# Patient Record
Sex: FEMALE | Race: WHITE | NOT HISPANIC OR LATINO | Employment: OTHER | URBAN - METROPOLITAN AREA
[De-identification: names, ages, dates, MRNs, and addresses within clinical notes are randomized per-mention and may not be internally consistent; named-entity substitution may affect disease eponyms.]

---

## 2019-08-30 ENCOUNTER — HOSPITAL ENCOUNTER (OUTPATIENT)
Dept: PREADMISSION TESTING | Facility: HOSPITAL | Age: 79
Discharge: HOME | End: 2019-08-30
Attending: INTERNAL MEDICINE
Payer: MEDICARE

## 2019-08-30 VITALS — WEIGHT: 170 LBS | HEIGHT: 57 IN | BODY MASS INDEX: 36.68 KG/M2

## 2019-08-30 DIAGNOSIS — Z01.812 PRE-PROCEDURE LAB EXAM: Primary | ICD-10-CM

## 2019-08-30 LAB
ALBUMIN SERPL BCP-MCNC: 3.9 G/DL (ref 3.5–5.2)
ALP SERPL-CCNC: 87 U/L (ref 55–135)
ALT SERPL W/O P-5'-P-CCNC: 16 U/L (ref 10–44)
ANION GAP SERPL CALC-SCNC: 13 MMOL/L (ref 8–16)
APTT PPP: 27.5 SEC (ref 26.2–34.7)
AST SERPL-CCNC: 19 U/L (ref 10–40)
BILIRUB SERPL-MCNC: 0.9 MG/DL (ref 0.1–1)
BUN SERPL-MCNC: 20 MG/DL (ref 8–23)
CALCIUM SERPL-MCNC: 9.5 MG/DL (ref 8.7–10.5)
CHLORIDE SERPL-SCNC: 107 MMOL/L (ref 95–110)
CO2 SERPL-SCNC: 24 MMOL/L (ref 23–29)
CREAT SERPL-MCNC: 1 MG/DL (ref 0.5–1.4)
ERYTHROCYTE [DISTWIDTH] IN BLOOD BY AUTOMATED COUNT: 12.8 % (ref 11.5–14.5)
EST. GFR  (AFRICAN AMERICAN): >60 ML/MIN/1.73 M^2
EST. GFR  (NON AFRICAN AMERICAN): 53.7 ML/MIN/1.73 M^2
GLUCOSE SERPL-MCNC: 107 MG/DL (ref 70–110)
HCT VFR BLD AUTO: 42.2 % (ref 37–48.5)
HGB BLD-MCNC: 13.7 G/DL (ref 12–16)
INR PPP: 1
MAGNESIUM SERPL-MCNC: 1.9 MG/DL (ref 1.6–2.6)
MCH RBC QN AUTO: 29.5 PG (ref 27–31)
MCHC RBC AUTO-ENTMCNC: 32.5 G/DL (ref 32–36)
MCV RBC AUTO: 91 FL (ref 82–98)
PLATELET # BLD AUTO: 200 K/UL (ref 150–350)
PMV BLD AUTO: 9.5 FL (ref 9.2–12.9)
POTASSIUM SERPL-SCNC: 4.8 MMOL/L (ref 3.5–5.1)
PROT SERPL-MCNC: 7.1 G/DL (ref 6–8.4)
PROTHROMBIN TIME: 12.8 SEC (ref 11.7–14)
RBC # BLD AUTO: 4.64 M/UL (ref 4–5.4)
SODIUM SERPL-SCNC: 144 MMOL/L (ref 136–145)
WBC # BLD AUTO: 7.7 K/UL (ref 3.9–12.7)

## 2019-08-30 PROCEDURE — 85730 THROMBOPLASTIN TIME PARTIAL: CPT

## 2019-08-30 PROCEDURE — 83735 ASSAY OF MAGNESIUM: CPT

## 2019-08-30 PROCEDURE — 36415 COLL VENOUS BLD VENIPUNCTURE: CPT

## 2019-08-30 PROCEDURE — 80053 COMPREHEN METABOLIC PANEL: CPT

## 2019-08-30 PROCEDURE — 93005 ELECTROCARDIOGRAM TRACING: CPT

## 2019-08-30 PROCEDURE — 85027 COMPLETE CBC AUTOMATED: CPT

## 2019-08-30 PROCEDURE — 85610 PROTHROMBIN TIME: CPT

## 2019-08-30 RX ORDER — METFORMIN HYDROCHLORIDE 500 MG/1
500 TABLET ORAL 2 TIMES DAILY WITH MEALS
Status: ON HOLD | COMMUNITY
End: 2020-02-13 | Stop reason: HOSPADM

## 2019-08-30 RX ORDER — LISINOPRIL 20 MG/1
20 TABLET ORAL DAILY
Status: ON HOLD | COMMUNITY
End: 2020-02-13 | Stop reason: HOSPADM

## 2019-08-30 RX ORDER — NAPROXEN SODIUM 220 MG/1
81 TABLET, FILM COATED ORAL DAILY
Status: ON HOLD | COMMUNITY
End: 2020-02-11

## 2019-09-04 ENCOUNTER — HOSPITAL ENCOUNTER (OUTPATIENT)
Facility: HOSPITAL | Age: 79
Discharge: HOME | End: 2019-09-04
Attending: INTERNAL MEDICINE | Admitting: INTERNAL MEDICINE
Payer: MEDICARE

## 2019-09-04 ENCOUNTER — CLINICAL SUPPORT (OUTPATIENT)
Dept: CARDIOLOGY | Facility: HOSPITAL | Age: 79
End: 2019-09-04
Attending: INTERNAL MEDICINE
Payer: MEDICARE

## 2019-09-04 ENCOUNTER — ANESTHESIA EVENT (OUTPATIENT)
Dept: CARDIOLOGY | Facility: HOSPITAL | Age: 79
End: 2019-09-04
Payer: MEDICARE

## 2019-09-04 ENCOUNTER — ANESTHESIA (OUTPATIENT)
Dept: CARDIOLOGY | Facility: HOSPITAL | Age: 79
End: 2019-09-04
Payer: MEDICARE

## 2019-09-04 VITALS
OXYGEN SATURATION: 98 % | TEMPERATURE: 98 F | SYSTOLIC BLOOD PRESSURE: 125 MMHG | DIASTOLIC BLOOD PRESSURE: 59 MMHG | HEART RATE: 82 BPM | RESPIRATION RATE: 17 BRPM

## 2019-09-04 DIAGNOSIS — I05.0 RHEUMATIC MITRAL STENOSIS: ICD-10-CM

## 2019-09-04 DIAGNOSIS — I35.0 NONRHEUMATIC AORTIC VALVE STENOSIS: ICD-10-CM

## 2019-09-04 DIAGNOSIS — R06.09 DYSPNEA ON EFFORT: ICD-10-CM

## 2019-09-04 LAB — GLUCOSE SERPL-MCNC: 116 MG/DL (ref 70–110)

## 2019-09-04 PROCEDURE — C1894 INTRO/SHEATH, NON-LASER: HCPCS | Performed by: INTERNAL MEDICINE

## 2019-09-04 PROCEDURE — 99153 MOD SED SAME PHYS/QHP EA: CPT | Performed by: INTERNAL MEDICINE

## 2019-09-04 PROCEDURE — 25000003 PHARM REV CODE 250: Performed by: INTERNAL MEDICINE

## 2019-09-04 PROCEDURE — 25000003 PHARM REV CODE 250: Performed by: ANESTHESIOLOGY

## 2019-09-04 PROCEDURE — 99152 MOD SED SAME PHYS/QHP 5/>YRS: CPT | Performed by: INTERNAL MEDICINE

## 2019-09-04 PROCEDURE — C1887 CATHETER, GUIDING: HCPCS | Performed by: INTERNAL MEDICINE

## 2019-09-04 PROCEDURE — 93312 ECHO TRANSESOPHAGEAL: CPT

## 2019-09-04 PROCEDURE — 63600175 PHARM REV CODE 636 W HCPCS: Mod: GZ | Performed by: INTERNAL MEDICINE

## 2019-09-04 PROCEDURE — 63600175 PHARM REV CODE 636 W HCPCS: Performed by: ANESTHESIOLOGY

## 2019-09-04 PROCEDURE — 63600175 PHARM REV CODE 636 W HCPCS: Performed by: INTERNAL MEDICINE

## 2019-09-04 PROCEDURE — 27800903 OPTIME MED/SURG SUP & DEVICES OTHER IMPLANTS: Performed by: INTERNAL MEDICINE

## 2019-09-04 PROCEDURE — C1769 GUIDE WIRE: HCPCS | Performed by: INTERNAL MEDICINE

## 2019-09-04 PROCEDURE — 93571 IV DOP VEL&/PRESS C FLO 1ST: CPT | Performed by: INTERNAL MEDICINE

## 2019-09-04 PROCEDURE — 93454 CORONARY ARTERY ANGIO S&I: CPT | Performed by: INTERNAL MEDICINE

## 2019-09-04 RX ORDER — MIDAZOLAM HYDROCHLORIDE 1 MG/ML
INJECTION INTRAMUSCULAR; INTRAVENOUS
Status: DISCONTINUED | OUTPATIENT
Start: 2019-09-04 | End: 2019-09-04 | Stop reason: HOSPADM

## 2019-09-04 RX ORDER — DIPHENHYDRAMINE HCL 25 MG
25 CAPSULE ORAL ONCE
Status: ACTIVE | OUTPATIENT
Start: 2019-09-04

## 2019-09-04 RX ORDER — NITROGLYCERIN 0.4 MG/1
0.4 TABLET SUBLINGUAL EVERY 5 MIN PRN
Status: DISCONTINUED | OUTPATIENT
Start: 2019-09-04 | End: 2019-09-04 | Stop reason: HOSPADM

## 2019-09-04 RX ORDER — LIDOCAINE HYDROCHLORIDE 20 MG/ML
INJECTION, SOLUTION EPIDURAL; INFILTRATION; INTRACAUDAL; PERINEURAL
Status: DISCONTINUED | OUTPATIENT
Start: 2019-09-04 | End: 2019-09-04

## 2019-09-04 RX ORDER — SODIUM CHLORIDE 450 MG/100ML
INJECTION, SOLUTION INTRAVENOUS CONTINUOUS
Status: DISCONTINUED | OUTPATIENT
Start: 2019-09-04 | End: 2019-09-04 | Stop reason: HOSPADM

## 2019-09-04 RX ORDER — ONDANSETRON 2 MG/ML
4 INJECTION INTRAMUSCULAR; INTRAVENOUS EVERY 12 HOURS PRN
Status: DISCONTINUED | OUTPATIENT
Start: 2019-09-04 | End: 2019-09-04 | Stop reason: HOSPADM

## 2019-09-04 RX ORDER — PROPOFOL 10 MG/ML
VIAL (ML) INTRAVENOUS
Status: DISCONTINUED | OUTPATIENT
Start: 2019-09-04 | End: 2019-09-04

## 2019-09-04 RX ORDER — VERAPAMIL HYDROCHLORIDE 2.5 MG/ML
INJECTION, SOLUTION INTRAVENOUS
Status: DISCONTINUED | OUTPATIENT
Start: 2019-09-04 | End: 2019-09-04 | Stop reason: HOSPADM

## 2019-09-04 RX ORDER — DIAZEPAM 5 MG/1
5 TABLET ORAL
Status: ACTIVE | OUTPATIENT
Start: 2019-09-04

## 2019-09-04 RX ORDER — LIDOCAINE HYDROCHLORIDE 10 MG/ML
INJECTION, SOLUTION EPIDURAL; INFILTRATION; INTRACAUDAL; PERINEURAL
Status: DISCONTINUED | OUTPATIENT
Start: 2019-09-04 | End: 2019-09-04 | Stop reason: HOSPADM

## 2019-09-04 RX ORDER — LABETALOL HYDROCHLORIDE 5 MG/ML
INJECTION, SOLUTION INTRAVENOUS
Status: DISCONTINUED | OUTPATIENT
Start: 2019-09-04 | End: 2019-09-04

## 2019-09-04 RX ORDER — ASPIRIN 325 MG
325 TABLET, DELAYED RELEASE (ENTERIC COATED) ORAL ONCE
Status: ACTIVE | OUTPATIENT
Start: 2019-09-04

## 2019-09-04 RX ORDER — NITROGLYCERIN 5 MG/ML
INJECTION, SOLUTION INTRAVENOUS
Status: DISCONTINUED | OUTPATIENT
Start: 2019-09-04 | End: 2019-09-04 | Stop reason: HOSPADM

## 2019-09-04 RX ORDER — HEPARIN SODIUM 1000 [USP'U]/ML
INJECTION, SOLUTION INTRAVENOUS; SUBCUTANEOUS
Status: DISCONTINUED | OUTPATIENT
Start: 2019-09-04 | End: 2019-09-04 | Stop reason: HOSPADM

## 2019-09-04 RX ORDER — ACETAMINOPHEN 325 MG/1
650 TABLET ORAL EVERY 4 HOURS PRN
Status: DISCONTINUED | OUTPATIENT
Start: 2019-09-04 | End: 2019-09-04 | Stop reason: HOSPADM

## 2019-09-04 RX ORDER — SODIUM CHLORIDE 9 MG/ML
INJECTION, SOLUTION INTRAVENOUS CONTINUOUS
Status: ACTIVE | OUTPATIENT
Start: 2019-09-04

## 2019-09-04 RX ORDER — MORPHINE SULFATE 2 MG/ML
2 INJECTION, SOLUTION INTRAMUSCULAR; INTRAVENOUS
Status: DISCONTINUED | OUTPATIENT
Start: 2019-09-04 | End: 2019-09-04 | Stop reason: HOSPADM

## 2019-09-04 RX ORDER — HYDROCODONE BITARTRATE AND ACETAMINOPHEN 5; 325 MG/1; MG/1
1 TABLET ORAL EVERY 4 HOURS PRN
Status: DISCONTINUED | OUTPATIENT
Start: 2019-09-04 | End: 2019-09-04 | Stop reason: HOSPADM

## 2019-09-04 RX ORDER — FENTANYL CITRATE 50 UG/ML
INJECTION, SOLUTION INTRAMUSCULAR; INTRAVENOUS
Status: DISCONTINUED | OUTPATIENT
Start: 2019-09-04 | End: 2019-09-04 | Stop reason: HOSPADM

## 2019-09-04 RX ORDER — DIPHENHYDRAMINE HYDROCHLORIDE 50 MG/ML
INJECTION INTRAMUSCULAR; INTRAVENOUS
Status: DISCONTINUED | OUTPATIENT
Start: 2019-09-04 | End: 2019-09-04 | Stop reason: HOSPADM

## 2019-09-04 RX ADMIN — SODIUM CHLORIDE: 0.9 INJECTION, SOLUTION INTRAVENOUS at 08:09

## 2019-09-04 RX ADMIN — PROPOFOL 20 MG: 10 INJECTION, EMULSION INTRAVENOUS at 08:09

## 2019-09-04 RX ADMIN — LIDOCAINE HYDROCHLORIDE 40 MG: 20 INJECTION, SOLUTION EPIDURAL; INFILTRATION; INTRACAUDAL; PERINEURAL at 08:09

## 2019-09-04 RX ADMIN — LABETALOL HYDROCHLORIDE 5 MG: 5 INJECTION INTRAVENOUS at 08:09

## 2019-09-04 RX ADMIN — LIDOCAINE HYDROCHLORIDE 60 MG: 20 INJECTION, SOLUTION EPIDURAL; INFILTRATION; INTRACAUDAL; PERINEURAL at 08:09

## 2019-09-04 RX ADMIN — SODIUM CHLORIDE: 0.45 INJECTION, SOLUTION INTRAVENOUS at 11:09

## 2019-09-04 RX ADMIN — SODIUM CHLORIDE: 0.9 INJECTION, SOLUTION INTRAVENOUS at 06:09

## 2019-09-04 RX ADMIN — PROPOFOL 30 MG: 10 INJECTION, EMULSION INTRAVENOUS at 08:09

## 2019-09-04 NOTE — ANESTHESIA POSTPROCEDURE EVALUATION
Anesthesia Post Evaluation    Patient: Roxanne Hernandez    Procedure(s) Performed: Procedure(s) (LRB):  CATHETERIZATION, HEART, LEFT (Left)    Final Anesthesia Type: MAC  Patient location during evaluation: PACU  Patient participation: Yes- Able to Participate  Level of consciousness: awake and alert and oriented  Post-procedure vital signs: reviewed and stable  Pain management: adequate  Airway patency: patent  PONV status at discharge: No PONV  Anesthetic complications: no      Cardiovascular status: blood pressure returned to baseline and hemodynamically stable  Respiratory status: unassisted, spontaneous ventilation and nasal cannula  Hydration status: euvolemic  Follow-up not needed.          Vitals Value Taken Time   /60 9/4/2019  8:38 AM   Temp 36.4 °C (97.5 °F) 9/4/2019  8:38 AM   Pulse 84 9/4/2019  8:38 AM   Resp 20 9/4/2019  8:38 AM   SpO2 98 % 9/4/2019  8:38 AM         No case tracking events are documented in the log.      Pain/Paula Score: Paula Score: 10 (9/4/2019  7:59 AM)

## 2019-09-04 NOTE — PROCEDURES
Indication:   Mitral stenosis, aortic stenosis and regurgitation    Counseling:   Patient as well as her family member were informed of the risks, benefits as well as alternatives to the procedure and informed consent was obtained.     Procedure:  After obtaining informed consent and sedated with Anesthesia help a VICKY probe was advanced without difficulty and images were obtained. Patient tolerated the procedure well and no immediate complications were noted.     Complications:   None immediate    Findings:  1. Aortic valve: Heavily calcified with restricted leaflet motion. Moderate aortic regurgitation.   2. Mitral valve: Thick and calcified with restricted leaflet motion. Mild mitral stenosis and regurgitation.   3. Tricuspid valve: Is structurally normal with good leaflet excursion. Mild regurgitation.  4. Pulmonary valve is structurally normal with good leaflet excursion. No significant regurgitation.  5. Overall LVEF appears normal.   6. No obvious shunt across the interatrial septum by color flow Doppler.     Conclusions:  1. Mild mitral stenosis.   2. Moderate aortic regurgitation.   3. Known severe aortic stenosis on previous TTE.

## 2019-09-04 NOTE — PLAN OF CARE
Verbalizes understanding of discharge instructions, angio site care, and follow up care. Belongings gathered and dressed in personal clothing. Left via wheelchair to private auto accompanied family.

## 2019-09-04 NOTE — Clinical Note
Catheter is inserted into the ostial  left coronary artery. Angiography performed of the left coronary arteries in multiple views. Angiography performed via power injection with 8 mL contrast at 4 mL/s.

## 2019-09-04 NOTE — ANESTHESIA PREPROCEDURE EVALUATION
09/04/2019  Roxanne Hernandez is a 79 y.o., female.    Anesthesia Evaluation    I have reviewed the Patient Summary Reports.    I have reviewed the Nursing Notes.   I have reviewed the Medications.     Review of Systems  Anesthesia Hx:  No problems with previous Anesthesia    Social:  Former Smoker, No Alcohol Use    Hematology/Oncology:  Hematology Normal       -- Cancer in past history (cervical s/p dung):    EENT/Dental:EENT/Dental Normal   Pulmonary:   Shortness of breath (related to Valve Dz)    Renal/:  Renal/ Normal     Hepatic/GI:   GERD, well controlled    Musculoskeletal:  Musculoskeletal Normal    Neurological:  Neurology Normal    Endocrine:   Diabetes (Blood Glucose this am 116)    Dermatological:  Skin Normal    Psych:  Psychiatric Normal           Physical Exam  General:  Well nourished, Obesity    Airway/Jaw/Neck:  Airway Findings: Mouth Opening: Normal Tongue: Normal  General Airway Assessment: Adult  Mallampati: III  Improves to II with phonation.  TM Distance: Normal, at least 6 cm  Jaw/Neck Findings:     Neck ROM: Normal ROM      Dental:  Dental Findings: Edentulous   Chest/Lungs:  Chest/Lungs Findings: Clear to auscultation, Normal Respiratory Rate     Heart/Vascular:  Heart Findings: Rate: Normal  Rhythm: Regular Rhythm  Sounds: Normal  Heart Murmur  Systolic, Diastolic        Mental Status:  Mental Status Findings:  Cooperative, Alert and Oriented         Anesthesia Plan  Type of Anesthesia, risks & benefits discussed:  Anesthesia Type:  MAC  Patient's Preference:   Intra-op Monitoring Plan: standard ASA monitors  Intra-op Monitoring Plan Comments:   Post Op Pain Control Plan: per primary service following discharge from PACU  Post Op Pain Control Plan Comments:   Induction:   IV  Beta Blocker:  Patient is not currently on a Beta-Blocker (No further documentation required).       Informed  Consent: Patient understands risks and agrees with Anesthesia plan.  Questions answered. Anesthesia consent signed with patient.  ASA Score: 4     Day of Surgery Review of History & Physical:            Ready For Surgery From Anesthesia Perspective.

## 2019-09-04 NOTE — Clinical Note
70 ml injected throughout the case. 80 mL total wasted during the case. 150 mL total used in the case.

## 2019-09-04 NOTE — TRANSFER OF CARE
Anesthesia Transfer of Care Note    Patient: Roxanne Hernandez    Procedure(s) Performed: Procedure(s) (LRB):  CATHETERIZATION, HEART, LEFT (Left)    Patient location: Other: heart center    Anesthesia Type: MAC    Transport from OR: Transported from OR on 2-3 L/min O2 by NC with adequate spontaneous ventilation    Post pain: adequate analgesia    Post assessment: no apparent anesthetic complications    Post vital signs: stable    Level of consciousness: awake, alert and oriented    Complications: none    Transfer of care protocol was followed      Last vitals:   Visit Vitals  /60 (BP Location: Left arm, Patient Position: Lying)   Pulse 84   Temp 36.4 °C (97.5 °F) (Oral)   Resp 20   SpO2 98%   Breastfeeding? No

## 2019-09-04 NOTE — Clinical Note
Catheter is repositioned to the ostium   right coronary artery. Angiography performed of the right coronary arteries in multiple views. Angiography performed via power injection with 6 mL contrast at 3 mL/s.

## 2019-09-04 NOTE — INTERVAL H&P NOTE
The patient has been examined and the H&P has been reviewed:    I concur with the findings and no changes have occurred since H&P was written.    Anesthesia/Surgery risks, benefits and alternative options discussed and understood by patient/family.          Active Hospital Problems    Diagnosis  POA    Nonrheumatic aortic valve stenosis [I35.0]  Yes     Priority: High      Resolved Hospital Problems   No resolved problems to display.

## 2019-09-04 NOTE — Clinical Note
Prepped: right groin and right radial. Prepped with: chlorhexidine. The site was clipped. The patient was draped.

## 2019-09-19 DIAGNOSIS — I35.0 NONRHEUMATIC AORTIC VALVE STENOSIS: Primary | ICD-10-CM

## 2019-10-31 LAB
E WAVE DECELERATION TIME: 269.29 MSEC
PISA TR MAX VEL: 3.25 M/S
TR MAX PG: 42 MMHG

## 2019-11-11 ENCOUNTER — TELEPHONE (OUTPATIENT)
Dept: GASTROENTEROLOGY | Facility: CLINIC | Age: 79
End: 2019-11-11

## 2019-11-11 ENCOUNTER — HOSPITAL ENCOUNTER (OUTPATIENT)
Dept: CARDIOLOGY | Facility: CLINIC | Age: 79
Discharge: HOME | End: 2019-11-11
Attending: INTERNAL MEDICINE
Payer: MEDICARE

## 2019-11-11 ENCOUNTER — HOSPITAL ENCOUNTER (OUTPATIENT)
Dept: PULMONOLOGY | Facility: CLINIC | Age: 79
Discharge: HOME | End: 2019-11-11
Payer: MEDICARE

## 2019-11-11 ENCOUNTER — OFFICE VISIT (OUTPATIENT)
Dept: CARDIOLOGY | Facility: CLINIC | Age: 79
End: 2019-11-11
Payer: MEDICARE

## 2019-11-11 ENCOUNTER — TELEPHONE (OUTPATIENT)
Dept: UROLOGY | Facility: CLINIC | Age: 79
End: 2019-11-11

## 2019-11-11 ENCOUNTER — HOSPITAL ENCOUNTER (OUTPATIENT)
Dept: CARDIOLOGY | Facility: CLINIC | Age: 79
Discharge: HOME | End: 2019-11-11
Payer: MEDICARE

## 2019-11-11 ENCOUNTER — HOSPITAL ENCOUNTER (OUTPATIENT)
Dept: RADIOLOGY | Facility: HOSPITAL | Age: 79
Discharge: HOME | End: 2019-11-11
Attending: INTERNAL MEDICINE
Payer: MEDICARE

## 2019-11-11 VITALS
WEIGHT: 177.5 LBS | BODY MASS INDEX: 37.26 KG/M2 | HEIGHT: 58 IN | OXYGEN SATURATION: 98 % | SYSTOLIC BLOOD PRESSURE: 156 MMHG | DIASTOLIC BLOOD PRESSURE: 86 MMHG | HEART RATE: 115 BPM

## 2019-11-11 VITALS — HEIGHT: 57 IN | WEIGHT: 176 LBS | BODY MASS INDEX: 37.97 KG/M2

## 2019-11-11 VITALS
DIASTOLIC BLOOD PRESSURE: 72 MMHG | HEART RATE: 98 BPM | SYSTOLIC BLOOD PRESSURE: 148 MMHG | WEIGHT: 176 LBS | HEIGHT: 57 IN | BODY MASS INDEX: 37.97 KG/M2

## 2019-11-11 DIAGNOSIS — I34.2 NONRHEUMATIC MITRAL VALVE STENOSIS: ICD-10-CM

## 2019-11-11 DIAGNOSIS — I35.0 NONRHEUMATIC AORTIC VALVE STENOSIS: ICD-10-CM

## 2019-11-11 DIAGNOSIS — N20.1 LEFT URETERAL STONE: Primary | ICD-10-CM

## 2019-11-11 DIAGNOSIS — E11.9 DIABETES MELLITUS WITHOUT COMPLICATION: ICD-10-CM

## 2019-11-11 DIAGNOSIS — I35.1 NONRHEUMATIC AORTIC VALVE INSUFFICIENCY: ICD-10-CM

## 2019-11-11 DIAGNOSIS — N13.30 HYDRONEPHROSIS OF LEFT KIDNEY: ICD-10-CM

## 2019-11-11 DIAGNOSIS — I10 ESSENTIAL HYPERTENSION: ICD-10-CM

## 2019-11-11 DIAGNOSIS — K83.8 PNEUMOBILIA: ICD-10-CM

## 2019-11-11 DIAGNOSIS — N13.2 HYDRONEPHROSIS WITH URINARY OBSTRUCTION DUE TO URETERAL CALCULUS: ICD-10-CM

## 2019-11-11 DIAGNOSIS — I35.0 NONRHEUMATIC AORTIC VALVE STENOSIS: Primary | ICD-10-CM

## 2019-11-11 LAB
ASCENDING AORTA: 2.83 CM
AV INDEX (PROSTH): 0.29
AV MEAN GRADIENT: 53 MMHG
AV PEAK GRADIENT: 85 MMHG
AV REGURGITATION PRESSURE HALF TIME: 300 MS
AV VALVE AREA: 0.82 CM2
AV VELOCITY RATIO: 0.34
BSA FOR ECHO PROCEDURE: 1.79 M2
CV ECHO LV RWT: 0.87 CM
DLCO ADJ PRE: 13.18 ML/(MIN*MMHG) (ref 9.94–21.41)
DLCO SINGLE BREATH LLN: 9.94
DLCO SINGLE BREATH PRE REF: 84.1 %
DLCO SINGLE BREATH REF: 15.68
DLCOC SBVA LLN: 2.28
DLCOC SBVA PRE REF: 113 %
DLCOC SBVA REF: 4.15
DLCOC SINGLE BREATH LLN: 9.94
DLCOC SINGLE BREATH PRE REF: 84.1 %
DLCOC SINGLE BREATH REF: 15.68
DLCOCSBVAULN: 6.01
DLCOCSINGLEBREATHULN: 21.41
DLCOSINGLEBREATHULN: 21.41
DLCOVA LLN: 2.28
DLCOVA PRE REF: 113 %
DLCOVA PRE: 4.69 ML/(MIN*MMHG*L) (ref 2.28–6.01)
DLCOVA REF: 4.15
DLCOVAULN: 6.01
DLVAADJ PRE: 4.69 ML/(MIN*MMHG*L) (ref 2.28–6.01)
DOP CALC AO PEAK VEL: 4.6 M/S
DOP CALC AO VTI: 103.17 CM
DOP CALC LVOT AREA: 2.8 CM2
DOP CALC LVOT DIAMETER: 1.9 CM
DOP CALC LVOT PEAK VEL: 1.55 M/S
DOP CALC LVOT STROKE VOLUME: 85.02 CM3
DOP CALCLVOT PEAK VEL VTI: 30 CM
E WAVE DECELERATION TIME: 316.28 MSEC
E/A RATIO: 0.72
E/E' RATIO: 38.5 M/S
ECHO LV POSTERIOR WALL: 1.35 CM (ref 0.6–1.1)
FEF 25 75 LLN: 0.56
FEF 25 75 PRE REF: 68.2 %
FEF 25 75 REF: 1.34
FEV1 FVC LLN: 63
FEV1 FVC PRE REF: 95 %
FEV1 FVC REF: 78
FEV1 LLN: 1.07
FEV1 PRE REF: 76.3 %
FEV1 REF: 1.53
FRACTIONAL SHORTENING: 24 % (ref 28–44)
FVC LLN: 1.39
FVC PRE REF: 79.7 %
FVC REF: 1.98
HR TR ECHO: 92 BPM
INTERVENTRICULAR SEPTUM: 1.25 CM (ref 0.6–1.1)
IVC PRE: 1.62 L (ref 1.39–2.56)
IVC SINGLE BREATH LLN: 1.39
IVC SINGLE BREATH PRE REF: 82.1 %
IVC SINGLE BREATH REF: 1.98
IVCSINGLEBREATHULN: 2.56
LA MAJOR: 5.42 CM
LA MINOR: 5.36 CM
LA WIDTH: 4.39 CM
LEFT ATRIUM SIZE: 4.55 CM
LEFT ATRIUM VOLUME INDEX: 53.7 ML/M2
LEFT ATRIUM VOLUME: 91.51 CM3
LEFT INTERNAL DIMENSION IN SYSTOLE: 2.37 CM (ref 2.1–4)
LEFT VENTRICLE DIASTOLIC VOLUME INDEX: 22.51 ML/M2
LEFT VENTRICLE DIASTOLIC VOLUME: 38.34 ML
LEFT VENTRICLE MASS INDEX: 77 G/M2
LEFT VENTRICLE SYSTOLIC VOLUME INDEX: 11.4 ML/M2
LEFT VENTRICLE SYSTOLIC VOLUME: 19.44 ML
LEFT VENTRICULAR INTERNAL DIMENSION IN DIASTOLE: 3.11 CM (ref 3.5–6)
LEFT VENTRICULAR MASS: 130.47 G
LV LATERAL E/E' RATIO: 38.5 M/S
LV SEPTAL E/E' RATIO: 38.5 M/S
MV MEAN GRADIENT: 9 MMHG
MV PEAK A VEL: 2.14 M/S
MV PEAK E VEL: 1.54 M/S
MV PEAK GRADIENT: 16 MMHG
MVV LLN: 39
MVV PRE REF: 56.4 %
MVV REF: 46
PEF LLN: 2.52
PEF PRE REF: 65.5 %
PEF REF: 3.88
PISA TR MAX VEL: 2.99 M/S
PRE DLCO: 13.18 ML/(MIN*MMHG) (ref 9.94–21.41)
PRE FEF 25 75: 0.91 L/S (ref 0.56–2.11)
PRE FET 100: 7.18 SEC
PRE FEV1 FVC: 74.08 % (ref 63.27–92.76)
PRE FEV1: 1.17 L (ref 1.07–1.98)
PRE FEV5: 0.9 L
PRE FVC: 1.57 L (ref 1.39–2.56)
PRE MVV: 26 L/MIN (ref 39.19–53.02)
PRE PEF: 2.54 L/S (ref 2.52–5.24)
PULM VEIN S/D RATIO: 2.24
PV PEAK D VEL: 0.33 M/S
PV PEAK S VEL: 0.74 M/S
RA MAJOR: 4.77 CM
RA PRESSURE: 3 MMHG
RA WIDTH: 2.83 CM
RIGHT VENTRICULAR END-DIASTOLIC DIMENSION: 2.72 CM
RV TISSUE DOPPLER FREE WALL SYSTOLIC VELOCITY 1 (APICAL 4 CHAMBER VIEW): 9.62 CM/S
SINUS: 2.55 CM
STJ: 2.18 CM
TDI LATERAL: 0.04 M/S
TDI SEPTAL: 0.04 M/S
TDI: 0.04 M/S
TR MAX PG: 36 MMHG
TRICUSPID ANNULAR PLANE SYSTOLIC EXCURSION: 2.04 CM
TV REST PULMONARY ARTERY PRESSURE: 39 MMHG
VA PRE: 2.81 L (ref 3.63–3.63)
VA SINGLE BREATH LLN: 3.63
VA SINGLE BREATH PRE REF: 77.5 %
VA SINGLE BREATH REF: 3.63
VASINGLEBREATHULN: 3.63

## 2019-11-11 PROCEDURE — 99204 PR OFFICE/OUTPT VISIT, NEW, LEVL IV, 45-59 MIN: ICD-10-PCS | Mod: S$PBB,,, | Performed by: INTERNAL MEDICINE

## 2019-11-11 PROCEDURE — 25500020 PHARM REV CODE 255: Performed by: INTERNAL MEDICINE

## 2019-11-11 PROCEDURE — 94618 PULMONARY STRESS TESTING: ICD-10-PCS | Mod: 26,S$PBB,, | Performed by: INTERNAL MEDICINE

## 2019-11-11 PROCEDURE — 99213 OFFICE O/P EST LOW 20 MIN: CPT | Mod: PBBFAC,25

## 2019-11-11 PROCEDURE — 94010 BREATHING CAPACITY TEST: CPT | Mod: PBBFAC | Performed by: INTERNAL MEDICINE

## 2019-11-11 PROCEDURE — 94618 PULMONARY STRESS TESTING: CPT | Mod: 26,S$PBB,, | Performed by: INTERNAL MEDICINE

## 2019-11-11 PROCEDURE — 93010 EKG 12-LEAD: ICD-10-PCS | Mod: S$PBB,,, | Performed by: INTERNAL MEDICINE

## 2019-11-11 PROCEDURE — 94618 PULMONARY STRESS TESTING: CPT | Mod: PBBFAC | Performed by: INTERNAL MEDICINE

## 2019-11-11 PROCEDURE — 94729 DIFFUSING CAPACITY: CPT | Mod: 26,S$PBB,, | Performed by: INTERNAL MEDICINE

## 2019-11-11 PROCEDURE — 94729 DIFFUSING CAPACITY: CPT | Mod: PBBFAC | Performed by: INTERNAL MEDICINE

## 2019-11-11 PROCEDURE — 99999 PR PBB SHADOW E&M-EST. PATIENT-LVL III: ICD-10-PCS | Mod: PBBFAC,,,

## 2019-11-11 PROCEDURE — 74174 CTA CARDIAC TAVR_PARTNERS (XPD): ICD-10-PCS | Mod: 26,,, | Performed by: RADIOLOGY

## 2019-11-11 PROCEDURE — 94010 BREATHING CAPACITY TEST: ICD-10-PCS | Mod: 26,S$PBB,, | Performed by: INTERNAL MEDICINE

## 2019-11-11 PROCEDURE — 93010 ELECTROCARDIOGRAM REPORT: CPT | Mod: S$PBB,,, | Performed by: INTERNAL MEDICINE

## 2019-11-11 PROCEDURE — 71275 CTA CARDIAC TAVR_PARTNERS (XPD): ICD-10-PCS | Mod: 26,,, | Performed by: RADIOLOGY

## 2019-11-11 PROCEDURE — 74174 CTA ABD&PLVS W/CONTRAST: CPT | Mod: 26,,, | Performed by: RADIOLOGY

## 2019-11-11 PROCEDURE — 94010 BREATHING CAPACITY TEST: CPT | Mod: 26,S$PBB,, | Performed by: INTERNAL MEDICINE

## 2019-11-11 PROCEDURE — 71275 CT ANGIOGRAPHY CHEST: CPT | Mod: TC

## 2019-11-11 PROCEDURE — 93306 TTE W/DOPPLER COMPLETE: CPT | Mod: PBBFAC | Performed by: INTERNAL MEDICINE

## 2019-11-11 PROCEDURE — 94729 PR C02/MEMBANE DIFFUSE CAPACITY: ICD-10-PCS | Mod: 26,S$PBB,, | Performed by: INTERNAL MEDICINE

## 2019-11-11 PROCEDURE — 71275 CT ANGIOGRAPHY CHEST: CPT | Mod: 26,,, | Performed by: RADIOLOGY

## 2019-11-11 PROCEDURE — 93005 ELECTROCARDIOGRAM TRACING: CPT | Mod: PBBFAC | Performed by: INTERNAL MEDICINE

## 2019-11-11 PROCEDURE — 93306 ECHO (CUPID ONLY): ICD-10-PCS | Mod: 26,S$PBB,, | Performed by: INTERNAL MEDICINE

## 2019-11-11 PROCEDURE — 99999 PR PBB SHADOW E&M-EST. PATIENT-LVL III: CPT | Mod: PBBFAC,,,

## 2019-11-11 PROCEDURE — 99204 OFFICE O/P NEW MOD 45 MIN: CPT | Mod: S$PBB,,, | Performed by: INTERNAL MEDICINE

## 2019-11-11 RX ADMIN — IOHEXOL 100 ML: 350 INJECTION, SOLUTION INTRAVENOUS at 01:11

## 2019-11-11 NOTE — TELEPHONE ENCOUNTER
CT  11/11/19  Kidneys are normal in size and location.  Multiple nonobstructing right renal stones identified with the largest measuring at 1.2 cm.  There is severe left-sided hydronephrosis with severe dilatation of the left ureter throughout its course with a large obstructing stone at the left UVJ measuring 2.6 cm (series 601, image 85).  There is mild perinephric stranding surrounding the left kidney.     Right ureter is normal in course to the urinary bladder. There is apparent dilatation of the distal right ureter into the UVJ without apparent obstructing process.  Prior hysterectomy calcifications in the right adnexa and surgical clips.    Left ureteral stone  -     Urine culture; Future; Expected date: 11/11/2019  -     X-Ray Abdomen AP 1 View; Future; Expected date: 11/11/2019    Hydronephrosis of left kidney  -     Urine culture; Future; Expected date: 11/11/2019  -     X-Ray Abdomen AP 1 View; Future; Expected date: 11/11/2019    please schedule her for cysto bilateral RPGs, left ureteral stent placement this Weds.  Thank you.

## 2019-11-11 NOTE — TELEPHONE ENCOUNTER
Left ureteral stone  -     Case Request Operating Room: CYSTOSCOPY, WITH RETROGRADE PYELOGRAM, CYSTOSCOPY, WITH URETERAL STENT INSERTION    Hydronephrosis of left kidney  -     Case Request Operating Room: CYSTOSCOPY, WITH RETROGRADE PYELOGRAM, CYSTOSCOPY, WITH URETERAL STENT INSERTION

## 2019-11-11 NOTE — PROGRESS NOTES
Cardiology Clinic Note  Reason for Visit: Severe AS  Referring MD: Jett    HPI:     Roxanne Hernandez is a 79 y.o. female referred by Dr Frausto for evaluation of severe AS (NYHA Class III sx). Pt noticed worsening RENDON for last 6 months. Now progressed to the point she gets SOB with mopping floor. No CP, syncope, orthopnea, or admits for CHF. Doesn't restrict salt and not on lasix. +LE edema. PCP ordered echo and then referred to cardiologist. EF normal. Noted to have mod AI and mod MS along with severe AS. Had LHC by Dr. Frausto 9/2019 with nonobstructive CAD    The patient has undergone the following TAVR work-up:   ECHO (Date 11/11/19): NICOLE 0.82 cm2; peak velocity is 4.6 m/s; mean gradient is 53 mmHg EF= 70%. Pt also has mod AI and Mod MS from [facility]  LH (Date 9/4/19): Nonobstructive CAD doen by DR. Frausto. Disc in folder  STS: 3.1%   Frailty: 1/4(Hand )  Iliacs are >5.9 on R and > 7.08 on L   LVOT: (20.7 mm X 17.2 mm) and Avg Diameter is 18.7   Incidental findings on CT: severe left-sided hydronephrosis with severe dilatation of the left ureter throughout its course with a large obstructing stone at the left UVJ measuring 2.6 cm. Significant pneumobilia in the left hepatic lobe with air within the common bile duct.  CT Surgery risk assessment: Pending  Rhythm issues: NSR  PFTs: FEV1 76% predicted, DLCO 84% predicted.  Comorbidities: HTN, DM      Roxanne Hernandez is a 23-26mm Evolute Valve given LVOT Ca+ via L TF  access.       ROS:    Constitution: Negative for fever or chills. Negative for weight loss or gain.   HENT: Negative for sore throat or headaches. Negative for rhinorrhea.  Eyes: Negative for blurred or double vision.   Cardiovascular: See above  Pulmonary: +SOB. Negative for cough.   Gastrointestinal: Negative for abdominal pain. Negative for nausea/ vomiting. Negative for diarrhea.   : Negative for dysuria.   Neurological: Negative for focal weakness or sensory changes.  PMH:     Past  Medical History:   Diagnosis Date    Cervical cancer     Diabetes mellitus     Hypertension      Past Surgical History:   Procedure Laterality Date    CARDIAC CATHETERIZATION      CHOLECYSTECTOMY      CORONARY ANGIOGRAPHY Left 9/4/2019    Procedure: ANGIOGRAM, CORONARY ARTERY;  Surgeon: Carmen Yepez MD;  Location: [facility] CATH/EP LAB;  Service: Cardiology;  Laterality: Left;    HYSTERECTOMY       Allergies:     Review of patient's allergies indicates:   Allergen Reactions    Azithromycin Swelling     All mycins    Statins-hmg-coa reductase inhibitors Other (See Comments)     Liver function     Medications:     Current Outpatient Medications on File Prior to Visit   Medication Sig Dispense Refill    aspirin 81 MG Chew Take 81 mg by mouth once daily.      Lactobac no.41/Bifidobact no.7 (PROBIOTIC-10 ORAL) Take by mouth once daily.      lisinopril (PRINIVIL,ZESTRIL) 20 MG tablet Take 20 mg by mouth once daily.      metFORMIN (GLUCOPHAGE) 500 MG tablet Take 500 mg by mouth 2 (two) times daily with meals.       Current Facility-Administered Medications on File Prior to Visit   Medication Dose Route Frequency Provider Last Rate Last Dose    0.9%  NaCl infusion   Intravenous Continuous Carmen Yepez MD   Stopped at 09/04/19 1141    aspirin EC tablet 325 mg  325 mg Oral Once Carmen Yepez MD        diazePAM tablet 5 mg  5 mg Oral On Call Procedure Carmen Yeepz MD        diphenhydrAMINE capsule 25 mg  25 mg Oral Once Carmen Yepez MD        [COMPLETED] iohexol (OMNIPAQUE 350) injection 100 mL  100 mL Intravenous ONCE PRN Herbert Ashley MD   100 mL at 11/11/19 1343     Social History:     Social History     Tobacco Use    Smoking status: Former Smoker    Smokeless tobacco: Never Used   Substance Use Topics    Alcohol use: Not Currently     Family History:     Family History   Problem Relation Age of Onset    Hypertension Father     Cancer Maternal  "Grandfather     Cancer Paternal Grandfather      Physical Exam:   BP (!) 156/86 (BP Location: Left arm, Patient Position: Sitting, BP Method: Large (Manual))   Pulse (!) 115   Ht 4' 9.87" (1.47 m)   Wt 80.5 kg (177 lb 7.5 oz)   SpO2 98%   BMI 37.25 kg/m²      Constitutional: No acute distress, conversant  HEENT: Sclera anicteric, Pupils equal, round and reactive to light, extraocular motions intact, Oropharynx clear  Neck: No JVD, no carotid bruits  Cardiovascular: regular rate and rhythm, 3/6 late peaking systolic murmur at RUSB  Pulmonary: Clear to auscultation bilaterally  Abdominal: Abdomen soft, nontender, nondistended, positive bowel sounds  Extremities: No lower extremity edema,   Pulses: 2+ BL Radial, 2+ BL carotid, 1+ BL DP, 1+ BL PT  Skin: No ecchymosis, erythema, or ulcers  Psych: Alert and oriented x 3, appropriate affect  Neuro: CNII-XII intact, no focal deficits    Labs:     Lab Results   Component Value Date     11/11/2019    K 4.2 11/11/2019     11/11/2019    CO2 25 11/11/2019    BUN 15 11/11/2019    CREATININE 0.9 11/11/2019    ANIONGAP 6 (L) 11/11/2019     Lab Results   Component Value Date    AST 19 08/30/2019    ALT 16 08/30/2019    ALKPHOS 87 08/30/2019    BILITOT 0.9 08/30/2019    ALBUMIN 3.6 11/11/2019     Lab Results   Component Value Date    CALCIUM 9.5 11/11/2019    MG 1.9 08/30/2019     No results found for: BNP, BNPTRIAGEBLO Lab Results   Component Value Date    WBC 7.90 11/11/2019    HGB 14.5 11/11/2019    HCT 46.0 11/11/2019     11/11/2019     Lab Results   Component Value Date    INR 1.0 08/30/2019     No results found for: CHOL, HDL, LDLCALC, TRIG  No results found for: HGBA1C  No results found for: TSH, U3FIYYJ       Imaging:        No results found for: EF    EKG: NSR  Assessment:     1. Nonrheumatic aortic valve stenosis   The patient has undergone the following TAVR work-up:   ECHO (Date 11/11/19): NICOLE 0.82 cm2; peak velocity is 4.6 m/s; mean gradient is " 53 mmHg EF= 70%. Pt also has mod AI and Mod MS [facility] (Date 9/4/19): Nonobstructive CAD doen by DR. Frausto. Disc in folder  STS: 3.1%   Frailty: 1/4(Hand )  Iliacs are >5.9 on R and > 7.08 on L   LVOT: (20.7 mm X 17.2 mm) and Avg Diameter is 18.7   Incidental findings on CT: severe left-sided hydronephrosis with severe dilatation of the left ureter throughout its course with a large obstructing stone at the left UVJ measuring 2.6 cm. Significant pneumobilia in the left hepatic lobe with air within the common bile duct.  CT Surgery risk assessment: Pending  Rhythm issues: NSR  PFTs: FEV1 76% predicted, DLCO 84% predicted.  Comorbidities: HTN, DM, morbid obesity with BMI 37.      Roxanne David is a 23-26mm Evolute Valve given LVOT Ca+ via L TF  access.    2. Essential hypertension    3. Diabetes mellitus without complication    4. Hydronephrosis with urinary obstruction due to ureteral calculus    5. Pneumobilia    6. Nonrheumatic mitral valve stenosis    7. Nonrheumatic aortic valve insufficiency        Plan:      - Discussed incidental findings of pneumobilia and severe L sided hydronephrosis from obstructive renal stones. Spoke with urology clinic and they are going to schedule pt for ureteral stent placement. She is Asx and urinating well with normal renal function  - In regards to her pneumobilia she has not had any recent abdominal procedures. She denies Fever or chills or abd pain. Will set her up to see GI  - After these above two issues have been resolved she will call us back and we will set her up for TAVR. She has completed the workup except for CTS eval.  - Will use ASA/Plavix post TAVR    Signed:  Abimael Walker MD  Cardiology Fellow  [phone]  11/11/2019 4:08 PM    Staff:  I have personally taken the history and examined this patient and agree with the fellow's note as stated above and amended it accordingly :-) Dr. Henrique Guzman will see her in liver clinic for hemobilia and Dr. Dunham to arrange  L ureteral stent for ureteral obstruction/renal stone.  After that, will as CTS to see her and schedule TAVR.

## 2019-11-12 ENCOUNTER — TELEPHONE (OUTPATIENT)
Dept: UROLOGY | Facility: CLINIC | Age: 79
End: 2019-11-12

## 2019-11-12 NOTE — PROCEDURES
Roxanne Hernandez is a 79 y.o.  female patient, who presents for a 6 minute walk test ordered by MD Gabi.  The diagnosis is Aortic Valve Disorder.  The patient's BMI is 38.1 kg/m2.  Predicted distance (lower limit of normal) is 179.69 meters.      Test Results:    The test was completed without stopping.  The total time walked was 360 seconds.  During walking, the patient reported:  Dyspnea. The patient used no assistive devices  during testing.     11/11/2019---------Distance: 304.8 meters (1000 feet)     O2 Sat % Supplemental Oxygen Heart Rate Blood Pressure Peg Scale   Pre-exercise  (Resting) 98 % Room Air 92 bpm 155/72 mmHg 4   During Exercise 96 % Room Air 126 bpm 178/76 mmHg 7-8   Post-exercise  (Recovery) 98 % Room Air  102 bpm   mmHg       Recovery Time: 160 seconds    Performing nurse/tech: Ene ESTRADA      PREVIOUS STUDY:   The patient has not had a previous study.      CLINICAL INTERPRETATION:  Six minute walk distance is 304.8 meters (1000 feet) with very heavy dyspnea.  During exercise, there was no significant desaturation while breathing room air.  Blood pressure remained stable and Heart rate increased significantly with walking.  Hypertension and Tachycardia was present prior to exercise.  This may represent a tachycardic response to exercise.  The patient did not report non-pulmonary symptoms during exercise.  No previous study performed.  Based upon age and body mass index, exercise capacity is normal.

## 2019-11-13 ENCOUNTER — ANESTHESIA (OUTPATIENT)
Dept: SURGERY | Facility: HOSPITAL | Age: 79
End: 2019-11-13
Payer: MEDICARE

## 2019-11-13 ENCOUNTER — HOSPITAL ENCOUNTER (OUTPATIENT)
Facility: HOSPITAL | Age: 79
Discharge: HOME | End: 2019-11-13
Attending: UROLOGY | Admitting: UROLOGY
Payer: MEDICARE

## 2019-11-13 ENCOUNTER — ANESTHESIA EVENT (OUTPATIENT)
Dept: SURGERY | Facility: HOSPITAL | Age: 79
End: 2019-11-13
Payer: MEDICARE

## 2019-11-13 DIAGNOSIS — N20.1 LEFT URETERAL STONE: Primary | ICD-10-CM

## 2019-11-13 DIAGNOSIS — N20.1 URETERAL STONE: ICD-10-CM

## 2019-11-13 LAB
POCT GLUCOSE: 108 MG/DL (ref 70–110)
POCT GLUCOSE: 99 MG/DL (ref 70–110)

## 2019-11-13 PROCEDURE — D9220A PRA ANESTHESIA: ICD-10-PCS | Mod: CRNA,,, | Performed by: NURSE ANESTHETIST, CERTIFIED REGISTERED

## 2019-11-13 PROCEDURE — 63600175 PHARM REV CODE 636 W HCPCS: Performed by: STUDENT IN AN ORGANIZED HEALTH CARE EDUCATION/TRAINING PROGRAM

## 2019-11-13 PROCEDURE — 82365 CALCULUS SPECTROSCOPY: CPT

## 2019-11-13 PROCEDURE — D9220A PRA ANESTHESIA: Mod: ANES,,, | Performed by: ANESTHESIOLOGY

## 2019-11-13 PROCEDURE — D9220A PRA ANESTHESIA: ICD-10-PCS | Mod: ANES,,, | Performed by: ANESTHESIOLOGY

## 2019-11-13 PROCEDURE — D9220A PRA ANESTHESIA: Mod: CRNA,,, | Performed by: NURSE ANESTHETIST, CERTIFIED REGISTERED

## 2019-11-13 PROCEDURE — 27201423 OPTIME MED/SURG SUP & DEVICES STERILE SUPPLY: Performed by: UROLOGY

## 2019-11-13 PROCEDURE — 63600175 PHARM REV CODE 636 W HCPCS: Performed by: NURSE ANESTHETIST, CERTIFIED REGISTERED

## 2019-11-13 PROCEDURE — 36000706: Performed by: UROLOGY

## 2019-11-13 PROCEDURE — 25500020 PHARM REV CODE 255: Performed by: UROLOGY

## 2019-11-13 PROCEDURE — C1758 CATHETER, URETERAL: HCPCS | Performed by: UROLOGY

## 2019-11-13 PROCEDURE — C1769 GUIDE WIRE: HCPCS | Performed by: UROLOGY

## 2019-11-13 PROCEDURE — 71000015 HC POSTOP RECOV 1ST HR: Performed by: UROLOGY

## 2019-11-13 PROCEDURE — 37000008 HC ANESTHESIA 1ST 15 MINUTES: Performed by: UROLOGY

## 2019-11-13 PROCEDURE — 36000707: Performed by: UROLOGY

## 2019-11-13 PROCEDURE — 25000003 PHARM REV CODE 250: Performed by: NURSE ANESTHETIST, CERTIFIED REGISTERED

## 2019-11-13 PROCEDURE — 25000003 PHARM REV CODE 250: Performed by: UROLOGY

## 2019-11-13 PROCEDURE — 52356 CYSTO/URETERO W/LITHOTRIPSY: CPT | Mod: LT,,, | Performed by: UROLOGY

## 2019-11-13 PROCEDURE — C2617 STENT, NON-COR, TEM W/O DEL: HCPCS | Performed by: UROLOGY

## 2019-11-13 PROCEDURE — 71000044 HC DOSC ROUTINE RECOVERY FIRST HOUR: Performed by: UROLOGY

## 2019-11-13 PROCEDURE — 25000003 PHARM REV CODE 250: Performed by: STUDENT IN AN ORGANIZED HEALTH CARE EDUCATION/TRAINING PROGRAM

## 2019-11-13 PROCEDURE — 52356 PR CYSTO/URETERO W/LITHOTRIPSY: ICD-10-PCS | Mod: LT,,, | Performed by: UROLOGY

## 2019-11-13 PROCEDURE — 82962 GLUCOSE BLOOD TEST: CPT | Performed by: UROLOGY

## 2019-11-13 PROCEDURE — 37000009 HC ANESTHESIA EA ADD 15 MINS: Performed by: UROLOGY

## 2019-11-13 PROCEDURE — 63600175 PHARM REV CODE 636 W HCPCS: Performed by: UROLOGY

## 2019-11-13 DEVICE — STENT URETERAL UNIV 6FR 24CM
Type: IMPLANTABLE DEVICE | Site: URETER | Status: NON-FUNCTIONAL
Removed: 2019-12-12

## 2019-11-13 RX ORDER — HYDROCODONE BITARTRATE AND ACETAMINOPHEN 5; 325 MG/1; MG/1
1 TABLET ORAL EVERY 6 HOURS PRN
Qty: 11 TABLET | Refills: 0 | Status: SHIPPED | OUTPATIENT
Start: 2019-11-13 | End: 2019-11-23

## 2019-11-13 RX ORDER — TAMSULOSIN HYDROCHLORIDE 0.4 MG/1
0.4 CAPSULE ORAL DAILY
Qty: 30 CAPSULE | Refills: 0 | Status: SHIPPED | OUTPATIENT
Start: 2019-11-13 | End: 2019-12-12

## 2019-11-13 RX ORDER — EPHEDRINE SULFATE 50 MG/ML
INJECTION, SOLUTION INTRAVENOUS
Status: DISCONTINUED | OUTPATIENT
Start: 2019-11-13 | End: 2019-11-13

## 2019-11-13 RX ORDER — PROPOFOL 10 MG/ML
VIAL (ML) INTRAVENOUS
Status: DISCONTINUED | OUTPATIENT
Start: 2019-11-13 | End: 2019-11-13

## 2019-11-13 RX ORDER — SULFAMETHOXAZOLE AND TRIMETHOPRIM 800; 160 MG/1; MG/1
1 TABLET ORAL 2 TIMES DAILY
Qty: 6 TABLET | Refills: 0 | Status: SHIPPED | OUTPATIENT
Start: 2019-11-13 | End: 2019-11-16

## 2019-11-13 RX ORDER — SODIUM CHLORIDE 9 MG/ML
INJECTION, SOLUTION INTRAVENOUS CONTINUOUS PRN
Status: DISCONTINUED | OUTPATIENT
Start: 2019-11-13 | End: 2019-11-13

## 2019-11-13 RX ORDER — LIDOCAINE HCL/PF 100 MG/5ML
SYRINGE (ML) INTRAVENOUS
Status: DISCONTINUED | OUTPATIENT
Start: 2019-11-13 | End: 2019-11-13

## 2019-11-13 RX ORDER — CEFAZOLIN SODIUM 1 G/3ML
2 INJECTION, POWDER, FOR SOLUTION INTRAMUSCULAR; INTRAVENOUS
Status: COMPLETED | OUTPATIENT
Start: 2019-11-13 | End: 2019-11-13

## 2019-11-13 RX ORDER — FENTANYL CITRATE 50 UG/ML
INJECTION, SOLUTION INTRAMUSCULAR; INTRAVENOUS
Status: DISCONTINUED | OUTPATIENT
Start: 2019-11-13 | End: 2019-11-13

## 2019-11-13 RX ORDER — VASOPRESSIN 20 [USP'U]/ML
INJECTION, SOLUTION INTRAMUSCULAR; SUBCUTANEOUS
Status: DISCONTINUED | OUTPATIENT
Start: 2019-11-13 | End: 2019-11-13

## 2019-11-13 RX ORDER — SODIUM CHLORIDE 9 MG/ML
INJECTION, SOLUTION INTRAVENOUS CONTINUOUS
Status: DISCONTINUED | OUTPATIENT
Start: 2019-11-13 | End: 2019-11-13 | Stop reason: HOSPADM

## 2019-11-13 RX ORDER — LIDOCAINE HYDROCHLORIDE 10 MG/ML
1 INJECTION INFILTRATION; PERINEURAL ONCE
Status: COMPLETED | OUTPATIENT
Start: 2019-11-13 | End: 2019-11-13

## 2019-11-13 RX ORDER — ONDANSETRON 8 MG/1
8 TABLET, ORALLY DISINTEGRATING ORAL EVERY 8 HOURS PRN
Status: DISCONTINUED | OUTPATIENT
Start: 2019-11-13 | End: 2019-11-13 | Stop reason: HOSPADM

## 2019-11-13 RX ORDER — HYDROCODONE BITARTRATE AND ACETAMINOPHEN 5; 325 MG/1; MG/1
1 TABLET ORAL EVERY 4 HOURS PRN
Status: DISCONTINUED | OUTPATIENT
Start: 2019-11-13 | End: 2019-11-13 | Stop reason: HOSPADM

## 2019-11-13 RX ORDER — MIDAZOLAM HYDROCHLORIDE 1 MG/ML
INJECTION, SOLUTION INTRAMUSCULAR; INTRAVENOUS
Status: DISCONTINUED | OUTPATIENT
Start: 2019-11-13 | End: 2019-11-13

## 2019-11-13 RX ORDER — PHENYLEPHRINE HYDROCHLORIDE 10 MG/ML
INJECTION INTRAVENOUS
Status: DISCONTINUED | OUTPATIENT
Start: 2019-11-13 | End: 2019-11-13

## 2019-11-13 RX ADMIN — FENTANYL CITRATE 25 MCG: 50 INJECTION, SOLUTION INTRAMUSCULAR; INTRAVENOUS at 12:11

## 2019-11-13 RX ADMIN — LIDOCAINE HYDROCHLORIDE 20 MG: 20 INJECTION, SOLUTION INTRAVENOUS at 11:11

## 2019-11-13 RX ADMIN — EPHEDRINE SULFATE 10 MG: 50 INJECTION, SOLUTION INTRAMUSCULAR; INTRAVENOUS; SUBCUTANEOUS at 12:11

## 2019-11-13 RX ADMIN — PHENYLEPHRINE HYDROCHLORIDE 100 MCG: 10 INJECTION INTRAVENOUS at 12:11

## 2019-11-13 RX ADMIN — SODIUM CHLORIDE: 0.9 INJECTION, SOLUTION INTRAVENOUS at 01:11

## 2019-11-13 RX ADMIN — CEFAZOLIN 2 G: 330 INJECTION, POWDER, FOR SOLUTION INTRAMUSCULAR; INTRAVENOUS at 12:11

## 2019-11-13 RX ADMIN — PROPOFOL 100 MG: 10 INJECTION, EMULSION INTRAVENOUS at 11:11

## 2019-11-13 RX ADMIN — VASOPRESSIN 3 UNITS: 20 INJECTION, SOLUTION INTRAMUSCULAR; SUBCUTANEOUS at 12:11

## 2019-11-13 RX ADMIN — SODIUM CHLORIDE: 0.9 INJECTION, SOLUTION INTRAVENOUS at 11:11

## 2019-11-13 RX ADMIN — MIDAZOLAM HYDROCHLORIDE 0.5 MG: 1 INJECTION, SOLUTION INTRAMUSCULAR; INTRAVENOUS at 11:11

## 2019-11-13 RX ADMIN — LIDOCAINE HYDROCHLORIDE 1 ML: 10 INJECTION, SOLUTION EPIDURAL; INFILTRATION; INTRACAUDAL; PERINEURAL at 10:11

## 2019-11-13 RX ADMIN — PHENYLEPHRINE HYDROCHLORIDE 100 MCG: 10 INJECTION INTRAVENOUS at 11:11

## 2019-11-13 RX ADMIN — ONDANSETRON 8 MG: 8 TABLET, ORALLY DISINTEGRATING ORAL at 02:11

## 2019-11-13 RX ADMIN — FENTANYL CITRATE 50 MCG: 50 INJECTION, SOLUTION INTRAMUSCULAR; INTRAVENOUS at 01:11

## 2019-11-13 RX ADMIN — SODIUM CHLORIDE: 0.9 INJECTION, SOLUTION INTRAVENOUS at 10:11

## 2019-11-13 RX ADMIN — EPHEDRINE SULFATE 15 MG: 50 INJECTION, SOLUTION INTRAMUSCULAR; INTRAVENOUS; SUBCUTANEOUS at 01:11

## 2019-11-13 RX ADMIN — VASOPRESSIN 2 UNITS: 20 INJECTION, SOLUTION INTRAMUSCULAR; SUBCUTANEOUS at 12:11

## 2019-11-13 RX ADMIN — VASOPRESSIN 4 UNITS: 20 INJECTION, SOLUTION INTRAMUSCULAR; SUBCUTANEOUS at 12:11

## 2019-11-13 NOTE — H&P (VIEW-ONLY)
Urology ([facility]) H&P  Staff: Paddy Dunham MD    Is this patient in a research study?  No    CC: Left ureteral stone, left hydronephrosis    HPI:  Roxanne Hernandez is a 79 y.o. female with a history of severe aortic stenosis with the incidental finding of a large left UVJ stone and severe hydro. She presents today for bilateral retrogrades and left ureteral stent placement. She is supposed to undergo TAVR once her hydronephrosis is addressed.    ROS:  Neg except per HPI    Past Medical History:   Diagnosis Date    Cervical cancer     Diabetes mellitus     Hypertension        Past Surgical History:   Procedure Laterality Date    CARDIAC CATHETERIZATION      CHOLECYSTECTOMY      CORONARY ANGIOGRAPHY Left 9/4/2019    Procedure: ANGIOGRAM, CORONARY ARTERY;  Surgeon: Carmen Yepez MD;  Location: [facility] CATH/EP LAB;  Service: Cardiology;  Laterality: Left;    HYSTERECTOMY         Social History     Socioeconomic History    Marital status:      Spouse name: Not on file    Number of children: Not on file    Years of education: Not on file    Highest education level: Not on file   Occupational History    Not on file   Social Needs    Financial resource strain: Not on file    Food insecurity:     Worry: Not on file     Inability: Not on file    Transportation needs:     Medical: Not on file     Non-medical: Not on file   Tobacco Use    Smoking status: Former Smoker    Smokeless tobacco: Never Used   Substance and Sexual Activity    Alcohol use: Not Currently    Drug use: Never    Sexual activity: Not on file   Lifestyle    Physical activity:     Days per week: Not on file     Minutes per session: Not on file    Stress: Not on file   Relationships    Social connections:     Talks on phone: Not on file     Gets together: Not on file     Attends Jewish service: Not on file     Active member of club or organization: Not on file     Attends meetings of clubs or organizations: Not on file      Relationship status: Not on file   Other Topics Concern    Not on file   Social History Narrative    Not on file       Family History   Problem Relation Age of Onset    Hypertension Father     Cancer Maternal Grandfather     Cancer Paternal Grandfather        Review of patient's allergies indicates:   Allergen Reactions    Azithromycin Swelling     All mycins    Statins-hmg-coa reductase inhibitors Other (See Comments)     Liver function       Current Facility-Administered Medications on File Prior to Encounter   Medication Dose Route Frequency Provider Last Rate Last Dose    0.9%  NaCl infusion   Intravenous Continuous Carmen Yepez MD   Stopped at 09/04/19 1141    aspirin EC tablet 325 mg  325 mg Oral Once Carmen Yepez MD        diazePAM tablet 5 mg  5 mg Oral On Call Procedure Carmen Yepez MD        diphenhydrAMINE capsule 25 mg  25 mg Oral Once Carmen Yepez MD         Current Outpatient Medications on File Prior to Encounter   Medication Sig Dispense Refill    aspirin 81 MG Chew Take 81 mg by mouth once daily.      Lactobac no.41/Bifidobact no.7 (PROBIOTIC-10 ORAL) Take by mouth once daily.      lisinopril (PRINIVIL,ZESTRIL) 20 MG tablet Take 20 mg by mouth once daily.      metFORMIN (GLUCOPHAGE) 500 MG tablet Take 500 mg by mouth 2 (two) times daily with meals.       Physical Exam:  General: No acute distress, well developed. AAOx3  Head: Normocephalic, Atraumatic  Eyes: Extra-occular movements intact, No discharge  Neck: supple, symmetrical, trachea midline  Lungs: normal respiratory effort, no respiratory distress, no wheezes  CV: regular rate, 2+ pulses  Abdomen: soft, non-tender, non-distended, no organomegaly  MSK: no edema, no deformities, normal ROM  Skin: skin color, texture, turgor normal.  Neurologic: no focal deficits, sensation intact    Labs:    Urine dipstick today - Negative for all tested components.    Lab Results   Component Value Date     WBC 7.90 11/11/2019    HGB 14.5 11/11/2019    HCT 46.0 11/11/2019    MCV 92 11/11/2019     11/11/2019           BMP  Lab Results   Component Value Date     11/11/2019    K 4.2 11/11/2019     11/11/2019    CO2 25 11/11/2019    BUN 15 11/11/2019    CREATININE 0.9 11/11/2019    CALCIUM 9.5 11/11/2019    ANIONGAP 6 (L) 11/11/2019    ESTGFRAFRICA >60.0 11/11/2019    EGFRNONAA >60.0 11/11/2019         Imaging: CT  11/11/19  Kidneys are normal in size and location.  Multiple nonobstructing right renal stones identified with the largest measuring at 1.2 cm.  There is severe left-sided hydronephrosis with severe dilatation of the left ureter throughout its course with a large obstructing stone at the left UVJ measuring 2.6 cm (series 601, image 85).  There is mild perinephric stranding surrounding the left kidney.     Right ureter is normal in course to the urinary bladder. There is apparent dilatation of the distal right ureter into the UVJ without apparent obstructing process.  Prior hysterectomy calcifications in the right adnexa and surgical clips.    Assessment: Roxanne Hernandez is a 79 y.o. female with a history of severe aortic stenosis with the incidental finding of a large left UVJ stone and severe hydro.    Plan:     1. To OR today for bilateral RPG and left ureteral stent placement. Possible left ureteroscopy.  2. Consents signed   3. I have explained the risk, benefits, and alternatives of the procedure in detail. The patient voices understanding and all questions have been answered. The patient agrees to proceed as planned.     Sergio Degroot MD

## 2019-11-13 NOTE — ANESTHESIA RELEASE NOTE
Anesthesia Release from PACU Note    Patient: Roxanne Hernandez    Procedure(s) Performed: Procedure(s) (LRB):  STENT, URETERAL (Left)  URETEROSCOPY (Left)  LITHOTRIPSY, USING LASER (Left)  REMOVAL, CALCULUS, URETER, URETEROSCOPIC (Left)  CYSTOSCOPY (N/A)    Anesthesia type: general    Post pain: Adequate analgesia    Post assessment: no apparent anesthetic complications and tolerated procedure well    Last Vitals:   Vitals:    11/13/19 1432   BP: (!) 150/64   Pulse: 103   Resp: 20   Temp:          Post vital signs: stable    Level of consciousness: awake and alert     Nausea/Vomiting: no nausea/no vomiting    Complications: none    Airway Patency: patent    Respiratory: unassisted    Cardiovascular: stable and blood pressure at baseline    Hydration: euvolemic

## 2019-11-13 NOTE — PROGRESS NOTES
Instructed pt and daughter on how to empty prado catheter, and how to remove prado cath tomorrow morning.  Both verbalized understanding

## 2019-11-13 NOTE — ANESTHESIA POSTPROCEDURE EVALUATION
Anesthesia Post Evaluation    Patient: Roxanne Hernandez    Procedure(s) Performed: Procedure(s) (LRB):  STENT, URETERAL (Left)  URETEROSCOPY (Left)  LITHOTRIPSY, USING LASER (Left)  REMOVAL, CALCULUS, URETER, URETEROSCOPIC (Left)  CYSTOSCOPY (N/A)    Final Anesthesia Type: general  Patient location during evaluation: PACU  Patient participation: Yes- Able to Participate  Level of consciousness: awake and alert  Post-procedure vital signs: reviewed and stable  Pain management: adequate  Airway patency: patent  PONV status at discharge: No PONV  Anesthetic complications: no      Cardiovascular status: blood pressure returned to baseline  Respiratory status: unassisted, spontaneous ventilation and room air  Hydration status: euvolemic  Follow-up not needed.          Vitals Value Taken Time   /64 11/13/2019  2:32 PM   Temp 36.6 °C (97.9 °F) 11/13/2019  2:02 PM   Pulse 100 11/13/2019  2:38 PM   Resp 28 11/13/2019  2:38 PM   SpO2 95 % 11/13/2019  2:38 PM   Vitals shown include unvalidated device data.      No case tracking events are documented in the log.      Pain/Paula Score: Paula Score: 10 (11/13/2019  2:33 PM)

## 2019-11-13 NOTE — PROGRESS NOTES
Dr Degroot arrived with pt to  bed 19.  Informed nurse that pt will be going home with prado cath in place and that pt is to remove prado cath tomorrow morning and he left 10cc syring for pt to take home.

## 2019-11-13 NOTE — DISCHARGE INSTRUCTIONS
Ok to remove prado catheter tomorrow morning         Cystoscopy    Cystoscopy is a procedure that lets your doctor look directly inside your urethra and bladder. It can be used to:  · Help diagnose a problem with your urethra, bladder, or kidneys.  · Take a sample (biopsy) of bladder or urethral tissue.  · Treat certain problems (such as removing kidney stones).  · Place a stent to bypass an obstruction.  · Take special X-rays of the kidneys.  Based on the findings, your doctor may recommend other tests or treatments.  What is a cystoscope?  A cystoscope is a telescope-like instrument that contains lenses and fiberoptics (small glass wires that make bright light). The cystoscope may be straight and rigid, or flexible to bend around curves in the urethra. The doctor may look directly into the cystoscope, or project the image onto a monitor.  Getting ready  · Ask your doctor if you should stop taking any medicines before the procedure.  · Ask whether you should avoid eating or drinking anything after midnight before the procedure.  · Follow any other instructions your doctor gives you.  Tell your doctor before the exam if you:  · Take any medicines, such as aspirin or blood thinners  · Have allergies to any medicines  · Are pregnant   The procedure  Cystoscopy is done in the doctors office, surgery center, or hospital. The doctor and a nurse are present during the procedure. It takes only a few minutes, longer if a biopsy, X-ray, or treatment needs to be done.  During the procedure:  · You lie on an exam table on your back, knees bent and legs apart. You are covered with a drape.  · Your urethra and the area around it are washed. Anesthetic jelly may be applied to numb the urethra. Other pain medicine is usually not needed. In some cases, you may be offered a mild sedative to help you relax. If a more extensive procedure is to be done, such as a biopsy or kidney stone removal, general anesthesia may be needed.  · The  cystoscope is inserted. A sterile fluid is put into the bladder to expand it. You may feel pressure from this fluid.  · When the procedure is done, the cystoscope is removed.  After the procedure  If you had a sedative, general anesthesia, or spinal anesthesia, you must have someone drive you home. Once youre home:  · Drink plenty of fluids.  · You may have burning or light bleeding when you urinate--this is normal.  · Medicines may be prescribed to ease any discomfort or prevent infection. Take these as directed.  · Call your doctor if you have heavy bleeding or blood clots, burning that lasts more than a day, a fever over 100°F  (38° C), or trouble urinating.  Date Last Reviewed: 1/1/2017 © 2000-2017 [employer]. [address] [place] [address], [place] [address]. All rights reserved. This information is not intended as a substitute for professional medical care. Always follow your healthcare professional's instructions.        Fowler Catheter Care    A Fowler catheter is a rubber tube that is placed through the urethra (opening where urine comes out) and into the bladder. This helps drain urine from the bladder. There is a small balloon on the end of the tube that is inflated after insertion. This keeps the catheter from sliding out of the bladder.  A Fowler catheter is used to treat urinary retention (unable to pass urine). It is also used when there is incontinence (loss of bladder control).  Home care  · Finish taking any prescribed antibiotic even if you are feeling better before then.  · It is important to keep bacteria from getting into the collection bag. Do not disconnect the catheter from the collection bag.  · Use a leg band to secure the drainage tube, so it does not pull on the catheter. Drain the collection bag when it becomes full using the drain spout at the bottom of the bag.  · Do not try to pull or remove your catheter. This will injure your urethra. It must be removed by your healthcare  provider or nurse.  Follow-up care  Follow up with your healthcare provider as advised for repeat urine testing and catheter removal or replacement.  When to seek medical advice  Call your healthcare provider right away if any of these occur:  · Fever of 100.4ºF (38ºC) or higher, or as directed by your healthcare provider  · Bladder pain or fullness  · Abdominal swelling, nausea or vomiting, or back pain  · Blood or urine leakage around the catheter  · Bloody urine coming from the catheter (if a new symptom)  · Catheter falls out  · Catheter stops draining for 6 hours  · Weakness, dizziness, or fainting  Date Last Reviewed: 10/1/2016  © 3072-8755 [employer]. [address] [place] [address], [place] [address]. All rights reserved. This information is not intended as a substitute for professional medical care. Always follow your healthcare professional's instructions.      Anesthesia: General Anesthesia     You are watched continuously during your procedure by your anesthesia provider.     Youre due to have surgery. During surgery, youll be given medicine called anesthesia or anesthetic. This will keep you comfortable and pain-free. Your anesthesia provider will use general anesthesia.  What is general anesthesia?  General anesthesia puts you into a state like deep sleep. It goes into the bloodstream (IV anesthetics), into the lungs (gas anesthetics), or both. You feel nothing during the procedure. You will not remember it. During the procedure, the anesthesia provider monitors you continuously. He or she checks your heart rate and rhythm, blood pressure, breathing, and blood oxygen.  · IV anesthetics. IV anesthetics are given through an IV line in your arm. Theyre often given first. This is so you are asleep before a gas anesthetic is started. Some kinds of IV anesthetics relieve pain. Others relax you. Your doctor will decide which kind is best in your case.  · Gas anesthetics. Gas anesthetics are breathed  into the lungs. They are often used to keep you asleep. They can be given through a facemask or a tube placed in your larynx or trachea (breathing tube).  ¨ If you have a facemask, your anesthesia provider will most likely place it over your nose and mouth while youre still awake. Youll breathe oxygen through the mask as your IV anesthetic is started. Gas anesthetic may be added through the mask.  ¨ If you have a tube in the larynx or trachea, it will be inserted into your throat after youre asleep.  Anesthesia tools and medicines  You will likely have:  · IV anesthetics. These are put into an IV line into your bloodstream.  · Gas anesthetics. You breathe these anesthetics into your lungs, where they pass into your bloodstream.  · Pulse oximeter. This is a small clip that is attached to the end of your finger. This measures your blood oxygen level.  · Electrocardiography leads (electrodes). These are small sticky pads that are placed on your chest. They record your heart rate and rhythm.  · Blood pressure cuff. This reads your blood pressure.  Risks and possible complications  General anesthesia has some risks. These include:  · Breathing problems  · Nausea and vomiting  · Sore throat or hoarseness (usually temporary)  · Allergic reaction to the anesthetic  · Irregular heartbeat (rare)  · Cardiac arrest (rare)   Anesthesia safety  · Follow all instructions you are given for how long not to eat or drink before your procedure.  · Be sure your doctor knows what medicines and drugs you take. This includes over-the-counter medicines, herbs, supplements, alcohol or other drugs. You will be asked when those were last taken.  · Have an adult family member or friend drive you home after the procedure.  · For the first 24 hours after your surgery:  ¨ Do not drive or use heavy equipment.  ¨ Do not make important decisions or sign legal documents. If important decisions or signing legal documents is necessary during the  first 24 hours after surgery, have a trusted family member or spouse act on your behalf.  ¨ Avoid alcohol.  ¨ Have a responsible adult stay with you. He or she can watch for problems and help keep you safe.  Date Last Reviewed: 12/1/2016  © 9784-5490 [employer]. [address] [place] [address], [place] [address]. All rights reserved. This information is not intended as a substitute for professional medical care. Always follow your healthcare professional's instructions.

## 2019-11-13 NOTE — TRANSFER OF CARE
"Anesthesia Transfer of Care Note    Patient: Roxanne Hernandez    Procedure(s) Performed: Procedure(s) (LRB):  STENT, URETERAL (Left)  URETEROSCOPY (Left)  LITHOTRIPSY, USING LASER (Left)  REMOVAL, CALCULUS, URETER, URETEROSCOPIC (Left)  CYSTOSCOPY (N/A)    Patient location: PACU    Anesthesia Type: general    Transport from OR: Transported from OR on 2-3 L/min O2 by NC with adequate spontaneous ventilation    Post pain: adequate analgesia    Post assessment: no apparent anesthetic complications and tolerated procedure well    Post vital signs: stable    Level of consciousness: awake, alert and oriented    Nausea/Vomiting: no nausea/vomiting    Complications: none    Transfer of care protocol was followed      Last vitals:   Visit Vitals  BP (!) 154/68 (BP Location: Right arm, Patient Position: Lying)   Pulse (!) 112   Temp 36.7 °C (98 °F) (Oral)   Resp 18   Ht 4' 10" (1.473 m)   Wt 80.3 kg (177 lb)   SpO2 95%   Breastfeeding? No   BMI 36.99 kg/m²     "

## 2019-11-13 NOTE — DISCHARGE SUMMARY
[facility]  Discharge Note  Short Stay    Admit Date: 11/13/2019    Discharge Date and Time: 11/13/2019 1:53 PM      Attending Physician: Paddy Dunham MD     Discharge Provider: Alis Solorio    Diagnoses:  Active Hospital Problems    Diagnosis  POA    *Left ureteral stone [N20.1]  Yes    Essential hypertension [I10]  Yes    Diabetes mellitus without complication [E11.9]  Yes    Pneumobilia [K83.8]  Yes    Nonrheumatic mitral valve stenosis [I34.2]  Yes    Nonrheumatic aortic valve insufficiency [I35.1]  Yes    Nonrheumatic aortic valve stenosis [I35.0]  Yes      Resolved Hospital Problems   No resolved problems to display.       Discharged Condition: good    Hospital Course: Patient was admitted for left ureteroscopy and tolerated the procedure well with no complications. The patient was discharged home in good condition on the same day.       Final Diagnoses: Same as principal problem.    Disposition: Home or Self Care    Follow up/Patient Instructions:    Medications:  Reconciled Home Medications:   Current Discharge Medication List      START taking these medications    Details   HYDROcodone-acetaminophen (NORCO) 5-325 mg per tablet Take 1 tablet by mouth every 6 (six) hours as needed for Pain.  Qty: 11 tablet, Refills: 0      sulfamethoxazole-trimethoprim 800-160mg (BACTRIM DS) 800-160 mg Tab Take 1 tablet by mouth 2 (two) times daily. for 3 days  Qty: 6 tablet, Refills: 0      tamsulosin (FLOMAX) 0.4 mg Cap Take 1 capsule (0.4 mg total) by mouth once daily.  Qty: 30 capsule, Refills: 0         CONTINUE these medications which have NOT CHANGED    Details   aspirin 81 MG Chew Take 81 mg by mouth once daily.      Lactobac no.41/Bifidobact no.7 (PROBIOTIC-10 ORAL) Take by mouth once daily.      lisinopril (PRINIVIL,ZESTRIL) 20 MG tablet Take 20 mg by mouth once daily.      metFORMIN (GLUCOPHAGE) 500 MG tablet Take 500 mg by mouth 2 (two) times daily with meals.           Discharge Procedure  Orders   Diet general     Call MD for:  temperature >100.4     Call MD for:  persistent nausea and vomiting     Call MD for:  severe uncontrolled pain     No dressing needed     CYSTOSCOPY W/ STENT REMOVAL   Standing Status: Future Standing Exp. Date: 05/13/20     Activity as tolerated     Follow-up Information     Paddy Dunham MD In 4 weeks.    Specialty:  Urology  Why:  stent removal  Contact information:  [address] [place] [id number]  [phone]                   Alis Solorio MD  Urology, PGY- 5  Pager# [phone]

## 2019-11-13 NOTE — ANESTHESIA PREPROCEDURE EVALUATION
11/13/2019  Roxanne Hernandez is a 79 y.o., female.    Anesthesia Evaluation    I have reviewed the Patient Summary Reports.    I have reviewed the Nursing Notes.   I have reviewed the Medications.     Review of Systems  Anesthesia Hx:  No problems with previous Anesthesia  History of prior surgery of interest to airway management or planning: Denies Family Hx of Anesthesia complications.   Denies Personal Hx of Anesthesia complications.   Social:  Non-Smoker    Hematology/Oncology:  Hematology Normal   Oncology Normal     EENT/Dental:EENT/Dental Normal   Cardiovascular:   Hypertension    Pulmonary:  Pulmonary Normal    Renal/:   Chronic Renal Disease    Hepatic/GI:  Hepatic/GI Normal    Musculoskeletal:  Musculoskeletal Normal    Neurological:  Neurology Normal    Endocrine:   Diabetes    Psych:  Psychiatric Normal           Physical Exam  General:  Well nourished    Airway/Jaw/Neck:  Airway Findings: Mouth Opening: Normal Tongue: Normal  General Airway Assessment: Adult  Mallampati: II  TM Distance: Normal, at least 6 cm  Jaw/Neck Findings:  Neck ROM: Normal ROM      Dental:  Dental Findings: In tact   Chest/Lungs:  Chest/Lungs Findings: Clear to auscultation, Normal Respiratory Rate     Heart/Vascular:  Heart Findings: Rate: Normal  Rhythm: Regular Rhythm  Sounds: Normal        Mental Status:  Mental Status Findings:  Cooperative, Alert and Oriented         Anesthesia Plan  Type of Anesthesia, risks & benefits discussed:  Anesthesia Type:  general  Patient's Preference:   Intra-op Monitoring Plan: standard ASA monitors  Intra-op Monitoring Plan Comments:   Post Op Pain Control Plan: multimodal analgesia  Post Op Pain Control Plan Comments:   Induction:   IV  Beta Blocker:  Patient is not currently on a Beta-Blocker (No further documentation required).       Informed Consent: Patient understands risks and  agrees with Anesthesia plan.  Questions answered. Anesthesia consent signed with patient.  ASA Score: 2     Day of Surgery Review of History & Physical:    H&P update referred to the surgeon.         Ready For Surgery From Anesthesia Perspective.

## 2019-11-13 NOTE — H&P
Urology ([facility]) H&P  Staff: Paddy Dunham MD    Is this patient in a research study?  No    CC: Left ureteral stone, left hydronephrosis    HPI:  Roxanne Hernandez is a 79 y.o. female with a history of severe aortic stenosis with the incidental finding of a large left UVJ stone and severe hydro. She presents today for bilateral retrogrades and left ureteral stent placement. She is supposed to undergo TAVR once her hydronephrosis is addressed.    ROS:  Neg except per HPI    Past Medical History:   Diagnosis Date    Cervical cancer     Diabetes mellitus     Hypertension        Past Surgical History:   Procedure Laterality Date    CARDIAC CATHETERIZATION      CHOLECYSTECTOMY      CORONARY ANGIOGRAPHY Left 9/4/2019    Procedure: ANGIOGRAM, CORONARY ARTERY;  Surgeon: Carmen Ypeez MD;  [facility] CATH[facility]EP LAB;  Service: Cardiology;  Laterality: Left;    HYSTERECTOMY         Social History     Socioeconomic History    Marital status:      Spouse name: Not on file    Number of children: Not on file    Years of education: Not on file    Highest education level: Not on file   Occupational History    Not on file   Social Needs    Financial resource strain: Not on file    Food insecurity:     Worry: Not on file     Inability: Not on file    Transportation needs:     Medical: Not on file     Non-medical: Not on file   Tobacco Use    Smoking status: Former Smoker    Smokeless tobacco: Never Used   Substance and Sexual Activity    Alcohol use: Not Currently    Drug use: Never    Sexual activity: Not on file   Lifestyle    Physical activity:     Days per week: Not on file     Minutes per session: Not on file    Stress: Not on file   Relationships    Social connections:     Talks on phone: Not on file     Gets together: Not on file     Attends Zoroastrianism service: Not on file     Active member of club or organization: Not on file     Attends meetings of clubs or organizations: Not on file      Relationship status: Not on file   Other Topics Concern    Not on file   Social History Narrative    Not on file       Family History   Problem Relation Age of Onset    Hypertension Father     Cancer Maternal Grandfather     Cancer Paternal Grandfather        Review of patient's allergies indicates:   Allergen Reactions    Azithromycin Swelling     All mycins    Statins-hmg-coa reductase inhibitors Other (See Comments)     Liver function       Current Facility-Administered Medications on File Prior to Encounter   Medication Dose Route Frequency Provider Last Rate Last Dose    0.9%  NaCl infusion   Intravenous Continuous Carmen Yepez MD   Stopped at 09/04/19 1141    aspirin EC tablet 325 mg  325 mg Oral Once Carmen Yepez MD        diazePAM tablet 5 mg  5 mg Oral On Call Procedure Carmen Yepez MD        diphenhydrAMINE capsule 25 mg  25 mg Oral Once Carmen Yepez MD         Current Outpatient Medications on File Prior to Encounter   Medication Sig Dispense Refill    aspirin 81 MG Chew Take 81 mg by mouth once daily.      Lactobac no.41/Bifidobact no.7 (PROBIOTIC-10 ORAL) Take by mouth once daily.      lisinopril (PRINIVIL,ZESTRIL) 20 MG tablet Take 20 mg by mouth once daily.      metFORMIN (GLUCOPHAGE) 500 MG tablet Take 500 mg by mouth 2 (two) times daily with meals.       Physical Exam:  General: No acute distress, well developed. AAOx3  Head: Normocephalic, Atraumatic  Eyes: Extra-occular movements intact, No discharge  Neck: supple, symmetrical, trachea midline  Lungs: normal respiratory effort, no respiratory distress, no wheezes  CV: regular rate, 2+ pulses  Abdomen: soft, non-tender, non-distended, no organomegaly  MSK: no edema, no deformities, normal ROM  Skin: skin color, texture, turgor normal.  Neurologic: no focal deficits, sensation intact    Labs:    Urine dipstick today - Negative for all tested components.    Lab Results   Component Value Date     WBC 7.90 11/11/2019    HGB 14.5 11/11/2019    HCT 46.0 11/11/2019    MCV 92 11/11/2019     11/11/2019           BMP  Lab Results   Component Value Date     11/11/2019    K 4.2 11/11/2019     11/11/2019    CO2 25 11/11/2019    BUN 15 11/11/2019    CREATININE 0.9 11/11/2019    CALCIUM 9.5 11/11/2019    ANIONGAP 6 (L) 11/11/2019    ESTGFRAFRICA >60.0 11/11/2019    EGFRNONAA >60.0 11/11/2019         Imaging: CT  11/11/19  Kidneys are normal in size and location.  Multiple nonobstructing right renal stones identified with the largest measuring at 1.2 cm.  There is severe left-sided hydronephrosis with severe dilatation of the left ureter throughout its course with a large obstructing stone at the left UVJ measuring 2.6 cm (series 601, image 85).  There is mild perinephric stranding surrounding the left kidney.     Right ureter is normal in course to the urinary bladder. There is apparent dilatation of the distal right ureter into the UVJ without apparent obstructing process.  Prior hysterectomy calcifications in the right adnexa and surgical clips.    Assessment: Roxanne Hernandez is a 79 y.o. female with a history of severe aortic stenosis with the incidental finding of a large left UVJ stone and severe hydro.    Plan:     1. To OR today for bilateral RPG and left ureteral stent placement. Possible left ureteroscopy.  2. Consents signed   3. I have explained the risk, benefits, and alternatives of the procedure in detail. The patient voices understanding and all questions have been answered. The patient agrees to proceed as planned.     Sergio Degroot MD

## 2019-11-13 NOTE — OP NOTE
[facility]  Operative Note    Date: 11/13/2019    Pre-Op Diagnosis:   1. 2.5 cm left distal ureteral stone  2. Severe left hydronephrosis    Patient Active Problem List   Diagnosis    Nonrheumatic aortic valve stenosis    Essential hypertension    Diabetes mellitus without complication    Hydronephrosis with urinary obstruction due to ureteral calculus    Pneumobilia    Nonrheumatic mitral valve stenosis    Nonrheumatic aortic valve insufficiency    Left ureteral stone       Post-Op Diagnosis: same    Procedure(s) Performed:   1.  Left ureteroscopy  2.  Cystoscopy  3.  Stone basket extraction  4.  Laser lithotripsy  5.  Left ureteral stent placement  6.  Fluoro < 1 h    Specimen(s): sone for analysis    Staff Surgeon: Paddy Dunham MD    Assistant Surgeon: Alis Solorio MD; Lonnie Degroot MD    Anesthesia: General endotracheal anesthesia    Indications: Roxanne Hernandez is a 79 y.o. female with a left 2.5 cm UVJ stone, presenting for definitive stone management. This was found incidentally on CT she underwent for aortic stenosis. There is severe cortical thinning of the left kidney.     Findings:   - stones radiopaque  - no bladder abnormalities  - unable to pass wire up into left kidney  - severely impacted left UVJ stone requiring ureteral meatotomy to gain access into ureter and fragment stone      Estimated Blood Loss: min    Drains: 6 Fr x 24 cm JJ ureteral stent without strings    Procedure in detail:  After informed consent was obtained, the patient was brought the the cystoscopy suite and placed in the supine position.  SCDs were applied and working.  Anesthesia was administered.  The patient was then placed in the dorsal lithotomy position and prepped and draped in the usual sterile fashion.      A rigid cystoscope in a 22 Fr sheath was introduced into the patient's urethra.  This passed easily.  The entire urethra was visualized which showed no strictures or masses.  Formal  cystoscopy was performed which revealed no masses or lesions suspicious for malignancy, no bladder stones, no bladder diverticuli, no trabeculations.  The ureteral orifices were visualized in the normal anatomic position bilaterally. There was severe distortion of the left UO.      We attempted to pass an angled motion wire up the left ureter however were unable to gain access past the stone. We then elected to perform an incision of the left ureteral orifice. This was done using the laser in a lateral to medial fashion. The stone was then clearly visualized.     An 8 Fr rigid ureteroscope was passed into the patient's bladder. A 365 micron laser fiber was passed through the ureteroscope.  The stone was fragmented using the laser. There was severe impaction of the stone into the ureteral wall. The laser fiber was removed and a Nitinol tipless basket was introduced through the ureteroscope.  Stone fragments were removed and placed in the bladder.  Once access to the proximal ureter was obtained, the scope was advanced into the proximal ureter ensuring there were no further fragments. A motion wire was passed through the scope into the kidney. The ureteroscope was removed keeping the motion wire in place.      A cystoscope was reinserted and the bladder was irrigated to remove the stone fragments.  The bladder was drained the cystoscope removed keeping the wire in place.      A 6 Fr x 24 cm JJ ureteral stent without strings was passed over the wire and up into the renal pelvis using fluoro.  When the coil appeared to be in good position in the kidney the wire was removed under continuous fluoro.  Good coils were seen in the kidney and the bladder using fluoro.      A 16 fr prado was placed for maximal drainage.     The patient tolerated the procedure well and was transferred to the recovery room in stable condition.      Disposition:  The patient will follow up with Dr. Dunham in 4 weeks for stent removal. She will  remove her prado tomorrow.      Alis Solorio MD

## 2019-11-14 ENCOUNTER — TELEPHONE (OUTPATIENT)
Dept: UROLOGY | Facility: CLINIC | Age: 79
End: 2019-11-14

## 2019-11-14 NOTE — TELEPHONE ENCOUNTER
----- Message from Alis Solorio MD sent at 11/13/2019  2:13 PM CST -----      ----- Message -----  From: Alis Solorio MD  Sent: 11/13/2019   2:11 PM CST  To: Tamar Meyers LPN, Paddy Dunham MD    Can we please schedule this patient with Dr. Dunham in 4 weeks for cysto/stent removal?    Thanks,  Alis

## 2019-11-15 VITALS
WEIGHT: 177 LBS | OXYGEN SATURATION: 96 % | RESPIRATION RATE: 20 BRPM | HEIGHT: 58 IN | DIASTOLIC BLOOD PRESSURE: 64 MMHG | SYSTOLIC BLOOD PRESSURE: 140 MMHG | TEMPERATURE: 98 F | HEART RATE: 96 BPM | BODY MASS INDEX: 37.16 KG/M2

## 2019-11-15 LAB
COMPN STONE: NORMAL
SPECIMEN SOURCE: NORMAL
STONE ANALYSIS IR-IMP: NORMAL

## 2019-11-18 ENCOUNTER — TELEPHONE (OUTPATIENT)
Dept: UROLOGY | Facility: CLINIC | Age: 79
End: 2019-11-18

## 2019-11-18 DIAGNOSIS — Z96.0 URETERAL STENT RETAINED: Primary | ICD-10-CM

## 2019-11-18 NOTE — TELEPHONE ENCOUNTER
----- Message from Alis Solorio MD sent at 11/13/2019  2:11 PM CST -----  Can we please schedule this patient with Dr. Dunham in 4 weeks for cysto/stent removal?    Thanks,  Alis    Ureteral stent retained  -     X-Ray Abdomen AP 1 View; Future; Expected date: 11/18/2019  -     Cystoscopy with Stent Removal; Future; Expected date: 12/18/2019    please have her do a KUB and schedule her for cysto and ureteral stent removal in 4 wks.

## 2019-11-18 NOTE — PATIENT INSTRUCTIONS
Cystoscopy    Cystoscopy is a procedure that lets your doctor look directly inside your urethra and bladder. It can be used to:  · Help diagnose a problem with your urethra, bladder, or kidneys.  · Take a sample (biopsy) of bladder or urethral tissue.  · Treat certain problems (such as removing kidney stones).  · Place a stent to bypass an obstruction.  · Take special X-rays of the kidneys.  Based on the findings, your doctor may recommend other tests or treatments.  What is a cystoscope?  A cystoscope is a telescope-like instrument that contains lenses and fiberoptics (small glass wires that make bright light). The cystoscope may be straight and rigid, or flexible to bend around curves in the urethra. The doctor may look directly into the cystoscope, or project the image onto a monitor.  Getting ready  · Ask your doctor if you should stop taking any medicines before the procedure.  · Ask whether you should avoid eating or drinking anything after midnight before the procedure.  · Follow any other instructions your doctor gives you.  Tell your doctor before the exam if you:  · Take any medicines, such as aspirin or blood thinners  · Have allergies to any medicines  · Are pregnant   The procedure  Cystoscopy is done in the doctors office, surgery center, or hospital. The doctor and a nurse are present during the procedure. It takes only a few minutes, longer if a biopsy, X-ray, or treatment needs to be done.  During the procedure:  · You lie on an exam table on your back, knees bent and legs apart. You are covered with a drape.  · Your urethra and the area around it are washed. Anesthetic jelly may be applied to numb the urethra. Other pain medicine is usually not needed. In some cases, you may be offered a mild sedative to help you relax. If a more extensive procedure is to be done, such as a biopsy or kidney stone removal, general anesthesia may be needed.  · The cystoscope is inserted. A sterile fluid is put  into the bladder to expand it. You may feel pressure from this fluid.  · When the procedure is done, the cystoscope is removed.  After the procedure  If you had a sedative, general anesthesia, or spinal anesthesia, you must have someone drive you home. Once youre home:  · Drink plenty of fluids.  · You may have burning or light bleeding when you urinate--this is normal.  · Medicines may be prescribed to ease any discomfort or prevent infection. Take these as directed.  · Call your doctor if you have heavy bleeding or blood clots, burning that lasts more than a day, a fever over 100°F  (38° C), or trouble urinating.  Date Last Reviewed: 1/1/2017  © 7747-0322 [employer]. [address] [place] [address], [place] [address]. All rights reserved. This information is not intended as a substitute for professional medical care. Always follow your healthcare professional's instructions.

## 2019-11-19 ENCOUNTER — TELEPHONE (OUTPATIENT)
Dept: ENDOSCOPY | Facility: HOSPITAL | Age: 79
End: 2019-11-19

## 2019-11-21 DIAGNOSIS — I35.0 NODULAR CALCIFIC AORTIC VALVE STENOSIS: Primary | ICD-10-CM

## 2019-11-21 DIAGNOSIS — N18.9 CHRONIC KIDNEY DISEASE, UNSPECIFIED CKD STAGE: ICD-10-CM

## 2019-11-21 RX ORDER — DEXTROSE MONOHYDRATE AND SODIUM CHLORIDE 5; .45 G/100ML; G/100ML
INJECTION, SOLUTION INTRAVENOUS CONTINUOUS
Status: CANCELLED | OUTPATIENT
Start: 2019-11-21

## 2019-11-21 RX ORDER — DIPHENHYDRAMINE HCL 25 MG
50 CAPSULE ORAL ONCE
Status: CANCELLED | OUTPATIENT
Start: 2019-11-21 | End: 2019-11-21

## 2019-12-12 ENCOUNTER — OFFICE VISIT (OUTPATIENT)
Dept: CARDIOLOGY | Facility: CLINIC | Age: 79
End: 2019-12-12
Payer: MEDICARE

## 2019-12-12 ENCOUNTER — HOSPITAL ENCOUNTER (OUTPATIENT)
Dept: RADIOLOGY | Facility: HOSPITAL | Age: 79
Discharge: HOME | End: 2019-12-12
Attending: UROLOGY
Payer: MEDICARE

## 2019-12-12 ENCOUNTER — HOSPITAL ENCOUNTER (OUTPATIENT)
Dept: UROLOGY | Facility: HOSPITAL | Age: 79
Discharge: HOME | End: 2019-12-12
Attending: UROLOGY
Payer: MEDICARE

## 2019-12-12 ENCOUNTER — OFFICE VISIT (OUTPATIENT)
Dept: CARDIOTHORACIC SURGERY | Facility: CLINIC | Age: 79
End: 2019-12-12
Payer: MEDICARE

## 2019-12-12 VITALS
DIASTOLIC BLOOD PRESSURE: 59 MMHG | HEIGHT: 58 IN | BODY MASS INDEX: 36.79 KG/M2 | SYSTOLIC BLOOD PRESSURE: 118 MMHG | WEIGHT: 175.25 LBS | TEMPERATURE: 98 F | OXYGEN SATURATION: 97 % | HEART RATE: 88 BPM

## 2019-12-12 VITALS
HEIGHT: 58 IN | SYSTOLIC BLOOD PRESSURE: 115 MMHG | DIASTOLIC BLOOD PRESSURE: 60 MMHG | WEIGHT: 175.5 LBS | OXYGEN SATURATION: 100 % | BODY MASS INDEX: 36.84 KG/M2 | HEART RATE: 87 BPM

## 2019-12-12 VITALS
RESPIRATION RATE: 18 BRPM | BODY MASS INDEX: 35.45 KG/M2 | HEIGHT: 58 IN | WEIGHT: 168.88 LBS | SYSTOLIC BLOOD PRESSURE: 137 MMHG | DIASTOLIC BLOOD PRESSURE: 59 MMHG | HEART RATE: 82 BPM | TEMPERATURE: 99 F

## 2019-12-12 DIAGNOSIS — I35.1 NONRHEUMATIC AORTIC VALVE INSUFFICIENCY: ICD-10-CM

## 2019-12-12 DIAGNOSIS — K83.8 PNEUMOBILIA: ICD-10-CM

## 2019-12-12 DIAGNOSIS — N20.1 LEFT URETERAL STONE: ICD-10-CM

## 2019-12-12 DIAGNOSIS — I10 ESSENTIAL HYPERTENSION: ICD-10-CM

## 2019-12-12 DIAGNOSIS — I35.0 NONRHEUMATIC AORTIC VALVE STENOSIS: Primary | ICD-10-CM

## 2019-12-12 DIAGNOSIS — N13.30 HYDRONEPHROSIS OF LEFT KIDNEY: ICD-10-CM

## 2019-12-12 DIAGNOSIS — N13.2 HYDRONEPHROSIS WITH URINARY OBSTRUCTION DUE TO URETERAL CALCULUS: ICD-10-CM

## 2019-12-12 DIAGNOSIS — E11.9 DIABETES MELLITUS WITHOUT COMPLICATION: ICD-10-CM

## 2019-12-12 DIAGNOSIS — N20.1 LEFT URETERAL STONE: Primary | ICD-10-CM

## 2019-12-12 PROCEDURE — 99205 OFFICE O/P NEW HI 60 MIN: CPT | Mod: S$PBB,,, | Performed by: THORACIC SURGERY (CARDIOTHORACIC VASCULAR SURGERY)

## 2019-12-12 PROCEDURE — 1159F MED LIST DOCD IN RCRD: CPT | Mod: ,,, | Performed by: INTERNAL MEDICINE

## 2019-12-12 PROCEDURE — 99999 PR PBB SHADOW E&M-EST. PATIENT-LVL III: ICD-10-PCS | Mod: PBBFAC,,, | Performed by: THORACIC SURGERY (CARDIOTHORACIC VASCULAR SURGERY)

## 2019-12-12 PROCEDURE — 52310 PR CYSTOSCOPY,REMV CALCULUS,SIMPLE: ICD-10-PCS | Mod: ,,, | Performed by: UROLOGY

## 2019-12-12 PROCEDURE — 1159F PR MEDICATION LIST DOCUMENTED IN MEDICAL RECORD: ICD-10-PCS | Mod: ,,, | Performed by: THORACIC SURGERY (CARDIOTHORACIC VASCULAR SURGERY)

## 2019-12-12 PROCEDURE — 52310 CYSTOSCOPY AND TREATMENT: CPT | Mod: ,,, | Performed by: UROLOGY

## 2019-12-12 PROCEDURE — 99205 PR OFFICE/OUTPT VISIT, NEW, LEVL V, 60-74 MIN: ICD-10-PCS | Mod: S$PBB,,, | Performed by: THORACIC SURGERY (CARDIOTHORACIC VASCULAR SURGERY)

## 2019-12-12 PROCEDURE — 1126F PR PAIN SEVERITY QUANTIFIED, NO PAIN PRESENT: ICD-10-PCS | Mod: ,,, | Performed by: INTERNAL MEDICINE

## 2019-12-12 PROCEDURE — 99214 OFFICE O/P EST MOD 30 MIN: CPT | Mod: S$PBB,,, | Performed by: INTERNAL MEDICINE

## 2019-12-12 PROCEDURE — 99213 OFFICE O/P EST LOW 20 MIN: CPT | Mod: PBBFAC,25 | Performed by: THORACIC SURGERY (CARDIOTHORACIC VASCULAR SURGERY)

## 2019-12-12 PROCEDURE — 99999 PR PBB SHADOW E&M-EST. PATIENT-LVL III: CPT | Mod: PBBFAC,,, | Performed by: THORACIC SURGERY (CARDIOTHORACIC VASCULAR SURGERY)

## 2019-12-12 PROCEDURE — 1126F AMNT PAIN NOTED NONE PRSNT: CPT | Mod: ,,, | Performed by: INTERNAL MEDICINE

## 2019-12-12 PROCEDURE — 74018 XR ABDOMEN AP 1 VIEW: ICD-10-PCS | Mod: 26,,, | Performed by: RADIOLOGY

## 2019-12-12 PROCEDURE — 87086 URINE CULTURE/COLONY COUNT: CPT

## 2019-12-12 PROCEDURE — 1159F MED LIST DOCD IN RCRD: CPT | Mod: ,,, | Performed by: THORACIC SURGERY (CARDIOTHORACIC VASCULAR SURGERY)

## 2019-12-12 PROCEDURE — 99213 OFFICE O/P EST LOW 20 MIN: CPT | Mod: PBBFAC,25,27

## 2019-12-12 PROCEDURE — 74018 RADEX ABDOMEN 1 VIEW: CPT | Mod: TC

## 2019-12-12 PROCEDURE — 1126F AMNT PAIN NOTED NONE PRSNT: CPT | Mod: ,,, | Performed by: THORACIC SURGERY (CARDIOTHORACIC VASCULAR SURGERY)

## 2019-12-12 PROCEDURE — 1159F PR MEDICATION LIST DOCUMENTED IN MEDICAL RECORD: ICD-10-PCS | Mod: ,,, | Performed by: INTERNAL MEDICINE

## 2019-12-12 PROCEDURE — 52310 CYSTOSCOPY AND TREATMENT: CPT

## 2019-12-12 PROCEDURE — 99999 PR PBB SHADOW E&M-EST. PATIENT-LVL III: ICD-10-PCS | Mod: PBBFAC,,,

## 2019-12-12 PROCEDURE — 99999 PR PBB SHADOW E&M-EST. PATIENT-LVL III: CPT | Mod: PBBFAC,,,

## 2019-12-12 PROCEDURE — 99214 PR OFFICE/OUTPT VISIT, EST, LEVL IV, 30-39 MIN: ICD-10-PCS | Mod: S$PBB,,, | Performed by: INTERNAL MEDICINE

## 2019-12-12 PROCEDURE — 74018 RADEX ABDOMEN 1 VIEW: CPT | Mod: 26,,, | Performed by: RADIOLOGY

## 2019-12-12 PROCEDURE — 1126F PR PAIN SEVERITY QUANTIFIED, NO PAIN PRESENT: ICD-10-PCS | Mod: ,,, | Performed by: THORACIC SURGERY (CARDIOTHORACIC VASCULAR SURGERY)

## 2019-12-12 RX ORDER — LIDOCAINE HYDROCHLORIDE 20 MG/ML
JELLY TOPICAL
Status: COMPLETED | OUTPATIENT
Start: 2019-12-12 | End: 2019-12-12

## 2019-12-12 RX ORDER — CLOPIDOGREL BISULFATE 75 MG/1
75 TABLET ORAL DAILY
Qty: 30 TABLET | Refills: 6 | Status: CANCELLED | OUTPATIENT
Start: 2019-12-12 | End: 2020-07-09

## 2019-12-12 RX ADMIN — LIDOCAINE HYDROCHLORIDE: 20 JELLY TOPICAL at 10:12

## 2019-12-12 NOTE — PROCEDURES
Date of Procedure: 12/12/2019    Procedure:                                                                        Cystourethroscopy with Successful Removal of left Ureteral Stent(s)                                                                                    Pre-OP Diagnosis:                                                                Left ureteral stent                                 Post-OP Diagnosis:                                                                same                              Anesthesia:                                                                       Anesthesia Administered:                                                     Intraurethral instillation of 10 mL 2% lidocaine (Xylocaine) jelly.     Findings:                                                                         Double J stent in the ureteral orifice.                                - The stent is not encrusted.                                           Description of Procedure:                                                         Informed Consent:                                                            - Risks, benefits and alternatives of procedure discussed with               patient and informed consent obtained.                                       Patient Position:                                                            - Supine. Careful padding and pressure point care performed.                 Prep and Drape:                                                              - Patient prepped and draped in usual sterile fashion using povidone         iodine (Betadine).                                                           Instruments:                                                                 - Alligator forceps.                                                         - Flexible Cystoscope: With 0 degree lens 16 Fr.                             Procedure Details:                                                            - Cystoscope passed under vision into bladder.                               - Bladder and urethra examined in their entirety with findings as            above.                                                                       - Ureteral stent visualized in bladder, grasped and removed.            Complications:                                                                    No immediate complications.                                             Post-OP Plan:                                                                    3 months with renal US and BMP    Left ureteral stone  -     CYSTOSCOPY W/ STENT REMOVAL; Standing  -     CYSTOSCOPY W/ STENT REMOVAL  -     lidocaine HCl 2% urojet  -     Urine culture    Hydronephrosis with urinary obstruction due to ureteral calculus  -     Basic metabolic panel; Future; Expected date: 12/12/2019  -     US Kidney; Future; Expected date: 12/12/2019

## 2019-12-12 NOTE — PATIENT INSTRUCTIONS

## 2019-12-12 NOTE — PROGRESS NOTES
Subjective:    Patient ID:  Roxanne Hernandez is a 79 y.o. female who presents for follow-up of Aortic Stenosis.    HPI  Roxanne Hernandez is a 79 y.o. female referred by Dr Frausto for evaluation of severe AS (NYHA Class III sx). She is here for a pre-op TAVR evaluation scheduled on 12/17/19. Patient reports worsening SOB for the past 6 months. She now experiences SOB with ADLs. She can no longer mop her kitchen without stopping to take breaks. She recently underwent a successful left ureteroscopy for a large left UVJ stone and severe hydronephrosis seen on TAVR CTA. Stents were removed today and she was cleared for TAVR. CTA also revealed significant pneumobilia in the left hepatic lobe with air in the common bile duct. Patient reports this was addressed with GI who cleared her for the procedure and will schedule endoscopy following TAVR.       The patient has undergone the following TAVR work-up:   ECHO (Date 11/11/19): NICOLE 0.82 cm2; peak velocity is 4.6 m/s; mean gradient is 53 mmHg EF= 70%. Pt also has mod AI and Mod MS from [facility] (Date 9/4/19): Nonobstructive CAD doen by DR. Frausto. Disc in folder  STS: 3.1%   Frailty: 1/4(Hand )  Iliacs are >5.9 on R and > 7.08 on L ** needs to be re-measured per Dr. Ashley  LVOT: (20.7 mm X 17.2 mm) and Avg Diameter is 18.7 ** needs to be re-measured per Dr. Ashley  Incidental findings on CT: severe left-sided hydronephrosis with severe dilatation of the left ureter throughout its course with a large obstructing stone at the left UVJ measuring 2.6 cm. Significant pneumobilia in the left hepatic lobe with air within the common bile duct.  CT Surgery risk assessment: moderate risk due to comorbid conditions and debility, Per Dr. Munson.   Rhythm issues: NSR  PFTs: FEV1 76% predicted, DLCO 84% predicted.  Comorbidities: HTN, DM      Roxanne Hernandez is a 23-26mm Evolute Valve given LVOT Ca+ via L TF  access.     Review of Systems   Constitution: Negative for chills and fever.    Eyes: Negative for blurred vision.   Cardiovascular: Positive for dyspnea on exertion. Negative for chest pain, claudication, irregular heartbeat, leg swelling, orthopnea, palpitations and syncope.   Respiratory: Negative for cough.    Skin: Negative for itching, rash and suspicious lesions.   Musculoskeletal: Negative for falls.   Gastrointestinal: Negative for abdominal pain.   Neurological: Negative for disturbances in coordination, dizziness and loss of balance.   Psychiatric/Behavioral: Negative for altered mental status.        Past Medical History:   Diagnosis Date    Cervical cancer     Diabetes mellitus     Hypertension      Current Outpatient Medications on File Prior to Visit   Medication Sig Dispense Refill    aspirin 81 MG Chew Take 81 mg by mouth once daily.      Lactobac no.41/Bifidobact no.7 (PROBIOTIC-10 ORAL) Take by mouth once daily.      lisinopril (PRINIVIL,ZESTRIL) 20 MG tablet Take 20 mg by mouth once daily.      metFORMIN (GLUCOPHAGE) 500 MG tablet Take 500 mg by mouth 2 (two) times daily with meals.      [DISCONTINUED] tamsulosin (FLOMAX) 0.4 mg Cap Take 1 capsule (0.4 mg total) by mouth once daily. 30 capsule 0     Current Facility-Administered Medications on File Prior to Visit   Medication Dose Route Frequency Provider Last Rate Last Dose    0.9%  NaCl infusion   Intravenous Continuous Carmen Yepez MD   Stopped at 09/04/19 1141    aspirin EC tablet 325 mg  325 mg Oral Once Carmen Yepez MD        diazePAM tablet 5 mg  5 mg Oral On Call Procedure Carmen Yepez MD        diphenhydrAMINE capsule 25 mg  25 mg Oral Once Carmen Yepez MD        [COMPLETED] lidocaine HCl 2% urojet   Mucous Membrane 1 time in Clinic/HOD Paddy Dunham MD         Vitals:    12/12/19 1434 12/12/19 1437   BP: 120/61 115/60   BP Location: Right arm Left arm   Patient Position: Sitting Sitting   BP Method: Large (Automatic) Large (Automatic)   Pulse: 87 87  "  SpO2: 100%    Weight: 79.6 kg (175 lb 7.8 oz)    Height: 4' 10" (1.473 m)      Body mass index is 36.68 kg/m².  Objective:    Physical Exam   Constitutional: She is oriented to person, place, and time. She appears well-developed and well-nourished. No distress.   HENT:   Head: Normocephalic and atraumatic.   Eyes: EOM are normal.   Neck: Normal range of motion.   Cardiovascular:   Murmur heard.   Harsh midsystolic murmur is present with a grade of 2/6 at the upper right sternal border radiating to the neck.  Pulmonary/Chest: Effort normal and breath sounds normal. No respiratory distress.   Neurological: She is alert and oriented to person, place, and time.   Skin: She is not diaphoretic.   Nursing note and vitals reviewed.        Assessment:       Nonrheumatic aortic valve stenosis  The patient has undergone the following TAVR work-up:   ECHO (Date 11/11/19): NICOLE 0.82 cm2; peak velocity is 4.6 m/s; mean gradient is 53 mmHg EF= 70%. Pt also has mod AI and Mod MS from [facility] (Date 9/4/19): Nonobstructive CAD doen by DR. Frausto. Disc in folder  STS: 3.1%   Frailty: 1/4(Hand )  Iliacs are >5.9 on R and > 7.08 on L ** needs to be re-measured per Dr. Ashley  LVOT: (20.7 mm X 17.2 mm) and Avg Diameter is 18.7 ** needs to be re-measured per Dr. Ashley  Incidental findings on CT: severe left-sided hydronephrosis with severe dilatation of the left ureter throughout its course with a large obstructing stone at the left UVJ measuring 2.6 cm. Significant pneumobilia in the left hepatic lobe with air within the common bile duct.  CT Surgery risk assessment: moderate risk due to comorbid conditions and debility, Per Dr. Munson.   Rhythm issues: NSR  PFTs: FEV1 76% predicted, DLCO 84% predicted.  Comorbidities: DM, age      Roxanne Hernandez is a 23-26mm Evolute Valve given LVOT Ca+ via L TF  access.     - The patient understands the risks and benefits and wishes to go ahead with the procedure.  - All patient's questions were " answered.  -The risks, benefits & alternatives of the procedure were explained to the patient  -Discussed in detailed the risk of bleeding, infection, heart rhythm abnormalities, allergic reactions, kidney injury, stroke and death.    -The risks of moderate sedation include hypotension, respiratory depression, arrhythmias, bronchospasm, & death.    -Informed consent was obtained & the patient is agreeable to proceed with the procedure.  -This patient was discussed with the attending cardiologist who agrees with the above assessment & plan.    Hydronephrosis with urinary obstruction due to ureteral calculus  Ureter stent removed today. Cleared for TAVR by nephrology.     Pneumobilia  Cleared for TAVR by GI. Will scheduled endoscopy 6 months following TAVR.       Plan:       1. TAVR scheduled for 12/17/19   2. ASA and Plavix start 12/17/19  3. Stop metformin day before procedure.   4. Educated to not get endoscopy for 6 months following procedure.

## 2019-12-12 NOTE — LETTER
December 12, 2019      Herbert Ashley MD  [address]  [facility] [address]           [facility] - Cardiovascular Surg  [address]  [facility] [address]  Phone: [phone]          Patient: Roxanne Hernandez   MR Number: [id number]   YOB: 1940   Date of Visit: 12/12/2019       Dear Dr. Herbert Ashley:    Thank you for referring Roxanne Hernandez to me for evaluation. Attached you will find relevant portions of my assessment and plan of care.    If you have questions, please do not hesitate to call me. I look forward to following Roxanne Hernandez along with you.    Sincerely,    Stoney Munson MD    Enclosure  CC:  No Recipients    If you would like to receive this communication electronically, please contact [email or web address] or [phone] to request more information on [employer] access.    For providers and/or their staff who would like to refer a patient to [facility], please contact us through our one-stop-shop provider referral line, [facility] , at [phone].    If you feel you have received this communication in error or would no longer like to receive these types of communications, please e-mail [email or web address]

## 2019-12-12 NOTE — ASSESSMENT & PLAN NOTE
The patient has undergone the following TAVR work-up:   ECHO (Date 11/11/19): NICOLE 0.82 cm2; peak velocity is 4.6 m/s; mean gradient is 53 mmHg EF= 70%. Pt also has mod AI and Mod MS from [facility] (Date 9/4/19): Nonobstructive CAD doen by DR. Frausto. Disc in folder  STS: 3.1%   Frailty: 1/4(Hand )  Iliacs are >5.9 on R and > 7.08 on L   LVOT: (20.7 mm X 17.2 mm) and Avg Diameter is 18.7   Incidental findings on CT: severe left-sided hydronephrosis with severe dilatation of the left ureter throughout its course with a large obstructing stone at the left UVJ measuring 2.6 cm. Significant pneumobilia in the left hepatic lobe with air within the common bile duct.  CT Surgery risk assessment: Pending  Rhythm issues: NSR  PFTs: FEV1 76% predicted, DLCO 84% predicted.  Comorbidities: HTN, DM      Roxanne Hernandez is a 23-26mm Evolute Valve given LVOT Ca+ via L TF  access.     - The patient understands the risks and benefits and wishes to go ahead with the procedure.  - All patient's questions were answered.  -The risks, benefits & alternatives of the procedure were explained to the patient  -Discussed in detailed the risk of bleeding, infection, heart rhythm abnormalities, allergic reactions, kidney injury, stroke and death.    -The risks of moderate sedation include hypotension, respiratory depression, arrhythmias, bronchospasm, & death.    -Informed consent was obtained & the patient is agreeable to proceed with the procedure.  -This patient was discussed with the attending cardiologist who agrees with the above assessment & plan.    1. TAVR scheduled for 12/17/19   2. Start ASA/Plavix  3. Stop metformin day of TAVR  4. Educated to not get endoscopy for 6 months following procedure.

## 2019-12-12 NOTE — PROGRESS NOTES
Subjective:      Patient ID: Roxanne Hernandez is a 79 y.o. female.    Chief Complaint: No chief complaint on file.      HPI:  Roxanne Hernandez is a 79 y.o. female who presents for evaluation of severe AS (NYHA Class III sx). Pt noticed worsening RENDON for last 6 months. Now progressed to the point she gets SOB with mopping floor. No CP, syncope, orthopnea, or admits for CHF. Doesn't restrict salt and not on lasix. +LE edema. PCP ordered echo and then referred to cardiologist. EF normal. Noted to have mod AI and mod MS along with severe AS. Had LHC by Dr. Frausto 9/2019 with nonobstructive CAD. Had cystoscopy with ureteral stent removal today for kidney stones noted on CT scan. Reports she spoke with GI doctor on phone regarding pneumobilia and was cleared for surgery. Reports doctor thinks her gallbladder surgery ~25 years ago may be the cause. Has endoscopy scheduled for after TAVR.      The patient has undergone the following TAVR work-up:   · ECHO (Date 11/11/19): NICOLE 0.82 cm2; peak velocity is 4.6 m/s; mean gradient is 53 mmHg EF= 70%. Pt also has mod AI and Mod MS from MAC  · LHC (Date 9/4/19): Nonobstructive CAD doen by DR. Frausto. Disc in folder  · STS: 3.1%   · Frailty: 1/4(Hand )  · Iliacs are >5.9 on R and > 7.08 on L   · LVOT: (20.7 mm X 17.2 mm) and Avg Diameter is 18.7   · Incidental findings on CT: severe left-sided hydronephrosis with severe dilatation of the left ureter throughout its course with a large obstructing stone at the left UVJ measuring 2.6 cm. Significant pneumobilia in the left hepatic lobe with air within the common bile duct.  · CT Surgery risk assessment: Pending  · Rhythm issues: NSR  · PFTs: FEV1 76% predicted, DLCO 84% predicted.  · Comorbidities: HTN, DM        Roxanne Hernandez is a 23-26mm Evolute Valve given LVOT Ca+ via L TF  access.          Family and social history reviewed    Review of patient's allergies indicates:   Allergen Reactions    Azithromycin Swelling     All mycins     Statins-hmg-coa reductase inhibitors Other (See Comments)     Liver function     Past Medical History:   Diagnosis Date    Cervical cancer     Diabetes mellitus     Hypertension      Past Surgical History:   Procedure Laterality Date    CARDIAC CATHETERIZATION      CHOLECYSTECTOMY      CORONARY ANGIOGRAPHY Left 9/4/2019    Procedure: ANGIOGRAM, CORONARY ARTERY;  Surgeon: Carmen Yepez MD;  Location: [facility] CATH/EP LAB;  Service: Cardiology;  Laterality: Left;    CYSTOSCOPY N/A 11/13/2019    Procedure: CYSTOSCOPY;  Surgeon: Paddy Dunham MD;  Location: [facility] OR [address] [place] [address];  Service: Urology;  Laterality: N/A;    HYSTERECTOMY      LASER LITHOTRIPSY Left 11/13/2019    Procedure: LITHOTRIPSY, USING LASER;  Surgeon: Paddy Dunham MD;  Location: [facility] OR [address] [place] [address];  Service: Urology;  Laterality: Left;    URETEROSCOPIC REMOVAL OF URETERIC CALCULUS Left 11/13/2019    Procedure: REMOVAL, CALCULUS, URETER, URETEROSCOPIC;  Surgeon: Paddy Dunham MD;  Location: [facility] OR [address] [place] [address];  Service: Urology;  Laterality: Left;    URETEROSCOPY Left 11/13/2019    Procedure: URETEROSCOPY;  Surgeon: Paddy Dunham MD;  Location: [address];  Service: Urology;  Laterality: Left;     Family History     Problem Relation (Age of Onset)    Cancer Maternal Grandfather, Paternal Grandfather    Hypertension Father        Social History     Socioeconomic History    Marital status:      Spouse name: Not on file    Number of children: Not on file    Years of education: Not on file    Highest education level: Not on file   Occupational History    Not on file   Social Needs    Financial resource strain: Not on file    Food insecurity:     Worry: Not on file     Inability: Not on file    Transportation needs:     Medical: Not on file     Non-medical: Not on file   Tobacco Use    Smoking status: Former Smoker    Smokeless tobacco: Never Used   Substance and Sexual Activity    Alcohol use: Not Currently    Drug use:  Never    Sexual activity: Not on file   Lifestyle    Physical activity:     Days per week: Not on file     Minutes per session: Not on file    Stress: Not on file   Relationships    Social connections:     Talks on phone: Not on file     Gets together: Not on file     Attends Muslim service: Not on file     Active member of club or organization: Not on file     Attends meetings of clubs or organizations: Not on file     Relationship status: Not on file   Other Topics Concern    Not on file   Social History Narrative    Not on file       Current medications Reviewed    Review of Systems   Constitutional: Positive for activity change and fatigue.   HENT: Negative for nosebleeds.    Eyes: Negative for visual disturbance.   Respiratory: Positive for shortness of breath.    Cardiovascular: Positive for palpitations and leg swelling. Negative for chest pain.   Gastrointestinal: Negative for nausea.   Musculoskeletal: Negative for gait problem.   Skin: Negative for color change.   Neurological: Negative for dizziness, seizures and syncope.   Hematological: Does not bruise/bleed easily.   Psychiatric/Behavioral: Negative for sleep disturbance.     Objective:   Physical Exam   Constitutional: She is oriented to person, place, and time. She appears well-developed. No distress.   obese   HENT:   Head: Normocephalic and atraumatic.   Eyes: Pupils are equal, round, and reactive to light. EOM are normal.   Neck: Normal range of motion.   Cardiovascular: Normal rate, regular rhythm and normal pulses.   Murmur heard.  Pulmonary/Chest: Effort normal. No respiratory distress.   Abdominal: Soft.   Musculoskeletal: Normal range of motion. She exhibits edema.   Neurological: She is alert and oriented to person, place, and time.   Skin: Skin is warm, dry and intact. Capillary refill takes less than 2 seconds. She is not diaphoretic.   Psychiatric: She has a normal mood and affect. Her speech is normal and behavior is normal.  Judgment and thought content normal.   Vitals reviewed.      Diagnostic Results: reviewed   TTE 11/11/19  · Concentric left ventricular remodeling.  · Hyperdynamic left ventricular systolic function. The estimated ejection fraction is 73%  · Normal right ventricular systolic function.  · Severe left atrial enlargement.  · Moderate mitral stenosis from annular calcification. The mean diastolic gradient across the mitral valve is 9 mmHg at a heart rate of 92 bpm.  · Severe aortic valve stenosis. Aortic valve area is 0.82 cm2; peak velocity is 4.6 m/s; mean gradient is 53 mmHg.  · Moderate aortic regurgitation.  · The estimated PA systolic pressure is 39 mm Hg  · Normal central venous pressure (3 mm Hg).    LHC 9/4/19  · Mid RCA lesion , 50% stenosed. Nonsignificant by iFR assessment.  · Known severe aortic stenosis from echo  I certify that I was present for catheter insertion, catheter manipulation, angiography, and angiographic interpretation of this patient.    VICKY 9/4/19   1. Aortic valve: Heavily calcified with restricted leaflet motion. Moderate aortic regurgitation.   2. Mitral valve: Thick and calcified with restricted leaflet motion. Mild mitral stenosis and regurgitation.   3. Tricuspid valve: Is structurally normal with good leaflet excursion. Mild regurgitation.  4. Pulmonary valve is structurally normal with good leaflet excursion. No significant regurgitation.  5. Overall LVEF appears normal.   6. No obvious shunt across the interatrial septum by color flow Doppler.   Conclusions:  1. Mild mitral stenosis.   2. Moderate aortic regurgitation.   3. Known severe aortic stenosis on previous TTE.     CTA TAVR 9/2019  Large left UVJ stone measuring at 2.6 cm with severe left-sided hydroureteronephrosis.    Significant pneumobilia in the left hepatic lobe with air within the common bile duct.  Unclear if this is due to biliary enteric fistula, infectious process such as cholangitis, or incompetent sphincter of  Oddi.  Unfortunately this is not well evaluated on this dedicated TAVR protocol examination.  Clinical correlation is advised.  Further evaluation could be performed with ERCP and dedicated abdominal CT with intravenous contrast.    TAVR measurements, as above.    Significant aortic valve and abdominal aorta atherosclerosis.    Nonspecific mesenteric haziness, possibly mesenteric panniculitis or adenitis.    Heterogeneity of the pulmonary parenchyma possibly related to elevated pulmonary vascular resistance, small airways disease, or both.    Small bilateral pleural effusions.  Assessment:   1. Aortic Stenosis   Plan:   TAVR 12/17/19    CTS Attending Note:    I have personally taken the history and examined this patient and agree with the THONG's note as stated above. Pleasant 79-year-old female with severe aortic stenosis.  She is symptomatic for this.  We discussed surgical aortic valve replacement versus transcatheter aortic valve insertion. We discussed the advantages and disadvantages of both.  She desires transcatheter aortic valve.  She is moderate risk for surgery due to comorbid conditions and debility.

## 2019-12-12 NOTE — INTERVAL H&P NOTE
The patient has been examined and the H&P has been reviewed:    I concur with the findings and no changes have occurred since H&P was written.     Procedure(s) Performed: 11/13/19  1.  Left ureteroscopy  2.  Cystoscopy  3.  Stone basket extraction  4.  Laser lithotripsy  5.  Left ureteral stent placement  6.  Fluoro < 1 h     Findings:   - stones radiopaque  - no bladder abnormalities  - unable to pass wire up into left kidney  - severely impacted left UVJ stone requiring ureteral meatotomy to gain access into ureter and fragment stone       Estimated Blood Loss: min     Drains: 6 Fr x 24 cm JJ ureteral stent without strings        There are no hospital problems to display for this patient.

## 2019-12-13 LAB — BACTERIA UR CULT: NO GROWTH

## 2019-12-16 ENCOUNTER — TELEPHONE (OUTPATIENT)
Dept: UROLOGY | Facility: CLINIC | Age: 79
End: 2019-12-16

## 2019-12-16 NOTE — TELEPHONE ENCOUNTER
Pt had stent removed last week , she was given one dose of antibiotic after. Her urine culture was negative. Patient notified

## 2019-12-17 ENCOUNTER — HOSPITAL ENCOUNTER (INPATIENT)
Facility: HOSPITAL | Age: 79
LOS: 2 days | Discharge: HOME | DRG: 266 | End: 2019-12-19
Attending: INTERNAL MEDICINE | Admitting: INTERNAL MEDICINE
Payer: MEDICARE

## 2019-12-17 ENCOUNTER — ANESTHESIA EVENT (OUTPATIENT)
Dept: MEDSURG UNIT | Facility: HOSPITAL | Age: 79
DRG: 266 | End: 2019-12-17
Payer: MEDICARE

## 2019-12-17 ENCOUNTER — ANESTHESIA (OUTPATIENT)
Dept: MEDSURG UNIT | Facility: HOSPITAL | Age: 79
DRG: 266 | End: 2019-12-17
Payer: MEDICARE

## 2019-12-17 DIAGNOSIS — N18.9 CHRONIC KIDNEY DISEASE, UNSPECIFIED CKD STAGE: ICD-10-CM

## 2019-12-17 DIAGNOSIS — I44.7 LEFT BUNDLE BRANCH BLOCK: ICD-10-CM

## 2019-12-17 DIAGNOSIS — I35.0 NODULAR CALCIFIC AORTIC VALVE STENOSIS: ICD-10-CM

## 2019-12-17 DIAGNOSIS — I49.9 ARRHYTHMIA: ICD-10-CM

## 2019-12-17 DIAGNOSIS — Z95.2 S/P TAVR (TRANSCATHETER AORTIC VALVE REPLACEMENT): ICD-10-CM

## 2019-12-17 LAB
ABO + RH BLD: NORMAL
ANION GAP SERPL CALC-SCNC: 6 MMOL/L (ref 8–16)
ANION GAP SERPL CALC-SCNC: 6 MMOL/L (ref 8–16)
APTT BLDCRRT: 22.7 SEC (ref 21–32)
BASOPHILS # BLD AUTO: 0.02 K/UL (ref 0–0.2)
BASOPHILS NFR BLD: 0.3 % (ref 0–1.9)
BLD GP AB SCN CELLS X3 SERPL QL: NORMAL
BUN SERPL-MCNC: 16 MG/DL (ref 8–23)
BUN SERPL-MCNC: 17 MG/DL (ref 8–23)
CALCIUM SERPL-MCNC: 7.8 MG/DL (ref 8.7–10.5)
CALCIUM SERPL-MCNC: 9.4 MG/DL (ref 8.7–10.5)
CHLORIDE SERPL-SCNC: 109 MMOL/L (ref 95–110)
CHLORIDE SERPL-SCNC: 112 MMOL/L (ref 95–110)
CO2 SERPL-SCNC: 20 MMOL/L (ref 23–29)
CO2 SERPL-SCNC: 24 MMOL/L (ref 23–29)
CREAT SERPL-MCNC: 0.9 MG/DL (ref 0.5–1.4)
CREAT SERPL-MCNC: 1.1 MG/DL (ref 0.5–1.4)
DIFFERENTIAL METHOD: ABNORMAL
EOSINOPHIL # BLD AUTO: 0.2 K/UL (ref 0–0.5)
EOSINOPHIL NFR BLD: 2.8 % (ref 0–8)
ERYTHROCYTE [DISTWIDTH] IN BLOOD BY AUTOMATED COUNT: 12.9 % (ref 11.5–14.5)
ERYTHROCYTE [DISTWIDTH] IN BLOOD BY AUTOMATED COUNT: 12.9 % (ref 11.5–14.5)
EST. GFR  (AFRICAN AMERICAN): 55.2 ML/MIN/1.73 M^2
EST. GFR  (AFRICAN AMERICAN): >60 ML/MIN/1.73 M^2
EST. GFR  (NON AFRICAN AMERICAN): 47.9 ML/MIN/1.73 M^2
EST. GFR  (NON AFRICAN AMERICAN): >60 ML/MIN/1.73 M^2
ESTIMATED AVG GLUCOSE: 111 MG/DL (ref 68–131)
GLUCOSE SERPL-MCNC: 108 MG/DL (ref 70–110)
GLUCOSE SERPL-MCNC: 139 MG/DL (ref 70–110)
HBA1C MFR BLD HPLC: 5.5 % (ref 4–5.6)
HCT VFR BLD AUTO: 36.5 % (ref 37–48.5)
HCT VFR BLD AUTO: 43.5 % (ref 37–48.5)
HGB BLD-MCNC: 11.6 G/DL (ref 12–16)
HGB BLD-MCNC: 14.2 G/DL (ref 12–16)
IMM GRANULOCYTES # BLD AUTO: 0.04 K/UL (ref 0–0.04)
IMM GRANULOCYTES NFR BLD AUTO: 0.6 % (ref 0–0.5)
INR PPP: 0.9 (ref 0.8–1.2)
LYMPHOCYTES # BLD AUTO: 1.2 K/UL (ref 1–4.8)
LYMPHOCYTES NFR BLD: 18.2 % (ref 18–48)
MCH RBC QN AUTO: 29 PG (ref 27–31)
MCH RBC QN AUTO: 29.5 PG (ref 27–31)
MCHC RBC AUTO-ENTMCNC: 31.8 G/DL (ref 32–36)
MCHC RBC AUTO-ENTMCNC: 32.6 G/DL (ref 32–36)
MCV RBC AUTO: 90 FL (ref 82–98)
MCV RBC AUTO: 91 FL (ref 82–98)
MONOCYTES # BLD AUTO: 0.6 K/UL (ref 0.3–1)
MONOCYTES NFR BLD: 8.2 % (ref 4–15)
NEUTROPHILS # BLD AUTO: 4.7 K/UL (ref 1.8–7.7)
NEUTROPHILS NFR BLD: 69.9 % (ref 38–73)
NRBC BLD-RTO: 0 /100 WBC
PLATELET # BLD AUTO: 126 K/UL (ref 150–350)
PLATELET # BLD AUTO: 178 K/UL (ref 150–350)
PMV BLD AUTO: 10 FL (ref 9.2–12.9)
PMV BLD AUTO: 10.1 FL (ref 9.2–12.9)
POCT GLUCOSE: 112 MG/DL (ref 70–110)
POCT GLUCOSE: 130 MG/DL (ref 70–110)
POCT GLUCOSE: 181 MG/DL (ref 70–110)
POTASSIUM SERPL-SCNC: 4.3 MMOL/L (ref 3.5–5.1)
POTASSIUM SERPL-SCNC: 4.4 MMOL/L (ref 3.5–5.1)
PROTHROMBIN TIME: 9.9 SEC (ref 9–12.5)
RBC # BLD AUTO: 4 M/UL (ref 4–5.4)
RBC # BLD AUTO: 4.81 M/UL (ref 4–5.4)
SODIUM SERPL-SCNC: 138 MMOL/L (ref 136–145)
SODIUM SERPL-SCNC: 139 MMOL/L (ref 136–145)
WBC # BLD AUTO: 6.74 K/UL (ref 3.9–12.7)
WBC # BLD AUTO: 7.16 K/UL (ref 3.9–12.7)

## 2019-12-17 PROCEDURE — 93010 ELECTROCARDIOGRAM REPORT: CPT | Mod: 76,,, | Performed by: INTERNAL MEDICINE

## 2019-12-17 PROCEDURE — 36620 INSERTION CATHETER ARTERY: CPT | Mod: 59,Q0,, | Performed by: ANESTHESIOLOGY

## 2019-12-17 PROCEDURE — 25000003 PHARM REV CODE 250: Performed by: INTERNAL MEDICINE

## 2019-12-17 PROCEDURE — 33361 PR TAVR, PERCUTANEOUS FEMORAL: ICD-10-PCS | Mod: Q0,62,, | Performed by: THORACIC SURGERY (CARDIOTHORACIC VASCULAR SURGERY)

## 2019-12-17 PROCEDURE — 33361 PR TAVR, PERCUTANEOUS FEMORAL: ICD-10-PCS | Mod: Q0,62,, | Performed by: INTERNAL MEDICINE

## 2019-12-17 PROCEDURE — 33361 REPLACE AORTIC VALVE PERQ: CPT | Mod: Q0,62 | Performed by: INTERNAL MEDICINE

## 2019-12-17 PROCEDURE — 25500020 PHARM REV CODE 255: Performed by: INTERNAL MEDICINE

## 2019-12-17 PROCEDURE — 80048 BASIC METABOLIC PNL TOTAL CA: CPT | Mod: 91

## 2019-12-17 PROCEDURE — 37000008 HC ANESTHESIA 1ST 15 MINUTES: Performed by: INTERNAL MEDICINE

## 2019-12-17 PROCEDURE — 63600175 PHARM REV CODE 636 W HCPCS: Performed by: STUDENT IN AN ORGANIZED HEALTH CARE EDUCATION/TRAINING PROGRAM

## 2019-12-17 PROCEDURE — 83036 HEMOGLOBIN GLYCOSYLATED A1C: CPT

## 2019-12-17 PROCEDURE — 86901 BLOOD TYPING SEROLOGIC RH(D): CPT

## 2019-12-17 PROCEDURE — 27201423 OPTIME MED/SURG SUP & DEVICES STERILE SUPPLY: Performed by: INTERNAL MEDICINE

## 2019-12-17 PROCEDURE — C1769 GUIDE WIRE: HCPCS | Performed by: INTERNAL MEDICINE

## 2019-12-17 PROCEDURE — 25000003 PHARM REV CODE 250: Performed by: STUDENT IN AN ORGANIZED HEALTH CARE EDUCATION/TRAINING PROGRAM

## 2019-12-17 PROCEDURE — 93005 ELECTROCARDIOGRAM TRACING: CPT

## 2019-12-17 PROCEDURE — S5010 5% DEXTROSE AND 0.45% SALINE: HCPCS | Performed by: INTERNAL MEDICINE

## 2019-12-17 PROCEDURE — 82962 GLUCOSE BLOOD TEST: CPT

## 2019-12-17 PROCEDURE — 36556 INSERT NON-TUNNEL CV CATH: CPT | Mod: 59,Q0,, | Performed by: ANESTHESIOLOGY

## 2019-12-17 PROCEDURE — 37000009 HC ANESTHESIA EA ADD 15 MINS: Performed by: INTERNAL MEDICINE

## 2019-12-17 PROCEDURE — C1760 CLOSURE DEV, VASC: HCPCS | Performed by: INTERNAL MEDICINE

## 2019-12-17 PROCEDURE — A4216 STERILE WATER/SALINE, 10 ML: HCPCS | Performed by: STUDENT IN AN ORGANIZED HEALTH CARE EDUCATION/TRAINING PROGRAM

## 2019-12-17 PROCEDURE — 27800903 OPTIME MED/SURG SUP & DEVICES OTHER IMPLANTS: Performed by: INTERNAL MEDICINE

## 2019-12-17 PROCEDURE — D9220A PRA ANESTHESIA: ICD-10-PCS | Mod: Q0,,, | Performed by: ANESTHESIOLOGY

## 2019-12-17 PROCEDURE — 99222 PR INITIAL HOSPITAL CARE,LEVL II: ICD-10-PCS | Mod: GC,,, | Performed by: INTERNAL MEDICINE

## 2019-12-17 PROCEDURE — 33361 REPLACE AORTIC VALVE PERQ: CPT | Mod: Q0,62,, | Performed by: THORACIC SURGERY (CARDIOTHORACIC VASCULAR SURGERY)

## 2019-12-17 PROCEDURE — 63600175 PHARM REV CODE 636 W HCPCS: Performed by: INTERNAL MEDICINE

## 2019-12-17 PROCEDURE — 20000000 HC ICU ROOM

## 2019-12-17 PROCEDURE — 99222 1ST HOSP IP/OBS MODERATE 55: CPT | Mod: GC,,, | Performed by: INTERNAL MEDICINE

## 2019-12-17 PROCEDURE — 36556 CENTRAL LINE: ICD-10-PCS | Mod: 59,Q0,, | Performed by: ANESTHESIOLOGY

## 2019-12-17 PROCEDURE — C1725 CATH, TRANSLUMIN NON-LASER: HCPCS | Performed by: INTERNAL MEDICINE

## 2019-12-17 PROCEDURE — 85027 COMPLETE CBC AUTOMATED: CPT

## 2019-12-17 PROCEDURE — 36620 ARTERIAL: ICD-10-PCS | Mod: 59,Q0,, | Performed by: ANESTHESIOLOGY

## 2019-12-17 PROCEDURE — 27000191 HC C-V MONITORING

## 2019-12-17 PROCEDURE — C1894 INTRO/SHEATH, NON-LASER: HCPCS | Performed by: INTERNAL MEDICINE

## 2019-12-17 PROCEDURE — 85730 THROMBOPLASTIN TIME PARTIAL: CPT

## 2019-12-17 PROCEDURE — D9220A PRA ANESTHESIA: Mod: Q0,,, | Performed by: ANESTHESIOLOGY

## 2019-12-17 PROCEDURE — 93010 ELECTROCARDIOGRAM REPORT: CPT | Mod: ,,, | Performed by: INTERNAL MEDICINE

## 2019-12-17 PROCEDURE — 85025 COMPLETE CBC W/AUTO DIFF WBC: CPT

## 2019-12-17 PROCEDURE — C1726 CATH, BAL DIL, NON-VASCULAR: HCPCS | Performed by: INTERNAL MEDICINE

## 2019-12-17 PROCEDURE — 33361 REPLACE AORTIC VALVE PERQ: CPT | Mod: Q0,62,, | Performed by: INTERNAL MEDICINE

## 2019-12-17 PROCEDURE — 93010 EKG 12-LEAD: ICD-10-PCS | Mod: ,,, | Performed by: INTERNAL MEDICINE

## 2019-12-17 PROCEDURE — 94761 N-INVAS EAR/PLS OXIMETRY MLT: CPT

## 2019-12-17 PROCEDURE — 27000239 HC STAND-BY BYPASS PUMP

## 2019-12-17 PROCEDURE — 85610 PROTHROMBIN TIME: CPT

## 2019-12-17 DEVICE — IMPLANTABLE DEVICE: Type: IMPLANTABLE DEVICE | Site: HEART | Status: FUNCTIONAL

## 2019-12-17 RX ORDER — SODIUM,POTASSIUM PHOSPHATES 280-250MG
2 POWDER IN PACKET (EA) ORAL
Status: DISCONTINUED | OUTPATIENT
Start: 2019-12-17 | End: 2019-12-19 | Stop reason: HOSPADM

## 2019-12-17 RX ORDER — ONDANSETRON 2 MG/ML
4 INJECTION INTRAMUSCULAR; INTRAVENOUS EVERY 12 HOURS PRN
Status: DISCONTINUED | OUTPATIENT
Start: 2019-12-17 | End: 2019-12-19 | Stop reason: HOSPADM

## 2019-12-17 RX ORDER — IBUPROFEN 200 MG
24 TABLET ORAL
Status: DISCONTINUED | OUTPATIENT
Start: 2019-12-17 | End: 2019-12-19 | Stop reason: HOSPADM

## 2019-12-17 RX ORDER — CLOPIDOGREL BISULFATE 75 MG/1
75 TABLET ORAL DAILY
Status: DISCONTINUED | OUTPATIENT
Start: 2019-12-18 | End: 2019-12-19 | Stop reason: HOSPADM

## 2019-12-17 RX ORDER — LANOLIN ALCOHOL/MO/W.PET/CERES
800 CREAM (GRAM) TOPICAL
Status: DISCONTINUED | OUTPATIENT
Start: 2019-12-17 | End: 2019-12-19 | Stop reason: HOSPADM

## 2019-12-17 RX ORDER — SODIUM CHLORIDE 9 MG/ML
INJECTION, SOLUTION INTRAVENOUS CONTINUOUS
Status: ACTIVE | OUTPATIENT
Start: 2019-12-17 | End: 2019-12-17

## 2019-12-17 RX ORDER — CLOPIDOGREL 300 MG/1
300 TABLET, FILM COATED ORAL ONCE
Status: COMPLETED | OUTPATIENT
Start: 2019-12-17 | End: 2019-12-17

## 2019-12-17 RX ORDER — POTASSIUM CHLORIDE 20 MEQ/15ML
40 SOLUTION ORAL
Status: DISCONTINUED | OUTPATIENT
Start: 2019-12-17 | End: 2019-12-19 | Stop reason: HOSPADM

## 2019-12-17 RX ORDER — PROTAMINE SULFATE 10 MG/ML
INJECTION, SOLUTION INTRAVENOUS
Status: DISCONTINUED | OUTPATIENT
Start: 2019-12-17 | End: 2019-12-17

## 2019-12-17 RX ORDER — CEFAZOLIN SODIUM 1 G/3ML
INJECTION, POWDER, FOR SOLUTION INTRAMUSCULAR; INTRAVENOUS
Status: DISCONTINUED | OUTPATIENT
Start: 2019-12-17 | End: 2019-12-17

## 2019-12-17 RX ORDER — IBUPROFEN 200 MG
16 TABLET ORAL
Status: DISCONTINUED | OUTPATIENT
Start: 2019-12-17 | End: 2019-12-19 | Stop reason: HOSPADM

## 2019-12-17 RX ORDER — FENTANYL CITRATE 50 UG/ML
INJECTION, SOLUTION INTRAMUSCULAR; INTRAVENOUS
Status: DISCONTINUED | OUTPATIENT
Start: 2019-12-17 | End: 2019-12-17

## 2019-12-17 RX ORDER — HEPARIN SODIUM 200 [USP'U]/100ML
INJECTION, SOLUTION INTRAVENOUS
Status: DISCONTINUED | OUTPATIENT
Start: 2019-12-17 | End: 2019-12-17

## 2019-12-17 RX ORDER — PHENYLEPHRINE HCL IN 0.9% NACL 1 MG/10 ML
SYRINGE (ML) INTRAVENOUS
Status: DISPENSED
Start: 2019-12-17 | End: 2019-12-18

## 2019-12-17 RX ORDER — DEXMEDETOMIDINE HYDROCHLORIDE 100 UG/ML
INJECTION, SOLUTION INTRAVENOUS
Status: DISCONTINUED | OUTPATIENT
Start: 2019-12-17 | End: 2019-12-17

## 2019-12-17 RX ORDER — DEXTROSE MONOHYDRATE AND SODIUM CHLORIDE 5; .45 G/100ML; G/100ML
INJECTION, SOLUTION INTRAVENOUS CONTINUOUS
Status: DISCONTINUED | OUTPATIENT
Start: 2019-12-17 | End: 2019-12-19 | Stop reason: HOSPADM

## 2019-12-17 RX ORDER — NOREPINEPHRINE BITARTRATE/D5W 4MG/250ML
0.02 PLASTIC BAG, INJECTION (ML) INTRAVENOUS CONTINUOUS PRN
Status: DISCONTINUED | OUTPATIENT
Start: 2019-12-17 | End: 2019-12-19 | Stop reason: HOSPADM

## 2019-12-17 RX ORDER — ASPIRIN 81 MG/1
81 TABLET ORAL DAILY
Status: DISCONTINUED | OUTPATIENT
Start: 2019-12-18 | End: 2019-12-19 | Stop reason: HOSPADM

## 2019-12-17 RX ORDER — HEPARIN SODIUM 1000 [USP'U]/ML
INJECTION, SOLUTION INTRAVENOUS; SUBCUTANEOUS
Status: DISCONTINUED | OUTPATIENT
Start: 2019-12-17 | End: 2019-12-17

## 2019-12-17 RX ORDER — ACETAMINOPHEN 325 MG/1
650 TABLET ORAL EVERY 4 HOURS PRN
Status: DISCONTINUED | OUTPATIENT
Start: 2019-12-17 | End: 2019-12-19 | Stop reason: HOSPADM

## 2019-12-17 RX ORDER — GLUCAGON 1 MG
1 KIT INJECTION
Status: DISCONTINUED | OUTPATIENT
Start: 2019-12-17 | End: 2019-12-19 | Stop reason: HOSPADM

## 2019-12-17 RX ORDER — DIPHENHYDRAMINE HCL 50 MG
50 CAPSULE ORAL ONCE
Status: COMPLETED | OUTPATIENT
Start: 2019-12-17 | End: 2019-12-17

## 2019-12-17 RX ORDER — LABETALOL HCL 20 MG/4 ML
10 SYRINGE (ML) INTRAVENOUS EVERY 4 HOURS PRN
Status: DISCONTINUED | OUTPATIENT
Start: 2019-12-17 | End: 2019-12-19 | Stop reason: HOSPADM

## 2019-12-17 RX ORDER — LIDOCAINE HYDROCHLORIDE 20 MG/ML
INJECTION, SOLUTION INFILTRATION; PERINEURAL
Status: DISCONTINUED | OUTPATIENT
Start: 2019-12-17 | End: 2019-12-17 | Stop reason: HOSPADM

## 2019-12-17 RX ORDER — INSULIN ASPART 100 [IU]/ML
1-10 INJECTION, SOLUTION INTRAVENOUS; SUBCUTANEOUS
Status: DISCONTINUED | OUTPATIENT
Start: 2019-12-17 | End: 2019-12-19 | Stop reason: HOSPADM

## 2019-12-17 RX ADMIN — SODIUM CHLORIDE: 0.9 INJECTION, SOLUTION INTRAVENOUS at 01:12

## 2019-12-17 RX ADMIN — DEXMEDETOMIDINE HYDROCHLORIDE 70 MCG: 100 INJECTION, SOLUTION, CONCENTRATE INTRAVENOUS at 10:12

## 2019-12-17 RX ADMIN — SODIUM CHLORIDE, SODIUM GLUCONATE, SODIUM ACETATE, POTASSIUM CHLORIDE, MAGNESIUM CHLORIDE, SODIUM PHOSPHATE, DIBASIC, AND POTASSIUM PHOSPHATE: .53; .5; .37; .037; .03; .012; .00082 INJECTION, SOLUTION INTRAVENOUS at 10:12

## 2019-12-17 RX ADMIN — DIPHENHYDRAMINE HYDROCHLORIDE 50 MG: 50 CAPSULE ORAL at 08:12

## 2019-12-17 RX ADMIN — PROTAMINE SULFATE 90 MG: 10 INJECTION, SOLUTION INTRAVENOUS at 11:12

## 2019-12-17 RX ADMIN — CEFAZOLIN 2 G: 330 INJECTION, POWDER, FOR SOLUTION INTRAMUSCULAR; INTRAVENOUS at 11:12

## 2019-12-17 RX ADMIN — Medication 0.04 MCG/KG/MIN: at 01:12

## 2019-12-17 RX ADMIN — FENTANYL CITRATE 50 MCG: 50 INJECTION, SOLUTION INTRAMUSCULAR; INTRAVENOUS at 11:12

## 2019-12-17 RX ADMIN — DEXMEDETOMIDINE HYDROCHLORIDE 1 MCG/KG/HR: 100 INJECTION, SOLUTION, CONCENTRATE INTRAVENOUS at 10:12

## 2019-12-17 RX ADMIN — HEPARIN SODIUM 9000 UNITS: 1000 INJECTION INTRAVENOUS; SUBCUTANEOUS at 11:12

## 2019-12-17 RX ADMIN — DEXTROSE AND SODIUM CHLORIDE: 5; .45 INJECTION, SOLUTION INTRAVENOUS at 08:12

## 2019-12-17 RX ADMIN — CLOPIDOGREL BISULFATE 300 MG: 300 TABLET, FILM COATED ORAL at 09:12

## 2019-12-17 NOTE — H&P
Subjective:      JENN Hernandez is a 79 y.o. female referred by Dr Frausto for evaluation of severe AS (NYHA Class III sx). She is here for a pre-op TAVR evaluation scheduled on 12/17/19. Patient reports worsening SOB for the past 6 months. She now experiences SOB with ADLs. She can no longer mop her kitchen without stopping to take breaks. She recently underwent a successful left ureteroscopy for a large left UVJ stone and severe hydronephrosis seen on TAVR CTA. Stents were removed today and she was cleared for TAVR. CTA also revealed significant pneumobilia in the left hepatic lobe with air in the common bile duct. Patient reports this was addressed with GI who cleared her for the procedure and will schedule endoscopy following TAVR.   The patient has undergone the following TAVR work-up:   ECHO (Date 11/11/19): NICOLE 0.82 cm2; peak velocity is 4.6 m/s; mean gradient is 53 mmHg EF= 70%. Pt also has mod AI and Mod MS from [facility] (Date 9/4/19): Nonobstructive CAD doen by DR. Frausto. Disc in folder   STS: 3.1%   Frailty: 1/4(Hand )   Iliacs are >5.9 on R and > 7.08 on L ** needs to be re-measured per Dr. Ashley   LVOT: (20.7 mm X 17.2 mm) and Avg Diameter is 18.7 ** needs to be re-measured per Dr. Ashley   Incidental findings on CT: severe left-sided hydronephrosis with severe dilatation of the left ureter throughout its course with a large obstructing stone at the left UVJ measuring 2.6 cm. Significant pneumobilia in the left hepatic lobe with air within the common bile duct.   CT Surgery risk assessment: moderate risk due to comorbid conditions and debility, Per Dr. Munson.   Rhythm issues: NSR   PFTs: FEV1 76% predicted, DLCO 84% predicted.   Comorbidities: HTN, DM  Roxanne Hernandez is a 23 mm Evolut Valve given LVOT Ca+ via L TF access.   Review of Systems   Constitution: Negative for chills and fever.   Eyes: Negative for blurred vision.   Cardiovascular: Positive for dyspnea on exertion. Negative for  chest pain, claudication, irregular heartbeat, leg swelling, orthopnea, palpitations and syncope.   Respiratory: Negative for cough.   Skin: Negative for itching, rash and suspicious lesions.   Musculoskeletal: Negative for falls.   Gastrointestinal: Negative for abdominal pain.   Neurological: Negative for disturbances in coordination, dizziness and loss of balance.   Psychiatric/Behavioral: Negative for altered mental status.            Past Medical History:   Diagnosis Date    Cervical cancer     Diabetes mellitus     Hypertension             Current Outpatient Medications on File Prior to Visit   Medication Sig Dispense Refill    aspirin 81 MG Chew Take 81 mg by mouth once daily.      Lactobac no.41/Bifidobact no.7 (PROBIOTIC-10 ORAL) Take by mouth once daily.      lisinopril (PRINIVIL,ZESTRIL) 20 MG tablet Take 20 mg by mouth once daily.      metFORMIN (GLUCOPHAGE) 500 MG tablet Take 500 mg by mouth 2 (two) times daily with meals.      [DISCONTINUED] tamsulosin (FLOMAX) 0.4 mg Cap Take 1 capsule (0.4 mg total) by mouth once daily. 30 capsule 0               Current Facility-Administered Medications on File Prior to Visit   Medication Dose Route Frequency Provider Last Rate Last Dose    0.9% NaCl infusion  Intravenous Continuous Carmen Yepez MD  Stopped at 09/04/19 1141    aspirin EC tablet 325 mg 325 mg Oral Once Carmen Yepez MD      diazePAM tablet 5 mg 5 mg Oral On Call Procedure Carmen Yepez MD      diphenhydrAMINE capsule 25 mg 25 mg Oral Once Carmen Yepez MD      [COMPLETED] lidocaine HCl 2% urojet  Mucous Membrane 1 time in Clinic/HOD Paddy Dunham MD       Vitals                                                                     Body mass index is 36.68 kg/m².   Objective:   Physical Exam   Constitutional: She is oriented to person, place, and time. She appears well-developed and well-nourished. No distress.   HENT:   Head: Normocephalic and  atraumatic.   Eyes: EOM are normal.   Neck: Normal range of motion.   Cardiovascular:   Murmur heard.   Harsh midsystolic murmur is present with a grade of 2/6 at the upper right sternal border radiating to the neck.   Pulmonary/Chest: Effort normal and breath sounds normal. No respiratory distress.   Neurological: She is alert and oriented to person, place, and time.   Skin: She is not diaphoretic.   Nursing note and vitals reviewed.       Assessment:     Nonrheumatic aortic valve stenosis   The patient has undergone the following TAVR work-up:   ECHO (Date 11/11/19): NICOLE 0.82 cm2; peak velocity is 4.6 m/s; mean gradient is 53 mmHg EF= 70%. Pt also has mod AI and Mod MS from [facility] (Date 9/4/19): Nonobstructive CAD doen by DR. Frausto. Disc in folder   STS: 3.1%   Frailty: 1/4(Hand )   Iliacs are >5.9 on R and > 7.08 on L ** needs to be re-measured per Dr. Ashley   LVOT: (20.7 mm X 17.2 mm) and Avg Diameter is 18.7 ** needs to be re-measured per Dr. Ashley   Incidental findings on CT: severe left-sided hydronephrosis with severe dilatation of the left ureter throughout its course with a large obstructing stone at the left UVJ measuring 2.6 cm. Significant pneumobilia in the left hepatic lobe with air within the common bile duct.   CT Surgery risk assessment: moderate risk due to comorbid conditions and debility, Per Dr. Munson.   Rhythm issues: NSR   PFTs: FEV1 76% predicted, DLCO 84% predicted.   Comorbidities: DM, age  Roxanne Hernandez is a 23 mm Evolut Valve given LVOT Ca+ via L TF access.   - The patient understands the risks and benefits and wishes to go ahead with the procedure.   - All patient's questions were answered.   -The risks, benefits & alternatives of the procedure were explained to the patient   -Discussed in detailed the risk of bleeding, infection, heart rhythm abnormalities, allergic reactions, kidney injury, stroke and death.   -The risks of moderate sedation include hypotension,  respiratory depression, arrhythmias, bronchospasm, & death.   -Informed consent was obtained & the patient is agreeable to proceed with the procedure.   -This patient was discussed with the attending cardiologist who agrees with the above assessment & plan.   Hydronephrosis with urinary obstruction due to ureteral calculus   Ureter stent removed today. Cleared for TAVR by nephrology.   Pneumobilia   Cleared for TAVR by GI. Will scheduled endoscopy 6 months following TAVR.     Po Hu MD  Structural/Valve Interventional Fellow  12/17/2019 8:20 AM

## 2019-12-17 NOTE — ANESTHESIA PROCEDURE NOTES
Arterial    Diagnosis: TAVR     Patient location during procedure: done in OR  Procedure start time: 12/17/2019 10:32 AM  Timeout: 12/17/2019 10:30 AM  Procedure end time: 12/17/2019 10:33 AM    Staffing  Authorizing Provider: Tamra Galo MD  Performing Provider: John Amezquita MD    Anesthesiologist was present at the time of the procedure.    Preanesthetic Checklist  Completed: patient identified, site marked, surgical consent, pre-op evaluation, timeout performed, IV checked, risks and benefits discussed, monitors and equipment checked and anesthesia consent givenArterial  Skin Prep: alcohol swabs  Local Infiltration: lidocaine  Orientation: right  Location: radialInsertion Attempts: 1  Assessment  Dressing: secured with tape and tegaderm  Patient: Tolerated well

## 2019-12-17 NOTE — TRANSFER OF CARE
"Anesthesia Transfer of Care Note    Patient: Roxanne Hernandez    Procedure(s) Performed: Procedure(s) (LRB):  REPLACEMENT, AORTIC VALVE, TRANSCATHETER (TAVR) (N/A)  PTA, Femoral Artery    Patient location: ICU    Anesthesia Type: MAC    Transport from OR: Transported from OR on 2-3 L/min O2 by NC with adequate spontaneous ventilation. Continuous ECG monitoring in transport. Continuous SpO2 monitoring in transport. Continuos invasive BP monitoring in transport    Post pain: adequate analgesia    Post assessment: no apparent anesthetic complications    Post vital signs: stable    Level of consciousness: awake    Nausea/Vomiting: no nausea/vomiting    Complications: none          Last vitals:   Visit Vitals  BP (!) 111/54 (BP Location: Left arm, Patient Position: Lying)   Pulse 84   Temp 36.4 °C (97.5 °F) (Oral)   Resp 18   Ht 4' 10" (1.473 m)   Wt 75.8 kg (167 lb)   SpO2 97%   Breastfeeding? No   BMI 34.90 kg/m²     "

## 2019-12-17 NOTE — ASSESSMENT & PLAN NOTE
In summary, patient with severe AS s/p TF TAVR. EP consulted for further management of TVP and if PPM is indicated. Underwent successful left transfemoral 23 mm evolute TAVR.  No paravalvular leak, mean gradient 3, peak velocity 1.5 post TAVR.    TTE: EF 70%  Pre-TAVR EKG: NSR with LVH and repolarization changes, QRS 86  Post-TAVR EKG:   Post-day EKG:       Plan:   - EP team will continue to follow, please call for any questions or concerns   - Any changes to rhythm or acute events on telemetry please obtain EKG    - NPO at MN, for possible PPM placement in AM  - EKG in AM   - Continue TVP overnight  - Routine Post op management per structural team

## 2019-12-17 NOTE — ANESTHESIA PREPROCEDURE EVALUATION
12/17/2019  Roxanne Hernandez is a 79 y.o., female.    Anesthesia Evaluation    I have reviewed the Patient Summary Reports.    I have reviewed the Nursing Notes.   I have reviewed the Medications.     Review of Systems  Anesthesia Hx:  No problems with previous Anesthesia  Denies Family Hx of Anesthesia complications.   Denies Personal Hx of Anesthesia complications.   Cardiovascular:   Hypertension   TTE 11/11/19:  · Concentric left ventricular remodeling.  · Hyperdynamic left ventricular systolic function. The estimated ejection fraction is 73%  · Normal right ventricular systolic function.  · Severe left atrial enlargement.  · Moderate mitral stenosis from annular calcification. The mean diastolic gradient across the mitral valve is 9 mmHg at a heart rate of 92 bpm.  · Severe aortic valve stenosis. Aortic valve area is 0.82 cm2; peak velocity is 4.6 m/s; mean gradient is 53 mmHg.  · Moderate aortic regurgitation.  · The estimated PA systolic pressure is 39 mm Hg  · Normal central venous pressure (3 mm Hg).   Endocrine:   Diabetes        Physical Exam  General:  Well nourished    Airway/Jaw/Neck:  Airway Findings: Mouth Opening: Normal Tongue: Normal  General Airway Assessment: Adult, Good  Mallampati: II  TM Distance: Normal, at least 6 cm  Jaw/Neck Findings:  Neck ROM: Normal ROM       Chest/Lungs:  Chest/Lungs Findings: Clear to auscultation     Heart/Vascular:  Heart Findings: Rate: Normal  Rhythm: Regular Rhythm        Mental Status:  Mental Status Findings:  Alert and Oriented, Cooperative         Anesthesia Plan  Type of Anesthesia, risks & benefits discussed:  Anesthesia Type:  MAC, general  Patient's Preference:   Intra-op Monitoring Plan: standard ASA monitors  Intra-op Monitoring Plan Comments:   Post Op Pain Control Plan: per primary service following discharge from PACU  Post Op Pain Control Plan  Comments:   Induction:   IV  Beta Blocker:         Informed Consent: Patient understands risks and agrees with Anesthesia plan.  Questions answered. Anesthesia consent signed with patient.  ASA Score: 4     Day of Surgery Review of History & Physical:    H&P update referred to the surgeon.         Ready For Surgery From Anesthesia Perspective.

## 2019-12-17 NOTE — NURSING
Paged Dr. Ashley's team concerning patients drop in BP on artline to 70s/30s MAP low 51. Levo restarted. Team at bedside. Instructed to administer 1L NS. WCTM.

## 2019-12-17 NOTE — CONSULTS
[facility]  Cardiac Electrophysiology  Consult Note    Admission Date: 12/17/2019  Code Status: No Order   Attending Provider: Kaz Pizano MD  Consulting Provider: Gricelda Alvarado MD  Principal Problem:<principal problem not specified>    Inpatient consult to Electrophysiology  Consult performed by: Gricelda Lake MD  Consult ordered by: Po Hu MD        Subjective:     Chief Complaint:  TAVR     HPI:   Ms. Roxanne Hernandez is a 79 y.o. Female, EP consulted for TAVR for PPM evaluation and TVP management. Patient with history of HTN, DM, and severe symptomatic AS underwent TAVR with structural cardiology, implanted 23mm Evolut Valve at [facility] on 12/17/19     The patient has undergone the following TAVR work-up:   · ECHO (Date 11/11/19): NICOLE 0.82 cm2; peak velocity is 4.6 m/s; mean gradient is 53 mmHg EF= 70%. Pt also has mod AI and Mod MS from [facility]   · LHC (Date 9/4/19): Nonobstructive CAD doen by DR. Frausto. Disc in folder   · STS: 3.1%   · Frailty: 1/4(Hand )   · Iliacs are >5.9 on R and > 7.08   · LVOT: (20.7 mm X 17.2 mm) and Avg Diameter is 18.7 ** needs to be re-measured per Dr. Ashley   · Incidental findings on CT: severe left-sided hydronephrosis with severe dilatation of the left ureter throughout its course with a large obstructing stone at the left UVJ measuring 2.6 cm. Significant pneumobilia in the left hepatic lobe with air within the common bile duct.   · CT Surgery risk assessment: moderate risk due to comorbid conditions and debility, Per Dr. Munson.   · Rhythm issues: NSR   · PFTs: FEV1 76% predicted, DLCO 84% predicted.   · Comorbidities: HTN, DM    Roxanne Hernandez is a 23 mm Evolut Valve given LVOT Ca+ via L TF access.     Past Medical History:   Diagnosis Date    Cervical cancer     Diabetes mellitus     Hypertension        Past Surgical History:   Procedure Laterality Date    CARDIAC CATHETERIZATION      CHOLECYSTECTOMY      CORONARY  ANGIOGRAPHY Left 9/4/2019    Procedure: ANGIOGRAM, CORONARY ARTERY;  Surgeon: Carmen Yepez MD;  Location: [facility] CATH/EP LAB;  Service: Cardiology;  Laterality: Left;    CYSTOSCOPY N/A 11/13/2019    Procedure: CYSTOSCOPY;  Surgeon: Paddy Dunham MD;  Location: [facility] OR [address] [place] [address];  Service: Urology;  Laterality: N/A;    HYSTERECTOMY      JOINT REPLACEMENT      KNEE ARTHROPLASTY Right     LASER LITHOTRIPSY Left 11/13/2019    Procedure: LITHOTRIPSY, USING LASER;  Surgeon: Paddy Dunham MD;  Location: [facility] OR [address] [place] [address];  Service: Urology;  Laterality: Left;    URETEROSCOPIC REMOVAL OF URETERIC CALCULUS Left 11/13/2019    Procedure: REMOVAL, CALCULUS, URETER, URETEROSCOPIC;  Surgeon: Paddy Dunham MD;  Location: [facility] OR [address] [place] [address];  Service: Urology;  Laterality: Left;    URETEROSCOPY Left 11/13/2019    Procedure: URETEROSCOPY;  Surgeon: Paddy Dunham MD;  Location: [facility] OR [address] [place] [address];  Service: Urology;  Laterality: Left;       Review of patient's allergies indicates:   Allergen Reactions    Azithromycin Swelling     All mycins    Statins-hmg-coa reductase inhibitors Other (See Comments)     Liver function       Current Facility-Administered Medications on File Prior to Encounter   Medication    0.9%  NaCl infusion    aspirin EC tablet 325 mg    diazePAM tablet 5 mg    diphenhydrAMINE capsule 25 mg     Current Outpatient Medications on File Prior to Encounter   Medication Sig    aspirin 81 MG Chew Take 81 mg by mouth once daily.    Lactobac no.41/Bifidobact no.7 (PROBIOTIC-10 ORAL) Take by mouth once daily.    lisinopril (PRINIVIL,ZESTRIL) 20 MG tablet Take 20 mg by mouth once daily.    metFORMIN (GLUCOPHAGE) 500 MG tablet Take 500 mg by mouth 2 (two) times daily with meals.     Family History     Problem Relation (Age of Onset)    Cancer Maternal Grandfather, Paternal Grandfather    Hypertension Father        Tobacco Use    Smoking status: Former Smoker     Packs/day: 1.00     Years: 25.00     Pack  years: 25.00     Types: Cigarettes     Last attempt to quit: 1980     Years since quittin.9    Smokeless tobacco: Never Used   Substance and Sexual Activity    Alcohol use: Not Currently    Drug use: Never    Sexual activity: Not on file     Review of Systems   Constitution: Negative for chills, decreased appetite, diaphoresis, fever, weight gain and weight loss.   Eyes: Negative for blurred vision.   Cardiovascular: Negative for chest pain, dyspnea on exertion, irregular heartbeat, leg swelling, near-syncope, orthopnea and palpitations.   Respiratory: Negative for cough, shortness of breath, snoring and wheezing.    Gastrointestinal: Negative for abdominal pain, nausea and vomiting.   Genitourinary: Negative for bladder incontinence and urgency.     Objective:     Vital Signs (Most Recent):  Temp: 97.5 °F (36.4 °C) (19 08)  Pulse: 84 (19 08)  Resp: 18 (19)  BP: (!) 111/54 (19 08)  SpO2: 97 % (19) Vital Signs (24h Range):  Temp:  [97.5 °F (36.4 °C)] 97.5 °F (36.4 °C)  Pulse:  [84] 84  Resp:  [18] 18  SpO2:  [97 %] 97 %  BP: (111-113)/(53-54) 111/54       Weight: 75.8 kg (167 lb)  Body mass index is 34.9 kg/m².    SpO2: 97 %  O2 Device (Oxygen Therapy): room air    Physical Exam   Constitutional: She is oriented to person, place, and time. She appears well-developed and well-nourished. No distress.   HENT:   Head: Normocephalic and atraumatic.   Eyes: Pupils are equal, round, and reactive to light. Conjunctivae are normal.   Neck: Normal range of motion. Neck supple. No thyromegaly present.   Cardiovascular: Normal rate, regular rhythm, normal heart sounds and intact distal pulses. Exam reveals no gallop and no friction rub.   No murmur heard.  Pulses:       Carotid pulses are 2+ on the right side, and 2+ on the left side.       Radial pulses are 2+ on the right side, and 2+ on the left side.   Pulmonary/Chest: Effort normal and breath sounds normal. No  respiratory distress. She has no wheezes.   Abdominal: Soft. Bowel sounds are normal. She exhibits no distension. There is no tenderness.   Neurological: She is alert and oriented to person, place, and time.   Skin: She is not diaphoretic.       Significant Labs:   CMP:   Recent Labs   Lab 12/17/19  0810      K 4.3      CO2 24      BUN 17   CREATININE 1.1   CALCIUM 9.4   ANIONGAP 6*   ESTGFRAFRICA 55.2*   EGFRNONAA 47.9*   , CBC:   Recent Labs   Lab 12/17/19  0810   WBC 7.16   HGB 14.2   HCT 43.5      , INR:   Recent Labs   Lab 12/17/19  0810   INR 0.9    and Lipid Panel No results for input(s): CHOL, HDL, LDLCALC, TRIG, CHOLHDL in the last 48 hours.    Significant Imaging: Ejection fraction: No results for input(s): EF in the last 168 hours.              Assessment and Plan:     Nodular calcific aortic valve stenosis  In summary, patient with severe AS s/p TF TAVR. EP consulted for further management of TVP and if PPM is indicated. Underwent successful left transfemoral 23 mm evolute TAVR.  No paravalvular leak, mean gradient 3, peak velocity 1.5 post TAVR.    TTE: EF 70%  Pre-TAVR EKG: NSR with LVH and repolarization changes, QRS 86, CINDY 136  Post-TAVR EKG: NSR with complete LBBB (), 1st AVB (CINDY 222)  Post-day EKG:       Plan:   - EP team will continue to follow, please call for any questions or concerns   - Any changes to rhythm or acute events on telemetry please obtain EKG    - NPO at MN, likely  EPS +/- Right sided PPM placement in AM   - EKG in AM   - Continue TVP overnight  - Routine Post op management per structural team      Thank you for your consult. I will follow-up with patient. Please contact us if you have any additional questions.    Gricelda Alvarado MD  Cardiac Electrophysiology  [facility]

## 2019-12-17 NOTE — ANESTHESIA PROCEDURE NOTES
Central Line    Diagnosis: TAVR  Patient location during procedure: done in OR  Procedure start time: 12/17/2019 10:42 AM  Timeout: 12/17/2019 10:41 AM  Procedure end time: 12/17/2019 10:45 AM    Staffing  Authorizing Provider: Tamra Galo MD  Performing Provider: John Amezquita MD    Staffing  Other anesthesia staff: John Amezquita MD  Performed: other anesthesia staff   Anesthesiologist was present at the time of the procedure.  Preanesthetic Checklist  Completed: patient identified, site marked, surgical consent, pre-op evaluation, timeout performed, IV checked, risks and benefits discussed, monitors and equipment checked and anesthesia consent given  Indication   Indication: hemodynamic monitoring (PACING)     Anesthesia   local infiltration    Central Line   Skin Prep: skin prepped with ChloraPrep, skin prep agent completely dried prior to procedure  maximum sterile barriers used during central venous catheter insertion  hand hygiene performed prior to central venous catheter insertion  Location: right, internal jugular.   Catheter type: double lumen  Catheter Size: 7.5 Fr  Ultrasound: vascular probe with ultrasound  Vessel Caliber: patent  Needle advanced into vessel with real time Ultrasound guidance.   Manometry: Venous cannualation confirmed by visual estimation of blood vessel pressure using manometry.  Insertion Attempts: 1   Securement:line sutured, chlorhexidine patch, sterile dressing applied and blood return through all ports    Post-Procedure   Adverse Events:none    Guidewire Guidewire removed intact.

## 2019-12-17 NOTE — HPI
Ms. Roxanne Hernandez is a 79 y.o. Female, EP consulted for TAVR for PPM evaluation and TVP management. Patient with history of HTN, DM, and severe symptomatic AS underwent TAVR with structural cardiology, implanted 23mm Evolut Valve at [facility] on 12/17/19     The patient has undergone the following TAVR work-up:   · ECHO (Date 11/11/19): NICOLE 0.82 cm2; peak velocity is 4.6 m/s; mean gradient is 53 mmHg EF= 70%. Pt also has mod AI and Mod MS from [facility]   · [facility] (Date 9/4/19): Nonobstructive CAD doen by DR. Frausto. Disc in folder   · STS: 3.1%   · Frailty: 1/4(Hand )   · Iliacs are >5.9 on R and > 7.08   · LVOT: (20.7 mm X 17.2 mm) and Avg Diameter is 18.7 ** needs to be re-measured per Dr. Ashley   · Incidental findings on CT: severe left-sided hydronephrosis with severe dilatation of the left ureter throughout its course with a large obstructing stone at the left UVJ measuring 2.6 cm. Significant pneumobilia in the left hepatic lobe with air within the common bile duct.   · CT Surgery risk assessment: moderate risk due to comorbid conditions and debility, Per Dr. Munson.   · Rhythm issues: NSR   · PFTs: FEV1 76% predicted, DLCO 84% predicted.   · Comorbidities: HTN, DM    Roxanne Hernandez is a 23 mm Evolut Valve given LVOT Ca+ via L TF access.

## 2019-12-17 NOTE — ANESTHESIA POSTPROCEDURE EVALUATION
Anesthesia Post Evaluation    Patient: Roxanne Hernandez    Procedure(s) Performed: Procedure(s) (LRB):  REPLACEMENT, AORTIC VALVE, TRANSCATHETER (TAVR) (N/A)  PTA, Femoral Artery    Final Anesthesia Type: general    Patient location during evaluation: ICU  Patient participation: Yes- Able to Participate  Level of consciousness: awake and alert  Post-procedure vital signs: reviewed and stable  Pain management: adequate  Airway patency: patent    PONV status at discharge: No PONV  Anesthetic complications: no      Cardiovascular status: hemodynamically stable  Respiratory status: spontaneous ventilation  Follow-up not needed.          Vitals Value Taken Time   /63 12/17/2019 12:48 PM   Temp 36.4 °C (97.5 °F) 12/17/2019  8:33 AM   Pulse 65 12/17/2019 12:51 PM   Resp 17 12/17/2019 12:51 PM   SpO2 100 % 12/17/2019 12:51 PM   Vitals shown include unvalidated device data.      No case tracking events are documented in the log.      Pain/Paula Score: No data recorded

## 2019-12-17 NOTE — ANESTHESIA PREPROCEDURE EVALUATION
[facility]  Anesthesia Pre-Operative Evaluation         Patient Name: Roxanne Hernandez  YOB: 1940  MRN: [id number]    SUBJECTIVE:     Pre-operative evaluation for Procedure(s) (LRB):  CARDIAC ELECTROPHYSIOLOGY STUDY, DIAGNOSTIC (N/A)     12/17/2019    Roxanne Hernandez is a 79 y.o. female w/ a significant PMHx of HTN, DM2, and severe symptomatic AS s/p TAVR with structural cardiology on 12/17. LBBB. Patient now presents for the above procedure(s). Additionally, reports of significant pneumobilia in the left hepatic lobe with air in the common bile duct. LHC (Date 9/4/19) showed nonobstructive CAD.     LDA:       Percutaneous Central Line Insertion/Assessment - double lumen  12/17/19 1042 (Active)   Number of days: 0            Peripheral IV - Single Lumen 12/17/19 0853 18 G Left Forearm (Active)   Site Assessment Clean;Dry;Intact;No redness;No swelling 12/17/2019  8:33 AM   Line Status Saline locked 12/17/2019  8:33 AM   Dressing Status Clean;Dry;Intact 12/17/2019  8:33 AM   Number of days: 0            Arterial Line 12/17/19 1032 Right Radial (Active)   Number of days: 0            Pacer Wires 12/17/19 1053 (Active)   Number of days: 0       Prev airway: None documented.    Drips:    sodium chloride 0.9% 200 mL/hr at 12/17/19 1320    dextrose 5 % and 0.45 % NaCl Stopped (12/17/19 1038)    norepinephrine bitartrate-D5W Stopped (12/17/19 1355)       Patient Active Problem List   Diagnosis    Nonrheumatic aortic valve stenosis    Essential hypertension    Diabetes mellitus without complication    Hydronephrosis with urinary obstruction due to ureteral calculus    Pneumobilia    Nonrheumatic mitral valve stenosis    Nonrheumatic aortic valve insufficiency    Left ureteral stone    Nodular calcific aortic valve stenosis       Review of patient's allergies indicates:   Allergen Reactions    Azithromycin Swelling     All mycins    Statins-hmg-coa reductase inhibitors Other (See  Comments)     Liver function       Current Inpatient Medications:   [START ON 12/18/2019] aspirin  81 mg Oral Daily    [START ON 12/18/2019] clopidogrel  75 mg Oral Daily    phenylephrine HCl in 0.9% NaCl           Current Facility-Administered Medications on File Prior to Encounter   Medication Dose Route Frequency Provider Last Rate Last Dose    0.9%  NaCl infusion   Intravenous Continuous Carmen Yepez MD   Stopped at 09/04/19 1141    aspirin EC tablet 325 mg  325 mg Oral Once Carmen Yepez MD        diazePAM tablet 5 mg  5 mg Oral On Call Procedure Carmen Yepez MD        diphenhydrAMINE capsule 25 mg  25 mg Oral Once Carmen Yepez MD         Current Outpatient Medications on File Prior to Encounter   Medication Sig Dispense Refill    aspirin 81 MG Chew Take 81 mg by mouth once daily.      Lactobac no.41/Bifidobact no.7 (PROBIOTIC-10 ORAL) Take by mouth once daily.      lisinopril (PRINIVIL,ZESTRIL) 20 MG tablet Take 20 mg by mouth once daily.      metFORMIN (GLUCOPHAGE) 500 MG tablet Take 500 mg by mouth 2 (two) times daily with meals.         Past Surgical History:   Procedure Laterality Date    CARDIAC CATHETERIZATION      CHOLECYSTECTOMY      CORONARY ANGIOGRAPHY Left 9/4/2019    Procedure: ANGIOGRAM, CORONARY ARTERY;  Surgeon: Carmen Yepez MD;  Location: [facility] CATH/EP LAB;  Service: Cardiology;  Laterality: Left;    CYSTOSCOPY N/A 11/13/2019    Procedure: CYSTOSCOPY;  Surgeon: Paddy Dunham MD;  Location: [address];  Service: Urology;  Laterality: N/A;    HYSTERECTOMY      JOINT REPLACEMENT      KNEE ARTHROPLASTY Right     LASER LITHOTRIPSY Left 11/13/2019    Procedure: LITHOTRIPSY, USING LASER;  Surgeon: Paddy Dunham MD;  Location: [facility] OR [address] [place] [address];  Service: Urology;  Laterality: Left;    URETEROSCOPIC REMOVAL OF URETERIC CALCULUS Left 11/13/2019    Procedure: REMOVAL, CALCULUS, URETER, URETEROSCOPIC;  Surgeon: Paddy Dunham  MD;  Location: [address];  Service: Urology;  Laterality: Left;    URETEROSCOPY Left 2019    Procedure: URETEROSCOPY;  Surgeon: Paddy Dunham MD;  Location: [address];  Service: Urology;  Laterality: Left;       Social History     Socioeconomic History    Marital status:      Spouse name: Not on file    Number of children: Not on file    Years of education: Not on file    Highest education level: Not on file   Occupational History    Not on file   Social Needs    Financial resource strain: Not on file    Food insecurity:     Worry: Not on file     Inability: Not on file    Transportation needs:     Medical: Not on file     Non-medical: Not on file   Tobacco Use    Smoking status: Former Smoker     Packs/day: 1.00     Years: 25.00     Pack years: 25.00     Types: Cigarettes     Last attempt to quit: 1980     Years since quittin.9    Smokeless tobacco: Never Used   Substance and Sexual Activity    Alcohol use: Not Currently    Drug use: Never    Sexual activity: Not on file   Lifestyle    Physical activity:     Days per week: Not on file     Minutes per session: Not on file    Stress: Not on file   Relationships    Social connections:     Talks on phone: Not on file     Gets together: Not on file     Attends Anabaptism service: Not on file     Active member of club or organization: Not on file     Attends meetings of clubs or organizations: Not on file     Relationship status: Not on file   Other Topics Concern    Not on file   Social History Narrative    Not on file       OBJECTIVE:     Vital Signs Range (Last 24H):  Temp:  [36.4 °C (97.5 °F)-36.4 °C (97.6 °F)]   Pulse:  [62-84]   Resp:  [16-18]   BP: (111-135)/(53-59)   SpO2:  [97 %-100 %]   Arterial Line BP: ()/(10-72)       Significant Labs:  Lab Results   Component Value Date    WBC 6.74 2019    HGB 11.6 (L) 2019    HCT 36.5 (L) 2019     (L) 2019    ALT 16 2019    AST  19 08/30/2019     12/17/2019    K 4.4 12/17/2019     (H) 12/17/2019    CREATININE 0.9 12/17/2019    BUN 16 12/17/2019    CO2 20 (L) 12/17/2019    INR 0.9 12/17/2019    HGBA1C 5.5 12/17/2019       Diagnostic Studies: No relevant studies.    EKG:   Results for orders placed or performed during the hospital encounter of 12/17/19   EKG 12-LEAD starting tomorrow    Collection Time: 12/17/19  1:24 PM    Narrative    Test Reason : Z95.2,    Vent. Rate : 062 BPM     Atrial Rate : 062 BPM     P-R Int : 222 ms          QRS Dur : 146 ms      QT Int : 538 ms       P-R-T Axes : 036 -13 053 degrees     QTc Int : 546 ms    Sinus rhythm with 1st degree A-V block  Left bundle branch block  Abnormal ECG  When compared with ECG of 17-DEC-2019 08:26,  TN interval has increased  Left bundle branch block is now Present    Referred By: JANINE HAAS           Confirmed By:        ECHOCARDIOGRAM:  TTE:  Results for orders placed or performed during the hospital encounter of 11/11/19   Echo   Result Value Ref Range    Ascending aorta 2.83 cm    STJ 2.18 cm    AV mean gradient 53 mmHg    Ao peak tho 4.6 m/s    Ao .17 cm    IVS 1.25 (A) 0.6 - 1.1 cm    LA size 4.55 cm    Left Atrium Major Axis 5.42 cm    Left Atrium Minor Axis 5.36 cm    LVIDD 3.11 (A) 3.5 - 6.0 cm    LVIDS 2.37 2.1 - 4.0 cm    LVOT diameter 1.90 cm    LVOT peak VTI 30.00 cm    PW 1.35 (A) 0.6 - 1.1 cm    MV Peak A Tho 2.14 m/s    E wave decelartion time 316.28 msec    MV Peak E Tho 1.54 m/s    PV Peak D Tho 0.33 m/s    PV Peak S Tho 0.74 m/s    RA Major Axis 4.77 cm    RA Width 2.83 cm    RVDD 2.72 cm    Sinus 2.55 cm    TAPSE 2.04 cm    TR Max Tho 2.99 m/s    TDI LATERAL 0.04 m/s    TDI SEPTAL 0.04 m/s    LA WIDTH 4.39 cm    LV Diastolic Volume 38.34 mL    LV Systolic Volume 19.44 mL    RV S' 9.62 cm/s    LVOT peak tho 1.55 m/s    LV LATERAL E/E' RATIO 38.50 m/s    LV SEPTAL E/E' RATIO 38.50 m/s    FS 24 %    LA volume 91.51 cm3    LV mass 130.47 g    Left  Ventricle Relative Wall Thickness 0.87 cm    AV valve area 0.82 cm2    AV Velocity Ratio 0.34     AV index (prosthetic) 0.29     E/A ratio 0.72     Mean e' 0.04 m/s    Pulm vein S/D ratio 2.24     LVOT area 2.8 cm2    LVOT stroke volume 85.02 cm3    AV peak gradient 85 mmHg    E/E' ratio 38.50 m/s    LV Systolic Volume Index 11.4 mL/m2    LV Diastolic Volume Index 22.51 mL/m2    LA Volume Index 53.7 mL/m2    LV Mass Index 77 g/m2    Triscuspid Valve Regurgitation Peak Gradient 36 mmHg    BSA 1.79 m2    Right Atrial Pressure (from IVC) 3 mmHg    AV regurgitation pressure 1/2 time 300 ms    MV mean gradient 9 mmHg    MV peak gradient 16 mmHg    TV rest pulmonary artery pressure 39 mmHg    HR echo 92 bpm    Narrative    · Concentric left ventricular remodeling.  · Hyperdynamic left ventricular systolic function. The estimated ejection   fraction is 73%  · Normal right ventricular systolic function.  · Severe left atrial enlargement.  · Moderate mitral stenosis from annular calcification. The mean diastolic   gradient across the mitral valve is 9 mmHg at a heart rate of 92 bpm.  · Severe aortic valve stenosis. Aortic valve area is 0.82 cm2; peak   velocity is 4.6 m/s; mean gradient is 53 mmHg.  · Moderate aortic regurgitation.  · The estimated PA systolic pressure is 39 mm Hg  · Normal central venous pressure (3 mm Hg).        VICKY:  Results for orders placed or performed during the hospital encounter of 09/04/19   Transesophageal echo (VICKY)   Result Value Ref Range    E wave decelartion time 269.29 msec    TR Max Tho 3.25 m/s    Triscuspid Valve Regurgitation Peak Gradient 42 mmHg    Narrative    Indication:   Mitral stenosis, aortic stenosis and regurgitation    Counseling:   Patient as well as her family member were informed of the risks, benefits   as well as alternatives to the procedure and informed consent was   obtained.     Procedure:  After obtaining informed consent and sedated with Anesthesia help a VICKY    probe was advanced without difficulty and images were obtained. Patient   tolerated the procedure well and no immediate complications were noted.     Complications:   None immediate    Findings:  1. Aortic valve: Heavily calcified with restricted leaflet motion.   Moderate aortic regurgitation.   2. Mitral valve: Thick and calcified with restricted leaflet motion. Mild   mitral stenosis and regurgitation.   3. Tricuspid valve: Is structurally normal with good leaflet excursion.   Mild regurgitation.  4. Pulmonary valve is structurally normal with good leaflet excursion. No   significant regurgitation.  5. Overall LVEF appears normal.   6. No obvious shunt across the interatrial septum by color flow Doppler.     Conclusions:  1. Mild mitral stenosis.   2. Moderate aortic regurgitation.   3. Known severe aortic stenosis on previous TTE.            ASSESSMENT/PLAN:         Anesthesia Evaluation    I have reviewed the Patient Summary Reports.     I have reviewed the Medications.     Review of Systems  Anesthesia Hx:  No problems with previous Anesthesia  Denies Family Hx of Anesthesia complications.   Denies Personal Hx of Anesthesia complications.   Hematology/Oncology:  Hematology Normal   Oncology Normal     Cardiovascular:   Hypertension Dysrhythmias   TTE 11/11/19:  · Concentric left ventricular remodeling.  · Hyperdynamic left ventricular systolic function. The estimated ejection fraction is 73%  · Normal right ventricular systolic function.  · Severe left atrial enlargement.  · Moderate mitral stenosis from annular calcification. The mean diastolic gradient across the mitral valve is 9 mmHg at a heart rate of 92 bpm.  · Severe aortic valve stenosis. Aortic valve area is 0.82 cm2; peak velocity is 4.6 m/s; mean gradient is 53 mmHg.  · Moderate aortic regurgitation.  · The estimated PA systolic pressure is 39 mm Hg  · Normal central venous pressure (3 mm Hg).   Renal/:   Chronic Renal Disease     Musculoskeletal:  Musculoskeletal Normal    Neurological:  Neurology Normal    Endocrine:   Diabetes, type 2    Psych:  Psychiatric Normal           Physical Exam  General:  Well nourished    Airway/Jaw/Neck:  Airway Findings: Mouth Opening: Normal Tongue: Normal  General Airway Assessment: Adult  Mallampati: IV  TM Distance: Normal, at least 6 cm  Jaw/Neck Findings:  Neck ROM: Extension Decreased, Mild, Extension Painful      Dental:  Dental Findings: Edentulous   Chest/Lungs:  Chest/Lungs Findings: Clear to auscultation     Heart/Vascular:  Heart Findings: Rate: Normal  Rhythm: Regular Rhythm        Mental Status:  Mental Status Findings:  Alert and Oriented, Cooperative         Anesthesia Plan  Type of Anesthesia, risks & benefits discussed:  Anesthesia Type:  general, MAC  Patient's Preference:   Intra-op Monitoring Plan: standard ASA monitors and arterial line  Intra-op Monitoring Plan Comments:   Post Op Pain Control Plan: multimodal analgesia and IV/PO Opioids PRN  Post Op Pain Control Plan Comments:   Induction:   IV  Beta Blocker:  Patient is not currently on a Beta-Blocker (No further documentation required).       Informed Consent: Patient representative understands risks and agrees with Anesthesia plan.  Questions answered. Anesthesia consent signed with patient representative.  ASA Score: 4     Day of Surgery Review of History & Physical:    H&P update referred to the surgeon.         Ready For Surgery From Anesthesia Perspective.

## 2019-12-17 NOTE — SUBJECTIVE & OBJECTIVE
Past Medical History:   Diagnosis Date    Cervical cancer     Diabetes mellitus     Hypertension        Past Surgical History:   Procedure Laterality Date    CARDIAC CATHETERIZATION      CHOLECYSTECTOMY      CORONARY ANGIOGRAPHY Left 9/4/2019    Procedure: ANGIOGRAM, CORONARY ARTERY;  Surgeon: Carmen Yepez MD;  Location: [facility] CATH/EP LAB;  Service: Cardiology;  Laterality: Left;    CYSTOSCOPY N/A 11/13/2019    Procedure: CYSTOSCOPY;  Surgeon: Paddy Dunham MD;  Location: [address];  Service: Urology;  Laterality: N/A;    HYSTERECTOMY      JOINT REPLACEMENT      KNEE ARTHROPLASTY Right     LASER LITHOTRIPSY Left 11/13/2019    Procedure: LITHOTRIPSY, USING LASER;  Surgeon: Paddy Dunham MD;  Location: [facility] OR [address] [place] [address];  Service: Urology;  Laterality: Left;    URETEROSCOPIC REMOVAL OF URETERIC CALCULUS Left 11/13/2019    Procedure: REMOVAL, CALCULUS, URETER, URETEROSCOPIC;  Surgeon: Paddy Dunham MD;  Location: [facility] OR [address] [place] [address];  Service: Urology;  Laterality: Left;    URETEROSCOPY Left 11/13/2019    Procedure: URETEROSCOPY;  Surgeon: Paddy Dunham MD;  Location: [address];  Service: Urology;  Laterality: Left;       Review of patient's allergies indicates:   Allergen Reactions    Azithromycin Swelling     All mycins    Statins-hmg-coa reductase inhibitors Other (See Comments)     Liver function       Current Facility-Administered Medications on File Prior to Encounter   Medication    0.9%  NaCl infusion    aspirin EC tablet 325 mg    diazePAM tablet 5 mg    diphenhydrAMINE capsule 25 mg     Current Outpatient Medications on File Prior to Encounter   Medication Sig    aspirin 81 MG Chew Take 81 mg by mouth once daily.    Lactobac no.41/Bifidobact no.7 (PROBIOTIC-10 ORAL) Take by mouth once daily.    lisinopril (PRINIVIL,ZESTRIL) 20 MG tablet Take 20 mg by mouth once daily.    metFORMIN (GLUCOPHAGE) 500 MG tablet Take 500 mg by mouth 2 (two) times daily with meals.      Family History     Problem Relation (Age of Onset)    Cancer Maternal Grandfather, Paternal Grandfather    Hypertension Father        Tobacco Use    Smoking status: Former Smoker     Packs/day: 1.00     Years: 25.00     Pack years: 25.00     Types: Cigarettes     Last attempt to quit: 1980     Years since quittin.9    Smokeless tobacco: Never Used   Substance and Sexual Activity    Alcohol use: Not Currently    Drug use: Never    Sexual activity: Not on file     Review of Systems   Constitution: Negative for chills, decreased appetite, diaphoresis, fever, weight gain and weight loss.   Eyes: Negative for blurred vision.   Cardiovascular: Negative for chest pain, dyspnea on exertion, irregular heartbeat, leg swelling, near-syncope, orthopnea and palpitations.   Respiratory: Negative for cough, shortness of breath, snoring and wheezing.    Gastrointestinal: Negative for abdominal pain, nausea and vomiting.   Genitourinary: Negative for bladder incontinence and urgency.     Objective:     Vital Signs (Most Recent):  Temp: 97.5 °F (36.4 °C) (19 0833)  Pulse: 84 (19 0833)  Resp: 18 (19 0833)  BP: (!) 111/54 (19 0834)  SpO2: 97 % (19 08) Vital Signs (24h Range):  Temp:  [97.5 °F (36.4 °C)] 97.5 °F (36.4 °C)  Pulse:  [84] 84  Resp:  [18] 18  SpO2:  [97 %] 97 %  BP: (111-113)/(53-54) 111/54       Weight: 75.8 kg (167 lb)  Body mass index is 34.9 kg/m².    SpO2: 97 %  O2 Device (Oxygen Therapy): room air    Physical Exam   Constitutional: She is oriented to person, place, and time. She appears well-developed and well-nourished. No distress.   HENT:   Head: Normocephalic and atraumatic.   Eyes: Pupils are equal, round, and reactive to light. Conjunctivae are normal.   Neck: Normal range of motion. Neck supple. No thyromegaly present.   Cardiovascular: Normal rate, regular rhythm, normal heart sounds and intact distal pulses. Exam reveals no gallop and no friction rub.   No  murmur heard.  Pulses:       Carotid pulses are 2+ on the right side, and 2+ on the left side.       Radial pulses are 2+ on the right side, and 2+ on the left side.   Pulmonary/Chest: Effort normal and breath sounds normal. No respiratory distress. She has no wheezes.   Abdominal: Soft. Bowel sounds are normal. She exhibits no distension. There is no tenderness.   Neurological: She is alert and oriented to person, place, and time.   Skin: She is not diaphoretic.       Significant Labs:   CMP:   Recent Labs   Lab 12/17/19  0810      K 4.3      CO2 24      BUN 17   CREATININE 1.1   CALCIUM 9.4   ANIONGAP 6*   ESTGFRAFRICA 55.2*   EGFRNONAA 47.9*   , CBC:   Recent Labs   Lab 12/17/19  0810   WBC 7.16   HGB 14.2   HCT 43.5      , INR:   Recent Labs   Lab 12/17/19  0810   INR 0.9    and Lipid Panel No results for input(s): CHOL, HDL, LDLCALC, TRIG, CHOLHDL in the last 48 hours.    Significant Imaging: Ejection fraction: No results for input(s): EF in the last 168 hours.

## 2019-12-17 NOTE — NURSING
Attempted to contact Dr. Hu regarding patient's H&H. Spoke with the NP. H&H dropped from 14.2 & 43.5 to 11.6 & 36.5. NP stated will relay to Dr. Hu. [facility].

## 2019-12-18 ENCOUNTER — ANESTHESIA (OUTPATIENT)
Dept: MEDSURG UNIT | Facility: HOSPITAL | Age: 79
DRG: 266 | End: 2019-12-18
Payer: MEDICARE

## 2019-12-18 DIAGNOSIS — Z95.2 S/P TAVR (TRANSCATHETER AORTIC VALVE REPLACEMENT): Primary | ICD-10-CM

## 2019-12-18 PROBLEM — Z95.3 S/P TAVR (TRANSCATHETER AORTIC VALVE REPLACEMENT): Status: ACTIVE | Noted: 2019-12-18

## 2019-12-18 PROBLEM — I50.33 ACUTE ON CHRONIC DIASTOLIC HEART FAILURE: Status: ACTIVE | Noted: 2019-12-18

## 2019-12-18 PROBLEM — I44.7 LEFT BUNDLE BRANCH BLOCK: Status: ACTIVE | Noted: 2019-12-18

## 2019-12-18 LAB
ANION GAP SERPL CALC-SCNC: 8 MMOL/L (ref 8–16)
BASOPHILS # BLD AUTO: 0.02 K/UL (ref 0–0.2)
BASOPHILS NFR BLD: 0.2 % (ref 0–1.9)
BUN SERPL-MCNC: 19 MG/DL (ref 8–23)
CALCIUM SERPL-MCNC: 8.4 MG/DL (ref 8.7–10.5)
CHLORIDE SERPL-SCNC: 110 MMOL/L (ref 95–110)
CO2 SERPL-SCNC: 18 MMOL/L (ref 23–29)
CREAT SERPL-MCNC: 1 MG/DL (ref 0.5–1.4)
DIFFERENTIAL METHOD: ABNORMAL
EOSINOPHIL # BLD AUTO: 0.1 K/UL (ref 0–0.5)
EOSINOPHIL NFR BLD: 1.1 % (ref 0–8)
ERYTHROCYTE [DISTWIDTH] IN BLOOD BY AUTOMATED COUNT: 12.7 % (ref 11.5–14.5)
EST. GFR  (AFRICAN AMERICAN): >60 ML/MIN/1.73 M^2
EST. GFR  (NON AFRICAN AMERICAN): 53.7 ML/MIN/1.73 M^2
GLUCOSE SERPL-MCNC: 179 MG/DL (ref 70–110)
HCT VFR BLD AUTO: 37.4 % (ref 37–48.5)
HGB BLD-MCNC: 12.6 G/DL (ref 12–16)
IMM GRANULOCYTES # BLD AUTO: 0.03 K/UL (ref 0–0.04)
IMM GRANULOCYTES NFR BLD AUTO: 0.3 % (ref 0–0.5)
LYMPHOCYTES # BLD AUTO: 1 K/UL (ref 1–4.8)
LYMPHOCYTES NFR BLD: 9.6 % (ref 18–48)
MCH RBC QN AUTO: 30 PG (ref 27–31)
MCHC RBC AUTO-ENTMCNC: 33.7 G/DL (ref 32–36)
MCV RBC AUTO: 89 FL (ref 82–98)
MONOCYTES # BLD AUTO: 0.9 K/UL (ref 0.3–1)
MONOCYTES NFR BLD: 8.7 % (ref 4–15)
NEUTROPHILS # BLD AUTO: 8 K/UL (ref 1.8–7.7)
NEUTROPHILS NFR BLD: 80.1 % (ref 38–73)
NRBC BLD-RTO: 0 /100 WBC
PLATELET # BLD AUTO: 153 K/UL (ref 150–350)
PMV BLD AUTO: 9.8 FL (ref 9.2–12.9)
POC ACTIVATED CLOTTING TIME K: 109 SEC (ref 74–137)
POC ACTIVATED CLOTTING TIME K: 313 SEC (ref 74–137)
POCT GLUCOSE: 148 MG/DL (ref 70–110)
POCT GLUCOSE: 149 MG/DL (ref 70–110)
POCT GLUCOSE: 153 MG/DL (ref 70–110)
POCT GLUCOSE: 163 MG/DL (ref 70–110)
POCT GLUCOSE: 168 MG/DL (ref 70–110)
POTASSIUM SERPL-SCNC: 4.1 MMOL/L (ref 3.5–5.1)
RBC # BLD AUTO: 4.2 M/UL (ref 4–5.4)
SAMPLE: ABNORMAL
SAMPLE: NORMAL
SODIUM SERPL-SCNC: 136 MMOL/L (ref 136–145)
WBC # BLD AUTO: 9.94 K/UL (ref 3.9–12.7)

## 2019-12-18 PROCEDURE — 25000003 PHARM REV CODE 250: Performed by: STUDENT IN AN ORGANIZED HEALTH CARE EDUCATION/TRAINING PROGRAM

## 2019-12-18 PROCEDURE — 25000003 PHARM REV CODE 250: Performed by: NURSE ANESTHETIST, CERTIFIED REGISTERED

## 2019-12-18 PROCEDURE — D9220A PRA ANESTHESIA: ICD-10-PCS | Mod: ANES,,, | Performed by: ANESTHESIOLOGY

## 2019-12-18 PROCEDURE — 94761 N-INVAS EAR/PLS OXIMETRY MLT: CPT

## 2019-12-18 PROCEDURE — C1894 INTRO/SHEATH, NON-LASER: HCPCS | Performed by: INTERNAL MEDICINE

## 2019-12-18 PROCEDURE — C1894 INTRO/SHEATH, NON-LASER: HCPCS

## 2019-12-18 PROCEDURE — C1751 CATH, INF, PER/CENT/MIDLINE: HCPCS

## 2019-12-18 PROCEDURE — C1898 LEAD, PMKR, OTHER THAN TRANS: HCPCS | Performed by: INTERNAL MEDICINE

## 2019-12-18 PROCEDURE — 85025 COMPLETE CBC W/AUTO DIFF WBC: CPT

## 2019-12-18 PROCEDURE — 20000000 HC ICU ROOM

## 2019-12-18 PROCEDURE — 63600175 PHARM REV CODE 636 W HCPCS: Performed by: NURSE PRACTITIONER

## 2019-12-18 PROCEDURE — 25000003 PHARM REV CODE 250: Performed by: INTERNAL MEDICINE

## 2019-12-18 PROCEDURE — 33208 INSRT HEART PM ATRIAL & VENT: CPT | Mod: KX,,, | Performed by: INTERNAL MEDICINE

## 2019-12-18 PROCEDURE — D9220A PRA ANESTHESIA: Mod: CRNA,,, | Performed by: NURSE ANESTHETIST, CERTIFIED REGISTERED

## 2019-12-18 PROCEDURE — 93005 ELECTROCARDIOGRAM TRACING: CPT

## 2019-12-18 PROCEDURE — 33210 INSERT ELECTRD/PM CATH SNGL: CPT

## 2019-12-18 PROCEDURE — 36620 INSERTION CATHETER ARTERY: CPT

## 2019-12-18 PROCEDURE — 63600175 PHARM REV CODE 636 W HCPCS: Performed by: STUDENT IN AN ORGANIZED HEALTH CARE EDUCATION/TRAINING PROGRAM

## 2019-12-18 PROCEDURE — 93010 ELECTROCARDIOGRAM REPORT: CPT | Mod: ,,, | Performed by: INTERNAL MEDICINE

## 2019-12-18 PROCEDURE — D9220A PRA ANESTHESIA: ICD-10-PCS | Mod: CRNA,,, | Performed by: NURSE ANESTHETIST, CERTIFIED REGISTERED

## 2019-12-18 PROCEDURE — 33208 PR INSER HART PACER XVENOUS ATR/VENTR: ICD-10-PCS | Mod: KX,,, | Performed by: INTERNAL MEDICINE

## 2019-12-18 PROCEDURE — D9220A PRA ANESTHESIA: Mod: ANES,,, | Performed by: ANESTHESIOLOGY

## 2019-12-18 PROCEDURE — A4216 STERILE WATER/SALINE, 10 ML: HCPCS | Performed by: NURSE ANESTHETIST, CERTIFIED REGISTERED

## 2019-12-18 PROCEDURE — 63600175 PHARM REV CODE 636 W HCPCS: Performed by: NURSE ANESTHETIST, CERTIFIED REGISTERED

## 2019-12-18 PROCEDURE — C1785 PMKR, DUAL, RATE-RESP: HCPCS | Performed by: INTERNAL MEDICINE

## 2019-12-18 PROCEDURE — 93010 EKG 12-LEAD: ICD-10-PCS | Mod: ,,, | Performed by: INTERNAL MEDICINE

## 2019-12-18 PROCEDURE — 63600175 PHARM REV CODE 636 W HCPCS: Performed by: INTERNAL MEDICINE

## 2019-12-18 PROCEDURE — 37000008 HC ANESTHESIA 1ST 15 MINUTES: Performed by: INTERNAL MEDICINE

## 2019-12-18 PROCEDURE — 80048 BASIC METABOLIC PNL TOTAL CA: CPT

## 2019-12-18 PROCEDURE — 37000009 HC ANESTHESIA EA ADD 15 MINS: Performed by: INTERNAL MEDICINE

## 2019-12-18 PROCEDURE — 27201423 OPTIME MED/SURG SUP & DEVICES STERILE SUPPLY: Performed by: INTERNAL MEDICINE

## 2019-12-18 PROCEDURE — 33208 INSRT HEART PM ATRIAL & VENT: CPT | Performed by: INTERNAL MEDICINE

## 2019-12-18 PROCEDURE — 36556 INSERT NON-TUNNEL CV CATH: CPT

## 2019-12-18 DEVICE — PACING LEAD
Type: IMPLANTABLE DEVICE | Site: HEART | Status: FUNCTIONAL
Brand: TENDRIL™

## 2019-12-18 DEVICE — PULSE GENERATOR
Type: IMPLANTABLE DEVICE | Site: CHEST | Status: FUNCTIONAL
Brand: ASSURITY MRI™

## 2019-12-18 RX ORDER — CEFAZOLIN SODIUM 1 G/3ML
2 INJECTION, POWDER, FOR SOLUTION INTRAMUSCULAR; INTRAVENOUS
Status: DISCONTINUED | OUTPATIENT
Start: 2019-12-18 | End: 2019-12-18

## 2019-12-18 RX ORDER — CEFAZOLIN SODIUM 1 G/3ML
1 INJECTION, POWDER, FOR SOLUTION INTRAMUSCULAR; INTRAVENOUS
Status: COMPLETED | OUTPATIENT
Start: 2019-12-18 | End: 2019-12-19

## 2019-12-18 RX ORDER — SODIUM CHLORIDE 9 MG/ML
INJECTION, SOLUTION INTRAVENOUS CONTINUOUS
Status: ACTIVE | OUTPATIENT
Start: 2019-12-18 | End: 2019-12-18

## 2019-12-18 RX ORDER — SODIUM CHLORIDE 0.9 % (FLUSH) 0.9 %
10 SYRINGE (ML) INJECTION
Status: CANCELLED | OUTPATIENT
Start: 2019-12-18

## 2019-12-18 RX ORDER — FUROSEMIDE 10 MG/ML
5 INJECTION INTRAMUSCULAR; INTRAVENOUS ONCE
Status: COMPLETED | OUTPATIENT
Start: 2019-12-18 | End: 2019-12-18

## 2019-12-18 RX ORDER — VANCOMYCIN HYDROCHLORIDE 1 G/20ML
INJECTION, POWDER, LYOPHILIZED, FOR SOLUTION INTRAVENOUS
Status: DISCONTINUED | OUTPATIENT
Start: 2019-12-18 | End: 2019-12-19 | Stop reason: HOSPADM

## 2019-12-18 RX ORDER — BUPIVACAINE HYDROCHLORIDE 2.5 MG/ML
INJECTION, SOLUTION EPIDURAL; INFILTRATION; INTRACAUDAL
Status: DISCONTINUED | OUTPATIENT
Start: 2019-12-18 | End: 2019-12-19 | Stop reason: HOSPADM

## 2019-12-18 RX ORDER — FENTANYL CITRATE 50 UG/ML
INJECTION, SOLUTION INTRAMUSCULAR; INTRAVENOUS
Status: DISCONTINUED | OUTPATIENT
Start: 2019-12-18 | End: 2019-12-18

## 2019-12-18 RX ORDER — LIDOCAINE HYDROCHLORIDE 20 MG/ML
INJECTION, SOLUTION EPIDURAL; INFILTRATION; INTRACAUDAL; PERINEURAL
Status: DISCONTINUED | OUTPATIENT
Start: 2019-12-18 | End: 2019-12-19 | Stop reason: HOSPADM

## 2019-12-18 RX ORDER — DEXMEDETOMIDINE HYDROCHLORIDE 100 UG/ML
INJECTION, SOLUTION INTRAVENOUS
Status: DISCONTINUED | OUTPATIENT
Start: 2019-12-18 | End: 2019-12-18

## 2019-12-18 RX ORDER — CLOPIDOGREL BISULFATE 75 MG/1
75 TABLET ORAL DAILY
Qty: 30 TABLET | Refills: 11 | Status: CANCELLED | OUTPATIENT
Start: 2019-12-18 | End: 2020-12-17

## 2019-12-18 RX ORDER — CEFAZOLIN SODIUM 1 G/3ML
INJECTION, POWDER, FOR SOLUTION INTRAMUSCULAR; INTRAVENOUS
Status: DISCONTINUED | OUTPATIENT
Start: 2019-12-18 | End: 2019-12-18

## 2019-12-18 RX ORDER — SODIUM CHLORIDE 0.9 G/100ML
IRRIGANT IRRIGATION
Status: DISCONTINUED | OUTPATIENT
Start: 2019-12-18 | End: 2019-12-19 | Stop reason: HOSPADM

## 2019-12-18 RX ORDER — FUROSEMIDE 20 MG/1
20 TABLET ORAL DAILY PRN
Qty: 30 TABLET | Refills: 11 | Status: CANCELLED | OUTPATIENT
Start: 2019-12-18 | End: 2020-12-17

## 2019-12-18 RX ORDER — SODIUM CHLORIDE 9 MG/ML
INJECTION, SOLUTION INTRAVENOUS CONTINUOUS PRN
Status: DISCONTINUED | OUTPATIENT
Start: 2019-12-18 | End: 2019-12-18

## 2019-12-18 RX ORDER — CEPHALEXIN 500 MG/1
500 CAPSULE ORAL EVERY 8 HOURS
Status: DISCONTINUED | OUTPATIENT
Start: 2019-12-19 | End: 2019-12-19 | Stop reason: HOSPADM

## 2019-12-18 RX ADMIN — FENTANYL CITRATE 50 MCG: 50 INJECTION, SOLUTION INTRAMUSCULAR; INTRAVENOUS at 01:12

## 2019-12-18 RX ADMIN — Medication 0.04 MCG/KG/MIN: at 05:12

## 2019-12-18 RX ADMIN — FENTANYL CITRATE 50 MCG: 50 INJECTION, SOLUTION INTRAMUSCULAR; INTRAVENOUS at 12:12

## 2019-12-18 RX ADMIN — DEXMEDETOMIDINE HYDROCHLORIDE 35 MCG: 100 INJECTION, SOLUTION, CONCENTRATE INTRAVENOUS at 12:12

## 2019-12-18 RX ADMIN — CEFAZOLIN 1 G: 1 INJECTION, POWDER, FOR SOLUTION INTRAMUSCULAR; INTRAVENOUS at 09:12

## 2019-12-18 RX ADMIN — INSULIN ASPART 2 UNITS: 100 INJECTION, SOLUTION INTRAVENOUS; SUBCUTANEOUS at 06:12

## 2019-12-18 RX ADMIN — INSULIN ASPART 2 UNITS: 100 INJECTION, SOLUTION INTRAVENOUS; SUBCUTANEOUS at 07:12

## 2019-12-18 RX ADMIN — DEXMEDETOMIDINE HYDROCHLORIDE 1 MCG/KG/HR: 100 INJECTION, SOLUTION, CONCENTRATE INTRAVENOUS at 01:12

## 2019-12-18 RX ADMIN — SODIUM CHLORIDE 150 ML/HR: 0.9 INJECTION, SOLUTION INTRAVENOUS at 04:12

## 2019-12-18 RX ADMIN — CLOPIDOGREL BISULFATE 75 MG: 75 TABLET ORAL at 08:12

## 2019-12-18 RX ADMIN — SODIUM CHLORIDE: 0.9 INJECTION, SOLUTION INTRAVENOUS at 12:12

## 2019-12-18 RX ADMIN — FUROSEMIDE 5 MG: 10 INJECTION, SOLUTION INTRAMUSCULAR; INTRAVENOUS at 08:12

## 2019-12-18 RX ADMIN — ACETAMINOPHEN 650 MG: 325 TABLET ORAL at 09:12

## 2019-12-18 RX ADMIN — CEFAZOLIN 2 G: 330 INJECTION, POWDER, FOR SOLUTION INTRAMUSCULAR; INTRAVENOUS at 12:12

## 2019-12-18 RX ADMIN — ASPIRIN 81 MG: 81 TABLET, COATED ORAL at 08:12

## 2019-12-18 NOTE — NURSING
Transferred pt on telemetry down to EP for pacemaker placement with transport tech. No acute events during transport.

## 2019-12-18 NOTE — ANESTHESIA POSTPROCEDURE EVALUATION
Anesthesia Post Evaluation    Patient: Roxanne Hernandez    Procedure(s) Performed: Procedure(s) (LRB):  INSERTION, CARDIAC PACEMAKER, DUAL CHAMBER (N/A)    Final Anesthesia Type: MAC    Patient location during evaluation: ICU  Patient participation: Yes- Able to Participate  Level of consciousness: awake  Post-procedure vital signs: reviewed and stable  Pain management: adequate  Airway patency: patent    PONV status at discharge: No PONV                Vitals Value Taken Time   /50 12/18/2019  4:47 PM   Temp 36 °C (96.8 °F) 12/18/2019  3:10 PM   Pulse 68 12/18/2019  4:55 PM   Resp 13 12/18/2019  4:55 PM   SpO2 97 % 12/18/2019  4:55 PM   Vitals shown include unvalidated device data.      No case tracking events are documented in the log.      Pain/Paula Score: No data recorded

## 2019-12-18 NOTE — SUBJECTIVE & OBJECTIVE
Interval History: Patient remained stable overnight. H/H increased to 12.6/37.4. She currently has no complaints and is waiting for PPM placement by EP. She has been NPO since midnight.     Objective:     Vital Signs (Most Recent):  Temp: 98.5 °F (36.9 °C) (12/18/19 0700)  Pulse: 89 (12/18/19 0800)  Resp: (!) 30 (12/18/19 0800)  BP: 129/71 (12/18/19 0800)  SpO2: 96 % (12/18/19 0800) Vital Signs (24h Range):  Temp:  [97.5 °F (36.4 °C)-98.5 °F (36.9 °C)] 98.5 °F (36.9 °C)  Pulse:  [] 89  Resp:  [10-30] 30  SpO2:  [96 %-100 %] 96 %  BP: (111-145)/(56-87) 129/71  Arterial Line BP: ()/(10-72) 144/50     Weight: 75.7 kg (166 lb 14.2 oz)  Body mass index is 34.88 kg/m².    SpO2: 96 %  O2 Device (Oxygen Therapy): room air      Intake/Output Summary (Last 24 hours) at 12/18/2019 1052  Last data filed at 12/18/2019 1000  Gross per 24 hour   Intake 3458.04 ml   Output 1100 ml   Net 2358.04 ml       Lines/Drains/Airways     Central Venous Catheter Line                 Percutaneous Central Line Insertion/Assessment - double lumen  12/17/19 1042 1 day         Introducer 12/17/19 1200 right internal jugular less than 1 day          Drain            Female External Urinary Catheter 12/17/19 1350 less than 1 day          Arterial Line                 Arterial Line 12/17/19 1032 Right Radial 1 day          Line                 Pacer Wires 12/17/19 1053 less than 1 day          Peripheral Intravenous Line                 Peripheral IV - Single Lumen 12/17/19 0853 18 G Left Forearm 1 day                Physical Exam   Constitutional: She is oriented to person, place, and time. She appears well-developed and well-nourished. No distress.   HENT:   Head: Normocephalic.   Neck: Normal range of motion.   Cardiovascular: Normal rate, regular rhythm and intact distal pulses.   No murmur heard.  Pulmonary/Chest: Effort normal.   Neurological: She is alert and oriented to person, place, and time.   Skin: She is not diaphoretic.   No  noted hematomas   Nursing note and vitals reviewed.      Significant Labs:   BMP:   Recent Labs   Lab 12/17/19  0810 12/17/19  1326 12/18/19  0505    139* 179*    138 136   K 4.3 4.4 4.1    112* 110   CO2 24 20* 18*   BUN 17 16 19   CREATININE 1.1 0.9 1.0   CALCIUM 9.4 7.8* 8.4*   , CBC   Recent Labs   Lab 12/17/19  0810 12/17/19  1326 12/18/19  0505   WBC 7.16 6.74 9.94   HGB 14.2 11.6* 12.6   HCT 43.5 36.5* 37.4    126* 153    and All pertinent lab results from the last 24 hours have been reviewed.

## 2019-12-18 NOTE — ANESTHESIA POSTPROCEDURE EVALUATION
Anesthesia Post Evaluation    Patient: Roxanne Hernandez    Procedure(s) Performed: Procedure(s) (LRB):  INSERTION, CARDIAC PACEMAKER, DUAL CHAMBER (N/A)    Final Anesthesia Type: MAC    Patient location during evaluation: ICU  Patient participation: Yes- Able to Participate  Level of consciousness: awake and alert  Post-procedure vital signs: reviewed and stable  Pain management: adequate  Airway patency: patent    PONV status at discharge: No PONV  Anesthetic complications: no      Cardiovascular status: hemodynamically stable  Respiratory status: unassisted and spontaneous ventilation  Hydration status: euvolemic  Follow-up not needed.          Vitals Value Taken Time   /50 12/18/2019  4:47 PM   Temp 36 °C (96.8 °F) 12/18/2019  3:10 PM   Pulse 68 12/18/2019  4:55 PM   Resp 13 12/18/2019  4:55 PM   SpO2 97 % 12/18/2019  4:55 PM   Vitals shown include unvalidated device data.      No case tracking events are documented in the log.      Pain/Paula Score: No data recorded

## 2019-12-18 NOTE — ASSESSMENT & PLAN NOTE
- Volume overload post procedure requiring IV diuretics, now improved  - PRN Lasix PO will be given at discharge

## 2019-12-18 NOTE — PLAN OF CARE
No acute events throughout day. See vital signs and assessments in flowsheets. See below for updates on today's progress.     Pulmonary: On RA    Cardiovascular: BP stabilized. 1L NS given and levo temporarily started on admit for hypotension. Right radial a line correlates with cuff. HR 50s-70s.     Neurological: AAOx4. Up in chair.     Gastrointestinal: No Bm. Good appetite. Order for NPO @ midnight.     Genitourinary: Purewick intact and patent. See flowsheet.    Endocrine: Accu-chek per order.    Integumentary/Other: Bilateral groin sites. CDI. No hematoma.    Infusions: KVO    Patient progressing towards goals as tolerated, plan of care for possible Pacemaker tomorrow communicated and reviewed with Roxanne Hernandez and family. All concerns addressed. Will continue to monitor.              CMICU DAILY GOALS       A: Awake    RASS: Goal -  0  Actual -  0   Restraint necessity:  no  B: Breath   SBT: Not intubated   C: Coordinate A & B, analgesics/sedatives   Pain: managed    SAT: Not intubated  D: Delirium   CAM-ICU: Overall CAM-ICU: Negative  E: Early Mobility   MOVE Screen: Pass pt up in chair   Activity: Activity Management: up in chair  FAS: Feeding/Nutrition   Diet order: Diet/Nutrition Received: low saturated fat/low cholesterol, 2 gram sodium,   Fluid restriction:    T: Thrombus   DVT prophylaxis: VTE Required Core Measure: Pharmacological prophylaxis initiated/maintained  H: HOB Elevation   Head of Bed (HOB): HOB at 20-30 degrees  U: Ulcer Prophylaxis   GI: yes  G: Glucose control   managed    S: Skin   Bundle compliance: yes   Bathing/Skin Care: back care, bath, chlorhexidine, bath, complete, linen changed, dressed/undressed Date: 12/17/2019 day shift  B: Bowel Function   constipation   I: Indwelling Catheters   Fowler necessity:  no   CVC necessity: yes   IPAD offered: Yes  D: De-escalation Antibx    Plan for the day   Possible pacemaker  Family/Goals of care/Code Status   Code Status: No Order     No acute  events throughout day, VS and assessment per flow sheet, patient progressing towards goals as tolerated, plan of care reviewed with Roxanne Hernandez and family, all concerns addressed, will continue to monitor.

## 2019-12-18 NOTE — ASSESSMENT & PLAN NOTE
- Successful left transfemoral 23 mm evolute TAVR.   - No paravalvular leak, mean gradient 3, peak velocity 1.5 post TAVR.   - Follow up Structural clinic in 1 month and 1 year with labs and Echo   - ASA / Plavix for 6 months   - No dirty procedures which could cause endocarditis for 6 months including dental work, endoscopy, colonoscopy, and  procedures.   - SBE prophylaxis for life

## 2019-12-18 NOTE — TRANSFER OF CARE
"Anesthesia Transfer of Care Note    Patient: Roxanne Hernandez    Procedure(s) Performed: Procedure(s) (LRB):  INSERTION, CARDIAC PACEMAKER, DUAL CHAMBER (N/A)    Patient location: ICU    Anesthesia Type: MAC    Transport from OR: Transported from OR on 6-10 L/min O2 by face mask with adequate spontaneous ventilation    Post pain: adequate analgesia    Post assessment: no apparent anesthetic complications and tolerated procedure well    Post vital signs: stable    Level of consciousness: awake    Nausea/Vomiting: no nausea/vomiting    Complications: none    Transfer of care protocol was followed      Last vitals:   Visit Vitals  BP (!) 135/99 (BP Location: Left arm, Patient Position: Sitting)   Pulse 84   Temp 36.6 °C (97.9 °F) (Oral)   Resp (!) 26   Ht 4' 10" (1.473 m)   Wt 75.7 kg (166 lb 14.2 oz)   SpO2 99%   Breastfeeding? No   BMI 34.88 kg/m²     "

## 2019-12-18 NOTE — PROGRESS NOTES
[facility]  Interventional Cardiology  Progress Note    Patient Name: Roxanne Hernandez  MRN: [id number]  Admission Date: 12/17/2019  Hospital Length of Stay: 1 days  Code Status: No Order   Attending Physician: Kaz Pizano MD   Primary Care Physician: Yoni Hernandez MD  Principal Problem:S/P TAVR (transcatheter aortic valve replacement)    Subjective:     Interval History: Patient remained stable overnight. H/H increased to 12.6/37.4. She currently has no complaints and is waiting for PPM placement by EP. She has been NPO since midnight.     Objective:     Vital Signs (Most Recent):  Temp: 98.5 °F (36.9 °C) (12/18/19 0700)  Pulse: 89 (12/18/19 0800)  Resp: (!) 30 (12/18/19 0800)  BP: 129/71 (12/18/19 0800)  SpO2: 96 % (12/18/19 0800) Vital Signs (24h Range):  Temp:  [97.5 °F (36.4 °C)-98.5 °F (36.9 °C)] 98.5 °F (36.9 °C)  Pulse:  [] 89  Resp:  [10-30] 30  SpO2:  [96 %-100 %] 96 %  BP: (111-145)/(56-87) 129/71  Arterial Line BP: ()/(10-72) 144/50     Weight: 75.7 kg (166 lb 14.2 oz)  Body mass index is 34.88 kg/m².    SpO2: 96 %  O2 Device (Oxygen Therapy): room air      Intake/Output Summary (Last 24 hours) at 12/18/2019 1052  Last data filed at 12/18/2019 1000  Gross per 24 hour   Intake 3458.04 ml   Output 1100 ml   Net 2358.04 ml       Lines/Drains/Airways     Central Venous Catheter Line                 Percutaneous Central Line Insertion/Assessment - double lumen  12/17/19 1042 1 day         Introducer 12/17/19 1200 right internal jugular less than 1 day          Drain            Female External Urinary Catheter 12/17/19 1350 less than 1 day          Arterial Line                 Arterial Line 12/17/19 1032 Right Radial 1 day          Line                 Pacer Wires 12/17/19 1053 less than 1 day          Peripheral Intravenous Line                 Peripheral IV - Single Lumen 12/17/19 0853 18 G Left Forearm 1 day                Physical Exam   Constitutional: She is oriented to  person, place, and time. She appears well-developed and well-nourished. No distress.   HENT:   Head: Normocephalic.   Neck: Normal range of motion.   Cardiovascular: Normal rate, regular rhythm and intact distal pulses.   No murmur heard.  Pulmonary/Chest: Effort normal.   Neurological: She is alert and oriented to person, place, and time.   Skin: She is not diaphoretic.   No noted hematomas   Nursing note and vitals reviewed.      Significant Labs:   BMP:   Recent Labs   Lab 12/17/19  0810 12/17/19  1326 12/18/19  0505    139* 179*    138 136   K 4.3 4.4 4.1    112* 110   CO2 24 20* 18*   BUN 17 16 19   CREATININE 1.1 0.9 1.0   CALCIUM 9.4 7.8* 8.4*   , CBC   Recent Labs   Lab 12/17/19  0810 12/17/19  1326 12/18/19  0505   WBC 7.16 6.74 9.94   HGB 14.2 11.6* 12.6   HCT 43.5 36.5* 37.4    126* 153    and All pertinent lab results from the last 24 hours have been reviewed.      Assessment and Plan:     Patient is a 79 y.o. female presenting with:    * S/P TAVR (transcatheter aortic valve replacement)  - Successful left transfemoral 23 mm evolute TAVR.   - No paravalvular leak, mean gradient 3, peak velocity 1.5 post TAVR.   - Follow up Structural clinic in 1 month and 1 year with labs and Echo   - ASA / Plavix for 6 months   - No dirty procedures which could cause endocarditis for 6 months including dental work, endoscopy, colonoscopy, and  procedures.   - SBE prophylaxis for life     Acute on chronic diastolic heart failure  - Volume overload post procedure requiring IV diuretics, now improved  - PRN Lasix PO will be given at discharge    Left bundle branch block  - New LBBB developed post procedure  - EP planning for PPM implant today, NPO since midnight      VTE Risk Mitigation (From admission, onward)    None          Arline Mcintyre PA-C  Interventional Cardiology  [facility]

## 2019-12-18 NOTE — HOSPITAL COURSE
Ms. Hernandez was admitted and underwent successful placement of a 23 mm EvolutR TAVR via LTF access under MAC sedation. She developed a LBBB post-procedure. TVP was left in place and EP was consulted. She was taken to the CCU in stable condition. That afternoon she became hypotensive with SBP in the 70's. Levophed and IV fluids were administered and pressure stabilized. Echo done at bedside showed no pericardial effusion. H/H dropped from 14.2/43.5 to 11.6/36.5 now increased to 12.6/37.4. She required diuresis of acute on chronic diastolic heart failure with IV Lasix. She diuresed well. Due to persistent LBBB she required PPM implant by EP. She ambulated around the unit without difficulty. She was eager to go home. EP and Structural Heart Team felt that she was medically stable for discharge.

## 2019-12-19 VITALS
HEART RATE: 91 BPM | RESPIRATION RATE: 25 BRPM | DIASTOLIC BLOOD PRESSURE: 60 MMHG | WEIGHT: 166.88 LBS | TEMPERATURE: 98 F | HEIGHT: 58 IN | SYSTOLIC BLOOD PRESSURE: 141 MMHG | OXYGEN SATURATION: 96 % | BODY MASS INDEX: 35.03 KG/M2

## 2019-12-19 PROBLEM — Z95.0 PRESENCE OF PERMANENT CARDIAC PACEMAKER: Status: ACTIVE | Noted: 2019-12-19

## 2019-12-19 LAB — POCT GLUCOSE: 109 MG/DL (ref 70–110)

## 2019-12-19 PROCEDURE — 94761 N-INVAS EAR/PLS OXIMETRY MLT: CPT

## 2019-12-19 PROCEDURE — 63600175 PHARM REV CODE 636 W HCPCS: Performed by: NURSE PRACTITIONER

## 2019-12-19 PROCEDURE — 25000003 PHARM REV CODE 250: Performed by: STUDENT IN AN ORGANIZED HEALTH CARE EDUCATION/TRAINING PROGRAM

## 2019-12-19 RX ORDER — CLOPIDOGREL BISULFATE 75 MG/1
75 TABLET ORAL DAILY
Qty: 30 TABLET | Refills: 6 | Status: ON HOLD | OUTPATIENT
Start: 2019-12-19 | End: 2020-02-13 | Stop reason: HOSPADM

## 2019-12-19 RX ORDER — ASPIRIN 81 MG/1
81 TABLET ORAL DAILY
Qty: 30 TABLET | Refills: 11 | Status: SHIPPED | OUTPATIENT
Start: 2019-12-19 | End: 2019-12-19 | Stop reason: HOSPADM

## 2019-12-19 RX ORDER — FUROSEMIDE 20 MG/1
TABLET ORAL
Qty: 30 TABLET | Refills: 0 | Status: ON HOLD | OUTPATIENT
Start: 2019-12-19 | End: 2020-02-13 | Stop reason: HOSPADM

## 2019-12-19 RX ORDER — AMOXICILLIN 250 MG
1 CAPSULE ORAL DAILY
Qty: 30 TABLET | Refills: 10 | Status: ON HOLD | OUTPATIENT
Start: 2019-12-19 | End: 2020-02-13 | Stop reason: HOSPADM

## 2019-12-19 RX ADMIN — ACETAMINOPHEN 650 MG: 325 TABLET ORAL at 05:12

## 2019-12-19 RX ADMIN — ASPIRIN 81 MG: 81 TABLET, COATED ORAL at 08:12

## 2019-12-19 RX ADMIN — CLOPIDOGREL BISULFATE 75 MG: 75 TABLET ORAL at 08:12

## 2019-12-19 RX ADMIN — CEFAZOLIN 1 G: 1 INJECTION, POWDER, FOR SOLUTION INTRAMUSCULAR; INTRAVENOUS at 05:12

## 2019-12-19 NOTE — ASSESSMENT & PLAN NOTE
- Successful left transfemoral 23 mm evolute TAVR.   - No paravalvular leak, mean gradient 3, peak velocity 1.5 post TAVR.   - Follow up Structural clinic in 1 month and 1 year with labs and Echo   - ASA / Plavix for 6 months   - No non sterile procedures which could cause endocarditis for 6 months including dental work, endoscopy, colonoscopy, and  procedures.   - SBE prophylaxis for life. Wallet card given.

## 2019-12-19 NOTE — ASSESSMENT & PLAN NOTE
- Stable  - Metformin was held the day before, day of, and 2 days following TAVR. Patient can restart tomorrow.

## 2019-12-19 NOTE — ASSESSMENT & PLAN NOTE
- Found on CTA during TAVR workup.  - Plans to undergo endoscopy with GI.   - Advised to wait 6 months post TAVR for endoscopy unless medically necessary.   - SBE prophylaxis will be needed for endoscopy.

## 2019-12-19 NOTE — HPI
Roxanne Hernandez is a 79 y.o. female referred by Dr Frausto for evaluation of severe AS (NYHA Class III sx). She is here for a pre-op TAVR evaluation scheduled on 12/17/19. Patient reports worsening SOB for the past 6 months. She now experiences SOB with ADLs. She can no longer mop her kitchen without stopping to take breaks. She recently underwent a successful left ureteroscopy for a large left UVJ stone and severe hydronephrosis seen on TAVR CTA. Stents were removed today and she was cleared for TAVR. CTA also revealed significant pneumobilia in the left hepatic lobe with air in the common bile duct. Patient reports this was addressed with GI who cleared her for the procedure and will schedule endoscopy following TAVR.     The patient has undergone the following TAVR work-up:   · ECHO (Date 11/11/19): NICOLE 0.82 cm2; peak velocity is 4.6 m/s; mean gradient is 53 mmHg EF= 70%. Pt also has mod AI and Mod MS from MAC   · [facility] (Date 9/4/19): Nonobstructive CAD doen by DR. Frausto. Disc in folder   · STS: 3.1%   · Frailty: 1/4(Hand )   · Iliacs are >5.9 on R and > 7.08 on L ** needs to be re-measured per Dr. Ashley   · LVOT: (20.7 mm X 17.2 mm) and Avg Diameter is 18.7 ** needs to be re-measured per Dr. Ashley   · Incidental findings on CT: severe left-sided hydronephrosis with severe dilatation of the left ureter throughout its course with a large obstructing stone at the left UVJ measuring 2.6 cm. Significant pneumobilia in the left hepatic lobe with air within the common bile duct.   · CT Surgery risk assessment: moderate risk due to comorbid conditions and debility, Per Dr. Munson.   · Rhythm issues: NSR   · PFTs: FEV1 76% predicted, DLCO 84% predicted.   · Comorbidities: HTN, DM    Roxanne Hernandez is a 23 mm Evolut Valve given LVOT Ca+ via L TF access.

## 2019-12-19 NOTE — PROGRESS NOTES
Electrophysiology Progress Note    Subjective:   Underwent right sided dual chamber ppm (left hand dominant) on 12/18/19 with Dr Macdonald. Tolerated well. Minimal pain.     Current Facility-Administered Medications   Medication Dose Route Frequency Provider Last Rate Last Dose    acetaminophen tablet 650 mg  650 mg Oral Q4H PRN Po Hu MD   650 mg at 12/19/19 0533    aspirin EC tablet 81 mg  81 mg Oral Daily Po Hu MD   81 mg at 12/18/19 0840    bupivacaine (PF) 0.25% (2.5 mg/ml) injection    PRN Nnamdi Macdonald MD   10 mL at 12/18/19 1338    cephALEXin capsule 500 mg  500 mg Oral Q8H Yobani Saavedra NP        clopidogrel tablet 75 mg  75 mg Oral Daily Po Hu MD   75 mg at 12/18/19 0840    dextrose 10% (D10W) Bolus  12.5 g Intravenous PRN Po Hu MD        dextrose 10% (D10W) Bolus  25 g Intravenous PRN Po Hu MD        dextrose 5 % and 0.45 % NaCl infusion   Intravenous Continuous Kaz Pizano MD   Stopped at 12/17/19 1038    glucagon (human recombinant) injection 1 mg  1 mg Intramuscular PRN Po Hu MD        glucose chewable tablet 16 g  16 g Oral PRN Po Hu MD        glucose chewable tablet 24 g  24 g Oral PRN Po Hu MD        insulin aspart U-100 pen 1-10 Units  1-10 Units Subcutaneous QID (AC + HS) PRN Po Hu MD   2 Units at 12/18/19 1806    labetalol 20 mg/4 mL (5 mg/mL) IV syring  10 mg Intravenous Q4H PRN Po Hu MD        lidocaine (PF) 20 mg/ml (2%) injection    PRN Nnamdi Macdonald MD   10 mL at 12/18/19 1338    magnesium oxide tablet 800 mg  800 mg Oral PRN Po Hu MD        magnesium oxide tablet 800 mg  800 mg Oral PRN Po Hu MD        norepinephrine 4 mg in dextrose 5% 250 mL infusion (premix) (titrating)  0.02 mcg/kg/min Intravenous Continuous PRN Po Hu MD   Stopped at 12/18/19 0850    ondansetron injection 4 mg  4 mg Intravenous Q12H PRN Po  SAMANTHA Hu MD        potassium chloride 10% oral solution 40 mEq  40 mEq Oral PRN Po Hu MD        potassium chloride 10% oral solution 40 mEq  40 mEq Oral PRN Po Hu MD        potassium chloride 10% oral solution 40 mEq  40 mEq Oral PRN Po Hu MD        potassium, sodium phosphates 280-160-250 mg packet 2 packet  2 packet Oral PRN Po Hu MD        potassium, sodium phosphates 280-160-250 mg packet 2 packet  2 packet Oral PRN Po Hu MD        potassium, sodium phosphates 280-160-250 mg packet 2 packet  2 packet Oral PRN Po Hu MD        sodium chloride 0.9% irrigation    PRN Nnamdi Macdonald MD   500 mL at 12/18/19 1319    vancomycin injection    PRN Nnamdi Macdonald MD   1,000 mg at 12/18/19 1319     Facility-Administered Medications Ordered in Other Encounters   Medication Dose Route Frequency Provider Last Rate Last Dose    0.9%  NaCl infusion   Intravenous Continuous Camren Yepez MD 0 mL/hr at 09/04/19 1141      aspirin EC tablet 325 mg  325 mg Oral Once Carmen Yepez MD        diazePAM tablet 5 mg  5 mg Oral On Call Procedure Carmen Yepez MD        diphenhydrAMINE capsule 25 mg  25 mg Oral Once Carmen Yepez MD            Constitutional: (-)fevers,   HEENT: (-) lightheadedness  Cardiovascular: (-)chest pain (-)paroxysmal nocturnal dyspnea (-)orthopnea  Respiratory: (-)shortness of breath, (-)dyspnea on exertion (-)hemoptysis  Gastrointestinal: (-)abdominal pain (-)nausea (-)vomiting (-)tarry stools  Musculoskeletal: (-)arthralgias  Neurologic: (-)parasthesias       Vitals:    12/19/19 0400 12/19/19 0500 12/19/19 0530 12/19/19 0700   BP: 133/63 (!) 150/110 (!) 140/66 129/60   BP Location: Left arm Left arm  Left arm   Patient Position: Lying Lying  Sitting   Pulse: 73 81 82 81   Resp: 18 18 12 11   Temp:    98.1 °F (36.7 °C)   TempSrc:    Oral   SpO2: 97% 97% 96% 96%   Weight:       Height:          Physical Exam:   Gen: no acute distress, pleasant patient answering questions appropriately  HEENT: extra-ocular muscles intact, normocephalic-atraumatic  CVS: regular rate and rhythm  CHEST: right upper chest ppm site without hematoma. aquacel in place  ABD: Soft  EXT: No Edema  NEURO: awake, alert, and oriented     Review of Labs:   Lab Results   Component Value Date    INR 0.9 12/17/2019    INR 0.9 12/12/2019    INR 1.0 08/30/2019     Lab Results   Component Value Date    WBC 9.94 12/18/2019    HGB 12.6 12/18/2019    HCT 37.4 12/18/2019    MCV 89 12/18/2019     12/18/2019     CMP  Sodium   Date Value Ref Range Status   12/18/2019 136 136 - 145 mmol/L Final     Potassium   Date Value Ref Range Status   12/18/2019 4.1 3.5 - 5.1 mmol/L Final     Chloride   Date Value Ref Range Status   12/18/2019 110 95 - 110 mmol/L Final     CO2   Date Value Ref Range Status   12/18/2019 18 (L) 23 - 29 mmol/L Final     Glucose   Date Value Ref Range Status   12/18/2019 179 (H) 70 - 110 mg/dL Final     BUN, Bld   Date Value Ref Range Status   12/18/2019 19 8 - 23 mg/dL Final     Creatinine   Date Value Ref Range Status   12/18/2019 1.0 0.5 - 1.4 mg/dL Final     Calcium   Date Value Ref Range Status   12/18/2019 8.4 (L) 8.7 - 10.5 mg/dL Final     Total Protein   Date Value Ref Range Status   08/30/2019 7.1 6.0 - 8.4 g/dL Final     Albumin   Date Value Ref Range Status   12/12/2019 3.6 3.5 - 5.2 g/dL Final     Total Bilirubin   Date Value Ref Range Status   08/30/2019 0.9 0.1 - 1.0 mg/dL Final     Comment:     For infants and newborns, interpretation of results should be based  on gestational age, weight and in agreement with clinical  observations.  Premature Infant recommended reference ranges:  Up to 24 hours.............<8.0 mg/dL  Up to 48 hours............<12.0 mg/dL  3-5 days..................<15.0 mg/dL  6-29 days.................<15.0 mg/dL       Alkaline Phosphatase   Date Value Ref Range Status   08/30/2019 87 55  - 135 U/L Final     AST   Date Value Ref Range Status   08/30/2019 19 10 - 40 U/L Final     ALT   Date Value Ref Range Status   08/30/2019 16 10 - 44 U/L Final     Anion Gap   Date Value Ref Range Status   12/18/2019 8 8 - 16 mmol/L Final     eGFR if    Date Value Ref Range Status   12/18/2019 >60.0 >60 mL/min/1.73 m^2 Final     eGFR if non    Date Value Ref Range Status   12/18/2019 53.7 (A) >60 mL/min/1.73 m^2 Final     Comment:     Calculation used to obtain the estimated glomerular filtration  rate (eGFR) is the CKD-EPI equation.        Most recent ECG  Reviewed, sinus LBBB    Assessment/Plan:  Patient Active Problem List   Diagnosis    Nonrheumatic aortic valve stenosis    Essential hypertension    Diabetes mellitus without complication    Hydronephrosis with urinary obstruction due to ureteral calculus    Pneumobilia    Nonrheumatic mitral valve stenosis    Nonrheumatic aortic valve insufficiency    Left ureteral stone    Nodular calcific aortic valve stenosis    Acute on chronic diastolic heart failure    S/P TAVR (transcatheter aortic valve replacement)    Left bundle branch block       Roxanne Hernandez is a 79 y.o. female with AS s/p TAVR, new LBBB and intermittent 2:1 AVB post TAVR, HTN, and DM who underwent dual chamber PPM ([facility]) with Dr Macdonald on 12/18/19.   Sling to left arm - wear for 24 hours, then only at night for 6 weeks.  NO HEPARIN PRODUCTS  Keflex 500 mg TID for 5 days at discharge  No lifting left elbow above shoulder height  No lifting over 5 pounds  No driving for 1 week and for 4 weeks if patient uses left arm to make turns  Patient may shower in 24 hours, do not let beam of shower hit site directly and no scrubbing in area  Follow up in device clinic in 1 week and with Dr Macdonald in 12 weeks.

## 2019-12-19 NOTE — ASSESSMENT & PLAN NOTE
- Volume overload post procedure requiring IV diuretics, now improved  - PRN Lasix PO given at discharge.  - Instructed to take 1 tablet for 3 days if she experiences a 3 pound weight gain overnight or 5 pounds over 5 days.

## 2019-12-19 NOTE — PLAN OF CARE
CMICU DAILY GOALS       A: Awake    RASS: Goal - RASS Goal: 0-->alert and calm  Actual - RASS (Kim Agitation-Sedation Scale): -1-->drowsy   Restraint necessity:    B: Breath   SBT: NA   C: Coordinate A & B, analgesics/sedatives   Pain: managed    SAT: Not intubated  D: Delirium   CAM-ICU: Overall CAM-ICU: Positive  E: Early Mobility   MOVE Screen: Pass   Activity: Activity Management: activity adjusted per tolerance  FAS: Feeding/Nutrition   Diet order: Diet/Nutrition Received: 2 gram sodium, low saturated fat/low cholesterol,   Fluid restriction:    T: Thrombus   DVT prophylaxis: VTE Required Core Measure: Pharmacological prophylaxis initiated/maintained  H: HOB Elevation   Head of Bed (HOB): HOB at 30 degrees  U: Ulcer Prophylaxis   GI: yes  G: Glucose control   managed Glycemic Management: blood glucose monitoring  S: Skin   Bundle compliance: yes   Bathing/Skin Care: bath, chlorhexidine, bath, complete, linen changed Date: PM Bath 12/18/19  B: Bowel Function   constipation   I: Indwelling Catheters   Fowler necessity:     CVC necessity: No   IPAD offered: Yes  D: De-escalation Antibx   Yes  Plan for the day   Had permanent pacemaker placed today. When pt is more awake tonight, encourage eating and get up and walk. Possible discharge tomorrow.  Family/Goals of care/Code Status   Code Status: Full Code     VS and assessment per flow sheet, patient progressing towards goals as tolerated, plan of care reviewed with Roxanne Hernandez and family, all concerns addressed, will continue to monitor.

## 2019-12-19 NOTE — PLAN OF CARE
A: Awake    RASS: Goal - RASS Goal: 0-->alert and calm  Actual - RASS (Kim Agitation-Sedation Scale): 0-->alert and calm   Restraint necessity:    B: Breath   SBT: Not intubated   C: Coordinate A & B, analgesics/sedatives   Pain: managed    SAT: Not intubated  D: Delirium   CAM-ICU: Overall CAM-ICU: Negative  E: Early Mobility   MOVE Screen: Pass   Activity: Activity Management: ambulated to bathroom - L4  FAS: Feeding/Nutrition   Diet order: Diet/Nutrition Received: low saturated fat/low cholesterol, 2 gram sodium,   Fluid restriction:    T: Thrombus   DVT prophylaxis: VTE Required Core Measure: Pharmacological prophylaxis initiated/maintained  H: HOB Elevation   Head of Bed (HOB): HOB at 30-45 degrees  U: Ulcer Prophylaxis   GI: no  G: Glucose control   managed Glycemic Management: blood glucose monitoring  S: Skin   Bundle compliance: yes   Bathing/Skin Care: bath, chlorhexidine, bath, partial, linen changed Date: 12/19  B: Bowel Function   constipation   I: Indwelling Catheters   Fowler necessity:  ambulates to bathroom   CVC necessity: No   IPAD offered: Yes  D: De-escalation Antibx   Yes; surgical abx  Plan for the day   Discharge home  Family/Goals of care/Code Status   Code Status: Full Code     No acute events throughout day, VS and assessment per flow sheet, patient progressing towards goals as tolerated, plan of care reviewed with Roxanne Hernandez and family, all concerns addressed, will continue to monitor.    Kaela Carey

## 2019-12-19 NOTE — ASSESSMENT & PLAN NOTE
- New LBBB and intermittent 2:1 AVB post TAVR   - Dual chamber PPM ([facility]) with Dr Macdonald on 12/18/19.    - Keflex 500 mg TID for 5 days, per EP.  - Patient will follow up with device clinic in 1 week and Dr Macdonald in 12 weeks.

## 2019-12-19 NOTE — DISCHARGE SUMMARY
[facility]  Interventional Cardiology  Discharge Summary      Patient Name: Roxanne Hernandez  MRN: [id number]  Admission Date: 12/17/2019  Hospital Length of Stay: 2 days  Discharge Date and Time:  12/19/2019 8:37 AM  Attending Physician: Kaz Pizano MD  Discharging Provider: Arline Mcintyre PA-C  Primary Care Physician: Yoni Hernandez MD    HPI:  Roxanne Hernandez is a 79 y.o. female referred by Dr Frausto for evaluation of severe AS (NYHA Class III sx). She is here for a pre-op TAVR evaluation scheduled on 12/17/19. Patient reports worsening SOB for the past 6 months. She now experiences SOB with ADLs. She can no longer mop her kitchen without stopping to take breaks. She recently underwent a successful left ureteroscopy for a large left UVJ stone and severe hydronephrosis seen on TAVR CTA. Stents were removed today and she was cleared for TAVR. CTA also revealed significant pneumobilia in the left hepatic lobe with air in the common bile duct. Patient reports this was addressed with GI who cleared her for the procedure and will schedule endoscopy following TAVR.     The patient has undergone the following TAVR work-up:   · ECHO (Date 11/11/19): NICOLE 0.82 cm2; peak velocity is 4.6 m/s; mean gradient is 53 mmHg EF= 70%. 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 Frausto. Disc in folder   · STS: 3.1%   · Frailty: 1/4(Hand )   · Iliacs are >5.9 on R and > 7.08 on L ** needs to be re-measured per Dr. Ashley   · LVOT: (20.7 mm X 17.2 mm) and Avg Diameter is 18.7 ** needs to be re-measured per Dr. Ashley   · Incidental findings on CT: severe left-sided hydronephrosis with severe dilatation of the left ureter throughout its course with a large obstructing stone at the left UVJ measuring 2.6 cm. Significant pneumobilia in the left hepatic lobe with air within the common bile duct.   · CT Surgery risk assessment: moderate risk due to comorbid  conditions and debility, Per Dr. Munson.   · Rhythm issues: NSR   · PFTs: FEV1 76% predicted, DLCO 84% predicted.   · Comorbidities: HTN, DM    Roxanne Hernandez is a 23 mm Evolut Valve given LVOT Ca+ via L TF access.       Procedure(s) (LRB):  INSERTION, CARDIAC PACEMAKER, DUAL CHAMBER (N/A)     Indwelling Lines/Drains at time of discharge:  Lines/Drains/Airways     Central Venous Catheter Line                 Percutaneous Central Line Insertion/Assessment - double lumen  12/17/19 1042 1 day                Hospital Course:  Ms. Hernandez was admitted and underwent successful placement of a 23 mm EvolutR TAVR via LTF access under MAC sedation. She developed a LBBB post-procedure. TVP was left in place and EP was consulted. She was taken to the CCU in stable condition. That afternoon she became hypotensive with SBP in the 70's. Levophed and IV fluids were administered and pressure stabilized. Echo done at bedside showed no pericardial effusion. H/H dropped from 14.2/43.5 to 11.6/36.5 now increased to 12.6/37.4. She required diuresis of acute on chronic diastolic heart failure with IV Lasix. She diuresed well. Due to persistent LBBB she required PPM implant by EP. She ambulated around the unit without difficulty. She was eager to go home. EP and Structural Heart Team felt that she was medically stable for discharge.     Consults (From admission, onward)        Status Ordering Provider     Inpatient consult to Electrophysiology  Once     Provider:  (Not yet assigned)    Completed JOSE COLON        Significant Diagnostic Studies: Labs:   BMP:   Recent Labs   Lab 12/17/19  1326 12/18/19  0505   * 179*    136   K 4.4 4.1   * 110   CO2 20* 18*   BUN 16 19   CREATININE 0.9 1.0   CALCIUM 7.8* 8.4*   , CBC   Recent Labs   Lab 12/17/19  1326 12/18/19  0505   WBC 6.74 9.94   HGB 11.6* 12.6   HCT 36.5* 37.4   * 153    and All labs within the past 24 hours have been reviewed    Pending Diagnostic  Studies:     Procedure Component Value Units Date/Time    EKG 12-lead [[id number]]     Order Status:  Sent Lab Status:  No result     EKG 12-lead [[id number]]     Order Status:  Sent Lab Status:  No result         * S/P TAVR (transcatheter aortic valve replacement)  - Successful left transfemoral 23 mm evolute TAVR.   - No paravalvular leak, mean gradient 3, peak velocity 1.5 post TAVR.   - Follow up Structural clinic in 1 month and 1 year with labs and Echo   - ASA / Plavix for 6 months   - No non sterile procedures which could cause endocarditis for 6 months including dental work, endoscopy, colonoscopy, and  procedures.   - SBE prophylaxis for life. Wallet card given.       Acute on chronic diastolic heart failure  - Volume overload post procedure requiring IV diuretics, now improved  - PRN Lasix PO given at discharge.  - Instructed to take 1 tablet for 3 days if she experiences a 3 pound weight gain overnight or 5 pounds over 5 days.     Presence of permanent cardiac pacemaker  - New LBBB and intermittent 2:1 AVB post TAVR   - Dual chamber PPM ([facility]) with Dr Macdonald on 12/18/19.    - Keflex 500 mg TID for 5 days, per EP.  - Patient will follow up with device clinic in 1 week and Dr Macdonald in 12 weeks.     Diabetes mellitus without complication  - Stable  - Metformin was held the day before, day of, and 2 days following TAVR. Patient can restart tomorrow.     Left bundle branch block  - See PPM    Nodular calcific aortic valve stenosis  - see s/p TAVR    Pneumobilia  - Found on CTA during TAVR workup.  - Plans to undergo endoscopy with GI.   - Advised to wait 6 months post TAVR for endoscopy unless medically necessary.   - SBE prophylaxis will be needed for endoscopy.      Essential hypertension  - Stable  - Continue home lisinopril.  - Follow-up with Dr. Frausto         Discharged Condition: good    Follow Up:  Follow-up Information     Natan Mendoza - Arrhythmia In 7 days.    Specialty:  Cardiology  Why:    drew dual chamber ppm device and wound check  Contact information:  Amy Miller  [facility] [id number] [phone]  Additional information:  Clinic [place] - 3rd Floor           Nnamdi Macdonald MD In 12 weeks.    Specialties:  Electrophysiology, Cardiology  Contact information:  Amy MILLER  [facility] [address]  [phone]                 Patient Instructions:      Type & Screen   Standing Status: Future Standing Exp. Date: 01/19/21     Medications:  Reconciled Home Medications:      Medication List      ASK your doctor about these medications    aspirin 81 MG Chew  Take 81 mg by mouth once daily.     lisinopril 20 MG tablet  Commonly known as:  PRINIVIL,ZESTRIL  Take 20 mg by mouth once daily.     metFORMIN 500 MG tablet  Commonly known as:  GLUCOPHAGE  Take 500 mg by mouth 2 (two) times daily with meals.     PROBIOTIC-10 ORAL  Take by mouth once daily.            Time spent on the discharge of patient: 35 minutes    Arline Mcintyre PA-C  Interventional Cardiology  [facility]

## 2019-12-19 NOTE — DISCHARGE INSTRUCTIONS
Sling to left arm - wear for 24 hours, then only at night for 6 weeks.  NO HEPARIN PRODUCTS  Keflex 500 mg TID for 5 days at discharge  No lifting left elbow above shoulder height  No lifting over 5 pounds  No driving for 1 week and for 4 weeks if patient uses left arm to make turns  Patient may shower in 24 hours, do not let beam of shower hit site directly and no scrubbing in area  Follow up in device clinic in 1 week and with Dr Macdonald in 12 weeks.  Dressing is to remain in place until you are seen in device clinic.   The dressing you have on your wound is to stay intact and in place until you see our nurses for a wound check in 2 weeks.  This dressing can get wet so you can shower tonight.  Your paper instructions may mention to remove the dressing tomorrow, however that is in reference to a different type of bandage.  This bandage is unique and helps prevent infection and should stay on until your wound check.

## 2019-12-20 DIAGNOSIS — Z95.0 CARDIAC PACEMAKER IN SITU: Primary | ICD-10-CM

## 2019-12-26 ENCOUNTER — CLINICAL SUPPORT (OUTPATIENT)
Dept: CARDIOLOGY | Facility: HOSPITAL | Age: 79
End: 2019-12-26
Attending: EMERGENCY MEDICINE
Payer: MEDICARE

## 2019-12-26 ENCOUNTER — CLINICAL SUPPORT (OUTPATIENT)
Dept: CARDIOLOGY | Facility: HOSPITAL | Age: 79
End: 2019-12-26
Attending: INTERNAL MEDICINE
Payer: MEDICARE

## 2019-12-26 ENCOUNTER — HOSPITAL ENCOUNTER (EMERGENCY)
Facility: HOSPITAL | Age: 79
Discharge: HOME | End: 2019-12-26
Attending: EMERGENCY MEDICINE
Payer: MEDICARE

## 2019-12-26 VITALS
OXYGEN SATURATION: 100 % | RESPIRATION RATE: 22 BRPM | HEIGHT: 58 IN | BODY MASS INDEX: 35.05 KG/M2 | WEIGHT: 167 LBS | HEART RATE: 78 BPM | TEMPERATURE: 98 F | SYSTOLIC BLOOD PRESSURE: 164 MMHG | DIASTOLIC BLOOD PRESSURE: 79 MMHG

## 2019-12-26 VITALS — BODY MASS INDEX: 35.05 KG/M2 | HEIGHT: 58 IN | WEIGHT: 167 LBS

## 2019-12-26 DIAGNOSIS — R06.02 SHORTNESS OF BREATH: ICD-10-CM

## 2019-12-26 DIAGNOSIS — Z95.0 CARDIAC PACEMAKER IN SITU: ICD-10-CM

## 2019-12-26 DIAGNOSIS — R60.9 SWELLING: ICD-10-CM

## 2019-12-26 LAB
ALBUMIN SERPL BCP-MCNC: 3.7 G/DL (ref 3.5–5.2)
ALP SERPL-CCNC: 99 U/L (ref 55–135)
ALT SERPL W/O P-5'-P-CCNC: 14 U/L (ref 10–44)
ANION GAP SERPL CALC-SCNC: 9 MMOL/L (ref 8–16)
AST SERPL-CCNC: 22 U/L (ref 10–40)
BASOPHILS # BLD AUTO: 0.04 K/UL (ref 0–0.2)
BASOPHILS NFR BLD: 0.6 % (ref 0–1.9)
BILIRUB SERPL-MCNC: 1.1 MG/DL (ref 0.1–1)
BNP SERPL-MCNC: 188 PG/ML (ref 0–99)
BUN SERPL-MCNC: 17 MG/DL (ref 8–23)
CALCIUM SERPL-MCNC: 9.1 MG/DL (ref 8.7–10.5)
CHLORIDE SERPL-SCNC: 106 MMOL/L (ref 95–110)
CO2 SERPL-SCNC: 25 MMOL/L (ref 23–29)
CREAT SERPL-MCNC: 0.8 MG/DL (ref 0.5–1.4)
DIFFERENTIAL METHOD: ABNORMAL
EOSINOPHIL # BLD AUTO: 0.3 K/UL (ref 0–0.5)
EOSINOPHIL NFR BLD: 3.7 % (ref 0–8)
ERYTHROCYTE [DISTWIDTH] IN BLOOD BY AUTOMATED COUNT: 13 % (ref 11.5–14.5)
EST. GFR  (AFRICAN AMERICAN): >60 ML/MIN/1.73 M^2
EST. GFR  (NON AFRICAN AMERICAN): >60 ML/MIN/1.73 M^2
GLUCOSE SERPL-MCNC: 95 MG/DL (ref 70–110)
HCT VFR BLD AUTO: 36.4 % (ref 37–48.5)
HGB BLD-MCNC: 11.8 G/DL (ref 12–16)
IMM GRANULOCYTES # BLD AUTO: 0.03 K/UL (ref 0–0.04)
IMM GRANULOCYTES NFR BLD AUTO: 0.4 % (ref 0–0.5)
INR PPP: 1
LYMPHOCYTES # BLD AUTO: 1.6 K/UL (ref 1–4.8)
LYMPHOCYTES NFR BLD: 23.3 % (ref 18–48)
MAGNESIUM SERPL-MCNC: 1.9 MG/DL (ref 1.6–2.6)
MCH RBC QN AUTO: 29.1 PG (ref 27–31)
MCHC RBC AUTO-ENTMCNC: 32.4 G/DL (ref 32–36)
MCV RBC AUTO: 90 FL (ref 82–98)
MONOCYTES # BLD AUTO: 0.6 K/UL (ref 0.3–1)
MONOCYTES NFR BLD: 9.1 % (ref 4–15)
NEUTROPHILS # BLD AUTO: 4.2 K/UL (ref 1.8–7.7)
NEUTROPHILS NFR BLD: 62.9 % (ref 38–73)
NRBC BLD-RTO: 0 /100 WBC
PLATELET # BLD AUTO: 162 K/UL (ref 150–350)
PMV BLD AUTO: 9.9 FL (ref 9.2–12.9)
POTASSIUM SERPL-SCNC: 4.1 MMOL/L (ref 3.5–5.1)
PROT SERPL-MCNC: 6.9 G/DL (ref 6–8.4)
PROTHROMBIN TIME: 12.8 SEC (ref 10.6–14.8)
RBC # BLD AUTO: 4.06 M/UL (ref 4–5.4)
SODIUM SERPL-SCNC: 140 MMOL/L (ref 136–145)
TROPONIN I SERPL DL<=0.01 NG/ML-MCNC: 0.04 NG/ML
WBC # BLD AUTO: 6.7 K/UL (ref 3.9–12.7)

## 2019-12-26 PROCEDURE — 83880 ASSAY OF NATRIURETIC PEPTIDE: CPT

## 2019-12-26 PROCEDURE — 93306 TTE W/DOPPLER COMPLETE: CPT

## 2019-12-26 PROCEDURE — 80053 COMPREHEN METABOLIC PANEL: CPT

## 2019-12-26 PROCEDURE — 93005 ELECTROCARDIOGRAM TRACING: CPT

## 2019-12-26 PROCEDURE — 84484 ASSAY OF TROPONIN QUANT: CPT

## 2019-12-26 PROCEDURE — 83735 ASSAY OF MAGNESIUM: CPT

## 2019-12-26 PROCEDURE — 93280 PM DEVICE PROGR EVAL DUAL: CPT

## 2019-12-26 PROCEDURE — 99285 EMERGENCY DEPT VISIT HI MDM: CPT | Mod: 25

## 2019-12-26 PROCEDURE — 85025 COMPLETE CBC W/AUTO DIFF WBC: CPT

## 2019-12-26 PROCEDURE — 85610 PROTHROMBIN TIME: CPT

## 2019-12-26 RX ORDER — ACETAMINOPHEN 325 MG/1
650 TABLET ORAL ONCE
Status: ON HOLD | COMMUNITY
End: 2020-02-13 | Stop reason: HOSPADM

## 2019-12-26 NOTE — ED TRIAGE NOTES
Presents to the ER with c/o SOB, palpitations, fluid retention since valve replacement/pacemaker insertion on December 17th, 18th 2019, reports symptoms since December 24th 2019, denies CP, reports SOB at rest, worsening on exertion

## 2019-12-27 NOTE — ED PROVIDER NOTES
"Encounter Date: 12/26/2019       History     Chief Complaint   Patient presents with    Shortness of Breath     X 2 DAYS, RECENT PACEMAKER PLACEMENT, INTERROGATED AT PCP OFFICE    Cough     Patient here with reported shortness of breath over last 2 days she is status post pacemaker placement and aortic valve repair pacemaker was interrogated today at the cardiologist's office in no [place] her primary cardiologist is here she described there the difficulty she has been having with shortness of breath and lower extremity edema and they advised her to come to the emergency department to see Dr. Yepez patient states that she has been taking Lasix as directed if she noted weight gain has not been taking it consistently she denies any chest pain she does report occasional cough no fever chills she was started on Eliquis after the valve repair has not noticed any bleeding        Review of patient's allergies indicates:   Allergen Reactions    Azithromycin Swelling     All mycins    Statins-hmg-coa reductase inhibitors Other (See Comments)     Liver function  "It attacks my liver and makes me very sick"     Past Medical History:   Diagnosis Date    Cervical cancer     Diabetes mellitus     Hypertension      Past Surgical History:   Procedure Laterality Date    A-V CARDIAC PACEMAKER INSERTION N/A 12/18/2019    Procedure: INSERTION, CARDIAC PACEMAKER, DUAL CHAMBER;  Surgeon: Nnamdi Macdonald MD;  Location: [facility] EP LAB;  Service: Cardiology;  Laterality: N/A;  lbbb, dppm, SJM, anes, pr, 6077    CARDIAC CATHETERIZATION      CHOLECYSTECTOMY      CORONARY ANGIOGRAPHY Left 9/4/2019    Procedure: ANGIOGRAM, CORONARY ARTERY;  Surgeon: Carmen Yepez MD;  Location: [facility] CATH/EP LAB;  Service: Cardiology;  Laterality: Left;    CYSTOSCOPY N/A 11/13/2019    Procedure: CYSTOSCOPY;  Surgeon: Paddy Dunham MD;  Location: [address];  Service: Urology;  Laterality: N/A;    HYSTERECTOMY      JOINT REPLACEMENT   "    KNEE ARTHROPLASTY Right     LASER LITHOTRIPSY Left 2019    Procedure: LITHOTRIPSY, USING LASER;  Surgeon: Paddy Dunham MD;  Location: [facility] OR [address] [place] [address];  Service: Urology;  Laterality: Left;    TRANSCATHETER AORTIC VALVE REPLACEMENT (TAVR) N/A 2019    Procedure: REPLACEMENT, AORTIC VALVE, TRANSCATHETER (TAVR);  Surgeon: Herbert Ashley MD;  Location: [facility] CATH LAB;  Service: Cardiology;  Laterality: N/A;    URETEROSCOPIC REMOVAL OF URETERIC CALCULUS Left 2019    Procedure: REMOVAL, CALCULUS, URETER, URETEROSCOPIC;  Surgeon: Paddy Dunham MD;  [facility];  Service: Urology;  Laterality: Left;    URETEROSCOPY Left 2019    Procedure: URETEROSCOPY;  Surgeon: Paddy Dunham MD;  Location: [facility] OR [address] [place] [address];  Service: Urology;  Laterality: Left;     Family History   Problem Relation Age of Onset    Hypertension Father     Cancer Maternal Grandfather     Cancer Paternal Grandfather      Social History     Tobacco Use    Smoking status: Former Smoker     Packs/day: 1.00     Years: 25.00     Pack years: 25.00     Types: Cigarettes     Last attempt to quit: 1980     Years since quittin.0    Smokeless tobacco: Never Used   Substance Use Topics    Alcohol use: Not Currently    Drug use: Never     Review of Systems   Constitutional: Negative.    HENT: Negative.    Eyes: Negative.    Respiratory: Positive for cough and shortness of breath.    Cardiovascular: Positive for leg swelling. Negative for chest pain and palpitations.   Gastrointestinal: Negative.    Endocrine: Negative.    Genitourinary: Negative.    Musculoskeletal: Negative.    Allergic/Immunologic: Negative.    Neurological: Negative.    Hematological: Negative.    Psychiatric/Behavioral: Negative.        Physical Exam     Initial Vitals [19 1244]   BP Pulse Resp Temp SpO2   (!) 169/70 80 (!) 22 97.9 °F (36.6 °C) 98 %      MAP       --         Physical Exam    Constitutional: She appears  well-developed and well-nourished. No distress.   HENT:   Head: Normocephalic and atraumatic.   Right Ear: External ear normal.   Left Ear: External ear normal.   Mouth/Throat: Oropharynx is clear and moist.   Eyes: Conjunctivae and EOM are normal. Pupils are equal, round, and reactive to light.   Neck: Normal range of motion. Neck supple.   Cardiovascular: Normal rate, regular rhythm, normal heart sounds and intact distal pulses.   Pulmonary/Chest: Breath sounds normal. No respiratory distress. She has no rales.   Abdominal: Soft. Bowel sounds are normal. There is no tenderness.   Musculoskeletal: Normal range of motion. She exhibits edema. She exhibits no tenderness.   Neurological: She is alert and oriented to person, place, and time. GCS score is 15. GCS eye subscore is 4. GCS verbal subscore is 5. GCS motor subscore is 6.   Skin: Skin is warm and dry. Capillary refill takes less than 2 seconds.   Psychiatric: She has a normal mood and affect. Her behavior is normal.         ED Course   Procedures  Labs Reviewed   CBC W/ AUTO DIFFERENTIAL - Abnormal; Notable for the following components:       Result Value    Hemoglobin 11.8 (*)     Hematocrit 36.4 (*)     All other components within normal limits   COMPREHENSIVE METABOLIC PANEL - Abnormal; Notable for the following components:    Total Bilirubin 1.1 (*)     All other components within normal limits   B-TYPE NATRIURETIC PEPTIDE - Abnormal; Notable for the following components:     (*)     All other components within normal limits   TROPONIN I   PROTIME-INR   MAGNESIUM        ECG Results          EKG 12-lead (Final result)  Result time 12/27/19 09:58:07    Final result by Interface, Lab In [facility] (12/27/19 09:58:07)                 Narrative:    Test Reason : R06.02,    Vent. Rate : 082 BPM     Atrial Rate : 082 BPM     P-R Int : 198 ms          QRS Dur : 156 ms      QT Int : 456 ms       P-R-T Axes : 052 -64 086 degrees     QTc Int : 532  ms    Atrial-sensed ventricular-paced rhythm  Abnormal ECG  When compared with ECG of 18-DEC-2019 05:53,  Electronic ventricular pacemaker has replaced Sinus rhythm  Confirmed by Noel Rodrigues MD ([id number]) on 12/27/2019 9:57:54 AM    Referred By: ROMEROERR   SELF           Confirmed By:Noel Rodrigues MD                            Imaging Results          US Lower Extremity Veins Bilateral (Final result)  Result time 12/26/19 15:29:33    Final result by Kelvin Maurer MD (12/26/19 15:29:33)                 Impression:      Negative for DVT throughout bilateral lower extremities.      Electronically signed by: Kelvin Maurer MD  Date:    12/26/2019  Time:    15:29             Narrative:    EXAMINATION:  US LOWER EXTREMITY VEINS BILATERAL    CLINICAL HISTORY:  Edema, unspecified    COMPARISON:  None    FINDINGS:  Grayscale, color Doppler, and spectral Doppler analysis was performed.    Bilateral common femoral, femoral, popliteal, and proximal great saphenous veins show normal compressibility, augmentation, and color Doppler flow.    Bilateral posterior tibial, anterior tibial, and peroneal veins are unremarkable.                               X-Ray Chest AP Portable (Final result)  Result time 12/26/19 14:36:13    Final result by Faith Rai MD (12/26/19 14:36:13)                 Impression:      No acute cardiopulmonary abnormality.      Electronically signed by: Faith Rai MD  Date:    12/26/2019  Time:    14:36             Narrative:    EXAMINATION:  XR CHEST AP PORTABLE    CLINICAL HISTORY:  CHF;    FINDINGS:  Portable chest at 14:24 hours is is compared to 12/18/2019 shows normal cardiomediastinal silhouette. There is a right subclavian vein implantable cardiac device in stable position.    Lungs are clear. Pulmonary vasculature is normal. No acute osseous abnormality.                                 Medical Decision Making:   ED Management:  I discussed patient's laboratory evaluation  radiographic findings with Dr. Yepez ultrasound show no evidence of blood clots in her legs chest x-ray shows no evidence of failure her BNP is 188 Dr. Yepez has requested that we perform an echocardiogram he is interpreted echocardiogram and states that there is only minimal pericardial effusion a normal EF valve appears normal with may be some mild regurg which he does not feels clinically significant he recommends that patient used her Lasix on a daily basis as previously prescribed and he will see her in his office on the 6th of next month advised patient her family these findings and recommendations patient is very comfortable with this plan                                 Clinical Impression:       ICD-10-CM ICD-9-CM   1. Shortness of breath R06.02 786.05   2. Swelling R60.9 782.3                             Roc Gonsales MD  12/27/19 8229

## 2019-12-31 ENCOUNTER — TELEPHONE (OUTPATIENT)
Dept: ELECTROPHYSIOLOGY | Facility: CLINIC | Age: 79
End: 2019-12-31

## 2019-12-31 DIAGNOSIS — I49.8 OTHER SPECIFIED CARDIAC ARRHYTHMIAS: Primary | ICD-10-CM

## 2019-12-31 NOTE — TELEPHONE ENCOUNTER
Pt now scheduled   ----- Message from Sandrita Tucker sent at 12/26/2019  1:55 PM CST -----  Pt needs 3 mos post op M.D. appointment and coordinated device check post DC PPM implant.    Thanks,  Device Team

## 2020-01-03 NOTE — OP NOTE
DATE OF PROCEDURE: 12/17/2019    ATTENDING SURGEONS: Herbert Ashley M.D and Stoney Munson M.D.    PREOPERATIVE DIAGNOSES:  1. Severe aortic stenosis.    POSTOPERATIVE DIAGNOSES:  1. Severe aortic stenosis      OPERATION PERFORMED: Transcatheter aortic valve insertion via left transfemoral   approach using a 23 mm [employer]onic Evolut valve    ANESTHESIA: General.    ESTIMATED BLOOD LOSS: 10 mL.    BRIEF HISTORY: This patient is a 79-year-old woman with severe aortic stenosis.  The patient has had progressive dyspnea on exertion. This prompted a thoughtful and thorough evaluation, which demonstrated severe aortic stenosis. Given the comorbid conditions, a transcatheter valve insertion was recommended. The patient now presents for transcatheter aortic valve insertion.    PROCEDURE: After obtaining informed and written consent, the patient was   brought to the cath lab and placed on the cath table in supine position. After   induction of adequate anesthesia, a transvenous pacing wire was placed.   Bilateral femoral arterial and femoral venous access was obtained.  A wire was advanced across the aortic valve. It was exchanged for stiff wire, and  a 16 mm Z-med balloon was placed. Under rapid ventricular pacing, balloon valvuloplasty was performed. The balloon was then withdrawn, and a 23 mm Evolut valve was advanced to the level of the aortic annulus. Once the team was satisfied that the valve was in proper position, it was deployed. Excellent positioning was obtained. Post-procedure echo demonstrated no aortic insufficiency. The femoral access sites were controlled using percutaneous techniques. The patient tolerated the procedure well, there were no complications. At the conclusion of the case, sponge and instrument counts were correct.

## 2020-01-22 ENCOUNTER — HOSPITAL ENCOUNTER (OUTPATIENT)
Dept: CARDIOLOGY | Facility: CLINIC | Age: 80
Discharge: HOME | End: 2020-01-22
Payer: MEDICARE

## 2020-01-22 ENCOUNTER — OFFICE VISIT (OUTPATIENT)
Dept: CARDIOLOGY | Facility: CLINIC | Age: 80
End: 2020-01-22
Payer: MEDICARE

## 2020-01-22 VITALS
DIASTOLIC BLOOD PRESSURE: 63 MMHG | WEIGHT: 167.13 LBS | BODY MASS INDEX: 35.08 KG/M2 | HEART RATE: 75 BPM | SYSTOLIC BLOOD PRESSURE: 142 MMHG | HEIGHT: 58 IN | OXYGEN SATURATION: 100 %

## 2020-01-22 VITALS
DIASTOLIC BLOOD PRESSURE: 80 MMHG | HEIGHT: 58 IN | BODY MASS INDEX: 35.05 KG/M2 | SYSTOLIC BLOOD PRESSURE: 165 MMHG | WEIGHT: 167 LBS | HEART RATE: 80 BPM

## 2020-01-22 DIAGNOSIS — Z95.2 S/P TAVR (TRANSCATHETER AORTIC VALVE REPLACEMENT): ICD-10-CM

## 2020-01-22 DIAGNOSIS — I50.32 CHRONIC HEART FAILURE WITH PRESERVED EJECTION FRACTION: ICD-10-CM

## 2020-01-22 DIAGNOSIS — Z95.2 S/P TAVR (TRANSCATHETER AORTIC VALVE REPLACEMENT): Primary | ICD-10-CM

## 2020-01-22 DIAGNOSIS — I10 ESSENTIAL HYPERTENSION: ICD-10-CM

## 2020-01-22 DIAGNOSIS — Z95.0 S/P PLACEMENT OF CARDIAC PACEMAKER: ICD-10-CM

## 2020-01-22 LAB
AORTIC ROOT ANNULUS: 1.99 CM
AORTIC VALVE CUSP SEPERATION: 0.89 CM
ASCENDING AORTA: 2.93 CM
AV INDEX (PROSTH): 0.51
AV INDEX (PROSTH): 0.53
AV MEAN GRADIENT: 15 MMHG
AV MEAN GRADIENT: 18 MMHG
AV PEAK GRADIENT: 30 MMHG
AV PEAK GRADIENT: 32 MMHG
AV VALVE AREA: 1.23 CM2
AV VALVE AREA: 1.38 CM2
AV VELOCITY RATIO: 0.48
AV VELOCITY RATIO: 44.24
BSA FOR ECHO PROCEDURE: 1.76 M2
BSA FOR ECHO PROCEDURE: 1.76 M2
CV ECHO LV RWT: 0.62 CM
CV ECHO LV RWT: 0.74 CM
DOP CALC AO PEAK VEL: 2.72 M/S
DOP CALC AO PEAK VEL: 2.84 M/S
DOP CALC AO VTI: 50.54 CM
DOP CALC AO VTI: 56.98 CM
DOP CALC LVOT AREA: 2.3 CM2
DOP CALC LVOT AREA: 2.7 CM2
DOP CALC LVOT DIAMETER: 1.72 CM
DOP CALC LVOT DIAMETER: 1.86 CM
DOP CALC LVOT PEAK VEL: 1.3 M/S
DOP CALC LVOT PEAK VEL: 125.64 M/S
DOP CALC LVOT STROKE VOLUME: 69.61 CM3
DOP CALC LVOT STROKE VOLUME: 69.81 CM3
DOP CALCLVOT PEAK VEL VTI: 25.63 CM
DOP CALCLVOT PEAK VEL VTI: 30.06 CM
E WAVE DECELERATION TIME: 325.38 MSEC
E WAVE DECELERATION TIME: 526.32 MSEC
E/A RATIO: 0.66
E/A RATIO: 0.87
E/E' RATIO: 28.22 M/S
E/E' RATIO: 31.67 M/S
ECHO LV POSTERIOR WALL: 1.11 CM (ref 0.6–1.1)
ECHO LV POSTERIOR WALL: 1.39 CM (ref 0.6–1.1)
FRACTIONAL SHORTENING: 34 % (ref 28–44)
FRACTIONAL SHORTENING: 61 % (ref 28–44)
INTERVENTRICULAR SEPTUM: 1.08 CM (ref 0.6–1.1)
INTERVENTRICULAR SEPTUM: 1.4 CM (ref 0.6–1.1)
IVRT: 0.05 MSEC
LA MAJOR: 5.4 CM
LA MINOR: 5.48 CM
LA WIDTH: 4.41 CM
LEFT ATRIUM SIZE: 4.23 CM
LEFT ATRIUM SIZE: 4.41 CM
LEFT ATRIUM VOLUME INDEX: 51.1 ML/M2
LEFT ATRIUM VOLUME: 86.25 CM3
LEFT INTERNAL DIMENSION IN SYSTOLE: 1.44 CM (ref 2.1–4)
LEFT INTERNAL DIMENSION IN SYSTOLE: 2.37 CM (ref 2.1–4)
LEFT VENTRICLE DIASTOLIC VOLUME INDEX: 31.79 ML/M2
LEFT VENTRICLE DIASTOLIC VOLUME INDEX: 46.95 ML/M2
LEFT VENTRICLE DIASTOLIC VOLUME: 53.62 ML
LEFT VENTRICLE DIASTOLIC VOLUME: 79.2 ML
LEFT VENTRICLE MASS INDEX: 112 G/M2
LEFT VENTRICLE MASS INDEX: 72 G/M2
LEFT VENTRICLE SYSTOLIC VOLUME INDEX: 11.6 ML/M2
LEFT VENTRICLE SYSTOLIC VOLUME INDEX: 7.7 ML/M2
LEFT VENTRICLE SYSTOLIC VOLUME: 13 ML
LEFT VENTRICLE SYSTOLIC VOLUME: 19.49 ML
LEFT VENTRICULAR INTERNAL DIMENSION IN DIASTOLE: 3.58 CM (ref 3.5–6)
LEFT VENTRICULAR INTERNAL DIMENSION IN DIASTOLE: 3.74 CM (ref 3.5–6)
LEFT VENTRICULAR MASS: 122.25 G
LEFT VENTRICULAR MASS: 188.74 G
LV LATERAL E/E' RATIO: 25.4 M/S
LV LATERAL E/E' RATIO: 31.67 M/S
LV SEPTAL E/E' RATIO: 31.67 M/S
LV SEPTAL E/E' RATIO: 31.75 M/S
MV PEAK A VEL: 1.93 M/S
MV PEAK A VEL: 2.19 M/S
MV PEAK E VEL: 1.27 M/S
MV PEAK E VEL: 1.9 M/S
PISA TR MAX VEL: 2.56 M/S
PISA TR MAX VEL: 2.68 M/S
PULM VEIN S/D RATIO: 1.47
PULM VEIN S/D RATIO: 2.29
PV PEAK D VEL: 0.28 M/S
PV PEAK D VEL: 42.04 M/S
PV PEAK S VEL: 0.64 M/S
PV PEAK S VEL: 61.6 M/S
PV PEAK VELOCITY: 107.56 CM/S
RA MAJOR: 4.65 CM
RA PRESSURE: 3 MMHG
RA PRESSURE: 3 MMHG
RA WIDTH: 3.07 CM
RIGHT VENTRICULAR END-DIASTOLIC DIMENSION: 202 CM
RIGHT VENTRICULAR END-DIASTOLIC DIMENSION: 3.05 CM
RV TISSUE DOPPLER FREE WALL SYSTOLIC VELOCITY 1 (APICAL 4 CHAMBER VIEW): 12.69 CM/S
SINUS: 3.08 CM
STJ: 3.05 CM
TDI LATERAL: 0.05 M/S
TDI LATERAL: 0.06 M/S
TDI SEPTAL: 0.04 M/S
TDI SEPTAL: 0.06 M/S
TDI: 0.05 M/S
TDI: 0.06 M/S
TR MAX PG: 26 MMHG
TR MAX PG: 29 MMHG
TRICUSPID ANNULAR PLANE SYSTOLIC EXCURSION: 1.65 CM
TV REST PULMONARY ARTERY PRESSURE: 29 MMHG
TV REST PULMONARY ARTERY PRESSURE: 32 MMHG

## 2020-01-22 PROCEDURE — 99214 OFFICE O/P EST MOD 30 MIN: CPT | Mod: S$PBB,,, | Performed by: INTERNAL MEDICINE

## 2020-01-22 PROCEDURE — 93306 TTE W/DOPPLER COMPLETE: CPT | Mod: PBBFAC | Performed by: INTERNAL MEDICINE

## 2020-01-22 PROCEDURE — 99214 PR OFFICE/OUTPT VISIT, EST, LEVL IV, 30-39 MIN: ICD-10-PCS | Mod: S$PBB,,, | Performed by: INTERNAL MEDICINE

## 2020-01-22 PROCEDURE — 99213 OFFICE O/P EST LOW 20 MIN: CPT | Mod: PBBFAC,25

## 2020-01-22 PROCEDURE — 93306 ECHO (CUPID ONLY): ICD-10-PCS | Mod: 26,S$PBB,, | Performed by: INTERNAL MEDICINE

## 2020-01-22 PROCEDURE — 99999 PR PBB SHADOW E&M-EST. PATIENT-LVL III: ICD-10-PCS | Mod: PBBFAC,,,

## 2020-01-22 PROCEDURE — 99999 PR PBB SHADOW E&M-EST. PATIENT-LVL III: CPT | Mod: PBBFAC,,,

## 2020-01-22 NOTE — PROGRESS NOTES
Interventional Cardiology Valve Clinic Note  Reason for Visit: No chief complaint on file.    HPI:   79 y.o. female referred by Dr Frausto for evaluation of severe AS (NYHA Class III sx). She is here 1 mo post-TAVR and presents for follow up.    She underwent successful left transfemoral 23 mm evolute TAVR.  No paravalvular leak, mean gradient 3, peak velocity 1.5 post TAVR.  Prior to procedure she was NHYA III, now with NYHA I.  She was able to walk to clinic from parking lot without SOB.  Is frustrated due to PPM and arm limitations but is otherwise doing very well.    PMH sig for htn, chronic hfpef, and S/P PPM.        Echo reviewed with staff:  Ef 65%, mild MR  Mild PVL at 4 o clock position  Well-functioning tavr    ROS:    Review of Systems   Constitution: Negative.   HENT: Negative.    Eyes: Negative.    Cardiovascular: Negative.    Respiratory: Negative.    Endocrine: Negative.    Skin: Negative.    Musculoskeletal: Negative.    Gastrointestinal: Negative.    Genitourinary: Negative.    Neurological: Negative.      PMH:     Past Medical History:   Diagnosis Date    Cervical cancer     Diabetes mellitus     Hypertension      Past Surgical History:   Procedure Laterality Date    A-V CARDIAC PACEMAKER INSERTION N/A 12/18/2019    Procedure: INSERTION, CARDIAC PACEMAKER, DUAL CHAMBER;  Surgeon: Nnamdi Macdonald MD;  Location: [facility] EP LAB;  Service: Cardiology;  Laterality: N/A;  lbbb, dppm, SJM, anes, pr, [id number]    CARDIAC CATHETERIZATION      CHOLECYSTECTOMY      CORONARY ANGIOGRAPHY Left 9/4/2019    Procedure: ANGIOGRAM, CORONARY ARTERY;  Surgeon: Carmen Yepez MD;  Location: [facility] CATH/EP LAB;  Service: Cardiology;  Laterality: Left;    CYSTOSCOPY N/A 11/13/2019    Procedure: CYSTOSCOPY;  Surgeon: Paddy Dunham MD;  Location: [facility] OR [address] [place] [address];  Service: Urology;  Laterality: N/A;    HYSTERECTOMY      JOINT REPLACEMENT      KNEE ARTHROPLASTY Right     LASER LITHOTRIPSY Left 11/13/2019     "Procedure: LITHOTRIPSY, USING LASER;  Surgeon: Paddy Dunham MD;  Location: [facility] OR [address] [place] [address];  Service: Urology;  Laterality: Left;    TRANSCATHETER AORTIC VALVE REPLACEMENT (TAVR) N/A 12/17/2019    Procedure: REPLACEMENT, AORTIC VALVE, TRANSCATHETER (TAVR);  Surgeon: Herbert Ashley MD;  Location: [facility] CATH LAB;  Service: Cardiology;  Laterality: N/A;    URETEROSCOPIC REMOVAL OF URETERIC CALCULUS Left 11/13/2019    Procedure: REMOVAL, CALCULUS, URETER, URETEROSCOPIC;  Surgeon: Paddy Dunham MD;  Location: [facility] OR [address] [place] [address];  Service: Urology;  Laterality: Left;    URETEROSCOPY Left 11/13/2019    Procedure: URETEROSCOPY;  Surgeon: Paddy Dunham MD;  Location: [facility] OR [address] [place] [address];  Service: Urology;  Laterality: Left;     Allergies:     Review of patient's allergies indicates:   Allergen Reactions    Azithromycin Swelling     All mycins    Statins-hmg-coa reductase inhibitors Other (See Comments)     Liver function  "It attacks my liver and makes me very sick"     Medications:     Current Outpatient Medications on File Prior to Visit   Medication Sig Dispense Refill    acetaminophen (TYLENOL) 325 MG tablet Take 650 mg by mouth once.      aspirin 81 MG Chew Take 81 mg by mouth once daily.      clopidogrel (PLAVIX) 75 mg tablet Take 1 tablet (75 mg total) by mouth once daily. 30 tablet 6    furosemide (LASIX) 20 MG tablet Take for 3 lb weight gain overnight or 5 lbs in 5 days. (Patient taking differently: Take 20 mg by mouth once daily. Take for 3 lb weight gain overnight or 5 lbs in 5 days.) 30 tablet 0    Lactobac no.41/Bifidobact no.7 (PROBIOTIC-10 ORAL) Take 1 capsule by mouth once daily.       lisinopril (PRINIVIL,ZESTRIL) 20 MG tablet Take 20 mg by mouth once daily.      metFORMIN (GLUCOPHAGE) 500 MG tablet Take 500 mg by mouth 2 (two) times daily with meals.      senna-docusate 8.6-50 mg (SENNA WITH DOCUSATE SODIUM) 8.6-50 mg per tablet Take 1 tablet by mouth once daily. 30 tablet 10     Current " "Facility-Administered Medications on File Prior to Visit   Medication Dose Route Frequency Provider Last Rate Last Dose    0.9%  NaCl infusion   Intravenous Continuous Carmen Yepez MD 0 mL/hr at 19 1141      aspirin EC tablet 325 mg  325 mg Oral Once Carmen Yepez MD        diazePAM tablet 5 mg  5 mg Oral On Call Procedure Carmen Yepez MD        diphenhydrAMINE capsule 25 mg  25 mg Oral Once Carmen Yepez MD         Social History:     Social History     Tobacco Use    Smoking status: Former Smoker     Packs/day: 1.00     Years: 25.00     Pack years: 25.00     Types: Cigarettes     Last attempt to quit: 1980     Years since quittin.0    Smokeless tobacco: Never Used   Substance Use Topics    Alcohol use: Not Currently     Family History:     Family History   Problem Relation Age of Onset    Hypertension Father     Cancer Maternal Grandfather     Cancer Paternal Grandfather      Physical Exam:   BP (!) 142/63 (BP Location: Left arm, Patient Position: Sitting, BP Method: Large (Automatic))   Pulse 75   Ht 4' 10" (1.473 m)   Wt 75.8 kg (167 lb 1.7 oz)   SpO2 100%   BMI 34.93 kg/m²    Physical Exam   Constitutional: She is oriented to person, place, and time. She appears well-developed and well-nourished.   HENT:   Head: Normocephalic and atraumatic.   Eyes: Pupils are equal, round, and reactive to light. Conjunctivae and EOM are normal.   Neck: Normal range of motion. Neck supple. No JVD present.   Cardiovascular: Normal rate, regular rhythm and normal heart sounds. Exam reveals no gallop and no friction rub.   No murmur heard.  Pulmonary/Chest: Effort normal and breath sounds normal. No respiratory distress. She has no wheezes. She has no rales. She exhibits no tenderness.   Abdominal: Soft. Bowel sounds are normal. She exhibits no distension. There is no tenderness.   Musculoskeletal: Normal range of motion. She exhibits no edema or tenderness. "   Neurological: She is alert and oriented to person, place, and time.   Skin: Skin is warm and dry. No erythema. No pallor.       Labs:     Lab Results   Component Value Date     01/22/2020    K 5.0 01/22/2020     01/22/2020    CO2 23 01/22/2020    BUN 20 01/22/2020    CREATININE 1.1 01/22/2020    ANIONGAP 8 01/22/2020     Lab Results   Component Value Date    HGBA1C 5.5 12/17/2019     Lab Results   Component Value Date     (H) 12/26/2019    Lab Results   Component Value Date    WBC 8.12 01/22/2020    HGB 13.5 01/22/2020    HCT 43.0 01/22/2020     (L) 01/22/2020    GRAN 4.2 12/26/2019    GRAN 62.9 12/26/2019     No results found for: CHOL, HDL, LDLCALC, TRIG       Imaging:   Reviewed as above    EKG: paced  Assessment:     1. S/P TAVR (transcatheter aortic valve replacement)    2. S/P placement of cardiac pacemaker    3. Essential hypertension    4. Chronic heart failure with preserved ejection fraction          Plan:   S/P TAVR (transcatheter aortic valve replacement)  Echo as above  Valve functioning well with mild PVL  Patients symptoms are much improved    S/P placement of cardiac pacemaker  2 more weeks on mobility restriction  Functioning well  Wound healing well    Essential hypertension  Well-controlled at this time  Continue meds as prescribed    Chronic heart failure with preserved ejection fraction  Continue lisinopril and lasix    45 mins spent revieweing echo and conversing with patient, more than half of which was spent going through plan and reviewing studies.  Discussed with Dr. Ashley. RTC 11 months.     Signed:  Pavel eBnson MD  1/22/2020 2:50 PM

## 2020-02-11 ENCOUNTER — HOSPITAL ENCOUNTER (OUTPATIENT)
Dept: RADIOLOGY | Facility: HOSPITAL | Age: 80
Discharge: HOME | End: 2020-02-11
Attending: INTERNAL MEDICINE
Payer: MEDICARE

## 2020-02-11 ENCOUNTER — HOSPITAL ENCOUNTER (OUTPATIENT)
Dept: PREADMISSION TESTING | Facility: HOSPITAL | Age: 80
Discharge: HOME | End: 2020-02-11
Attending: INTERNAL MEDICINE
Payer: MEDICARE

## 2020-02-11 ENCOUNTER — HOSPITAL ENCOUNTER (OUTPATIENT)
Facility: HOSPITAL | Age: 80
Discharge: HOME | End: 2020-02-13
Attending: INTERNAL MEDICINE | Admitting: INTERNAL MEDICINE
Payer: MEDICARE

## 2020-02-11 DIAGNOSIS — I44.7 LEFT BUNDLE BRANCH BLOCK: ICD-10-CM

## 2020-02-11 DIAGNOSIS — I50.32 CHRONIC DIASTOLIC HEART FAILURE: Chronic | ICD-10-CM

## 2020-02-11 DIAGNOSIS — T82.03XD: Primary | ICD-10-CM

## 2020-02-11 DIAGNOSIS — R94.31 NONSPECIFIC ABNORMAL ELECTROCARDIOGRAM (ECG) (EKG): Primary | ICD-10-CM

## 2020-02-11 DIAGNOSIS — R06.02 SHORTNESS OF BREATH: Primary | ICD-10-CM

## 2020-02-11 DIAGNOSIS — I25.118 CORONARY ARTERY DISEASE OF NATIVE ARTERY OF NATIVE HEART WITH STABLE ANGINA PECTORIS: Chronic | ICD-10-CM

## 2020-02-11 DIAGNOSIS — I34.2 NONRHEUMATIC MITRAL VALVE STENOSIS: ICD-10-CM

## 2020-02-11 DIAGNOSIS — I50.33 ACUTE ON CHRONIC DIASTOLIC HEART FAILURE: ICD-10-CM

## 2020-02-11 DIAGNOSIS — E11.9 DIABETES MELLITUS WITHOUT COMPLICATION: Chronic | ICD-10-CM

## 2020-02-11 DIAGNOSIS — I35.0 NODULAR CALCIFIC AORTIC VALVE STENOSIS: ICD-10-CM

## 2020-02-11 DIAGNOSIS — R94.31 NONSPECIFIC ABNORMAL ELECTROCARDIOGRAM (ECG) (EKG): ICD-10-CM

## 2020-02-11 DIAGNOSIS — Z95.0 PRESENCE OF PERMANENT CARDIAC PACEMAKER: ICD-10-CM

## 2020-02-11 DIAGNOSIS — I80.13 PHLEBITIS AND THROMBOPHLEBITIS OF FEMORAL VEIN, BILATERAL: ICD-10-CM

## 2020-02-11 DIAGNOSIS — Z95.2 S/P TAVR (TRANSCATHETER AORTIC VALVE REPLACEMENT): ICD-10-CM

## 2020-02-11 DIAGNOSIS — R06.02 SHORTNESS OF BREATH: ICD-10-CM

## 2020-02-11 DIAGNOSIS — N13.2 HYDRONEPHROSIS WITH URINARY OBSTRUCTION DUE TO URETERAL CALCULUS: ICD-10-CM

## 2020-02-11 DIAGNOSIS — I80.13 PHLEBITIS AND THROMBOPHLEBITIS OF FEMORAL VEIN, BILATERAL: Primary | ICD-10-CM

## 2020-02-11 PROCEDURE — G0378 HOSPITAL OBSERVATION PER HR: HCPCS

## 2020-02-11 PROCEDURE — 93970 EXTREMITY STUDY: CPT | Mod: TC

## 2020-02-11 PROCEDURE — G0379 DIRECT REFER HOSPITAL OBSERV: HCPCS

## 2020-02-11 PROCEDURE — 96372 THER/PROPH/DIAG INJ SC/IM: CPT | Mod: 59

## 2020-02-11 PROCEDURE — 25000003 PHARM REV CODE 250: Performed by: INTERNAL MEDICINE

## 2020-02-11 PROCEDURE — 93005 ELECTROCARDIOGRAM TRACING: CPT

## 2020-02-11 PROCEDURE — 71046 X-RAY EXAM CHEST 2 VIEWS: CPT | Mod: TC

## 2020-02-11 PROCEDURE — 63600175 PHARM REV CODE 636 W HCPCS: Performed by: INTERNAL MEDICINE

## 2020-02-11 PROCEDURE — 96360 HYDRATION IV INFUSION INIT: CPT

## 2020-02-11 RX ORDER — IBUPROFEN 200 MG
16 TABLET ORAL
Status: DISCONTINUED | OUTPATIENT
Start: 2020-02-11 | End: 2020-02-13 | Stop reason: HOSPADM

## 2020-02-11 RX ORDER — ENOXAPARIN SODIUM 100 MG/ML
80 INJECTION SUBCUTANEOUS
Status: DISCONTINUED | OUTPATIENT
Start: 2020-02-11 | End: 2020-02-12

## 2020-02-11 RX ORDER — CLOPIDOGREL BISULFATE 75 MG/1
75 TABLET ORAL EVERY MORNING
Status: DISCONTINUED | OUTPATIENT
Start: 2020-02-12 | End: 2020-02-13 | Stop reason: HOSPADM

## 2020-02-11 RX ORDER — GLUCAGON 1 MG
1 KIT INJECTION
Status: DISCONTINUED | OUTPATIENT
Start: 2020-02-11 | End: 2020-02-13 | Stop reason: HOSPADM

## 2020-02-11 RX ORDER — AMOXICILLIN 250 MG
1 CAPSULE ORAL 2 TIMES DAILY PRN
Status: DISCONTINUED | OUTPATIENT
Start: 2020-02-11 | End: 2020-02-13 | Stop reason: HOSPADM

## 2020-02-11 RX ORDER — IBUPROFEN 200 MG
24 TABLET ORAL
Status: DISCONTINUED | OUTPATIENT
Start: 2020-02-11 | End: 2020-02-13 | Stop reason: HOSPADM

## 2020-02-11 RX ORDER — SODIUM CHLORIDE 450 MG/100ML
INJECTION, SOLUTION INTRAVENOUS CONTINUOUS
Status: DISCONTINUED | OUTPATIENT
Start: 2020-02-11 | End: 2020-02-12

## 2020-02-11 RX ADMIN — SODIUM CHLORIDE: 0.45 INJECTION, SOLUTION INTRAVENOUS at 06:02

## 2020-02-11 RX ADMIN — SODIUM CHLORIDE 500 ML: 0.9 INJECTION, SOLUTION INTRAVENOUS at 07:02

## 2020-02-11 RX ADMIN — ENOXAPARIN SODIUM 80 MG: 80 INJECTION, SOLUTION INTRAVENOUS; SUBCUTANEOUS at 06:02

## 2020-02-11 NOTE — H&P
02/12/2020 @ 5:28 PM     History & Physical Examination Report, Attending Physician  [facility]     Patient Name: Roxanne Hernandez   MRN: [id number]   Admission Date and Time: 2/11/2020  4:45 PM <== placed on observation status upon admission   Hospital length of stay: 0 days    Code status: Full Code   Attending Physician: Yoni Hernandez MD   Principal Problem: Shortness of breath      Chief Complaint and History of Present Illness:    This 79 year old female presented to my office on 02/11/2020 complaining of [id number] worsening over the past week. She has not had cough, fever, chest pain, pressure, or tightness.   · In the weeks prior to her TAVR on 12/17/2019 she reported to Cardiologist Marleni that she was having progressive dypsnea on exertion with palpitations.    · She was worked up and the determination was that she had severe aortic stenosis which adequately explained her symptoms, for which cause TAVR was advised.   · 12/17/2019: TAVR per Jessenia Pizano at [facility]   · 12/26/2019: [facility] ER visit for recurrent dyspnea on exertion, went home after exclusion of DVT for continued outpatient management   · 02/11/2020: presentation to my office with continued complaints of waxing and waning dypsnea on exertion similar to what she had prior to her TAVR.      Aortic stenosis saga:   Echocardiogram (VICKY) 09/04/2019:    Aortic valve: heavily calcified with restricted leaflet motion. Moderate aortic regurgitation.    Mitral valve: thick and calcified with restricted leaflet motion. Mild mitral stenosis and regurgitation.    Tricuspid valve: is structurally normal with good leaflet excursion. Mild regurgitation.   Pulmonary valve: is structurally normal with good leaflet excursion. No significant regurgitation.   Overall LVEF appears normal.    No obvious shunt across the interatrial septum by color flow Doppler.     Echocardiogram (TTE) 12/26/2019Mild concentric left ventricular  hypertrophy.  · Normal left ventricular systolic function. The estimated ejection fraction is 60%.  · No wall motion abnormalities.  · Grade I (mild) left ventricular diastolic dysfunction consistent with impaired relaxation.  · There is a transcutaneously-placed aortic bioprosthesis present.  · Mild aortic regurgitation.  · Mild-to-moderate aortic valve stenosis.  · Aortic valve area is 1.23 cm2; peak velocity is 2.84 m/s; mean gradient is 18 mmHg.  · Mild left atrial enlargement.  · Mild tricuspid regurgitation.  · Normal right ventricular systolic function.  · Moderate mitral sclerosis.  · There is mild leaflet calcification of the Mitral Valve.  · Mild mitral stenosis. MVA by PHT is 2.21 sq cm.  · Normal central venous pressure (3 mmHg).   · The estimated PA systolic pressure is 32 mmHg.:     Echocardiogram (TTE) 01/22/2020:   · Normal left ventricular systolic function. The estimated ejection fraction is 65%.  · Concentric left ventricular remodeling.  · No wall motion abnormalities.  · Grade I (mild) left ventricular diastolic dysfunction consistent with impaired relaxation.  · Normal right ventricular systolic function.  · Severe left atrial enlargement.  · There is a transcutaneously-placed aortic bioprosthesis present.   · There is paravalvular aortic insufficiency present   · Mild mitral regurgitation.  · Normal central venous pressure (3 mmHg).  · The estimated PA systolic pressure is 29 mmHg.   · Trivial posterior pericardial effusion.     Cardiac catheterization 09/14/2019 (Timothy Salgado MD):   · Mid RCA lesion, 50% stenosed, nonsignificant by iFR assessment.  · Known severe aortic stenosis from echo    CTA Cardiac TAVR 09/19/2019:    Large left UVJ stone measuring at 2.6 cm with severe left-sided hydroureteronephrosis.   Significant pneumobilia in the left hepatic lobe with air within the common bile duct.  Unclear if this is due to biliary enteric fistula, infectious process such as cholangitis,  "or incompetent sphincter of Oddi.  Unfortunately this is not well evaluated on this dedicated TAVR protocol examination.  Clinical correlation is advised.  Further evaluation could be performed with ERCP and dedicated abdominal CT with intravenous contrast.   TAVR measurements, as above.   Significant aortic valve and abdominal aorta atherosclerosis.   Nonspecific mesenteric haziness, possibly mesenteric panniculitis or adenitis.   Heterogeneity of the pulmonary parenchyma possibly related to elevated pulmonary vascular resistance, small airways disease, or both.   Small bilateral pleural effusions.    Cardiac catheterization 12/17/2019 (Herbert Ashley):    Successful left transfemoral 23 mm Evolute TAVR.  No paravalvular leak, mean gradient 3, peak velocity 1.5 post TAVR.    Review of patient's allergies indicates:   Allergen Reactions    Azithromycin Swelling     All mycins    Statins-hmg-coa reductase inhibitors Other (See Comments)     Liver function  "It attacks my liver and makes me very sick"    Sulfa (sulfonamide antibiotics) Hives     Current Facility-Administered Medications on File Prior to Encounter   Medication    0.9%  NaCl infusion    aspirin EC tablet 325 mg    diazePAM tablet 5 mg    diphenhydrAMINE capsule 25 mg     Current Outpatient Medications on File Prior to Encounter   Medication Sig    acetaminophen (TYLENOL) 325 MG tablet Take 650 mg by mouth once.    clopidogrel (PLAVIX) 75 mg tablet Take 1 tablet (75 mg total) by mouth once daily.    furosemide (LASIX) 20 MG tablet Take for 3 lb weight gain overnight or 5 lbs in 5 days. (Patient taking differently: Take 20 mg by mouth once daily. Take for 3 lb weight gain overnight or 5 lbs in 5 days.)    lisinopril (PRINIVIL,ZESTRIL) 20 MG tablet Take 20 mg by mouth once daily.    metFORMIN (GLUCOPHAGE) 500 MG tablet Take 500 mg by mouth 2 (two) times daily with meals.    senna-docusate 8.6-50 mg (SENNA WITH DOCUSATE SODIUM) 8.6-50 " mg per tablet Take 1 tablet by mouth once daily.       Physical Examination:   Office Vital signs:   Weight = 173# = 78 kg   Temp = 97.2   BP = 132/67, pulse - 100, respirations = 14 and unlabored   Room air O2 saturation at rest = 98-99%     A&Ox4 in NAD   HEENT = PERRL, normal   Neck = normal  Lungs = clear diffusely with good air flow   Heart = RRR S1 & S2 with no murmurs, rubs, or gallops   Breasts = normal   Abdomen = obese, protuberant, soft, bowel sounds positive, non-tender, with a black 2mm stitch protruding from the apex of her mid-central old 1998 hysterectomy scar (the patient says it has been sticking out for a long time)   /GYN/rectal = not examined   Extremities: no clubbing or cyanosis, with trace edema in the RLE and 2+ edema in her LLE from her thigh down to her foot   Pulses = 2+ carotids, brachials, radials, 1+ femorals, 3+ dorsal pedals, absent posterior tibials (no bruits)   Neurologic = grossly intact   NO bedsores   NO DVT     Labs:     Recent serial CMP's:              Recent Labs   Lab 02/11/20  1321   WBC 9.81   HGB 14.5   HCT 45.0        Recent Labs   Lab 02/11/20  1321 02/12/20  0401   BNP 87 117*     Lab Results   Component Value Date    DDIMER 1.57 (H) 02/11/2020     Lab Results   Component Value Date    TSH 1.840 02/11/2020    FREET4 0.88 02/11/2020       Imaging:       X-ray Chest Pa And Lateral    Result Date: 2/11/2020  No acute cardiac or pulmonary process. Electronically signed by: Simone Lujan MD Date:    02/11/2020 Time:    14:04    Us Lower Extremity Veins Bilateral    Result Date: 2/11/2020  No evidence of acute deep venous thrombosis in the visualized venous segments of the bilateral lower extremities. Electronically signed by: Simone Lujan MD Date:    02/11/2020 Time:    14:47    Us Kidney    Result Date: 2/12/2020  1.  Moderate left-sided hydronephrosis with no shadowing stone identified. 2.  Nonobstructing right-sided renal stones. 3.  Likely debris  within the bladder, consider correlation with urinalysis. Electronically signed by: Simone Lujan MD Date:    02/12/2020 Time:    06:00      Results for orders placed or performed during the hospital encounter of 02/11/20   EKG 12-lead    Collection Time: 02/11/20  1:32 PM    Narrative    Test Reason : R06.02,    Vent. Rate : 078 BPM     Atrial Rate : 078 BPM     P-R Int : 148 ms          QRS Dur : 100 ms      QT Int : 398 ms       P-R-T Axes : 137 -27 125 degrees     QTc Int : 453 ms    Unusual P axis, possible ectopic atrial rhythm  ST and T wave abnormality, consider lateral ischemia  Abnormal ECG  When compared with ECG of 26-DEC-2019 13:38,  Ectopic atrial rhythm has replaced Electronic ventricular pacemaker    Referred By: JANINE HAAS           Confirmed By:      Impression:   1. Dyspnea, cause to be determined, PE is a possibility ... CTA of chest cannot be done on admission evening due to newly elevated BUN/creat   2. D-dimer elevation with negative bilateral leg vein ultrasound 02/11/2020 ... The possibility of PE has NOT been excluded   3. BUN/creatinine elevation, new onset, cause to be determined   4. Para TAVR valvular leakage as demonstrated on echocardiogram of 01/22/2022   5. Marked improvement in her long-term obesity  6. Marked improvement in her DM II (present since 12/01/2000)   7. Other problems as recorded on my office [facility] document, a copy of which is copied and pasted to the bottom of this report.       Problem Noted   Shortness of Breath 2/11/2020   Leakage of Periprosthetic Valve, Subsequent Encounter 2/11/2020   Dehydration Syndrome 2/11/2020   Hydronephrosis With Urinary Obstruction Due to Ureteral Calculus 11/11/2019    Left ureteral stone with hydronephrosis requiring  manipulation 11/13/2019 @ [facility] (Paddy Dunham MD)    CTA Cardiac TAVR 09/19/2019: large left UVJ stone measuring at 2.6 cm with severe left-sided hydroureteronephrosis    Intervention 11/13/2019: 1.  Left  "ureteroscopy 2.  Cystoscopy 3.  Stone basket extraction 4.  Laser lithotripsy 5.  Left ureteral stent placement 6.  Fluoro < 1h 2019 (Paddy Dunham @ [facility])    Ultrasound kidneys 2020 = moderate left-sided hydronephrosis with no shadowing stone identified; nonobstructing right-sided renal stones; likely debris within the bladder, consider correlation with urinalysis.             Plan:   1. Observation status   2. Hydration overnight, to hopefully correct her newly elevated BUN and creatinine   3. Empiric treatment for possible pulmonary embolism with Lovenox 1mg/kg/24h (renally adjusted for her reduced eGFR in the presence of her newly elevated BUN/creatinine)  4. Hope that the morning of 2020 her BUN/creatine will be normalized "enough" to safely permit a CTA of the chest to look for pulmonary emboli.  5. Metformin 500mg po qam is discontinued due to normal glucose and elevation of BUN/creat.   6. Further diagnostic and therapeutic interventions pending the results of the above listed pending tests.    Yoni Hernandez MD     =====================================      Roxanne Hernandez  : 1935   Patient since: 1991 @ age 51     PAST MEDICAL HISTORY: review date: 2006, 2006, 2006, 2007, 2007, 2009, 2009, 2011, 2011, 2011, 11/10/2011, 2012, 2012, 2013, 2013 è away è 2020,   ALLERGIES: see current computerized database listing   MEDS: see current computerized database listing     Breast status: fibrocystic changes   Family history of breast cancer? no as of 2006   BSE?   no as of 2006   Last CBE: 2000 = wnl, 2006 = wnl, 2007 = wnl, 2009 = wnl, 2011 = wnl, 2012 = wnl, 2013 = wnl è away è 2020 = wnl,   Last mammogram:  1998 = wnl, 2006 = ABNORMAL LEFT è 2006 dx mammo/us = ABNORMAL LEFT è 2006 = ABNORMAL LEFT è see " biopsy reports below, 02/27/2009 = wnl, 05/17/2011 =  è right dx mammo/us 06/02/2011 = normal, 09/17/2012 = wnl,     Surgery consultation:    Left breast biopsy #1 05/08/2006 ([facility]): left breast tissue: fatty breast tissue with focal mild to moderate intraductal hyperplasia and focal medial calcific sclerosis of blood vessels; benign    Left breast biopsy #2 06/28/2006 ([facility]): left breast, lumpectomy: rare foci of intraductal epithelial hyperplasia with microcalcification embedded in fat necrosis surrounding a cystic lesion with hemorrhage and foreign body giant cell granuloma.  No evidence of malignancy.     Uterus: S/P hysterectomy for uterine (or cervical) cancer ~01/14/1998 ([facility])   Family history of ovarian cancer? no as of 03/29/2006   PAP smear: ~1999   HRT usage history: positive usage of OCPs from 1964 to 1965, never took HRT as of 03/29/2006   HRT risks discussed in detail on: 03/29/2006   Bone density status: osteoporosis   P/C, R/B, limits, alternatives to all osteoporosis treatment options including: calcium + D, HRT, calcitonin-salmon, oral bisphosphonates, IV bisphosphonates, (including new mid femur shaft rare fractures) Evista, Forteo, Prolia discussed in detail on: pending due to severe carious teeth bisphosphonates are contraindicated, and due to cerebrovascular disease Evista is relatively contraindicated, leaving only Miacalcin or Forteo è her choice is: Caltrate 600+D and Miacalcin as of 05/23/2011 è Miacalcin discontinued 09/12/2013 (but she hasnt used it in a long time anyway) due to new FDA rule of on-or-about 03/05/2013 that the slight increased risk of cancer outweighs its questionable benefits in reducing osteoporosis http://www.medscape.com/viewarticle/103604 (accessed 07/17/2013) è her choice is to take nothing because all of the alternatives are unacceptable to her primarily due to exorbitant expense or they are contraindicated      DEXA Date Density measurement site  Bone density (g/cm2) T-score Z-score WHO classification Fracture Risk   04/17/2006 Lumbar spine 0.869 -1.6 0.2 Osteopenia     Left femoral neck 0.483 -3.3 -1.7 Osteoporosis     Left total hip 0.792 -1.2 0.0 Osteopenia    05/17/2011 Lumbar spine 0.922 -1.1 1.0 Osteopenia     Total left hip 0.622 -2.6 -1.1 Osteoporosis             09/06/2013 Lumbar spine 0.944 -0.9 1.4 Normal     Total left hip 0.579 -2.4 -0.5 Osteopenia    Vitamin D status: vitamin D deficiency known since 06/01/2011   Date 25-OH D    Comments   06/01/2011 28 ng/mL  Baseline on no vitamin D supplements   09/17/2012 20 ng/mL  Vitamin D3 cholecalciferol 2000U/d ordered since 06/06/2011, but she wasn't taking it           Bladder status: clinically normal     Colorectal status: diverticulosis coli, multiple hyperplastic and adenomatous colon polyps known since 08/07/1998    Family history CRC or colon polyps? no as of 03/29/2006   CRC screening/surveillance discussed on: prior to first colonoscopy, 03/29/2006, 05/17/2011 with referral package, reminded 09/24/2012, reminded 09/16/2013 with referral package,     Family counseling done 03/29/2006   Last rectal:   Barium enema 01/12/1998 = several sessile polyps, diverticulosis coli   Colonoscopy #1 08/07/1998 ([facility]ie) = multiple polyps; histopathology = adenomatous polyp with mild dysplasia, hyperplastic polyps   Colonoscopy #2 08/02/2006 (Jamie) = small cecal polyp (ablated), ascending polyp removed, 8mm transverse polyp, rectosigmoid polyp (ablated); histopathology = ascending and transverse colon polyps: tubular adenomas (2-3-year repeat advised)   Colonoscopy #3: due 08/02/2009     Upper GI status: clinically normal   UGI 01/05/1998 = normal     Hepatobiliary system status: normal in the S/P cholecystectomy state, and probable fatty infiltration of the left lobe as of 06/22/1995   Ultrasound 01/31/1992 = cholelithiasis with tender gallbladder   Ultrasound abdomen 06/22/1995 = normal except for  hypoechoic acoustic texture of the left hepatic lobe   ERCP #1 02/01/1992 ([facility]) = choledocholithiasis with cholangitis   ERCP #2 06/28/1995 ([place]) = multiple filling defects due to stones   CT abdomen (with & without) 06/26/1995 = normal except for air in the biliary tree, fatty infiltration of the left lobe, calcified granuloma at right lung base   CT abdomen & pelvis (with & without) 12/02/1997 = normal/normal except for air in the biliary tree due to prior sphincterotomy   Date ALT  (0-40) AST  (0-40) Alk Phos T. bili Alb T.P. GGT  Ca++ iPTH  (12-65 pg/mL) PO4   04/24/1992 54 40 258 0.6 4.2 7.2   9.5     08/01/1994 39 31 139 0.7 4.3 7.7   9.5     01/25/2001 22 21 154 0.7 4.1 6.8   9.4     05/01/2006 29 27 123 0.8 3.6 6.9   8.8     03/13/2009 37 27 57 0.7 3.9 6.9   9.2     05/20/2011 36 29 82 0.6 4.3 7.4   9.6     09/17/2012 20 19 81 0.9 4.2 7.0   9.5     09/06/2013 25 26 78 0.8 4.2 7.2   9.5     Away              02/11/2020 20 25 90 1.1 4.3 7.4   9.5                     Renal status: normal function   Left ureteral stone with hydronephrosis requiring  manipulation 11/13/2019 @ [facility] (Paddy Dunham MD)    CTA Cardiac TAVR 09/19/2019: large left UVJ stone measuring at 2.6 cm with severe left-sided hydroureteronephrosis    Intervention 11/13/2019: 1.  Left ureteroscopy 2.  Cystoscopy 3.  Stone basket extraction 4.  Laser lithotripsy 5.  Left ureteral stent placement 6.  Fluoro < 1h 11/13/2019 (Paddy Dunham @ [facility])    Ultrasound kidneys 02/11/2020 = moderate left-sided hydronephrosis with no shadowing stone identified; nonobstructing right-sided renal stones; likely debris within the bladder, consider correlation with urinalysis.    Date BUN Creat eGFR CKD  stage CC Protein mg/24h Microalbumin (0-30 mcg/mg creat) Comments   04/24/1992 10 0.8         01/25/2001 15 1.1         05/01/2006 18 0.8     18 mcg/mg creat    09/09/2006 17 0.9     3 mcg/mL Zestril since 05/04/2006 03/13/2009 17 0.8      13 mcg/mg creat    05/20/2011 28 0.8     12 mcg/mg creat    11/04/2011 24 0.8     9 mcg/mg creat    09/17/2012 20 0.9     10 mcg/mg creat    09/06/2013 20 1.0     10 mcg/mg creat    Away                       20 1.1 48        02/11/2020 45 1.7 28        02/12/2020 36 1.2 43                     BP status: hypertension since prior to 01/31/1992 @ 200/110     Lipid status: mixed hyperlipidemia (intolerant of Zocor, Lopid, Zetia)   Date Ch   LDL HDL TG VLDL Comments   04/24/1992 261 195 40 126 25 Diet   08/01/1994 233   171     06/06/1995 314 228 25 307     07/18/1995 291 222 38 156     04/26/1996 258 170 43 225     11/26/1997 289 219 43 134 27    01/25/2001 196 112 44 197 39    05/01/2006 223 137 51 174 35    09/09/2006 175 45 45 90 18 On Zocor 20 since 06/07/2006 02/05/2007 202 121 57 122 24 Diet   03/13/2009 172 121 37 70 14 Tricor 145 ordered since 02/09/2009 05/20/2011 279 202 49 139 28  but NOT taking any ordered meds for a long time PLUS Atkins diet for 2 months   11/04/2011 236 159 54 116 23  è changed to Pravachol 10 06/06/2011 09/17/2012 200 116 41 216 43 Pravachol 20 since 11/10/2011   09/06/2013 199 115 47 183 37    Away                  Glycemic status: diabetes mellitus II since prior to 12/01/2000 when FBS = 136 mg/dL   Date: 05/01/06 09/09/06 02/05/07 03/13/09 05/20/11 11/04/11 09/17/12 09/06/13 Away      %A1C 8.6 6.5 6.0 5.9 5.6 5.7 6.4 6.6       Date:               %A1C               Weight status: overweight since age 25  Date Height ()   Weight (#) BMI Weight status Ideal BMI min Ideal BMI max Ideal body weight minimum (#) Ideal weight maximum (#)   01/31/1992  224         10/01/1996  235         12/11/2000  243         03/29/2006 59 225 32 Obesity I 20 25 138 173   09/14/2006  218         02/08/2007  220         02/09/2009  216         03/18/2009  212         05/17/2011  207         07/24/2012  213         09/24/2012 58 206         11/05/2012 58 209         09/12/2013  206          Away           02/11/2020  173                    Thyroid status: euthyroid   Date T4   Free T4 TSH   Comments   12/01/2000 10.7  1.85   On no thyroid meds   05/01/2006  0.87 2.88      03/13/2009  0.98 2.85      05/20/2011  0.94 3.13      Away         02/11/2020  0.88 1.84               Heart status: LV diastolic dysfunction by echo 12/01/2000   Murmur of aortic stenosis (asymptomatic) discovered on routine exam 05/17/2011, but very mild by echocardiogram 05/21/2011     S/P TAVR (Jessenia Pizano) 12/17/2019    S/P pacemaker 12/18/2019 (Jessenia Pizano)   ASCVD known since 09/14/2019   EKG 12/01/2000 = NSR, leftward axis, normal   EKG 05/01/2006 = NSR, leftward axis, normal   EKG 06/01/2011 = NSR, PACs, mild left axis, normal     EKG 08/30/2019 = NSR, LAD, LVH with secondary ST-T changes (inverted Ts in I, aVL)   EKG 02/11/2020 = NSR, LAD, LVH, inverted Ts in I, aVL     Stress test (persantine/myoview) 12/02/2000 = normal/normal   Echocardiogram 12/01/2000 = hyperdynamic LV, with mild to moderate amount of pericardial fat, and left ventricular diastolic dysfunction   Echocardiogram 05/20/2011 = dilated left atrial cavity, normal LV thickness and function with LVEF @ 70%, mild aortic stenosis with valve area @ 1.09cm2, peak gradient = 34 mmHg, minimal prolapse of anterior mitral leaflet, mild TI, very small pericardial effusion   Echocardiogram (VICKY) 09/04/2019:    Aortic valve: heavily calcified with restricted leaflet motion. Moderate aortic regurgitation.    Mitral valve: thick and calcified with restricted leaflet motion. Mild mitral stenosis and regurgitation.    Tricuspid valve: is structurally normal with good leaflet excursion. Mild regurgitation.   Pulmonary valve: is structurally normal with good leaflet excursion. No significant regurgitation.   Overall LVEF appears normal.    No obvious shunt across the interatrial septum by color flow Doppler.     Echocardiogram (TTE) 12/26/2019   Mild  concentric left ventricular hypertrophy.   Normal left ventricular systolic function. The estimated ejection fraction is 60%.   No wall motion abnormalities.   Grade I (mild) left ventricular diastolic dysfunction consistent with impaired relaxation.   There is a transcutaneously-placed aortic bioprosthesis present.   Mild aortic regurgitation.   Mild-to-moderate aortic valve stenosis.   Aortic valve area is 1.23 cm2; peak velocity is 2.84 m/s; mean gradient is 18 mmHg.   Mild left atrial enlargement.   Mild tricuspid regurgitation.   Normal right ventricular systolic function.   Moderate mitral sclerosis.   There is mild leaflet calcification of the Mitral Valve.   Mild mitral stenosis. MVA by PHT is 2.21 sq cm.   Normal central venous pressure (3 mmHg).    The estimated PA systolic pressure is 32 mmHg.:     Echocardiogram (TTE) 01/22/2020:    Normal left ventricular systolic function. The estimated ejection fraction is 65%.   Concentric left ventricular remodeling.   No wall motion abnormalities.   Grade I (mild) left ventricular diastolic dysfunction consistent with impaired relaxation.   Normal right ventricular systolic function.   Severe left atrial enlargement.   There is a transcutaneously-placed aortic bioprosthesis present.    There is paravalvular aortic insufficiency present. (ICD 10: T82.03XA)     Mild mitral regurgitation.   Normal central venous pressure (3 mmHg).   The estimated PA systolic pressure is 29 mmHg.    Trivial posterior pericardial effusion.     Cardiac catheterization 09/14/2019 (Timothy Salgado MD):    Mid RCA lesion, 50% stenosed, nonsignificant by iFR assessment.   Known severe aortic stenosis from echo    CTA Cardiac TAVR 09/19/2019:    Large left UVJ stone measuring at 2.6 cm with severe left-sided hydroureteronephrosis.   Significant pneumobilia in the left hepatic lobe with air within the common bile duct.  Unclear if this is due to biliary enteric  fistula, infectious process such as cholangitis, or incompetent sphincter of Oddi.  Unfortunately, this is not well evaluated on this dedicated TAVR protocol examination.  Clinical correlation is advised.  Further evaluation could be performed with ERCP and dedicated abdominal CT with intravenous contrast.   TAVR measurements, as above.   Significant aortic valve and abdominal aorta atherosclerosis.   Nonspecific mesenteric haziness, possibly mesenteric panniculitis or adenitis.   Heterogeneity of the pulmonary parenchyma possibly related to elevated pulmonary vascular resistance, small airways disease, or both.   Small bilateral pleural effusions.    Cardiac catheterization 12/17/2019 (Herbert Ashley):    Successful left transfemoral 23 mm Evolut R TAVR.  No paravalvular leak, mean gradient 3,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

## 2020-02-12 PROBLEM — E86.0 DEHYDRATION SYNDROME: Status: ACTIVE | Noted: 2020-02-11

## 2020-02-12 PROBLEM — I25.118 CORONARY ARTERY DISEASE OF NATIVE ARTERY OF NATIVE HEART WITH STABLE ANGINA PECTORIS: Chronic | Status: ACTIVE | Noted: 2019-09-14

## 2020-02-12 PROBLEM — I50.32 CHRONIC DIASTOLIC HEART FAILURE: Chronic | Status: ACTIVE | Noted: 2019-12-26

## 2020-02-12 PROBLEM — T82.03XD: Status: ACTIVE | Noted: 2020-02-11

## 2020-02-12 LAB
ANION GAP SERPL CALC-SCNC: 9 MMOL/L (ref 8–16)
BNP SERPL-MCNC: 117 PG/ML (ref 0–99)
BUN SERPL-MCNC: 36 MG/DL (ref 8–23)
CALCIUM SERPL-MCNC: 9.1 MG/DL (ref 8.7–10.5)
CHLORIDE SERPL-SCNC: 110 MMOL/L (ref 95–110)
CO2 SERPL-SCNC: 21 MMOL/L (ref 23–29)
CREAT SERPL-MCNC: 1.2 MG/DL (ref 0.5–1.4)
EST. GFR  (AFRICAN AMERICAN): 49.7 ML/MIN/1.73 M^2
EST. GFR  (NON AFRICAN AMERICAN): 43.1 ML/MIN/1.73 M^2
GLUCOSE SERPL-MCNC: 116 MG/DL (ref 70–110)
GLUCOSE SERPL-MCNC: 120 MG/DL (ref 70–110)
GLUCOSE SERPL-MCNC: 127 MG/DL (ref 70–110)
GLUCOSE SERPL-MCNC: 133 MG/DL (ref 70–110)
POTASSIUM SERPL-SCNC: 4.5 MMOL/L (ref 3.5–5.1)
SODIUM SERPL-SCNC: 140 MMOL/L (ref 136–145)

## 2020-02-12 PROCEDURE — 36415 COLL VENOUS BLD VENIPUNCTURE: CPT

## 2020-02-12 PROCEDURE — 25000003 PHARM REV CODE 250: Performed by: INTERNAL MEDICINE

## 2020-02-12 PROCEDURE — 80048 BASIC METABOLIC PNL TOTAL CA: CPT

## 2020-02-12 PROCEDURE — G0378 HOSPITAL OBSERVATION PER HR: HCPCS

## 2020-02-12 PROCEDURE — 96361 HYDRATE IV INFUSION ADD-ON: CPT

## 2020-02-12 PROCEDURE — 63600175 PHARM REV CODE 636 W HCPCS: Performed by: INTERNAL MEDICINE

## 2020-02-12 PROCEDURE — 83880 ASSAY OF NATRIURETIC PEPTIDE: CPT

## 2020-02-12 PROCEDURE — 25500020 PHARM REV CODE 255: Performed by: INTERNAL MEDICINE

## 2020-02-12 PROCEDURE — 81001 URINALYSIS AUTO W/SCOPE: CPT

## 2020-02-12 RX ADMIN — CLOPIDOGREL BISULFATE 75 MG: 75 TABLET, FILM COATED ORAL at 06:02

## 2020-02-12 RX ADMIN — IOHEXOL 100 ML: 350 INJECTION, SOLUTION INTRAVENOUS at 10:02

## 2020-02-12 RX ADMIN — SODIUM CHLORIDE 500 ML: 0.9 INJECTION, SOLUTION INTRAVENOUS at 09:02

## 2020-02-12 NOTE — PLAN OF CARE
02/12/20 1550   Discharge Assessment   Assessment Type Discharge Planning Reassessment     Kelsey Ontiveros, RN, , called this CM about what Diagnosis was the pt admitted, have pt orders but cannot find Dx, this CM found these DX in AVS as follows populated per Dr Hernandez admit:    Why you were hospitalized     Your primary diagnosis was: Shortness of Breath   Your diagnoses also included: Leakage of Periprosthetic Valve, Subsequent Encounter, Dehydration Syndrome     CM will follow for DC Planning Needs.

## 2020-02-12 NOTE — PLAN OF CARE
02/12/20 1031   FRASER Message   Medicare Outpatient and Observation Notification regarding financial responsibility Given to patient/caregiver;Explained to patient/caregiver;Signed/date by patient/caregiver   Date FRASER was signed 02/12/20   Time FRASER was signed 1006     SW met with Pt at bedside to discuss observation status.  FRASER Form explained and signed by Pt.  Copy and information sheet left at bedside, and original will be scanned to chart.  Pt verbalized no further questions at this time.

## 2020-02-13 VITALS
TEMPERATURE: 98 F | HEIGHT: 58 IN | BODY MASS INDEX: 36 KG/M2 | OXYGEN SATURATION: 100 % | DIASTOLIC BLOOD PRESSURE: 80 MMHG | WEIGHT: 171.5 LBS | HEART RATE: 79 BPM | RESPIRATION RATE: 20 BRPM | SYSTOLIC BLOOD PRESSURE: 144 MMHG

## 2020-02-13 LAB
ANION GAP SERPL CALC-SCNC: 7 MMOL/L (ref 8–16)
BACTERIA #/AREA URNS HPF: NEGATIVE /HPF
BILIRUB UR QL STRIP: NEGATIVE
BNP SERPL-MCNC: 164 PG/ML (ref 0–99)
BUN SERPL-MCNC: 23 MG/DL (ref 8–23)
CALCIUM SERPL-MCNC: 9.4 MG/DL (ref 8.7–10.5)
CHLORIDE SERPL-SCNC: 111 MMOL/L (ref 95–110)
CLARITY UR: CLEAR
CO2 SERPL-SCNC: 20 MMOL/L (ref 23–29)
COLOR UR: COLORLESS
CREAT SERPL-MCNC: 1.1 MG/DL (ref 0.5–1.4)
EST. GFR  (AFRICAN AMERICAN): 55.2 ML/MIN/1.73 M^2
EST. GFR  (NON AFRICAN AMERICAN): 47.9 ML/MIN/1.73 M^2
GLUCOSE SERPL-MCNC: 118 MG/DL (ref 70–110)
GLUCOSE SERPL-MCNC: 125 MG/DL (ref 70–110)
GLUCOSE SERPL-MCNC: 126 MG/DL (ref 70–110)
GLUCOSE UR QL STRIP: NEGATIVE
HGB UR QL STRIP: NEGATIVE
HYALINE CASTS #/AREA URNS LPF: 1 /LPF
KETONES UR QL STRIP: NEGATIVE
LEUKOCYTE ESTERASE UR QL STRIP: ABNORMAL
MICROSCOPIC COMMENT: ABNORMAL
NITRITE UR QL STRIP: NEGATIVE
PH UR STRIP: 7 [PH] (ref 5–8)
POTASSIUM SERPL-SCNC: 4.5 MMOL/L (ref 3.5–5.1)
PROT UR QL STRIP: NEGATIVE
RBC #/AREA URNS HPF: 0 /HPF (ref 0–4)
SODIUM SERPL-SCNC: 138 MMOL/L (ref 136–145)
SP GR UR STRIP: 1.01 (ref 1–1.03)
SQUAMOUS #/AREA URNS HPF: 1 /HPF
URN SPEC COLLECT METH UR: ABNORMAL
UROBILINOGEN UR STRIP-ACNC: NEGATIVE EU/DL
WBC #/AREA URNS HPF: 8 /HPF (ref 0–5)

## 2020-02-13 PROCEDURE — 99204 PR OFFICE/OUTPT VISIT, NEW, LEVL IV, 45-59 MIN: ICD-10-PCS | Mod: ,,, | Performed by: INTERNAL MEDICINE

## 2020-02-13 PROCEDURE — G0378 HOSPITAL OBSERVATION PER HR: HCPCS

## 2020-02-13 PROCEDURE — 36415 COLL VENOUS BLD VENIPUNCTURE: CPT

## 2020-02-13 PROCEDURE — 25000003 PHARM REV CODE 250: Performed by: INTERNAL MEDICINE

## 2020-02-13 PROCEDURE — 99204 OFFICE O/P NEW MOD 45 MIN: CPT | Mod: ,,, | Performed by: INTERNAL MEDICINE

## 2020-02-13 PROCEDURE — 83880 ASSAY OF NATRIURETIC PEPTIDE: CPT

## 2020-02-13 PROCEDURE — 80048 BASIC METABOLIC PNL TOTAL CA: CPT

## 2020-02-13 RX ORDER — NITROGLYCERIN 20 MG/1
1 PATCH TRANSDERMAL DAILY
Qty: 30 PATCH | Refills: 11
Start: 2020-02-13 | End: 2023-02-08

## 2020-02-13 RX ORDER — METOPROLOL SUCCINATE 25 MG/1
25 TABLET, EXTENDED RELEASE ORAL EVERY MORNING
Qty: 30 TABLET | Refills: 11 | Status: ON HOLD
Start: 2020-02-13 | End: 2023-12-01 | Stop reason: HOSPADM

## 2020-02-13 RX ORDER — LISINOPRIL 10 MG/1
10 TABLET ORAL EVERY MORNING
Status: DISCONTINUED | OUTPATIENT
Start: 2020-02-13 | End: 2020-02-13 | Stop reason: HOSPADM

## 2020-02-13 RX ORDER — NITROGLYCERIN 20 MG/1
1 PATCH TRANSDERMAL DAILY
Status: DISCONTINUED | OUTPATIENT
Start: 2020-02-13 | End: 2020-02-13 | Stop reason: HOSPADM

## 2020-02-13 RX ORDER — LISINOPRIL 10 MG/1
10 TABLET ORAL EVERY MORNING
Qty: 90 TABLET | Refills: 3 | Status: ON HOLD | OUTPATIENT
Start: 2020-02-14 | End: 2020-10-17 | Stop reason: HOSPADM

## 2020-02-13 RX ORDER — CLOPIDOGREL BISULFATE 75 MG/1
75 TABLET ORAL EVERY MORNING
Qty: 30 TABLET | Refills: 11 | Status: ON HOLD | OUTPATIENT
Start: 2020-02-14 | End: 2020-08-04 | Stop reason: HOSPADM

## 2020-02-13 RX ADMIN — CLOPIDOGREL BISULFATE 75 MG: 75 TABLET, FILM COATED ORAL at 06:02

## 2020-02-13 RX ADMIN — LISINOPRIL 10 MG: 10 TABLET ORAL at 09:02

## 2020-02-13 RX ADMIN — NITROGLYCERIN 1 PATCH: 0.1 PATCH TRANSDERMAL at 07:02

## 2020-02-13 RX ADMIN — SENNOSIDES AND DOCUSATE SODIUM 1 TABLET: 8.6; 5 TABLET ORAL at 10:02

## 2020-02-13 NOTE — H&P
[facility]  Cardiology  Consult Note    Patient Name: Roxanne Hernandez  MRN: [id number]  Admission Date: 2/11/2020  Hospital Length of Stay: 0 days  Code Status: Full Code   Attending Provider: Yoni Hernandez MD   Consulting Provider: Concepcion Jolley NP  Primary Care Physician: Yoni Hernandez MD  Principal Problem:Shortness of breath    Patient information was obtained from patient, past medical records and ER records.     Consults  Subjective:     REASON FOR CONSULT: shortness of breath    HPI: Ms. Hernandez is a 79 year old female with past medical history significant for aortic stenosis s/p TAVR, hypertension, dyslipidemia, DM, renal stones, PM in situ presents with complaint of shortness of breath. She was seen in Dr. Watt office on 2/11/2020 with ongoing complaint of shortness of breath, and was subsequently admitted from there. Ms. Hernandez states that after her surgery, she initially felt better. But over the last couple of weeks the shortness of breath has been worsening. She was seen in our office on 1/29/2020 at which time she did not report shortness of breath. She states she went to the ER later that day with shortness of breath and work up was unrevealing so she was sent home. She denies any chest pains. Denies any associated dizziness, lightheadedness or syncope. She does report feeling palpitations, which are not necessarily related to her shortness of breath episodes. She reports these episodes occur randomly and are not necessarily related to exertion. She states this is how she felt prior to the TAVR procedure.  No arrhythmias noted on telemetry review. She does report orthopnea which is unchanged.  Denies any bleeding issues. She denies any recent illness, fever, chills, nausea, vomiting, diarrhea, or cough.  Reports that after her last appointment with Dr. Ashley, she was instructed to limit her fluid intake, and she has been on daily Lasix. She feels like it was causing her to be dehydrated.  On admission, her creatinine was 1.7 and has improved to normal with IV Hydration. Her D Dimer was elevated on admission and CTA has ruled out PE. US of BLE was negative on previous exam.     Past Medical History:   Diagnosis Date    Cervical cancer     Diabetes mellitus     Hypertension        Past Surgical History:   Procedure Laterality Date    A-V CARDIAC PACEMAKER INSERTION N/A 12/18/2019    Procedure: INSERTION, CARDIAC PACEMAKER, DUAL CHAMBER;  Surgeon: Nnamdi Macdonald MD;  Location: [facility] EP LAB;  Service: Cardiology;  Laterality: N/A;  lbbb, dppm, SJM, anes, pr, [id number]    CARDIAC CATHETERIZATION      CHOLECYSTECTOMY      CORONARY ANGIOGRAPHY Left 9/4/2019    Procedure: ANGIOGRAM, CORONARY ARTERY;  Surgeon: Carmen Yepez MD;  Location: [facility] CATH/EP LAB;  Service: Cardiology;  Laterality: Left;    CYSTOSCOPY N/A 11/13/2019    Procedure: CYSTOSCOPY;  Surgeon: Paddy Dunham MD;  Location: [address];  Service: Urology;  Laterality: N/A;    HYSTERECTOMY      JOINT REPLACEMENT      KNEE ARTHROPLASTY Right     LASER LITHOTRIPSY Left 11/13/2019    Procedure: LITHOTRIPSY, USING LASER;  Surgeon: Paddy Dunham MD;  Location: [address];  Service: Urology;  Laterality: Left;    TRANSCATHETER AORTIC VALVE REPLACEMENT (TAVR) N/A 12/17/2019    Procedure: REPLACEMENT, AORTIC VALVE, TRANSCATHETER (TAVR);  Surgeon: Herbert Ashley MD;  Location: [facility] CATH LAB;  Service: Cardiology;  Laterality: N/A;    URETEROSCOPIC REMOVAL OF URETERIC CALCULUS Left 11/13/2019    Procedure: REMOVAL, CALCULUS, URETER, URETEROSCOPIC;  Surgeon: Paddy Dunham MD;  Location: [address];  Service: Urology;  Laterality: Left;    URETEROSCOPY Left 11/13/2019    Procedure: URETEROSCOPY;  Surgeon: Paddy Dunham MD;  Location: [address];  Service: Urology;  Laterality: Left;       Review of patient's allergies indicates:   Allergen Reactions    Azithromycin Swelling     All mycins    Statins-hmg-coa  "reductase inhibitors Other (See Comments)     Liver function  "It attacks my liver and makes me very sick"    Sulfa (sulfonamide antibiotics) Hives       Current Facility-Administered Medications on File Prior to Encounter   Medication    0.9%  NaCl infusion    aspirin EC tablet 325 mg    diazePAM tablet 5 mg    diphenhydrAMINE capsule 25 mg     Current Outpatient Medications on File Prior to Encounter   Medication Sig    acetaminophen (TYLENOL) 325 MG tablet Take 650 mg by mouth once.    clopidogrel (PLAVIX) 75 mg tablet Take 1 tablet (75 mg total) by mouth once daily.    furosemide (LASIX) 20 MG tablet Take for 3 lb weight gain overnight or 5 lbs in 5 days. (Patient taking differently: Take 20 mg by mouth once daily. Take for 3 lb weight gain overnight or 5 lbs in 5 days.)    lisinopril (PRINIVIL,ZESTRIL) 20 MG tablet Take 20 mg by mouth once daily.    metFORMIN (GLUCOPHAGE) 500 MG tablet Take 500 mg by mouth 2 (two) times daily with meals.    senna-docusate 8.6-50 mg (SENNA WITH DOCUSATE SODIUM) 8.6-50 mg per tablet Take 1 tablet by mouth once daily.       Scheduled Meds:   clopidogreL  75 mg Oral QAM    lisinopril  10 mg Oral QAM     Continuous Infusions:  PRN Meds:.dextrose 50%, dextrose 50%, glucagon (human recombinant), glucose, glucose, senna-docusate 8.6-50 mg    Family History     Problem Relation (Age of Onset)    Cancer Maternal Grandfather, Paternal Grandfather    Hypertension Father          Tobacco Use    Smoking status: Former Smoker     Packs/day: 1.00     Years: 25.00     Pack years: 25.00     Types: Cigarettes     Last attempt to quit: 1980     Years since quittin.1    Smokeless tobacco: Never Used   Substance and Sexual Activity    Alcohol use: Not Currently    Drug use: Never    Sexual activity: Not on file       ROS   No significant headaches or sore throat or runny nose.   No recent changes in vision.   No recent changes in hearing.  No dysphagia or " odynophagia.  Denies  chest pain. Reports shortness of breath.   Denies any cough or hemoptysis.   Denies any abdominal pain, nausea, vomiting, diarrhea or constipation.   Denies any dysuria or polyuria.   Denies any fevers or chills.   Denies any recent significant weight changes.   Denies bleeding diathesis    Objective:     Vital Signs (Most Recent):  Temp: 98 °F (36.7 °C) (02/13/20 1101)  Pulse: 87 (02/13/20 1101)  Resp: 20 (02/13/20 1101)  BP: (!) 117/55 (02/13/20 1101)  SpO2: 99 % (02/13/20 1101) Vital Signs (24h Range):  Temp:  [97.8 °F (36.6 °C)-98.2 °F (36.8 °C)] 98 °F (36.7 °C)  Pulse:  [74-88] 87  Resp:  [17-20] 20  SpO2:  [97 %-100 %] 99 %  BP: (111-158)/(55-78) 117/55     Weight: 77.8 kg (171 lb 8.3 oz)  Body mass index is 35.85 kg/m².    SpO2: 99 %  O2 Device (Oxygen Therapy): room air      Intake/Output Summary (Last 24 hours) at 2/13/2020 1601  Last data filed at 2/12/2020 2340  Gross per 24 hour   Intake --   Output 100 ml   Net -100 ml       Lines/Drains/Airways     Peripheral Intravenous Line                 Peripheral IV - Single Lumen 22 G Anterior;Left Forearm -- days                Physical Exam  HEENT: Normocephalic, atraumatic, PERRL, Conjunctiva pink, no scleral icterus.   CVS: S1S2+, RRR, + SM, no rubs or gallops, JVP: Normal.  LUNGS: Clear  ABDOMEN: Soft, NT, BS+  EXTREMITIES: No cyanosis, clubbing or edema  NEURO: AAO X 3.       Significant Labs:   ABG: No results for input(s): PH, PCO2, HCO3, POCSATURATED, BE in the last 48 hours., Blood Culture: No results for input(s): LABBLOO in the last 48 hours., BMP:   Recent Labs   Lab 02/12/20  0401 02/13/20  0352   * 118*    138   K 4.5 4.5    111*   CO2 21* 20*   BUN 36* 23   CREATININE 1.2 1.1   CALCIUM 9.1 9.4   , CMP   Recent Labs   Lab 02/12/20  0401 02/13/20  0352    138   K 4.5 4.5    111*   CO2 21* 20*   * 118*   BUN 36* 23   CREATININE 1.2 1.1   CALCIUM 9.1 9.4   ANIONGAP 9 7*   ESTGFRAFRICA 49.7*  55.2*   EGFRNONAA 43.1* 47.9*   , CBC No results for input(s): WBC, HGB, HCT, PLT in the last 48 hours., INR No results for input(s): INR, PROTIME in the last 48 hours., Lipid Panel No results for input(s): CHOL, HDL, LDLCALC, TRIG, CHOLHDL in the last 48 hours.,   Pathology Results  (Last 10 years)    None      , Troponin No results for input(s): TROPONINI in the last 48 hours., All pertinent lab results from the last 24 hours have been reviewed. and   Recent Lab Results       02/13/20  1135   02/13/20  0352   02/12/20  2340   02/12/20  2032   02/12/20  1630        Anion Gap   7           Appearance, UA     Clear         Bacteria, UA     Negative         Bilirubin (UA)     Negative         BNP   164  Comment:  Values of less than 100 pg/ml are consistent with non-CHF populations.           BUN, Bld   23           Calcium   9.4           Chloride   111           CO2   20           Color, UA     Colorless         Creatinine   1.1           eGFR if    55.2           eGFR if non    47.9  Comment:  Calculation used to obtain the estimated glomerular filtration  rate (eGFR) is the CKD-EPI equation.              Glucose   118           Glucose, UA     Negative         Hyaline Casts, UA     1         Ketones, UA     Negative         Leukocytes, UA     1+         Microscopic Comment     SEE COMMENT  Comment:  Other formed elements not mentioned in the report are not   present in the microscopic examination.            NITRITE UA     Negative         Occult Blood UA     Negative         pH, UA     7.0         POC Glucose 126     120 116     Potassium   4.5           Protein, UA     Negative  Comment:  Recommend a 24 hour urine protein or a urine   protein/creatinine ratio if globulin induced proteinuria is  clinically suspected.           RBC, UA     0         Sodium   138           Specific [place], UA     1.015         Specimen UA     Urine, Clean Catch         Squam Epithel, UA     1          UROBILINOGEN UA     Negative         WBC, UA     8                              Significant Imaging: X-Ray: CXR: X-Ray Chest 1 View (CXR): No results found for this visit on 02/11/20.    I have reviewed the imaging all significant imaging for the last 24 hours.      Echocardiogram (TTE) 01/22/2020:   · Normal left ventricular systolic function. The estimated ejection fraction is 65%.  · Concentric left ventricular remodeling.  · No wall motion abnormalities.  · Grade I (mild) left ventricular diastolic dysfunction consistent with impaired relaxation.  · Normal right ventricular systolic function.  · Severe left atrial enlargement.  · There is a transcutaneously-placed aortic bioprosthesis present.   · There is paravalvular aortic insufficiency present   · Mild mitral regurgitation.  · Normal central venous pressure (3 mmHg).  · The estimated PA systolic pressure is 29 mmHg.   · Trivial posterior pericardial effusion.     XR CHEST PA AND LATERAL [id number]    COMPARISON:  None available.    FINDINGS:  The lungs are clear. Costophrenic angles are seen without effusion. No pneumothorax is identified. The heart is mildly enlarged, with an endovascular aortic stent. Right-sided pacemaker is unchanged in configuration.  Osseous structures show degenerative changes in the spine. The visualized upper abdomen is unremarkable.      Impression       No acute cardiac or pulmonary process.       Assessment and Plan:     IMPRESSION:  Shortness of breath. Elevated D Dimer on admission. Negative bilateral leg vein US 2/11/2020. CTA 2/12/2020 ruled out PE.  s/p VICKY revealing severe aortic stenosis on 9/4/2019. s/p left heart catherization on 9/4/2019 with nonobstructive CAD. S/p TAVR 12/17/2019.  Palpitations. Possibly related to intermittent pacing.   ADRIANA. Resolved with IV hydration.   Severe aortic valve stenosis. Aortic valve area is 0.82 cm2; peak velocity is 4.6 m/s; mean gradient is 53 mmHg. s/p TAVR 12/17/2019  Left ventricular  hypertrophy on ECG.  Hypertension.  Controlled.  Dyslipidemia.  Diabetes mellitus.  Last HbA1c was 5.0.  Large left UVJ stone measuring at 2.6 cm with severe left-sided hydroureteronephrosis. s/p left renal stent. Stent to be removed in 3 weeks. s/p stent removal 12/17/2019  Dual chamber PM in situ. [place] device. 12/19/2019  Abnormal ECG. LBBB      RECOMMENDATIONS:    1. Would advise decrease in her Lasix.   2. No further cardiac evaluation warranted at this time. If patient's dyspnea does not improve over the next 1-2 months, will consider further evaluation with a VICKY and possible a RHC at that time.   3. The patient's case was Discussed in detail with Dr. Hernandez   4. Patient can follow up in clinic as previously scheduled on 4/6/2020 or sooner if needed.   Thank you for your consult. I will follow-up with patient. Please contact us if you have any additional questions.    Concepcion Jolley NP  Cardiology   [facility]    I have personally seen and examined the patient. I reviewed the notes, assessments, and/or procedures performed by Ms Concepcion oJlley, I concur with her documentation of Roxanne Hernandez.      '

## 2020-02-13 NOTE — PROGRESS NOTES
02/12/2020 @ 7:06 PM     Progress Note, Attending Physician   Critical access hospital     Patient Name: Roxanne Hernandez   MRN: [id number]   Admission Date and Time: 2/11/2020  4:45 PM <== placed on observation status upon admission   Hospital length of stay: 0 days      Code status: Full Code   Attending Physician: Yoni Hernandez MD   Principal Problem: Shortness of breath    Current Diagnoses:   1. Dyspnea, possibly due to her acute dehydration syndrome   1. 02/12/2020: I was in the process of writing discharge orders on this lady the evening of 02/12/2020 when her nurse came to me and reported that the patient was acutely dyspneic again, without chest pain ... I do not have a proper diagnosis to explain her episodic dyspnea.  See Subjective section below for details...   2. D-dimer elevation on admission:   1. negative bilateral leg vein ultrasound 02/11/2020   2. negative CTA chest 02/12/2020 excluding pulmonary embolism   3. BUN/creatinine elevation, present on admission, improved by discharge day.   1. BUN/creat 02/11/2020 = 45/1.7  2. BUN/creat 02/12/2020 = 36/1.2 <= markedly better ... To be followed in the outpatient interval till resolved or explained   4. Persistent left hydronephrosis, asymptomatic, due to prior nephrolithiasis that has already been managed by her Urologist   1. This problem is completely asymptomatic.  She has NO pain anywhere in general, and no abdominal pain, back pain, flank pain, dysuria, or hematuria.   2. This might be simple persistent residual ectasis from her prior stone   3. I am referring her to her established urologist (Paddy Dunham MD) upon discharge to decide on whether additional diagnostic studies or intervention is needed in regards to her persistent left hydronephrosis   5. Para TAVR valvular leakage as demonstrated on echocardiogram of 01/22/2022   1. 02/12/2020: to be followed carefully in the outpatient interval by her Cardiology team   6. Marked improvement in her  long-term obesity   1. 02/12/2020: I asked her to further improve her obesity state   7. Marked improvement in her DM II (present since 12/01/2000)   1. 02/12/2020: I have discontinued metformin due to her new CHF diagnoses, her new azotemia, and her markedly improved DM   8. Other problems as recorded on my office PMH document (see the bottom of the history & physical examination report)     Problem Noted   Shortness of Breath 2/11/2020   Leakage of Periprosthetic Valve, Subsequent Encounter 2/11/2020    Aortic stenosis saga:   Echocardiogram ([facility]) 09/04/2019:    Aortic valve: heavily calcified with restricted leaflet motion. Moderate aortic regurgitation.    Mitral valve: thick and calcified with restricted leaflet motion. Mild mitral stenosis and regurgitation.    Tricuspid valve: is structurally normal with good leaflet excursion. Mild regurgitation.   Pulmonary valve: is structurally normal with good leaflet excursion. No significant regurgitation.   Overall LVEF appears normal.    No obvious shunt across the interatrial septum by color flow Doppler.     Cardiac catheterization 09/14/2019 (Timothy Salgado MD):   · Mid RCA lesion, 50% stenosed, nonsignificant by iFR assessment.  · Known severe aortic stenosis from echo    CTA Cardiac TAVR 09/19/2019:    Large left UVJ stone measuring at 2.6 cm with severe left-sided hydroureteronephrosis.   Significant pneumobilia in the left hepatic lobe with air within the common bile duct.  Unclear if this is due to biliary enteric fistula, infectious process such as cholangitis, or incompetent sphincter of Oddi.  Unfortunately this is not well evaluated on this dedicated TAVR protocol examination.  Clinical correlation is advised.  Further evaluation could be performed with ERCP and dedicated abdominal CT with intravenous contrast.   TAVR measurements, as above.   Significant aortic valve and abdominal aorta atherosclerosis.   Nonspecific mesenteric haziness,  possibly mesenteric panniculitis or adenitis.   Heterogeneity of the pulmonary parenchyma possibly related to elevated pulmonary vascular resistance, small airways disease, or both.   Small bilateral pleural effusions.    Cardiac catheterization 12/17/2019 (Herbert Ashley):    Successful left transfemoral 23 mm Evolute TAVR.  No paravalvular leak, mean gradient 3, peak velocity 1.5 post TAVR.    Echocardiogram (TTE) 12/26/2019   · Mild concentric left ventricular hypertrophy   · Normal left ventricular systolic function. The estimated ejection fraction is 60%.  · No wall motion abnormalities.  · Grade I (mild) left ventricular diastolic dysfunction consistent with impaired relaxation.  · There is a transcutaneously-placed aortic bioprosthesis present.  · Mild aortic regurgitation.  · Mild-to-moderate aortic valve stenosis.  · Aortic valve area is 1.23 cm2; peak velocity is 2.84 m/s; mean gradient is 18 mmHg.  · Mild left atrial enlargement.  · Mild tricuspid regurgitation.  · Normal right ventricular systolic function.  · Moderate mitral sclerosis.  · There is mild leaflet calcification of the Mitral Valve.  · Mild mitral stenosis. MVA by PHT is 2.21 sq cm.  · Normal central venous pressure (3 mmHg).   · The estimated PA systolic pressure is 32 mmHg.:     Echocardiogram (TTE) 01/22/2020:   · Normal left ventricular systolic function. The estimated ejection fraction is 65%.  · Concentric left ventricular remodeling.  · No wall motion abnormalities.  · Grade I (mild) left ventricular diastolic dysfunction consistent with impaired relaxation.  · Normal right ventricular systolic function.  · Severe left atrial enlargement.  · There is a transcutaneously-placed aortic bioprosthesis present.   · There is paravalvular aortic insufficiency present   · Mild mitral regurgitation.  · Normal central venous pressure (3 mmHg).  · The estimated PA systolic pressure is 29 mmHg.   · Trivial posterior pericardial effusion.       Chronic Diastolic Heart Failure 12/1/2000    Echocardiogram 12/01/2000 = hyperdynamic LV, with mild to moderate amount of pericardial fat, and left ventricular diastolic dysfunction   Echocardiogram 05/20/2011 = dilated left atrial cavity, normal LV thickness and function with LVEF @ 70%, mild aortic stenosis with valve area @ 1.09cm2, peak gradient = 34 mmHg, minimal prolapse of anterior mitral leaflet, mild TI, very small pericardial effusion        Coronary Artery Disease of Native Artery of Native Heart With Stable Angina Pectoris 9/14/2019    Cardiac catheterization 09/14/2019 (Timothy Salgado MD):    Mid RCA lesion, 50% stenosed, nonsignificant by iFR assessment.   Known severe aortic stenosis from echo     Dehydration Syndrome 2/11/2020   Hydronephrosis With Urinary Obstruction Due to Ureteral Calculus 11/11/2019    Left ureteral stone with hydronephrosis requiring  manipulation 11/13/2019 @ [facility] (Paddy Dunham MD)    CTA Cardiac TAVR 09/19/2019: large left UVJ stone measuring at 2.6 cm with severe left-sided hydroureteronephrosis    Intervention 11/13/2019: 1.  Left ureteroscopy 2.  Cystoscopy 3.  Stone basket extraction 4.  Laser lithotripsy 5.  Left ureteral stent placement 6.  Fluoro < 1h 11/13/2019 (Paddy Dunham @ [facility])    Ultrasound kidneys 02/11/2020 = moderate left-sided hydronephrosis with no shadowing stone identified; nonobstructing right-sided renal stones; likely debris within the bladder, consider correlation with urinalysis.        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   Ultrasound 01/31/1992 = cholelithiasis with tender gallbladder   Ultrasound abdomen 06/22/1995 = normal except for hypoechoic acoustic texture of the left hepatic lobe   ERCP #1 02/01/1992 ([place]) = choledocholithiasis with cholangitis   ERCP #2  06/28/1995 (Leonor) = multiple filling defects due to stones   CT abdomen (with & without) 06/26/1995 = normal except for air in the biliary tree, fatty infiltration of the left lobe, calcified granuloma at right lung base   CT abdomen & pelvis (with & without) 12/02/1997 = normal/normal except for air in the biliary tree due to prior sphincterotomy   CTA Cardiac TAVR 09/19/2019: significant pneumobilia in the left hepatic lobe with air within the common bile duct         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    Barium enema 01/12/1998 = several sessile polyps, diverticulosis coli   Colonoscopy #1 08/07/1998 (Jamie) = multiple polyps; histopathology = adenomatous polyp with mild dysplasia, hyperplastic polyps   Colonoscopy #2 08/02/2006 (Jamie) = small cecal polyp (ablated), ascending polyp removed, 8mm transverse polyp, rectosigmoid polyp (ablated); histopathology = ascending and transverse colon polyps: tubular adenomas (2-3-year repeat advised)   Colonoscopy #3: due 08/02/2009          Subjective:   This 79 year old female presented to my office on 02/11/2020 complaining of [id number] worsening over the past week. She had not had cough, fever, chest pain, pressure, or tightness.   · In the weeks prior to her TAVR on 12/17/2019 she reported to Cardiologist Marleni that she was having progressive dypsnea on exertion with palpitations.    · She was worked up and the determination was that she had severe aortic stenosis which adequately explained her symptoms, for which cause TAVR was advised.   · 12/17/2019: TAVR per Jessenia Pizano at [facility]   · 12/26/2019: [facility] ER visit for recurrent dyspnea on exertion, went home after exclusion of DVT for continued outpatient management   · 02/11/2020: presentation to my office with continued complaints of waxing and waning dypsnea on exertion similar to what she had prior to her TAVR.      I placed her in the hospital to exclude overt acute  congestive heart failure, pulmonary embolism, or another explanation.   I found what is recorded above and below.   Paradoxically, her dyspnea resolved with hydration.  In fact, she said after her very first fluid bolus on the evening of admission she felt dramatically better immediately.   She remained in NSR throughout the hospitalization, with no evidence for atrial fibrillation of atrial flutter.   On discharge day she felt completely well again.   On the evening of discharge I ambulated her at a normal pace around the nurses' station, which she tolerated perfectly without dyspnea, chest pain, chest pressure, or palpitations.   After discussing the findings with her, she agreed with me that she was remarkably better and stable for discharge to home.     After spending nearly an hour preparing discharge orders, prior to my actually submitting discharge orders, her nurse came up to me to report that the patient was again dyspneic.  I went to her bedside and found her to be dyspneic and tachypneic, but remarkably her O2 saturation on room air was 99 to 100%, her pulse was 89, and her cardiac rhythm was NSR.   I stayed at her bedside, and over about 45 minutes, her dyspnea subsided about 75%.    She reported that this dyspnea event is exactly what she has been complaining about since PRIOR to her TAVR.  She reports that her symptoms since her TAVR are NO BETTER than they were prior to her TAVR.  Remarkably, they are no worse, though.     Physical Examination:   Temp:  [97.7 °F (36.5 °C)-98.3 °F (36.8 °C)]   Pulse:  [73-87]   Resp:  [19-22]   BP: (123-158)/(59-78)   SpO2:  [97 %-100 %]     A&Ox4, acutely mildly dyspneic.    HEENT = PERRL, normal   Neck = normal  Lungs = remarkably clear diffusely with good air flow   Heart = RRR S1 & S2 with no murmurs, rubs, or gallops   Abdomen = obese, stable, benign    Extremities: no clubbing or cyanosis, or edema    Neurologic = grossly intact   NO bedsores   NO DVT   IV site  "clean     Review of patient's allergies indicates:   Allergen Reactions    Azithromycin Swelling     All mycins    Statins-hmg-coa reductase inhibitors Other (See Comments)     Liver function  "It attacks my liver and makes me very sick"    Sulfa (sulfonamide antibiotics) Hives     Home Medications prior to admission:   1. Plavix 75mg po qam   2. Furosemide 20mg po qam   3. KCL 10meq po qam   4. Lisinopril 20mg po qam   5. Metformin 500mg po qam     Current Facility-Administered Medications:     clopidogreL tablet 75 mg, 75 mg, Oral, QAM, Yoni Hernandez MD, 75 mg at 02/12/20 0619    dextrose 50% injection 12.5 g, 12.5 g, Intravenous, PRN, Yoni Hernandez MD    dextrose 50% injection 25 g, 25 g, Intravenous, PRN, Yoni Hernandez MD    glucagon (human recombinant) injection 1 mg, 1 mg, Intramuscular, PRN, Yoni Hernandez MD    glucose chewable tablet 16 g, 16 g, Oral, PRN, Yoni Hernandez MD    glucose chewable tablet 24 g, 24 g, Oral, PRN, Yoni Hernandez MD    senna-docusate 8.6-50 mg per tablet 1 tablet, 1 tablet, Oral, BID PRN, Yoni Hernandez MD    Facility-Administered Medications Ordered in Other Encounters:     0.9%  NaCl infusion, , Intravenous, Continuous, Carmen Yepez MD, Last Rate: 0 mL/hr at 09/04/19 1141    aspirin EC tablet 325 mg, 325 mg, Oral, Once, Carmen Yepez MD    diazePAM tablet 5 mg, 5 mg, Oral, On Call Procedure, Carmen Yepez MD    diphenhydrAMINE capsule 25 mg, 25 mg, Oral, Once, Carmen Yepez MD     Investigations:     Recent serial CMP's:    Glucose   Date Value Ref Range Status   02/12/2020 127 (H) 70 - 110 mg/dL Final   02/11/2020 98 70 - 110 mg/dL Final   01/22/2020 93 70 - 110 mg/dL Final   12/26/2019 95 70 - 110 mg/dL Final   12/18/2019 179 (H) 70 - 110 mg/dL Final   12/17/2019 139 (H) 70 - 110 mg/dL Final   12/17/2019 108 70 - 110 mg/dL Final   12/12/2019 97 70 - 110 mg/dL Final   11/11/2019 118 (H) 70 - 110 mg/dL Final "   08/30/2019 107 70 - 110 mg/dL Final     Sodium   Date Value Ref Range Status   02/12/2020 140 136 - 145 mmol/L Final   02/11/2020 137 136 - 145 mmol/L Final   01/22/2020 139 136 - 145 mmol/L Final   12/26/2019 140 136 - 145 mmol/L Final   12/18/2019 136 136 - 145 mmol/L Final   12/17/2019 138 136 - 145 mmol/L Final   12/17/2019 139 136 - 145 mmol/L Final   12/12/2019 139 136 - 145 mmol/L Final   11/11/2019 140 136 - 145 mmol/L Final   08/30/2019 144 136 - 145 mmol/L Final     Potassium   Date Value Ref Range Status   02/12/2020 4.5 3.5 - 5.1 mmol/L Final   02/11/2020 4.8 3.5 - 5.1 mmol/L Final   01/22/2020 5.0 3.5 - 5.1 mmol/L Final   12/26/2019 4.1 3.5 - 5.1 mmol/L Final   12/18/2019 4.1 3.5 - 5.1 mmol/L Final   12/17/2019 4.4 3.5 - 5.1 mmol/L Final   12/17/2019 4.3 3.5 - 5.1 mmol/L Final   12/12/2019 4.6 3.5 - 5.1 mmol/L Final   11/11/2019 4.2 3.5 - 5.1 mmol/L Final   08/30/2019 4.8 3.5 - 5.1 mmol/L Final     Chloride   Date Value Ref Range Status   02/12/2020 110 95 - 110 mmol/L Final   02/11/2020 104 95 - 110 mmol/L Final   01/22/2020 108 95 - 110 mmol/L Final   12/26/2019 106 95 - 110 mmol/L Final   12/18/2019 110 95 - 110 mmol/L Final   12/17/2019 112 (H) 95 - 110 mmol/L Final   12/17/2019 109 95 - 110 mmol/L Final   12/12/2019 107 95 - 110 mmol/L Final   11/11/2019 109 95 - 110 mmol/L Final   08/30/2019 107 95 - 110 mmol/L Final     CO2   Date Value Ref Range Status   02/12/2020 21 (L) 23 - 29 mmol/L Final   02/11/2020 20 (L) 23 - 29 mmol/L Final   01/22/2020 23 23 - 29 mmol/L Final   12/26/2019 25 23 - 29 mmol/L Final   12/18/2019 18 (L) 23 - 29 mmol/L Final   12/17/2019 20 (L) 23 - 29 mmol/L Final   12/17/2019 24 23 - 29 mmol/L Final   12/12/2019 23 23 - 29 mmol/L Final   11/11/2019 25 23 - 29 mmol/L Final   08/30/2019 24 23 - 29 mmol/L Final     BUN, Bld   Date Value Ref Range Status   02/12/2020 36 (H) 8 - 23 mg/dL Final   02/11/2020 45 (H) 8 - 23 mg/dL Final   01/22/2020 20 8 - 23 mg/dL Final    12/26/2019 17 8 - 23 mg/dL Final   12/18/2019 19 8 - 23 mg/dL Final   12/17/2019 16 8 - 23 mg/dL Final   12/17/2019 17 8 - 23 mg/dL Final   12/12/2019 22 8 - 23 mg/dL Final   11/11/2019 15 8 - 23 mg/dL Final   08/30/2019 20 8 - 23 mg/dL Final     Creatinine   Date Value Ref Range Status   02/12/2020 1.2 0.5 - 1.4 mg/dL Final   02/11/2020 1.7 (H) 0.5 - 1.4 mg/dL Final   01/22/2020 1.1 0.5 - 1.4 mg/dL Final   12/26/2019 0.8 0.5 - 1.4 mg/dL Final   12/18/2019 1.0 0.5 - 1.4 mg/dL Final   12/17/2019 0.9 0.5 - 1.4 mg/dL Final   12/17/2019 1.1 0.5 - 1.4 mg/dL Final   12/12/2019 1.1 0.5 - 1.4 mg/dL Final   11/11/2019 0.9 0.5 - 1.4 mg/dL Final   08/30/2019 1.0 0.5 - 1.4 mg/dL Final     Calcium   Date Value Ref Range Status   02/12/2020 9.1 8.7 - 10.5 mg/dL Final   02/11/2020 9.5 8.7 - 10.5 mg/dL Final   01/22/2020 9.3 8.7 - 10.5 mg/dL Final   12/26/2019 9.1 8.7 - 10.5 mg/dL Final   12/18/2019 8.4 (L) 8.7 - 10.5 mg/dL Final   12/17/2019 7.8 (L) 8.7 - 10.5 mg/dL Final   12/17/2019 9.4 8.7 - 10.5 mg/dL Final   12/12/2019 9.6 8.7 - 10.5 mg/dL Final   11/11/2019 9.5 8.7 - 10.5 mg/dL Final   08/30/2019 9.5 8.7 - 10.5 mg/dL Final     ALT   Date Value Ref Range Status   02/11/2020 20 10 - 44 U/L Final   12/26/2019 14 10 - 44 U/L Final   08/30/2019 16 10 - 44 U/L Final     AST   Date Value Ref Range Status   02/11/2020 25 10 - 40 U/L Final   12/26/2019 22 10 - 40 U/L Final   08/30/2019 19 10 - 40 U/L Final     Alkaline Phosphatase   Date Value Ref Range Status   02/11/2020 90 55 - 135 U/L Final   12/26/2019 99 55 - 135 U/L Final   08/30/2019 87 55 - 135 U/L Final     Total Bilirubin   Date Value Ref Range Status   02/11/2020 1.1 (H) 0.1 - 1.0 mg/dL Final     Comment:     For infants and newborns, interpretation of results should be based  on gestational age, weight and in agreement with clinical  observations.  Premature Infant recommended reference ranges:  Up to 24 hours.............<8.0 mg/dL  Up to 48 hours............<12.0  mg/dL  3-5 days..................<15.0 mg/dL  6-29 days.................<15.0 mg/dL     12/26/2019 1.1 (H) 0.1 - 1.0 mg/dL Final     Comment:     For infants and newborns, interpretation of results should be based  on gestational age, weight and in agreement with clinical  observations.  Premature Infant recommended reference ranges:  Up to 24 hours.............<8.0 mg/dL  Up to 48 hours............<12.0 mg/dL  3-5 days..................<15.0 mg/dL  6-29 days.................<15.0 mg/dL     08/30/2019 0.9 0.1 - 1.0 mg/dL Final     Comment:     For infants and newborns, interpretation of results should be based  on gestational age, weight and in agreement with clinical  observations.  Premature Infant recommended reference ranges:  Up to 24 hours.............<8.0 mg/dL  Up to 48 hours............<12.0 mg/dL  3-5 days..................<15.0 mg/dL  6-29 days.................<15.0 mg/dL       Albumin   Date Value Ref Range Status   02/11/2020 4.3 3.5 - 5.2 g/dL Final   12/26/2019 3.7 3.5 - 5.2 g/dL Final   12/12/2019 3.6 3.5 - 5.2 g/dL Final   11/11/2019 3.6 3.5 - 5.2 g/dL Final   08/30/2019 3.9 3.5 - 5.2 g/dL Final     Total Protein   Date Value Ref Range Status   02/11/2020 7.4 6.0 - 8.4 g/dL Final   12/26/2019 6.9 6.0 - 8.4 g/dL Final   08/30/2019 7.1 6.0 - 8.4 g/dL Final     Recent Labs   Lab 02/11/20  1321   WBC 9.81   HGB 14.5   HCT 45.0        Recent Labs   Lab 02/11/20  1321 02/12/20  0401   BNP 87 117*     Lab Results   Component Value Date    DDIMER 1.57 (H) 02/11/2020     Lab Results   Component Value Date    TSH 1.840 02/11/2020    FREET4 0.88 02/11/2020       Imaging:     X-ray Chest Pa And Lateral  Result Date: 2/11/2020  No acute cardiac or pulmonary process.   Electronically signed by: Simone Lujan MD   Date: 02/11/2020 Time: 14:04    Us Lower Extremity Veins Bilateral  Result Date: 2/11/2020  No evidence of acute deep venous thrombosis in the visualized venous segments of the bilateral lower  extremities.  Electronically signed by: Simone Lujan MD   Date: 02/11/2020 Time: 14:47    Us Kidney  Result Date: 2/12/2020  1.  Moderate left-sided hydronephrosis with no shadowing stone identified. 2.  Nonobstructing right-sided renal stones. 3.  Likely debris within the bladder, consider correlation with urinalysis.   Electronically signed by: Simone Lujan MD   Date: 02/12/2020 Time: 06:00    CTA chest   Result date: 2/12/2020   1. No evidence of pulmonary embolism to the segmental arterial level. If there is continued clinical concern, consider further evaluation with lower extremity doppler.  2. Mild dependent atelectasis.  3.  Numerous additional and incidental findings as outlined above, unchanged from the previous exam.  Electronically signed by: Simone Lujan MD  Date: 02/12/2020  Time: 10:26        Results for orders placed or performed during the hospital encounter of 02/11/20   EKG 12-lead    Collection Time: 02/11/20  1:32 PM    Narrative    Test Reason : R06.02,    Vent. Rate : 078 BPM     Atrial Rate : 078 BPM     P-R Int : 148 ms          QRS Dur : 100 ms      QT Int : 398 ms       P-R-T Axes : 137 -27 125 degrees     QTc Int : 453 ms    Unusual P axis, possible ectopic atrial rhythm  ST and T wave abnormality, consider lateral ischemia  Abnormal ECG  When compared with ECG of 26-DEC-2019 13:38,  Ectopic atrial rhythm has replaced Electronic ventricular pacemaker    Referred By: YONI HERNANDEZ           Confirmed By:      Plan:   1. I cannot discharge her to home.   2. I am ordering supplemental O2 via nasal cannula.   3. Consult her established Cardiologist Marleni the morning of 02/13/2020.    Yoni Hernandez MD

## 2020-02-13 NOTE — SUBJECTIVE & OBJECTIVE
"Past Medical History:   Diagnosis Date    Cervical cancer     Diabetes mellitus     Hypertension        Past Surgical History:   Procedure Laterality Date    A-V CARDIAC PACEMAKER INSERTION N/A 12/18/2019    Procedure: INSERTION, CARDIAC PACEMAKER, DUAL CHAMBER;  Surgeon: Nnamdi Macdonald MD;  Location: [facility] EP LAB;  Service: Cardiology;  Laterality: N/A;  lbbb, dppm, SJM, anes, pr, [id number]    CARDIAC CATHETERIZATION      CHOLECYSTECTOMY      CORONARY ANGIOGRAPHY Left 9/4/2019    Procedure: ANGIOGRAM, CORONARY ARTERY;  Surgeon: Carmen Yepez MD;  Location: [facility] CATH/EP LAB;  Service: Cardiology;  Laterality: Left;    CYSTOSCOPY N/A 11/13/2019    Procedure: CYSTOSCOPY;  Surgeon: Paddy Dunham MD;  Location: [address];  Service: Urology;  Laterality: N/A;    HYSTERECTOMY      JOINT REPLACEMENT      KNEE ARTHROPLASTY Right     LASER LITHOTRIPSY Left 11/13/2019    Procedure: LITHOTRIPSY, USING LASER;  Surgeon: Paddy Dunham MD;  Location: [address];  Service: Urology;  Laterality: Left;    TRANSCATHETER AORTIC VALVE REPLACEMENT (TAVR) N/A 12/17/2019    Procedure: REPLACEMENT, AORTIC VALVE, TRANSCATHETER (TAVR);  Surgeon: Herbert Ashley MD;  Location: [facility] CATH LAB;  Service: Cardiology;  Laterality: N/A;    URETEROSCOPIC REMOVAL OF URETERIC CALCULUS Left 11/13/2019    Procedure: REMOVAL, CALCULUS, URETER, URETEROSCOPIC;  Surgeon: Paddy Dunham MD;  Location: [address];  Service: Urology;  Laterality: Left;    URETEROSCOPY Left 11/13/2019    Procedure: URETEROSCOPY;  Surgeon: Paddy Dunham MD;  Location: [address];  Service: Urology;  Laterality: Left;       Review of patient's allergies indicates:   Allergen Reactions    Azithromycin Swelling     All mycins    Statins-hmg-coa reductase inhibitors Other (See Comments)     Liver function  "It attacks my liver and makes me very sick"    Sulfa (sulfonamide antibiotics) Hives       Family History     Problem Relation (Age " of Onset)    Cancer Maternal Grandfather, Paternal Grandfather    Hypertension Father        Tobacco Use    Smoking status: Former Smoker     Packs/day: 1.00     Years: 25.00     Pack years: 25.00     Types: Cigarettes     Last attempt to quit: 1980     Years since quittin.1    Smokeless tobacco: Never Used   Substance and Sexual Activity    Alcohol use: Not Currently    Drug use: Never    Sexual activity: Not on file         Review of Systems   Constitutional: Positive for activity change. Negative for appetite change, chills, diaphoresis, fatigue and fever.        Has lost about 50# intentionally   HENT: Negative for congestion, hearing loss, nosebleeds, postnasal drip, sneezing and sore throat.    Respiratory: Positive for shortness of breath. Negative for apnea, cough, choking, chest tightness, wheezing and stridor.    Cardiovascular: Positive for palpitations and leg swelling (minimal). Negative for chest pain.   Gastrointestinal: Negative for abdominal distention, abdominal pain, blood in stool, constipation, diarrhea and nausea.   Genitourinary: Negative for dysuria, frequency, hematuria and urgency.   Musculoskeletal: Negative for arthralgias and myalgias.   Neurological: Negative for dizziness, tremors, seizures, syncope, facial asymmetry, speech difficulty, weakness, light-headedness, numbness and headaches.   Hematological: Negative for adenopathy.   Psychiatric/Behavioral: Positive for sleep disturbance (insomnia). Negative for dysphoric mood. The patient is not nervous/anxious.      Objective:     Vital Signs (Most Recent):  Temp: 98 °F (36.7 °C) (20 1101)  Pulse: 87 (20 1101)  Resp: 20 (20 1101)  BP: (!) 117/55 (20 1101)  SpO2: 99 % (20 1101) Vital Signs (24h Range):  Temp:  [97.8 °F (36.6 °C)-98.3 °F (36.8 °C)] 98 °F (36.7 °C)  Pulse:  [73-88] 87  Resp:  [17-20] 20  SpO2:  [97 %-100 %] 99 %  BP: (111-158)/(55-78) 117/55     Weight: 77.8 kg (171 lb 8.3  oz)  Body mass index is 35.85 kg/m².      Intake/Output Summary (Last 24 hours) at 2/13/2020 1331  Last data filed at 2/12/2020 2340  Gross per 24 hour   Intake --   Output 100 ml   Net -100 ml       Physical Exam   Constitutional: She is oriented to person, place, and time. She appears well-developed and well-nourished. No distress.   Obese female, NAD, AAO x 3   HENT:   Head: Normocephalic and atraumatic.   Right Ear: External ear normal.   Left Ear: External ear normal.   Nose: Nose normal.   Mouth/Throat: Oropharynx is clear and moist.   Eyes: Pupils are equal, round, and reactive to light. Conjunctivae and EOM are normal.   Neck: Normal range of motion. Neck supple. No JVD present. No tracheal deviation present. No thyromegaly present.   Cardiovascular: Normal rate, regular rhythm and intact distal pulses. Exam reveals no gallop and no friction rub.   Murmur (2/6 diastolic m over aortic area) heard.  Pulmonary/Chest: Effort normal and breath sounds normal. No stridor. No respiratory distress. She has no wheezes. She has no rales. She exhibits no tenderness.   No acc m use   Abdominal: Soft. Bowel sounds are normal. She exhibits no distension. There is no tenderness. There is no rebound and no guarding.   obese   Musculoskeletal: Normal range of motion. She exhibits edema (trace foot edema). She exhibits no tenderness.   Lymphadenopathy:     She has no cervical adenopathy.   Neurological: She is alert and oriented to person, place, and time. No cranial nerve deficit.   Skin: Skin is warm and dry. She is not diaphoretic.   Psychiatric: She has a normal mood and affect. Her behavior is normal.   Nursing note and vitals reviewed.      Vents:       Lines/Drains/Airways     Peripheral Intravenous Line                 Peripheral IV - Single Lumen 22 G Anterior;Left Forearm -- days                Significant Labs:    CBC/Anemia Profile:  No results for input(s): WBC, HGB, HCT, PLT, MCV, RDW, IRON, FERRITIN, RETIC,  FOLATE, [id number], OCCULTBLOOD in the last 48 hours.     Chemistries:  Recent Labs   Lab 02/12/20  0401 02/13/20  0352    138   K 4.5 4.5    111*   CO2 21* 20*   BUN 36* 23   CREATININE 1.2 1.1   CALCIUM 9.1 9.4       All pertinent labs within the past 24 hours have been reviewed.    BNP  164  TSH - 1.840  UA - noted    Significant Imaging:   I have reviewed and interpreted all pertinent imaging results/findings within the past 24 hours.     CTA PE evaluation: This is a moderate quality study for the evaluation of pulmonary embolism  secondary to a combination of contrast bolus timing and motion artifact. The pulmonary arteries are normal in appearance without pulmonary emboli noted up to the segmental level, noting the limitations of CT technique for identifying small or isolated subsegmental emboli.    CT Chest:    Visualized neck: normal    Lungs: There is dependent atelectasis in the bilateral lower lobes.  The lungs are otherwise clear.    Airway: The trachea and central bronchial tree appear normal.    Pleura: There is no pleural effusion. There is no pneumothorax.    Cardiovascular: The heart is normal in size.  There is an aortic endovascular stent.  Atheromatous calcifications are seen at the aortic arch.  Scattered coronary arterial calcifications are seen (predominantly in the LAD distribution).  There is a right-sided dual lead pacemaker with leads projecting to the right atrium ventricle.    Mediastinum: normal    Soft tissues: normal    Musculoskeletal: There are moderate degenerative changes of the thoracic spine.  There are no suspicious osseous lesions.    Esophagus: normal    Upper Abdomen: There is pneumobilia predominantly in the left lobe of the liver.  There is an indeterminate right adrenal nodule measuring 11 mm in diameter.  There is a calcified structure in the hepatic dome measuring 17 mm in diameter (image 211)      Impression       1. No evidence of pulmonary embolism to the  segmental arterial level. If there is continued clinical concern, consider further evaluation with lower extremity doppler.    2. Mild dependent atelectasis.    3.  Numerous additional and incidental findings as outlined above, unchanged from the previous exam.      Electronically signed by: Simone Lujan MD  Date: 02/12/2020  Time: 10:26     Dopplers - neg for DVT    EKG  Vent. Rate : 078 BPM     Atrial Rate : 078 BPM     P-R Int : 148 ms          QRS Dur : 100 ms      QT Int : 398 ms       P-R-T Axes : 137 -27 125 degrees     QTc Int : 453 ms    Unusual P axis, possible ectopic atrial rhythm  ST and T wave abnormality, consider lateral ischemia  Abnormal ECG  When compared with ECG of 26-DEC-2019 13:38,  Ectopic atrial rhythm has replaced Electronic ventricular pacemaker    Referred By: JANINE HAAS           Confirmed By:     ECHO (1/22/2020)  · Normal left ventricular systolic function. The estimated ejection fraction is 65%.  · Concentric left ventricular remodeling.  · No wall motion abnormalities.  · Grade I (mild) left ventricular diastolic dysfunction consistent with impaired relaxation.  · Normal right ventricular systolic function.  · Severe left atrial enlargement.  · There is a transcutaneously-placed aortic bioprosthesis present. There is paravalvular aortic insufficiency present.  · Mild mitral regurgitation.  · Normal central venous pressure (3 mmHg).  · The estimated PA systolic pressure is 29 mmHg.  · Trivial posterior pericardial effusion.

## 2020-02-13 NOTE — NURSING
Pt refuses bed alarm, hospital socks(states they're too hot and her daughter is bringing her crocs); pt also refuses to wear O2, which is ordered PRN via NC-pt is noted and verbalizes to be SOB with minimal exertion. No acute distress noted at this time and VS are stable.   Dr. Hernandez notified of the above and verbalizes understanding.

## 2020-02-13 NOTE — ASSESSMENT & PLAN NOTE
· Episodic as described above  · Doubt a pulmonary source for this symptom  · Reviewed last available pacer report and there does not appear to be any recurring atrial arrhythmias  · ? If related to her aortic insufficiency  · Will try to locate her PFT (though I doubt that she has significant COPD)  · Do not think that these symptoms are related to deconditioning or weight (though she will benefit from further weight loss and exercise)  · ? If she may benefit either from further afterload reduction or even beta blocker administration (to be seen by Dr Yepez)  · She should have a home sleep study done after DC to make sure that she does not have any significant RODRIGUEZ  · Doubt anginal symptom (last cath reviewed)  · Note that pt has severe left atrial enlargement on most recent ECHO

## 2020-02-13 NOTE — HPI
80 yo mfemale admitted for evaluation of intermittent significant dyspnea.  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  Prior to her TAVR she had episodic dyspnea, immediately after the procedure she felt that she was doing better but the symptoms have resumed.  The episodes are described as coming on suddenly, can happen at any time, can resolve spontaneously and quickly, are sometimes associated with palpitation (though not always), no definite chest pain, no CHF symptoms.  Thdere is no associated cough, sputum, wheezing, chest tightness.  She does not identify any definite inciting or relieving factors.  She denies any h/o COPD (has about a 20 pack year h/o smoking but quit about 40 years ago), no definite h/o RODRIGUEZ (has increased risk due to age, obesity but denies EDS, observed apneas).  She states that she has had recent PFT (report not in computer) and most recent ECHO (see below) has been reviewed.

## 2020-02-13 NOTE — CONSULTS
[facility]  Pulmonology  Consult Note    Patient Name: Roxanne Hernandez  MRN: [id number]  Admission Date: 2/11/2020  Hospital Length of Stay: 0 days  Code Status: Full Code  Attending Physician: Yoni Hernandez MD  Primary Care Provider: Yoni Hernandez MD   Principal Problem: Shortness of breath    Inpatient consult to Pulmonology  Consult performed by: Jim Negrete MD  Consult ordered by: Yoni Hernandez MD        Subjective:     HPI:  78 yo mfemale admitted for evaluation of intermittent significant dyspnea.  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  Prior to her TAVR she had episodic dyspnea, immediately after the procedure she felt that she was doing better but the symptoms have resumed.  The episodes are described as coming on suddenly, can happen at any time, can resolve spontaneously and quickly, are sometimes associated with palpitation (though not always), no definite chest pain, no CHF symptoms.  Thdere is no associated cough, sputum, wheezing, chest tightness.  She does not identify any definite inciting or relieving factors.  She denies any h/o COPD (has about a 20 pack year h/o smoking but quit about 40 years ago), no definite h/o RODRIGUEZ (has increased risk due to age, obesity but denies EDS, observed apneas).  She states that she has had recent PFT (report not in computer) and most recent ECHO (see below) has been reviewed.    Past Medical History:   Diagnosis Date    Cervical cancer     Diabetes mellitus     Hypertension        Past Surgical History:   Procedure Laterality Date    A-V CARDIAC PACEMAKER INSERTION N/A 12/18/2019    Procedure: INSERTION, CARDIAC PACEMAKER, DUAL CHAMBER;  Surgeon: Nnamdi Macdonald MD;  Location: [facility] EP LAB;  Service: Cardiology;  Laterality: N/A;  lbbb, dppm, SJM, anes, pr, 6077    CARDIAC CATHETERIZATION      CHOLECYSTECTOMY      CORONARY ANGIOGRAPHY Left 9/4/2019    Procedure: ANGIOGRAM, CORONARY ARTERY;  Surgeon: Carmen Yepez MD;  Location:  "[facility] CATH/EP LAB;  Service: Cardiology;  Laterality: Left;    CYSTOSCOPY N/A 2019    Procedure: CYSTOSCOPY;  Surgeon: Paddy Dunham MD;  Location: [address];  Service: Urology;  Laterality: N/A;    HYSTERECTOMY      JOINT REPLACEMENT      KNEE ARTHROPLASTY Right     LASER LITHOTRIPSY Left 2019    Procedure: LITHOTRIPSY, USING LASER;  Surgeon: Paddy Dunham MD;  Location: [address];  Service: Urology;  Laterality: Left;    TRANSCATHETER AORTIC VALVE REPLACEMENT (TAVR) N/A 2019    Procedure: REPLACEMENT, AORTIC VALVE, TRANSCATHETER (TAVR);  Surgeon: Herbert Ashley MD;  Location: [facility] CATH LAB;  Service: Cardiology;  Laterality: N/A;    URETEROSCOPIC REMOVAL OF URETERIC CALCULUS Left 2019    Procedure: REMOVAL, CALCULUS, URETER, URETEROSCOPIC;  Surgeon: Paddy Dunham MD;  Location: [address];  Service: Urology;  Laterality: Left;    URETEROSCOPY Left 2019    Procedure: URETEROSCOPY;  Surgeon: Paddy Dunham MD;  Location: [address];  Service: Urology;  Laterality: Left;       Review of patient's allergies indicates:   Allergen Reactions    Azithromycin Swelling     All mycins    Statins-hmg-coa reductase inhibitors Other (See Comments)     Liver function  "It attacks my liver and makes me very sick"    Sulfa (sulfonamide antibiotics) Hives       Family History     Problem Relation (Age of Onset)    Cancer Maternal Grandfather, Paternal Grandfather    Hypertension Father        Tobacco Use    Smoking status: Former Smoker     Packs/day: 1.00     Years: 25.00     Pack years: 25.00     Types: Cigarettes     Last attempt to quit: 1980     Years since quittin.1    Smokeless tobacco: Never Used   Substance and Sexual Activity    Alcohol use: Not Currently    Drug use: Never    Sexual activity: Not on file         Review of Systems   Constitutional: Positive for activity change. Negative for appetite change, chills, diaphoresis, fatigue and " fever.        Has lost about 50# intentionally   HENT: Negative for congestion, hearing loss, nosebleeds, postnasal drip, sneezing and sore throat.    Respiratory: Positive for shortness of breath. Negative for apnea, cough, choking, chest tightness, wheezing and stridor.    Cardiovascular: Positive for palpitations and leg swelling (minimal). Negative for chest pain.   Gastrointestinal: Negative for abdominal distention, abdominal pain, blood in stool, constipation, diarrhea and nausea.   Genitourinary: Negative for dysuria, frequency, hematuria and urgency.   Musculoskeletal: Negative for arthralgias and myalgias.   Neurological: Negative for dizziness, tremors, seizures, syncope, facial asymmetry, speech difficulty, weakness, light-headedness, numbness and headaches.   Hematological: Negative for adenopathy.   Psychiatric/Behavioral: Positive for sleep disturbance (insomnia). Negative for dysphoric mood. The patient is not nervous/anxious.      Objective:     Vital Signs (Most Recent):  Temp: 98 °F (36.7 °C) (02/13/20 1101)  Pulse: 87 (02/13/20 1101)  Resp: 20 (02/13/20 1101)  BP: (!) 117/55 (02/13/20 1101)  SpO2: 99 % (02/13/20 1101) Vital Signs (24h Range):  Temp:  [97.8 °F (36.6 °C)-98.3 °F (36.8 °C)] 98 °F (36.7 °C)  Pulse:  [73-88] 87  Resp:  [17-20] 20  SpO2:  [97 %-100 %] 99 %  BP: (111-158)/(55-78) 117/55     Weight: 77.8 kg (171 lb 8.3 oz)  Body mass index is 35.85 kg/m².      Intake/Output Summary (Last 24 hours) at 2/13/2020 1331  Last data filed at 2/12/2020 2340  Gross per 24 hour   Intake --   Output 100 ml   Net -100 ml       Physical Exam   Constitutional: She is oriented to person, place, and time. She appears well-developed and well-nourished. No distress.   Obese female, NAD, AAO x 3   HENT:   Head: Normocephalic and atraumatic.   Right Ear: External ear normal.   Left Ear: External ear normal.   Nose: Nose normal.   Mouth/Throat: Oropharynx is clear and moist.   Eyes: Pupils are equal, round,  and reactive to light. Conjunctivae and EOM are normal.   Neck: Normal range of motion. Neck supple. No JVD present. No tracheal deviation present. No thyromegaly present.   Cardiovascular: Normal rate, regular rhythm and intact distal pulses. Exam reveals no gallop and no friction rub.   Murmur (2/6 diastolic m over aortic area) heard.  Pulmonary/Chest: Effort normal and breath sounds normal. No stridor. No respiratory distress. She has no wheezes. She has no rales. She exhibits no tenderness.   No acc m use   Abdominal: Soft. Bowel sounds are normal. She exhibits no distension. There is no tenderness. There is no rebound and no guarding.   obese   Musculoskeletal: Normal range of motion. She exhibits edema (trace foot edema). She exhibits no tenderness.   Lymphadenopathy:     She has no cervical adenopathy.   Neurological: She is alert and oriented to person, place, and time. No cranial nerve deficit.   Skin: Skin is warm and dry. She is not diaphoretic.   Psychiatric: She has a normal mood and affect. Her behavior is normal.   Nursing note and vitals reviewed.      Vents:       Lines/Drains/Airways     Peripheral Intravenous Line                 Peripheral IV - Single Lumen 22 G Anterior;Left Forearm -- days                Significant Labs:    CBC/Anemia Profile:  No results for input(s): WBC, HGB, HCT, PLT, MCV, RDW, IRON, FERRITIN, RETIC, FOLATE, [id number], OCCULTBLOOD in the last 48 hours.     Chemistries:  Recent Labs   Lab 02/12/20  0401 02/13/20  0352    138   K 4.5 4.5    111*   CO2 21* 20*   BUN 36* 23   CREATININE 1.2 1.1   CALCIUM 9.1 9.4       All pertinent labs within the past 24 hours have been reviewed.    BNP  164  TSH - 1.840  UA - noted    Significant Imaging:   I have reviewed and interpreted all pertinent imaging results/findings within the past 24 hours.     CTA PE evaluation: This is a moderate quality study for the evaluation of pulmonary embolism  secondary to a combination of  contrast bolus timing and motion artifact. The pulmonary arteries are normal in appearance without pulmonary emboli noted up to the segmental level, noting the limitations of CT technique for identifying small or isolated subsegmental emboli.    CT Chest:    Visualized neck: normal    Lungs: There is dependent atelectasis in the bilateral lower lobes.  The lungs are otherwise clear.    Airway: The trachea and central bronchial tree appear normal.    Pleura: There is no pleural effusion. There is no pneumothorax.    Cardiovascular: The heart is normal in size.  There is an aortic endovascular stent.  Atheromatous calcifications are seen at the aortic arch.  Scattered coronary arterial calcifications are seen (predominantly in the LAD distribution).  There is a right-sided dual lead pacemaker with leads projecting to the right atrium ventricle.    Mediastinum: normal    Soft tissues: normal    Musculoskeletal: There are moderate degenerative changes of the thoracic spine.  There are no suspicious osseous lesions.    Esophagus: normal    Upper Abdomen: There is pneumobilia predominantly in the left lobe of the liver.  There is an indeterminate right adrenal nodule measuring 11 mm in diameter.  There is a calcified structure in the hepatic dome measuring 17 mm in diameter (image 211)      Impression       1. No evidence of pulmonary embolism to the segmental arterial level. If there is continued clinical concern, consider further evaluation with lower extremity doppler.    2. Mild dependent atelectasis.    3.  Numerous additional and incidental findings as outlined above, unchanged from the previous exam.      Electronically signed by: Simone Lujan MD  Date: 02/12/2020  Time: 10:26     Dopplers - neg for DVT    EKG  Vent. Rate : 078 BPM     Atrial Rate : 078 BPM     P-R Int : 148 ms          QRS Dur : 100 ms      QT Int : 398 ms       P-R-T Axes : 137 -27 125 degrees     QTc Int : 453 ms    Unusual P axis, possible  ectopic atrial rhythm  ST and T wave abnormality, consider lateral ischemia  Abnormal ECG  When compared with ECG of 26-DEC-2019 13:38,  Ectopic atrial rhythm has replaced Electronic ventricular pacemaker    Referred By: JANINE HAAS           Confirmed By:     ECHO (1/22/2020)  · Normal left ventricular systolic function. The estimated ejection fraction is 65%.  · Concentric left ventricular remodeling.  · No wall motion abnormalities.  · Grade I (mild) left ventricular diastolic dysfunction consistent with impaired relaxation.  · Normal right ventricular systolic function.  · Severe left atrial enlargement.  · There is a transcutaneously-placed aortic bioprosthesis present. There is paravalvular aortic insufficiency present.  · Mild mitral regurgitation.  · Normal central venous pressure (3 mmHg).  · The estimated PA systolic pressure is 29 mmHg.  · Trivial posterior pericardial effusion.    Assessment/Plan:     * Shortness of breath  · Episodic as described above  · Doubt a pulmonary source for this symptom  · Reviewed last available pacer report and there does not appear to be any recurring atrial arrhythmias  · ? If related to her aortic insufficiency  · Will try to locate her PFT (though I doubt that she has significant COPD)  · Do not think that these symptoms are related to deconditioning or weight (though she will benefit from further weight loss and exercise)  · ? If she may benefit either from further afterload reduction or even beta blocker administration (to be seen by Dr Yepez)  · She should have a home sleep study done after DC to make sure that she does not have any significant RODRIGUEZ  · Doubt anginal symptom (last cath reviewed)  · Note that pt has severe left atrial enlargement on most recent ECHO    Coronary artery disease of native artery of native heart with stable angina pectoris  · As above, doubt that this is an anginal symptoms  · Await cardiology thoughts    Chronic diastolic heart  failure  · Aware, seems compensated at this time          Thank you for your consult. I will follow-up with patient. Please contact us if you have any additional questions.     Jim Negrete MD  Pulmonology  [facility]     0

## 2020-02-13 NOTE — NURSING
"Pain meds, both IV and oral, administered around the clock (when due), as ordered, however pt states his abdominal pain is still uncontrolled-usually a 7-10 every time pain level assessed, before and after pain med administered. Pt states IV pain medication only relieves pain for approximately 30 min.    Also, pt continues with generalized edema, especially to BUE and BLE +3/+4, as well as scrotal edema. Informed pt that order was obtained to discontinue prado; pt verbalizes concern regarding this and states he does not think "he can handle it".   Physical therapy attempted to work with pt and transfer pt into chair; however, when pt stood up he stated that he did not feel good and was unable to transfer, therefore PT placed pt back in bed.    BP was obtained by PT during this time, which revealed positive orthos; "(lying) 111/67, (sitting) 96/58, (sitting)about 3 minutes later 127/74, (standing) 96/62", per physical therapist.     Notified Dr. Carlton of all of the above; MD verbalizes understanding, asked if pt is drinking protein supplements; pt states that he is. MD also suggest using condom cath if pt does not feel he will be able to use urinal. No new orders obtained.     Will apply condom cath following prado removal and will continue to monitor.   "

## 2020-02-14 NOTE — DISCHARGE SUMMARY
02/13/2020 @ 6:27 PM     Discharge Summary, Attending Physician   On license of [facility]     Patient Name: Roxanne Hernandez   MRN: [id number]   Admission Date and Time: 2/11/2020  4:45 PM <== placed on observation status upon admission   Hospital length of stay: 0 days    Discharge Date: 02/13/2020     Code status: Full Code   Attending Physician: Yoni Hernandez MD   Principal Problem: Shortness of breath    Current Diagnoses:   1. 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 In reality, this dyspnea syndrome has been going on since prior to her TAVR and since prior to the beginning of her workup which is dated to just prior to 09/04/2019.  2. 02/12/2020: I was in the process of writing discharge orders on this lady the evening of 02/12/2020 when her nurse came to me and reported that the patient was acutely dyspneic again, without chest pain, and at that point I did not have a proper diagnosis to explain her episodic dyspnea.    3. Consultation from Pulmonologist Caden received 02/13/2020   4. Consultation from Cardiologist Marleni received 02/13/2020   5. We are attributing this to her karyn-valvular TAVR leak, with plans for tincture of time to cause this problem to settle down   2. D-dimer elevation on admission:   1. negative bilateral leg vein ultrasound 02/11/2020   2. negative CTA chest 02/12/2020 excluding pulmonary embolism   3. BUN/creatinine elevation, present on admission, improved by discharge day, due to furosemide-induced azotemia (dehydration).   1. BUN/creat 02/11/2020 = 45/1.7  2. BUN/creat 02/12/2020 = 36/1.2   3. BUN/creat 02/13/2020 = 23/1.1 <= markedly better ... To be followed in the outpatient interval till resolved or explained   4. Persistent left hydronephrosis, asymptomatic, due to prior nephrolithiasis that has already been managed by her Urologist   1. This problem is completely asymptomatic.  She has  NO pain anywhere in general, and no abdominal pain, back pain, flank pain, dysuria, or hematuria.   2. This might be simple persistent residual ectasia from her prior stone   3. Urinalysis 02/12/2020 = completely normal   4. I am referring her to her established urologist (Paddy Dunham MD) upon discharge to decide on whether additional diagnostic studies or intervention is needed in regards to her persistent left hydronephrosis   5. 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 02/13/2020: to be followed carefully in the outpatient interval by her Cardiology team   6. Marked improvement in her long-term obesity   1. 02/13/2020: I asked her to further improve her obesity state   7. Marked improvement in her DM II (present since 12/01/2000)   1. 02/13/2020: I have discontinued metformin due to her CHF diagnosis, her recent azotemia, and her markedly improved DM   8. Other problems as recorded on my office PMH document (see the bottom of the history & physical examination report)     Problem Noted   Shortness of Breath 2/11/2020    9. 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 In reality, this dyspnea syndrome has been going on since prior to her TAVR and since prior to the beginning of her workup which is dated to just prior to 09/04/2019.  2. 02/12/2020: I was in the process of writing discharge orders on this lady the evening of 02/12/2020 when her nurse came to me and reported that the patient was acutely dyspneic again, without chest pain, and at that point I did not have a proper diagnosis to explain her episodic dyspnea.    3. Consultation from Pulmonologist Caden received 02/13/2020   4. Consultation from Cardiologist Marleni received 02/13/2020   5. We are attributing this to her karyn-valvular TAVR leak, with plans for tincture of time to cause this problem to settle down       Leakage of Periprosthetic Valve, Subsequent Encounter 2/11/2020    Aortic stenosis saga:   Echocardiogram ([facility]) 09/04/2019:    Aortic valve: heavily calcified with restricted leaflet motion. Moderate aortic regurgitation.    Mitral valve: thick and calcified with restricted leaflet motion. Mild mitral stenosis and regurgitation.    Tricuspid valve: is structurally normal with good leaflet excursion. Mild regurgitation.   Pulmonary valve: is structurally normal with good leaflet excursion. No significant regurgitation.   Overall LVEF appears normal.    No obvious shunt across the interatrial septum by color flow Doppler.     Cardiac catheterization 09/14/2019 (Timothy Salgado MD):   · Mid RCA lesion, 50% stenosed, nonsignificant by iFR assessment.  · Known severe aortic stenosis from echo    CTA Cardiac TAVR 09/19/2019:    Large left UVJ stone measuring at 2.6 cm with severe left-sided hydroureteronephrosis.   Significant pneumobilia in the left hepatic lobe with air within the common bile duct.  Unclear if this is due to biliary enteric fistula, infectious process such as cholangitis, or incompetent sphincter of Oddi.  Unfortunately this is not well evaluated on this dedicated TAVR protocol examination.  Clinical correlation is advised.  Further evaluation could be performed with ERCP and dedicated abdominal CT with intravenous contrast.   TAVR measurements, as above.   Significant aortic valve and abdominal aorta atherosclerosis.   Nonspecific mesenteric haziness, possibly mesenteric panniculitis or adenitis.   Heterogeneity of the pulmonary parenchyma possibly related to elevated pulmonary vascular resistance, small airways disease, or both.   Small bilateral pleural effusions.    Cardiac catheterization 12/17/2019 (Herbert Ashley):    Successful left transfemoral 23 mm Evolute TAVR.  No paravalvular leak, mean gradient 3, peak velocity 1.5 post TAVR.    Echocardiogram (TTE) 12/26/2019   · Mild  concentric left ventricular hypertrophy   · Normal left ventricular systolic function. The estimated ejection fraction is 60%.  · No wall motion abnormalities.  · Grade I (mild) left ventricular diastolic dysfunction consistent with impaired relaxation.  · There is a transcutaneously-placed aortic bioprosthesis present.  · Mild aortic regurgitation.  · Mild-to-moderate aortic valve stenosis.  · Aortic valve area is 1.23 cm2; peak velocity is 2.84 m/s; mean gradient is 18 mmHg.  · Mild left atrial enlargement.  · Mild tricuspid regurgitation.  · Normal right ventricular systolic function.  · Moderate mitral sclerosis.  · There is mild leaflet calcification of the Mitral Valve.  · Mild mitral stenosis. MVA by PHT is 2.21 sq cm.  · Normal central venous pressure (3 mmHg).   · The estimated PA systolic pressure is 32 mmHg.:     Echocardiogram (TTE) 01/22/2020:   · Normal left ventricular systolic function. The estimated ejection fraction is 65%.  · Concentric left ventricular remodeling.  · No wall motion abnormalities.  · Grade I (mild) left ventricular diastolic dysfunction consistent with impaired relaxation.  · Normal right ventricular systolic function.  · Severe left atrial enlargement.  · There is a transcutaneously-placed aortic bioprosthesis present.   · There is paravalvular aortic insufficiency present   · Mild mitral regurgitation.  · Normal central venous pressure (3 mmHg).  · The estimated PA systolic pressure is 29 mmHg.   · Trivial posterior pericardial effusion.      Chronic Diastolic Heart Failure 12/1/2000    Echocardiogram 12/01/2000 = hyperdynamic LV, with mild to moderate amount of pericardial fat, and left ventricular diastolic dysfunction   Echocardiogram 05/20/2011 = dilated left atrial cavity, normal LV thickness and function with LVEF @ 70%, mild aortic stenosis with valve area @ 1.09cm2, peak gradient = 34 mmHg, minimal prolapse of anterior mitral leaflet, mild TI, very small pericardial  effusion        Coronary Artery Disease of Native Artery of Native Heart With Stable Angina Pectoris 9/14/2019    Cardiac catheterization 09/14/2019 (Timothy Salgado MD):    Mid RCA lesion, 50% stenosed, nonsignificant by iFR assessment.   Known severe aortic stenosis from echo  02/13/2020: it is unlikely that this trivial coronary artery disease is the cause of her episodic dyspnea that has been present since prior to 09/04/2019.     Dehydration Syndrome 2/11/2020   Hydronephrosis With Urinary Obstruction Due to Ureteral Calculus 11/11/2019    Left ureteral stone with hydronephrosis requiring  manipulation 11/13/2019 @ [facility] (Paddy Dunham MD)    CTA Cardiac TAVR 09/19/2019: large left UVJ stone measuring at 2.6 cm with severe left-sided hydroureteronephrosis    Intervention 11/13/2019: 1.  Left ureteroscopy 2.  Cystoscopy 3.  Stone basket extraction 4.  Laser lithotripsy 5.  Left ureteral stent placement 6.  Fluoro < 1h 11/13/2019 (Paddy Dunham @ [facility])    Ultrasound kidneys 02/11/2020 = moderate left-sided hydronephrosis with no shadowing stone identified; nonobstructing right-sided renal stones; likely debris within the bladder, consider correlation with urinalysis.      Urinalysis 02/12/2020 = normal    As of 02/11-13/2020 she has been completely asymptomatic for this abnormality    02/13/2020 on discharge day I am ordering her to report back to her established Urologist Paddy Dunham for further investigation for this problem.     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   Ultrasound 01/31/1992 = cholelithiasis with tender gallbladder   Ultrasound abdomen 06/22/1995 = normal except for hypoechoic acoustic texture of the left hepatic lobe   ERCP #1 02/01/1992 ([place]) = choledocholithiasis with cholangitis   ERCP #2 06/28/1995 ([place]) =  multiple filling defects due to stones   CT abdomen (with & without) 06/26/1995 = normal except for air in the biliary tree, fatty infiltration of the left lobe, calcified granuloma at right lung base   CT abdomen & pelvis (with & without) 12/02/1997 = normal/normal except for air in the biliary tree due to prior sphincterotomy   CTA Cardiac TAVR 09/19/2019: significant pneumobilia in the left hepatic lobe with air within the common bile duct         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    Barium enema 01/12/1998 = several sessile polyps, diverticulosis coli   Colonoscopy #1 08/07/1998 (Jamie) = multiple polyps; histopathology = adenomatous polyp with mild dysplasia, hyperplastic polyps   Colonoscopy #2 08/02/2006 (Jamie) = small cecal polyp (ablated), ascending polyp removed, 8mm transverse polyp, rectosigmoid polyp (ablated); histopathology = ascending and transverse colon polyps: tubular adenomas (2-3-year repeat advised)   Colonoscopy #3: due 08/02/2009          Subjective:   This 79 year old female presented to my office on 02/11/2020 complaining of [id number] worsening over the past week. She had not had cough, fever, chest pain/pressure/tightness.   · In the weeks prior to her TAVR on 12/17/2019 she reported to Cardiologist Marleni that she was having progressive dypsnea on exertion with palpitations.    · She was worked up and the determination was that she had severe aortic stenosis which adequately explained her symptoms, for which cause TAVR was advised.   · 12/17/2019: TAVR per Jessenia Pizano at [facility]   · 12/26/2019: [facility] ER visit for recurrent dyspnea on exertion, went home after exclusion of DVT for continued outpatient management   · 02/11/2020: presentation to my office with continued complaints of waxing and waning dypsnea on exertion similar to what she had prior to her TAVR.    · I placed her in the hospital to exclude overt acute congestive heart failure,  "pulmonary embolism, or another explanation for her dyspnea syndrome:   I found what is recorded above and below.   Her dyspnea initially seemed to resolve with hydration.  In fact, she said after her very first fluid bolus on the evening of admission that she felt dramatically better immediately.   She remained in NSR throughout the hospitalization, with no evidence for atrial fibrillation or atrial flutter.   However, on the evening of 02/12/2020 when I was contemplating discharge, she had another acute dyspnea event, which I witnessed. I saw that she was dyspneic and tachypneic, but remarkably her O2 saturation on room air was 99 to 100%, her pulse was 89, her cardiac rhythm was NSR, she had NO JVD, her lungs were clear with excellent airflow and she had no gallop. Within 45 minutes to an hour her dyspnea subsided spontaneously.   On discharge day 02/13/2020 she felt "better" though not completely well again, admitting to still having episodic mild sudden dyspnea events throughout the day of discharge.     On the evening of 02/13/2020 after discussing the findings with her, she agreed with me that she was reasonably better and stable for discharge to home.     Discharge Day Physical Examination:   Temp:  [97.8 °F (36.6 °C)-98.2 °F (36.8 °C)]   Pulse:  [74-88]   Resp:  [17-20]   BP: (111-158)/(55-80)   SpO2:  [97 %-100 %]     A&Ox4 in no distress    HEENT = PERRL, normal   Neck = normal  Lungs = remarkably clear diffusely with good air flow   Heart = RRR S1 & S2 with no murmurs, rubs, or gallops   Abdomen = obese, stable, benign    Extremities: no clubbing or cyanosis, or edema    Neurologic = grossly intact   NO bedsores   NO DVT   IV site clean     Review of patient's allergies indicates:   Allergen Reactions    Azithromycin Swelling     All mycins    Statins-hmg-coa reductase inhibitors Other (See Comments)     Liver function  "It attacks my liver and makes me very sick"    Sulfa (sulfonamide antibiotics) " Hives     Home Medications prior to admission:   1. Plavix 75mg po qam   2. Furosemide 20mg po qam   3. KCL 10meq po qam   4. Lisinopril 20mg po qam   5. Metformin 500mg po qam     Current Facility-Administered Medications:     clopidogreL tablet 75 mg, 75 mg, Oral, QAM, Yoni Hernandez MD, 75 mg at 02/13/20 0609    dextrose 50% injection 12.5 g, 12.5 g, Intravenous, PRN, Yoni Hernandez MD    dextrose 50% injection 25 g, 25 g, Intravenous, PRN, Yoni Hernandez MD    glucagon (human recombinant) injection 1 mg, 1 mg, Intramuscular, PRN, Yoni Hernandez MD    glucose chewable tablet 16 g, 16 g, Oral, PRN, Yoni Hernandez MD    glucose chewable tablet 24 g, 24 g, Oral, PRN, Yoni Hernandez MD    lisinopril tablet 10 mg, 10 mg, Oral, QAM, Yoni Hernandez MD, 10 mg at 02/13/20 0949    nitroGLYCERIN 0.1 mg/hr TD PT24 1 patch, 1 patch, Transdermal, Daily, Yoni Hernandez MD    senna-docusate 8.6-50 mg per tablet 1 tablet, 1 tablet, Oral, BID PRN, Yoni Hernandez MD, 1 tablet at 02/13/20 1008    Facility-Administered Medications Ordered in Other Encounters:     0.9%  NaCl infusion, , Intravenous, Continuous, Carmen Yepez MD, Last Rate: 0 mL/hr at 09/04/19 1141    aspirin EC tablet 325 mg, 325 mg, Oral, Once, Carmen Yepez MD    diazePAM tablet 5 mg, 5 mg, Oral, On Call Procedure, Carmen Yepez MD    diphenhydrAMINE capsule 25 mg, 25 mg, Oral, Once, Carmen Yepez MD     Investigations:     Recent Serial CMP's:     Glucose   Date Value Ref Range Status   02/13/2020 118 (H) 70 - 110 mg/dL Final   02/12/2020 127 (H) 70 - 110 mg/dL Final   02/11/2020 98 70 - 110 mg/dL Final   01/22/2020 93 70 - 110 mg/dL Final   12/26/2019 95 70 - 110 mg/dL Final   12/18/2019 179 (H) 70 - 110 mg/dL Final   12/17/2019 139 (H) 70 - 110 mg/dL Final   12/17/2019 108 70 - 110 mg/dL Final   12/12/2019 97 70 - 110 mg/dL Final   11/11/2019 118 (H) 70 - 110 mg/dL Final   08/30/2019 107 70 - 110 mg/dL  Final     Sodium   Date Value Ref Range Status   02/13/2020 138 136 - 145 mmol/L Final   02/12/2020 140 136 - 145 mmol/L Final   02/11/2020 137 136 - 145 mmol/L Final   01/22/2020 139 136 - 145 mmol/L Final   12/26/2019 140 136 - 145 mmol/L Final   12/18/2019 136 136 - 145 mmol/L Final   12/17/2019 138 136 - 145 mmol/L Final   12/17/2019 139 136 - 145 mmol/L Final   12/12/2019 139 136 - 145 mmol/L Final   11/11/2019 140 136 - 145 mmol/L Final   08/30/2019 144 136 - 145 mmol/L Final     Potassium   Date Value Ref Range Status   02/13/2020 4.5 3.5 - 5.1 mmol/L Final   02/12/2020 4.5 3.5 - 5.1 mmol/L Final   02/11/2020 4.8 3.5 - 5.1 mmol/L Final   01/22/2020 5.0 3.5 - 5.1 mmol/L Final   12/26/2019 4.1 3.5 - 5.1 mmol/L Final   12/18/2019 4.1 3.5 - 5.1 mmol/L Final   12/17/2019 4.4 3.5 - 5.1 mmol/L Final   12/17/2019 4.3 3.5 - 5.1 mmol/L Final   12/12/2019 4.6 3.5 - 5.1 mmol/L Final   11/11/2019 4.2 3.5 - 5.1 mmol/L Final   08/30/2019 4.8 3.5 - 5.1 mmol/L Final     Chloride   Date Value Ref Range Status   02/13/2020 111 (H) 95 - 110 mmol/L Final   02/12/2020 110 95 - 110 mmol/L Final   02/11/2020 104 95 - 110 mmol/L Final   01/22/2020 108 95 - 110 mmol/L Final   12/26/2019 106 95 - 110 mmol/L Final   12/18/2019 110 95 - 110 mmol/L Final   12/17/2019 112 (H) 95 - 110 mmol/L Final   12/17/2019 109 95 - 110 mmol/L Final   12/12/2019 107 95 - 110 mmol/L Final   11/11/2019 109 95 - 110 mmol/L Final   08/30/2019 107 95 - 110 mmol/L Final     CO2   Date Value Ref Range Status   02/13/2020 20 (L) 23 - 29 mmol/L Final   02/12/2020 21 (L) 23 - 29 mmol/L Final   02/11/2020 20 (L) 23 - 29 mmol/L Final   01/22/2020 23 23 - 29 mmol/L Final   12/26/2019 25 23 - 29 mmol/L Final   12/18/2019 18 (L) 23 - 29 mmol/L Final   12/17/2019 20 (L) 23 - 29 mmol/L Final   12/17/2019 24 23 - 29 mmol/L Final   12/12/2019 23 23 - 29 mmol/L Final   11/11/2019 25 23 - 29 mmol/L Final   08/30/2019 24 23 - 29 mmol/L Final     BUN, Bld   Date Value Ref  Range Status   02/13/2020 23 8 - 23 mg/dL Final   02/12/2020 36 (H) 8 - 23 mg/dL Final   02/11/2020 45 (H) 8 - 23 mg/dL Final   01/22/2020 20 8 - 23 mg/dL Final   12/26/2019 17 8 - 23 mg/dL Final   12/18/2019 19 8 - 23 mg/dL Final   12/17/2019 16 8 - 23 mg/dL Final   12/17/2019 17 8 - 23 mg/dL Final   12/12/2019 22 8 - 23 mg/dL Final   11/11/2019 15 8 - 23 mg/dL Final   08/30/2019 20 8 - 23 mg/dL Final     Creatinine   Date Value Ref Range Status   02/13/2020 1.1 0.5 - 1.4 mg/dL Final   02/12/2020 1.2 0.5 - 1.4 mg/dL Final   02/11/2020 1.7 (H) 0.5 - 1.4 mg/dL Final   01/22/2020 1.1 0.5 - 1.4 mg/dL Final   12/26/2019 0.8 0.5 - 1.4 mg/dL Final   12/18/2019 1.0 0.5 - 1.4 mg/dL Final   12/17/2019 0.9 0.5 - 1.4 mg/dL Final   12/17/2019 1.1 0.5 - 1.4 mg/dL Final   12/12/2019 1.1 0.5 - 1.4 mg/dL Final   11/11/2019 0.9 0.5 - 1.4 mg/dL Final   08/30/2019 1.0 0.5 - 1.4 mg/dL Final     Calcium   Date Value Ref Range Status   02/13/2020 9.4 8.7 - 10.5 mg/dL Final   02/12/2020 9.1 8.7 - 10.5 mg/dL Final   02/11/2020 9.5 8.7 - 10.5 mg/dL Final   01/22/2020 9.3 8.7 - 10.5 mg/dL Final   12/26/2019 9.1 8.7 - 10.5 mg/dL Final   12/18/2019 8.4 (L) 8.7 - 10.5 mg/dL Final   12/17/2019 7.8 (L) 8.7 - 10.5 mg/dL Final   12/17/2019 9.4 8.7 - 10.5 mg/dL Final   12/12/2019 9.6 8.7 - 10.5 mg/dL Final   11/11/2019 9.5 8.7 - 10.5 mg/dL Final   08/30/2019 9.5 8.7 - 10.5 mg/dL Final     ALT   Date Value Ref Range Status   02/11/2020 20 10 - 44 U/L Final   12/26/2019 14 10 - 44 U/L Final   08/30/2019 16 10 - 44 U/L Final     AST   Date Value Ref Range Status   02/11/2020 25 10 - 40 U/L Final   12/26/2019 22 10 - 40 U/L Final   08/30/2019 19 10 - 40 U/L Final     Alkaline Phosphatase   Date Value Ref Range Status   02/11/2020 90 55 - 135 U/L Final   12/26/2019 99 55 - 135 U/L Final   08/30/2019 87 55 - 135 U/L Final     Total Bilirubin   Date Value Ref Range Status   02/11/2020 1.1 (H) 0.1 - 1.0 mg/dL Final     Comment:     For infants and newborns,  interpretation of results should be based  on gestational age, weight and in agreement with clinical  observations.  Premature Infant recommended reference ranges:  Up to 24 hours.............<8.0 mg/dL  Up to 48 hours............<12.0 mg/dL  3-5 days..................<15.0 mg/dL  6-29 days.................<15.0 mg/dL     12/26/2019 1.1 (H) 0.1 - 1.0 mg/dL Final     Comment:     For infants and newborns, interpretation of results should be based  on gestational age, weight and in agreement with clinical  observations.  Premature Infant recommended reference ranges:  Up to 24 hours.............<8.0 mg/dL  Up to 48 hours............<12.0 mg/dL  3-5 days..................<15.0 mg/dL  6-29 days.................<15.0 mg/dL     08/30/2019 0.9 0.1 - 1.0 mg/dL Final     Comment:     For infants and newborns, interpretation of results should be based  on gestational age, weight and in agreement with clinical  observations.  Premature Infant recommended reference ranges:  Up to 24 hours.............<8.0 mg/dL  Up to 48 hours............<12.0 mg/dL  3-5 days..................<15.0 mg/dL  6-29 days.................<15.0 mg/dL       Albumin   Date Value Ref Range Status   02/11/2020 4.3 3.5 - 5.2 g/dL Final   12/26/2019 3.7 3.5 - 5.2 g/dL Final   12/12/2019 3.6 3.5 - 5.2 g/dL Final   11/11/2019 3.6 3.5 - 5.2 g/dL Final   08/30/2019 3.9 3.5 - 5.2 g/dL Final     Total Protein   Date Value Ref Range Status   02/11/2020 7.4 6.0 - 8.4 g/dL Final   12/26/2019 6.9 6.0 - 8.4 g/dL Final   08/30/2019 7.1 6.0 - 8.4 g/dL Final     Recent Labs   Lab 02/11/20  1321   WBC 9.81   HGB 14.5   HCT 45.0        Recent Labs   Lab 02/11/20  1321 02/12/20  0401 02/13/20  0352   BNP 87 117* 164*     Lab Results   Component Value Date    DDIMER 1.57 (H) 02/11/2020     Lab Results   Component Value Date    TSH 1.840 02/11/2020    FREET4 0.88 02/11/2020     Imaging:     X-ray Chest Pa And Lateral  Result Date: 2/11/2020  No acute cardiac or  pulmonary process.   Electronically signed by: Simone Lujan MD   Date: 02/11/2020 Time: 14:04    Us Lower Extremity Veins Bilateral  Result Date: 2/11/2020  No evidence of acute deep venous thrombosis in the visualized venous segments of the bilateral lower extremities.  Electronically signed by: Simone Lujan MD   Date: 02/11/2020 Time: 14:47    Us Kidney  Result Date: 2/12/2020  1.  Moderate left-sided hydronephrosis with no shadowing stone identified. 2.  Nonobstructing right-sided renal stones. 3.  Likely debris within the bladder, consider correlation with urinalysis.   Electronically signed by: Simone Lujan MD   Date: 02/12/2020 Time: 06:00    CTA chest   Result date: 2/12/2020   1. No evidence of pulmonary embolism to the segmental arterial level. If there is continued clinical concern, consider further evaluation with lower extremity doppler.  2. Mild dependent atelectasis.  3.  Numerous additional and incidental findings as outlined above, unchanged from the previous exam.  Electronically signed by: Simone Lujan MD  Date: 02/12/2020  Time: 10:26        Results for orders placed or performed during the hospital encounter of 02/11/20   EKG 12-lead    Collection Time: 02/11/20  1:32 PM    Narrative    Test Reason : R06.02,    Vent. Rate : 078 BPM     Atrial Rate : 078 BPM     P-R Int : 148 ms          QRS Dur : 100 ms      QT Int : 398 ms       P-R-T Axes : 137 -27 125 degrees     QTc Int : 453 ms    Unusual P axis, possible ectopic atrial rhythm  ST and T wave abnormality, consider lateral ischemia  Abnormal ECG  When compared with ECG of 26-DEC-2019 13:38,  Ectopic atrial rhythm has replaced Electronic ventricular pacemaker    Referred By: JANINE HAAS           Confirmed By:      Plan:   Discharge medication regimen:   1. Plavix 75mg po qam is continued   2. Furosemide 20mg po qam is placed on hold   3. KCL 10meq po qam is stopped   4. Lisinopril 20mg po qam is reduced to 10mg po qam   5. Minitran  "0.1 mg/hour topically to the chest is added on discharge day for "afterload reduction"   6. Metformin 500mg po qam is discontinued     Special CHF discharge orders are given.   She is to see me next week, sooner if worse.   She is to see Cardiologist Marleni at his order.  She is to see Urologist Paddy Dunham soon after discharge to further evaluate her left hydronephrosis.     Her condition on discharge is good.   Her prognosis on discharge is good.     Yoni Hernandez MD   "

## 2020-02-18 ENCOUNTER — OFFICE VISIT (OUTPATIENT)
Dept: UROLOGY | Facility: CLINIC | Age: 80
End: 2020-02-18
Payer: MEDICARE

## 2020-02-18 VITALS
BODY MASS INDEX: 34.24 KG/M2 | SYSTOLIC BLOOD PRESSURE: 150 MMHG | HEART RATE: 72 BPM | HEIGHT: 58 IN | TEMPERATURE: 98 F | WEIGHT: 163.13 LBS | DIASTOLIC BLOOD PRESSURE: 71 MMHG

## 2020-02-18 DIAGNOSIS — N18.30 CKD (CHRONIC KIDNEY DISEASE) STAGE 3, GFR 30-59 ML/MIN: ICD-10-CM

## 2020-02-18 DIAGNOSIS — N20.0 KIDNEY STONES: ICD-10-CM

## 2020-02-18 DIAGNOSIS — N13.30 HYDRONEPHROSIS OF LEFT KIDNEY: Primary | ICD-10-CM

## 2020-02-18 LAB
BILIRUB SERPL-MCNC: ABNORMAL MG/DL
BLOOD URINE, POC: ABNORMAL
COLOR, POC UA: YELLOW
GLUCOSE UR QL STRIP: ABNORMAL
KETONES UR QL STRIP: ABNORMAL
LEUKOCYTE ESTERASE URINE, POC: ABNORMAL
NITRITE, POC UA: ABNORMAL
PH, POC UA: 6
PROTEIN, POC: ABNORMAL
SPECIFIC GRAVITY, POC UA: 1.02
UROBILINOGEN, POC UA: 0.2

## 2020-02-18 PROCEDURE — 99999 PR PBB SHADOW E&M-EST. PATIENT-LVL III: CPT | Mod: PBBFAC,,, | Performed by: UROLOGY

## 2020-02-18 PROCEDURE — 87086 URINE CULTURE/COLONY COUNT: CPT

## 2020-02-18 PROCEDURE — 99999 PR PBB SHADOW E&M-EST. PATIENT-LVL III: ICD-10-PCS | Mod: PBBFAC,,, | Performed by: UROLOGY

## 2020-02-18 PROCEDURE — 99215 OFFICE O/P EST HI 40 MIN: CPT | Mod: S$PBB,,, | Performed by: UROLOGY

## 2020-02-18 PROCEDURE — 99215 PR OFFICE/OUTPT VISIT, EST, LEVL V, 40-54 MIN: ICD-10-PCS | Mod: S$PBB,,, | Performed by: UROLOGY

## 2020-02-18 PROCEDURE — 81002 URINALYSIS NONAUTO W/O SCOPE: CPT | Mod: PBBFAC,PN | Performed by: UROLOGY

## 2020-02-18 PROCEDURE — 99213 OFFICE O/P EST LOW 20 MIN: CPT | Mod: PBBFAC,PN | Performed by: UROLOGY

## 2020-02-18 NOTE — PATIENT INSTRUCTIONS
Reviewed recent urologic history with patient as well as all imaging including preoperative CT scan and up to date renal ultrasound.  Given the severity of her hydroureteronephrosis and cortical loss of the left kidney she likely had longstanding obstruction from this severe distal ureteral stone burden on the left, though her preoperative renal function was normal, alternating between very mild CKD 3 with a EGFR of 53.7 in August to a normal creatinine of 0.9 an EGFR of greater than 60 in November at the time of the initial scan.  Some of her alterations in kidney function are likely due to her recent diuretics, fluid status, comorbidities.      She was dehydrated from Lasix use on admission to the hospital recently and her renal function improved to her recent postoperative baseline on discharge.  We did discuss that her left kidney may be nonfunctioning or poorly functioning, which may be a contributing factor in her overall kidney function, though there is no value in performing a functional study with Mag 3 renal scan at this time as it is clear on CT scan the long-term effects of this stone and procedure.  We did discuss it may take quite a while for this hydroureteronephrosis to resolve, and may not resolve.  Should periodically reassess.      As long as her kidney function is stable, she is having no left flank pain and is asymptomatic, and is having no urinary tract infections, no further intervention is needed.  We did discuss that in the setting of hydroureteronephrosis and stagnant urine, there is risk of urinary tract infection, and any systemic infection which could put her at risk for endocarditis with her recent TAVR is certainly the only concern at this time, however she remains infection free.  Given the trace leukocytes in her urine I will check a urine culture today to rule out any infection, though her most recent urinalysis from admission was negative for bacteria.    At this time encouraged  close follow-up with primary care and from a urologic standpoint I will have her repeat a renal ultrasound and labs in 6 months and chart check the results.  I will plan to see her on an annual basis for periodically reassessment, and she can return in the interim if there are any concerns.  I have advised that she reach out and return immediately if she experiences any of the following:  Gross blood in the urine, urinary tract infection or burning with urination, flank pain especially radiating around to the groin.    We did also review that she still has some nonobstructing kidney stones in the right kidney.  Certainly as she likely passed left-sided kidney stones to the distal ureter which stacked up in cause this long-term obstruction for which he was asymptomatic from, she may not have reliable symptoms of stone passage on the right and we would want to make sure to periodically assess these as well.  For future stone prevention, we primarily discussed adequate hydration to produce a goal daily urine output of greater than 2 liters, and increasing water.  We discussed a stone prevention diet as low salt/low sodium, moderate protein, low oxalate (especially iced tea), minimizing cola, and adding fresh lemon to water/drinking fresh lemonade.  Certainly given her significant tea intake we did discuss decreasing it significantly due to the high oxalate content of tea.  We did review that a 5 mm stone in the kidney has an 85% chance of spontaneous passage and she has 3 such small stones in her right kidney.  We did also discuss that stone prevention recommendations help prevent further increase in size of her current stones and help facilitate the passage of small stones.  And radiopaque on intraoperative fluoroscopy.  At time of her renal ultrasound and labs in 6 months I will also get a plain film KUB as a screening measure for stones to assess burden since they were calcium oxalate and radiopaque on fluoroscopy  in the operating room.

## 2020-02-18 NOTE — PROGRESS NOTES
[facility]:    Roxanne Hernandez is a 79 y.o. female who presents for evaluation of left hydronephrosis    She has HTN, DM, and recently referred to [facility] by Dr Frausto after cadia cath revealed severe aortic stenosis and moderate mitral valve stenosis, and nonobstructive CAD. She is pending TAVR but needed management of pneumobilia by GI and left ureteral stone by Urology first.    She was referred for management of large left ureteral stoner/hydronephrosis to Dr Dunham at [facility] as she had a CT 11/11/19 demonstrating:  Kidneys are normal in size and location.  Multiple nonobstructing right renal stones identified with the largest measuring at 1.2 cm.  There is severe left-sided hydronephrosis with severe dilatation of the left ureter throughout its course with a large obstructing stone at the left UVJ measuring 2.6 cm (series 601, image 85). There is mild perinephric stranding surrounding the left kidney. Right ureter is normal in course to the urinary bladder. There is apparent dilatation of the distal right ureter into the UVJ without apparent obstructing process.  Prior hysterectomy calcifications in the right adnexa and surgical clips.    11/13/19: L URS/HLL/SBE with 6x24 stent. Cr 0.9 preop with cortical thinning of left kidney.   stones radiopaque, no bladder abnormalities, unable to pass wire up into left kidney, severely impacted left UVJ stone requiring ureteral meatotomy to gain access into ureter and fragment stone    12/12/19: cysto/stent removal  12/17/19: transcatheter aortic valve replacemeny - asa/plavix x 6 mos  - No non sterile procedures which could cause endocarditis for 6 months including dental work, endoscopy, colonoscopy, and  procedures. Advised to wait 6 mos on workup for pneumobilia/endoscopy  - new LBBB s/p TAVR resulting in Dual chamber PPM ([facility]) with Dr Macdonald on 12/18/19  - also had volume overload/acute chf post TAVR which resolved    She was most recently admitted  2/11-2/13 to [facility] for dyspnea syndrome/chf exacerbation, attributed to post TAVR leak.   Renal USD done on this admission noted persistent moderate L hydro:  Right KIDNEY: Normal in size and echotexture.  5 mm echogenic focus with twinkle artifact in the upper pole the right kidney consistent with a nonobstructing stone. No hydronephrosis, contour deforming renal mass or cyst is seen. Renal size: 11.7 x 4.6 x 4.0 cm  Left KIDNEY: Normal in size and echotexture.  Moderate left-sided hydronephrosis is seen.  No shadowing stone is identified.Renal size: 11.4 x 5.3 x 4.4 cm  Urinary bladder: Normally distended, there is trace layering doubly in the bladder.  No ureteral jets are seen.  UA negative except for 1+ leuks, and micro with 8 wbc, no rbc or bacteria  On admission Cr 1.7, eGFR 28.3, and by discharge was 1.1, 47.9 which has been her baseline since stone extraction     She presents today noting:  No prior history of kidney stones.  This was a completely incidental finding on workup for her TAVR  Had no hematuria with any of this, denies any left flank pain.    Original CT from November reviewed demonstrating severe left hydroureteronephrosis with tortuous dilated ureter all the way down to a 2.6 cm stacked ureteral stone burden in the distal ureter plugging the ureterovesical junction 15 mm in diameter and 26 cm in length.  On the right kidney there is a punctate 2.3 cm upper pole stone and 2 adjacent nonobstructing lateral lower pole stones totalling 7.4 mm in composite.  No right hydronephrosis.    Review of most recent renal ultrasound demonstrates continued left hydroureteronephrosis with tortuous dilated ureter, though compared to CT scan may be slightly less    Urinalysis dipstick today is negative except for trace leukocytes.  She has been well hydrated her urine has been clear and pale yellow.  Crystal light peach tea is primary fluid intake  Poor water intake for water alone - needs to mix something  in    Past Medical History:   Diagnosis Date    Cervical cancer     Diabetes mellitus     Hypertension        Past Surgical History:   Procedure Laterality Date    A-V CARDIAC PACEMAKER INSERTION N/A 12/18/2019    Procedure: INSERTION, CARDIAC PACEMAKER, DUAL CHAMBER;  Surgeon: Nnamdi Macdonald MD;  Location: [facility] EP LAB;  Service: Cardiology;  Laterality: N/A;  lbbb, dppm, SJM, anes, pr, [id number]    CARDIAC CATHETERIZATION      CHOLECYSTECTOMY      CORONARY ANGIOGRAPHY Left 9/4/2019    Procedure: ANGIOGRAM, CORONARY ARTERY;  Surgeon: Carmen Yepez MD;  Location: [facility] CATH/EP LAB;  Service: Cardiology;  Laterality: Left;    CYSTOSCOPY N/A 11/13/2019    Procedure: CYSTOSCOPY;  Surgeon: Paddy Dunham MD;  Location: [address];  Service: Urology;  Laterality: N/A;    HYSTERECTOMY      JOINT REPLACEMENT      KNEE ARTHROPLASTY Right     LASER LITHOTRIPSY Left 11/13/2019    Procedure: LITHOTRIPSY, USING LASER;  Surgeon: Paddy Dunham MD;  Location: [address];  Service: Urology;  Laterality: Left;    TRANSCATHETER AORTIC VALVE REPLACEMENT (TAVR) N/A 12/17/2019    Procedure: REPLACEMENT, AORTIC VALVE, TRANSCATHETER (TAVR);  Surgeon: Herbert Ashley MD;  Location: [facility] CATH LAB;  Service: Cardiology;  Laterality: N/A;    URETEROSCOPIC REMOVAL OF URETERIC CALCULUS Left 11/13/2019    Procedure: REMOVAL, CALCULUS, URETER, URETEROSCOPIC;  Surgeon: Paddy Dunham MD;  Location: [address];  Service: Urology;  Laterality: Left;    URETEROSCOPY Left 11/13/2019    Procedure: URETEROSCOPY;  Surgeon: Paddy Dunham MD;  Location: [address];  Service: Urology;  Laterality: Left;       Family History   Problem Relation Age of Onset    Hypertension Father     Cancer Maternal Grandfather     Cancer Paternal Grandfather        Social History     Socioeconomic History    Marital status:      Spouse name: Not on file    Number of children: Not on file    Years of education: Not on file  "   Highest education level: Not on file   Occupational History    Not on file   Social Needs    Financial resource strain: Not on file    Food insecurity:     Worry: Not on file     Inability: Not on file    Transportation needs:     Medical: Not on file     Non-medical: Not on file   Tobacco Use    Smoking status: Former Smoker     Packs/day: 1.00     Years: 25.00     Pack years: 25.00     Types: Cigarettes     Last attempt to quit: 1980     Years since quittin.1    Smokeless tobacco: Never Used   Substance and Sexual Activity    Alcohol use: Not Currently    Drug use: Never    Sexual activity: Not on file   Lifestyle    Physical activity:     Days per week: Not on file     Minutes per session: Not on file    Stress: Not on file   Relationships    Social connections:     Talks on phone: Not on file     Gets together: Not on file     Attends Voodoo service: Not on file     Active member of club or organization: Not on file     Attends meetings of clubs or organizations: Not on file     Relationship status: Not on file   Other Topics Concern    Not on file   Social History Narrative    Not on file       Review of patient's allergies indicates:   Allergen Reactions    Azithromycin Swelling     All mycins    Statins-hmg-coa reductase inhibitors Other (See Comments)     Liver function  "It attacks my liver and makes me very sick"    Sulfa (sulfonamide antibiotics) Hives       Medications Reviewed: see MAR    ROS:    Constitutional: denies fevers, chills, night sweats, fatigue, malaise  Respiratory: negative for cough, shortness of breath, wheezing, dyspnea.  Cardiovascular: + for high blood pressure, negative for chest pain, varicose veins, ankle swelling, palpitations, syncope.  GI: negative for abdominal pain, heartburn, indigestion, nausea, vomiting, constipation, diarrhea, blood in stool.   Urology: as noted above in HPI  Endocrinology: negative for cold intolerance, excessive thirst, " "not feeling tired/sluggish, no heat intolerance.   Hematology/Lymph: negative for easy bleeding, easy bruising, swollen glands.  Musculoskeletal: negative for back pain, joint pain, joint swelling, neck pain.  Allergy-Immunology: negative for seasonal allergies, negative for unusual infections.   Skin: negative for boils, breast lumps, hives, itching, rash.   Neurology: negative for, dizziness, headache, tingling/numbness, tremors.   Psych: satisfied with life; negative for, anxiety, depression, suicidal thoughts.     PHYSICAL EXAM:    Vitals:    02/18/20 0950   BP: (!) 150/71   Pulse: 72   Temp: 98.1 °F (36.7 °C)     Body mass index is 34.1 kg/m². Weight: 74 kg (163 lb 2.3 oz) Height: 4' 10" (147.3 cm)       General: Alert, cooperative, no distress, appears stated age  Head: Normocephalic, without obvious abnormality, atraumatic  Neck: no masses, no thyromegaly, no lymphadenopathy  Eyes: PERRL, conjunctiva/corneas clear  Lungs: Respirations unlabored, normal effort, no accessory muscle use  CV: Warm and well perfused extremities  Abdomen: Soft, non-tender, no CVA tenderness, no hepatosplenomegaly, no hernia, 1+ pannus  Extremities: Extremities normal, atraumatic, no cyanosis or edema  Skin: Normal color, texture, and turgor, no rashes or lesions  Psych: Appropriate, well oriented, normal affect, normal mood  Neuro: Non-focal    LABS:    Recent Results (from the past 336 hour(s))   CBC auto differential    Collection Time: 02/11/20  1:21 PM   Result Value Ref Range    WBC 9.81 3.90 - 12.70 K/uL    RBC 5.05 4.00 - 5.40 M/uL    Hemoglobin 14.5 12.0 - 16.0 g/dL    Hematocrit 45.0 37.0 - 48.5 %    Mean Corpuscular Volume 89 82 - 98 fL    Mean Corpuscular Hemoglobin 28.7 27.0 - 31.0 pg    Mean Corpuscular Hemoglobin Conc 32.2 32.0 - 36.0 g/dL    RDW 12.8 11.5 - 14.5 %    Platelets 168 150 - 350 K/uL    MPV 10.4 9.2 - 12.9 fL    Immature Granulocytes 0.2 0.0 - 0.5 %    Gran # (ANC) 6.9 1.8 - 7.7 K/uL    Immature Grans " (Abs) 0.02 0.00 - 0.04 K/uL    Lymph # 1.9 1.0 - 4.8 K/uL    Mono # 0.7 0.3 - 1.0 K/uL    Eos # 0.3 0.0 - 0.5 K/uL    Baso # 0.05 0.00 - 0.20 K/uL    nRBC 0 0 /100 WBC    Gran% 70.7 38.0 - 73.0 %    Lymph% 19.2 18.0 - 48.0 %    Mono% 6.6 4.0 - 15.0 %    Eosinophil% 2.8 0.0 - 8.0 %    Basophil% 0.5 0.0 - 1.9 %    Differential Method Automated    Comprehensive metabolic panel    Collection Time: 02/11/20  1:21 PM   Result Value Ref Range    Sodium 137 136 - 145 mmol/L    Potassium 4.8 3.5 - 5.1 mmol/L    Chloride 104 95 - 110 mmol/L    CO2 20 (L) 23 - 29 mmol/L    Glucose 98 70 - 110 mg/dL    BUN, Bld 45 (H) 8 - 23 mg/dL    Creatinine 1.7 (H) 0.5 - 1.4 mg/dL    Calcium 9.5 8.7 - 10.5 mg/dL    Total Protein 7.4 6.0 - 8.4 g/dL    Albumin 4.3 3.5 - 5.2 g/dL    Total Bilirubin 1.1 (H) 0.1 - 1.0 mg/dL    Alkaline Phosphatase 90 55 - 135 U/L    AST 25 10 - 40 U/L    ALT 20 10 - 44 U/L    Anion Gap 13 8 - 16 mmol/L    eGFR if African American 32.6 (A) >60 mL/min/1.73 m^2    eGFR if non  28.3 (A) >60 mL/min/1.73 m^2   Brain natriuretic peptide    Collection Time: 02/11/20  1:21 PM   Result Value Ref Range    BNP 87 0 - 99 pg/mL   D dimer, quantitative    Collection Time: 02/11/20  1:21 PM   Result Value Ref Range    D-Dimer 1.57 (H) <0.50 ug/mL FEU   T4, free    Collection Time: 02/11/20  1:21 PM   Result Value Ref Range    Free T4 0.88 0.71 - 1.51 ng/dL   TSH    Collection Time: 02/11/20  1:21 PM   Result Value Ref Range    TSH 1.840 0.340 - 5.600 uIU/mL   Basic metabolic panel    Collection Time: 02/12/20  4:01 AM   Result Value Ref Range    Sodium 140 136 - 145 mmol/L    Potassium 4.5 3.5 - 5.1 mmol/L    Chloride 110 95 - 110 mmol/L    CO2 21 (L) 23 - 29 mmol/L    Glucose 127 (H) 70 - 110 mg/dL    BUN, Bld 36 (H) 8 - 23 mg/dL    Creatinine 1.2 0.5 - 1.4 mg/dL    Calcium 9.1 8.7 - 10.5 mg/dL    Anion Gap 9 8 - 16 mmol/L    eGFR if African American 49.7 (A) >60 mL/min/1.73 m^2    eGFR if non African American  43.1 (A) >60 mL/min/1.73 m^2   Brain natriuretic peptide    Collection Time: 02/12/20  4:01 AM   Result Value Ref Range     (H) 0 - 99 pg/mL   POCT glucose    Collection Time: 02/12/20 11:37 AM   Result Value Ref Range    POC Glucose 133 (H) 70 - 110   POCT glucose    Collection Time: 02/12/20  4:30 PM   Result Value Ref Range    POC Glucose 116 (H) 70 - 110   POCT glucose    Collection Time: 02/12/20  8:32 PM   Result Value Ref Range    POC Glucose 120 (H) 70 - 110   Urinalysis, Reflex to Urine Culture Urine, Clean Catch    Collection Time: 02/12/20 11:40 PM   Result Value Ref Range    Specimen UA Urine, Clean Catch     Color, UA Colorless (A) Yellow, Straw, Sakina    Appearance, UA Clear Clear    pH, UA 7.0 5.0 - 8.0    Specific Gravity, UA 1.015 1.005 - 1.030    Protein, UA Negative Negative    Glucose, UA Negative Negative    Ketones, UA Negative Negative    Bilirubin (UA) Negative Negative    Occult Blood UA Negative Negative    Nitrite, UA Negative Negative    Urobilinogen, UA Negative Negative EU/dL    Leukocytes, UA 1+ (A) Negative   Urinalysis Microscopic    Collection Time: 02/12/20 11:40 PM   Result Value Ref Range    RBC, UA 0 0 - 4 /hpf    WBC, UA 8 (H) 0 - 5 /hpf    Bacteria Negative None-Occ /hpf    Squam Epithel, UA 1 /hpf    Hyaline Casts, UA 1 0-1/lpf /lpf    Microscopic Comment SEE COMMENT    Basic metabolic panel    Collection Time: 02/13/20  3:52 AM   Result Value Ref Range    Sodium 138 136 - 145 mmol/L    Potassium 4.5 3.5 - 5.1 mmol/L    Chloride 111 (H) 95 - 110 mmol/L    CO2 20 (L) 23 - 29 mmol/L    Glucose 118 (H) 70 - 110 mg/dL    BUN, Bld 23 8 - 23 mg/dL    Creatinine 1.1 0.5 - 1.4 mg/dL    Calcium 9.4 8.7 - 10.5 mg/dL    Anion Gap 7 (L) 8 - 16 mmol/L    eGFR if African American 55.2 (A) >60 mL/min/1.73 m^2    eGFR if non  47.9 (A) >60 mL/min/1.73 m^2   Brain natriuretic peptide    Collection Time: 02/13/20  3:52 AM   Result Value Ref Range     (H) 0 - 99  pg/mL   POCT glucose    Collection Time: 02/13/20 11:35 AM   Result Value Ref Range    POC Glucose 126 (H) 70 - 110   POCT glucose    Collection Time: 02/13/20  4:59 PM   Result Value Ref Range    POC Glucose 125 (H) 70 - 110   POCT URINE DIPSTICK WITHOUT MICROSCOPE    Collection Time: 02/18/20  9:55 AM   Result Value Ref Range    Color, UA yellow     Spec Grav UA 1.020     pH, UA 6     WBC, UA trace     Nitrite, UA neg     Protein neg     Glucose, UA neg     Ketones, UA neg     Urobilinogen, UA 0.2     Bilirubin neg     Blood, UA neg          Assessment/Diagnosis:    1. Hydronephrosis of left kidney  POCT URINE DIPSTICK WITHOUT MICROSCOPE   2. Kidney stones  POCT URINE DIPSTICK WITHOUT MICROSCOPE   3. CKD (chronic kidney disease) stage 3, GFR 30-59 ml/min         Plans:  Reviewed recent urologic history with patient as well as all imaging including preoperative CT scan and up to date renal ultrasound.  Given the severity of her hydroureteronephrosis and cortical loss of the left kidney she likely had longstanding obstruction from this severe distal ureteral stone burden on the left, though her preoperative renal function was normal, alternating between very mild CKD 3 with a EGFR of 53.7 in August to a normal creatinine of 0.9 an EGFR of greater than 60 in November at the time of the initial scan.  Some of her alterations in kidney function are likely due to her recent diuretics, fluid status, comorbidities.      She was dehydrated from Lasix use on admission to the hospital recently and her renal function improved to her recent postoperative baseline on discharge.  We did discuss that her left kidney may be nonfunctioning or poorly functioning, which may be a contributing factor in her overall kidney function, though there is no value in performing a functional study with Mag 3 renal scan at this time as it is clear on CT scan the long-term effects of this stone and procedure.  We did discuss it may take quite a  while for this hydroureteronephrosis to resolve, and may not resolve.  Should periodically reassess.      As long as her kidney function is stable, she is having no left flank pain and is asymptomatic, and is having no urinary tract infections, no further intervention is needed.  We did discuss that in the setting of hydroureteronephrosis and stagnant urine, there is risk of urinary tract infection, and any systemic infection which could put her at risk for endocarditis with her recent TAVR is certainly the only concern at this time, however she remains infection free.  Given the trace leukocytes in her urine I will check a urine culture today to rule out any infection, though her most recent urinalysis from admission was negative for bacteria.    At this time encouraged close follow-up with primary care and from a urologic standpoint I will have her repeat a renal ultrasound and labs in 6 months and chart check the results.  I will plan to see her on an annual basis for periodically reassessment, and she can return in the interim if there are any concerns.  I have advised that she reach out and return immediately if she experiences any of the following:  Gross blood in the urine, urinary tract infection or burning with urination, flank pain especially radiating around to the groin.    We did also review that she still has some nonobstructing kidney stones in the right kidney.  Certainly as she likely passed left-sided kidney stones to the distal ureter which stacked up in cause this long-term obstruction for which he was asymptomatic from, she may not have reliable symptoms of stone passage on the right and we would want to make sure to periodically assess these as well.  For future stone prevention, we primarily discussed adequate hydration to produce a goal daily urine output of greater than 2 liters, and increasing water.  We discussed a stone prevention diet as low salt/low sodium, moderate protein, low oxalate  (especially iced tea), minimizing cola, and adding fresh lemon to water/drinking fresh lemonade.  Certainly given her significant tea intake we did discuss decreasing it significantly due to the high oxalate content of tea.  We did review that a 5 mm stone in the kidney has an 85% chance of spontaneous passage and she has 3 such small stones in her right kidney.  We did also discuss that stone prevention recommendations help prevent further increase in size of her current stones and help facilitate the passage of small stones.  And radiopaque on intraoperative fluoroscopy.  At time of her renal ultrasound and labs in 6 months I will also get a plain film KUB as a screening measure for stones to assess burden since they were calcium oxalate and radiopaque on fluoroscopy in the operating room.    45 min spent in encounter, over half in counseling.  All questions patient had were answered in detail and she is agreeable to the treatment plan.  Renal ultrasound, KUB, BMP in 6 months.  Will chart check the results and plan on annual evaluation.

## 2020-02-19 LAB — BACTERIA UR CULT: NO GROWTH

## 2020-02-24 DIAGNOSIS — Z12.31 ENCOUNTER FOR SCREENING MAMMOGRAM FOR MALIGNANT NEOPLASM OF BREAST: Primary | ICD-10-CM

## 2020-03-04 ENCOUNTER — HOSPITAL ENCOUNTER (OUTPATIENT)
Dept: RADIOLOGY | Facility: HOSPITAL | Age: 80
Discharge: HOME | End: 2020-03-04
Attending: INTERNAL MEDICINE
Payer: MEDICARE

## 2020-03-04 VITALS — HEIGHT: 58 IN | WEIGHT: 163.13 LBS | BODY MASS INDEX: 34.24 KG/M2

## 2020-03-04 DIAGNOSIS — Z12.31 ENCOUNTER FOR SCREENING MAMMOGRAM FOR MALIGNANT NEOPLASM OF BREAST: ICD-10-CM

## 2020-03-04 PROCEDURE — 77067 SCR MAMMO BI INCL CAD: CPT | Mod: TC,PO

## 2020-03-16 ENCOUNTER — LAB VISIT (OUTPATIENT)
Dept: LAB | Facility: HOSPITAL | Age: 80
End: 2020-03-16
Attending: INTERNAL MEDICINE
Payer: MEDICARE

## 2020-03-16 DIAGNOSIS — R10.11 ABDOMINAL PAIN, RIGHT UPPER QUADRANT: Primary | ICD-10-CM

## 2020-03-16 DIAGNOSIS — E78.2 MIXED HYPERLIPIDEMIA: ICD-10-CM

## 2020-03-16 DIAGNOSIS — E11.9 DIABETES MELLITUS WITHOUT COMPLICATION: ICD-10-CM

## 2020-03-16 DIAGNOSIS — E83.42 HYPOMAGNESEMIA: ICD-10-CM

## 2020-03-16 LAB
ALBUMIN SERPL BCP-MCNC: 3.9 G/DL (ref 3.5–5.2)
ALBUMIN/CREAT UR: 30.7 UG/MG (ref 0–30)
ALP SERPL-CCNC: 99 U/L (ref 55–135)
ALT SERPL W/O P-5'-P-CCNC: 18 U/L (ref 10–44)
ANION GAP SERPL CALC-SCNC: 6 MMOL/L (ref 8–16)
AST SERPL-CCNC: 23 U/L (ref 10–40)
BILIRUB SERPL-MCNC: 0.8 MG/DL (ref 0.1–1)
BUN SERPL-MCNC: 19 MG/DL (ref 8–23)
CALCIUM SERPL-MCNC: 9.2 MG/DL (ref 8.7–10.5)
CHLORIDE SERPL-SCNC: 107 MMOL/L (ref 95–110)
CHOLEST SERPL-MCNC: 254 MG/DL (ref 120–199)
CHOLEST/HDLC SERPL: 4.5 {RATIO} (ref 2–5)
CO2 SERPL-SCNC: 28 MMOL/L (ref 23–29)
CREAT SERPL-MCNC: 1.1 MG/DL (ref 0.5–1.4)
CREAT UR-MCNC: 58 MG/DL (ref 15–325)
EST. GFR  (AFRICAN AMERICAN): 55.2 ML/MIN/1.73 M^2
EST. GFR  (NON AFRICAN AMERICAN): 47.9 ML/MIN/1.73 M^2
ESTIMATED AVG GLUCOSE: 120 MG/DL (ref 68–131)
GLUCOSE SERPL-MCNC: 126 MG/DL (ref 70–110)
HBA1C MFR BLD HPLC: 5.8 % (ref 4.5–6.2)
HDLC SERPL-MCNC: 57 MG/DL (ref 40–75)
HDLC SERPL: 22.4 % (ref 20–50)
LDLC SERPL CALC-MCNC: 177.4 MG/DL (ref 63–159)
MAGNESIUM SERPL-MCNC: 1.9 MG/DL (ref 1.6–2.6)
MICROALBUMIN UR DL<=1MG/L-MCNC: 17.8 UG/ML
NONHDLC SERPL-MCNC: 197 MG/DL
POTASSIUM SERPL-SCNC: 4.2 MMOL/L (ref 3.5–5.1)
PROT SERPL-MCNC: 7 G/DL (ref 6–8.4)
SODIUM SERPL-SCNC: 141 MMOL/L (ref 136–145)
TRIGL SERPL-MCNC: 98 MG/DL (ref 30–150)

## 2020-03-16 PROCEDURE — 82043 UR ALBUMIN QUANTITATIVE: CPT

## 2020-03-16 PROCEDURE — 83735 ASSAY OF MAGNESIUM: CPT

## 2020-03-16 PROCEDURE — 80053 COMPREHEN METABOLIC PANEL: CPT

## 2020-03-16 PROCEDURE — 80061 LIPID PANEL: CPT

## 2020-03-16 PROCEDURE — 36415 COLL VENOUS BLD VENIPUNCTURE: CPT

## 2020-03-16 PROCEDURE — 83036 HEMOGLOBIN GLYCOSYLATED A1C: CPT

## 2020-03-18 ENCOUNTER — CLINICAL SUPPORT (OUTPATIENT)
Dept: CARDIOLOGY | Facility: HOSPITAL | Age: 80
End: 2020-03-18
Payer: MEDICARE

## 2020-03-18 PROCEDURE — 93296 REM INTERROG EVL PM/IDS: CPT | Performed by: INTERNAL MEDICINE

## 2020-03-18 PROCEDURE — 93294 REM INTERROG EVL PM/LDLS PM: CPT | Mod: ,,, | Performed by: INTERNAL MEDICINE

## 2020-03-18 PROCEDURE — 93294 CARDIAC DEVICE CHECK - REMOTE: ICD-10-PCS | Mod: ,,, | Performed by: INTERNAL MEDICINE

## 2020-03-26 ENCOUNTER — TELEPHONE (OUTPATIENT)
Dept: ELECTROPHYSIOLOGY | Facility: CLINIC | Age: 80
End: 2020-03-26

## 2020-03-26 ENCOUNTER — TELEPHONE (OUTPATIENT)
Dept: CARDIOLOGY | Facility: HOSPITAL | Age: 80
End: 2020-03-26

## 2020-03-26 NOTE — TELEPHONE ENCOUNTER
Spoke with pt who reports that she is feeling well and has no concerns at this time.  She would like to cancel her appt, and we will contact her once we being seeing pts in clinic again.

## 2020-03-26 NOTE — TELEPHONE ENCOUNTER
Contacted patient re: upcoming visit with Dr. Macdonald. I asked her to send a pacemaker transmission in. She is shopping right now and has other errands to run. I will try calling her back at 2 pm. She verbalized understanding.

## 2020-05-04 ENCOUNTER — TELEPHONE (OUTPATIENT)
Dept: ELECTROPHYSIOLOGY | Facility: CLINIC | Age: 80
End: 2020-05-04

## 2020-05-04 NOTE — TELEPHONE ENCOUNTER
Spoke with pt to schedule appts for EKG, device check, and clinic f/u with SK on 5/11/2020.  Pt will contact us if something comes up with her 's chemo appt and she needs to cancel.

## 2020-05-04 NOTE — TELEPHONE ENCOUNTER
Spoke with pt to schedule f/u appt in clinic.  I informed her that she will need an in clinic device check.  We do not have any appts available before the appts SK has open, so I will give the pt a call once I speak with the device staff, as I would prefer not to have her come in twice, since she lives in [place] and would need one of her kids to bring her here.  She informed me that the site of her incision has been itching, burning, and sore to the touch, which I will mention to the device staff.

## 2020-05-08 NOTE — PROGRESS NOTES
Subjective:    Patient ID:  Roxanne Hernandez is a 79 y.o. female who presents for follow-up of Pacemaker Check      HPI 80 yo female with HFpEF, Htn, Aortic stenosis s/p TAVR with subsequent LBBB and 2nd degree AV block, PPM, DM.  Primary cardiologist is Dr. Yepez.  TAVR 12/17/20. Developed new LBBB and 2nd degree AV block.  Dual chamber PPM (12/18/20) right sided.  Continued to note dyspnea on exertion.  Echo 1/22/20 normal biventricular structure and function, trivial effusion.  Notes stable dyspnea on exertion. Denies syncope, palpitations.  Device interrogation reveal stable function of the leads, RA pacing 12% RV pacing < 1% no significant arrhythmias.            Review of Systems   Constitution: Negative. Negative for fever and malaise/fatigue.   HENT: Negative for congestion and sore throat.    Cardiovascular: Negative for chest pain, dyspnea on exertion, irregular heartbeat, leg swelling, near-syncope, orthopnea, palpitations, paroxysmal nocturnal dyspnea and syncope.   Respiratory: Positive for shortness of breath. Negative for cough.    Gastrointestinal: Negative for abdominal pain, constipation and diarrhea.   Neurological: Negative for dizziness, light-headedness and weakness.   Psychiatric/Behavioral: Negative for depression. The patient is not nervous/anxious.         Objective:    Physical Exam   Constitutional: She is oriented to person, place, and time. She appears well-developed and well-nourished.   Eyes: Conjunctivae are normal. No scleral icterus.   Neck: No JVD present. No tracheal deviation present.   Cardiovascular: Normal rate and regular rhythm. PMI is not displaced.   Pulmonary/Chest: Effort normal and breath sounds normal. No respiratory distress.   Abdominal: Soft. There is no hepatosplenomegaly. There is no tenderness.   Musculoskeletal: She exhibits no edema or tenderness.   Neurological: She is alert and oriented to person, place, and time.   Skin: Skin is warm and dry. No rash noted.    Psychiatric: She has a normal mood and affect. Her behavior is normal.         Assessment:       1. Chronic diastolic heart failure    2. Left bundle branch block    3. Presence of permanent cardiac pacemaker    4. S/P TAVR (transcatheter aortic valve replacement)    5. Essential hypertension    6. Nonrheumatic aortic valve stenosis    7. Diabetes mellitus without complication         Plan:       Doing well overall.  Continue current settings and medications.  Echo in one year.  F/u with monitoring as scheduled, with me in one year.

## 2020-05-11 ENCOUNTER — HOSPITAL ENCOUNTER (OUTPATIENT)
Dept: CARDIOLOGY | Facility: CLINIC | Age: 80
Discharge: HOME | End: 2020-05-11
Attending: INTERNAL MEDICINE
Payer: MEDICARE

## 2020-05-11 ENCOUNTER — OFFICE VISIT (OUTPATIENT)
Dept: ELECTROPHYSIOLOGY | Facility: CLINIC | Age: 80
End: 2020-05-11
Payer: MEDICARE

## 2020-05-11 ENCOUNTER — CLINICAL SUPPORT (OUTPATIENT)
Dept: CARDIOLOGY | Facility: HOSPITAL | Age: 80
End: 2020-05-11
Attending: INTERNAL MEDICINE
Payer: MEDICARE

## 2020-05-11 VITALS
HEIGHT: 57 IN | BODY MASS INDEX: 39.05 KG/M2 | SYSTOLIC BLOOD PRESSURE: 154 MMHG | DIASTOLIC BLOOD PRESSURE: 68 MMHG | WEIGHT: 181 LBS | HEART RATE: 70 BPM

## 2020-05-11 DIAGNOSIS — I50.32 CHRONIC DIASTOLIC HEART FAILURE: Primary | Chronic | ICD-10-CM

## 2020-05-11 DIAGNOSIS — I10 ESSENTIAL HYPERTENSION: Chronic | ICD-10-CM

## 2020-05-11 DIAGNOSIS — Z95.0 PRESENCE OF PERMANENT CARDIAC PACEMAKER: ICD-10-CM

## 2020-05-11 DIAGNOSIS — I49.8 OTHER SPECIFIED CARDIAC ARRHYTHMIAS: ICD-10-CM

## 2020-05-11 DIAGNOSIS — Z95.0 CARDIAC PACEMAKER IN SITU: ICD-10-CM

## 2020-05-11 DIAGNOSIS — I44.7 LEFT BUNDLE BRANCH BLOCK: ICD-10-CM

## 2020-05-11 DIAGNOSIS — Z95.2 S/P TAVR (TRANSCATHETER AORTIC VALVE REPLACEMENT): ICD-10-CM

## 2020-05-11 DIAGNOSIS — E11.9 DIABETES MELLITUS WITHOUT COMPLICATION: Chronic | ICD-10-CM

## 2020-05-11 DIAGNOSIS — I35.0 NONRHEUMATIC AORTIC VALVE STENOSIS: ICD-10-CM

## 2020-05-11 PROCEDURE — 93005 ELECTROCARDIOGRAM TRACING: CPT | Mod: PBBFAC | Performed by: INTERNAL MEDICINE

## 2020-05-11 PROCEDURE — 93280 PM DEVICE PROGR EVAL DUAL: CPT

## 2020-05-11 PROCEDURE — 99214 OFFICE O/P EST MOD 30 MIN: CPT | Mod: S$PBB,,, | Performed by: INTERNAL MEDICINE

## 2020-05-11 PROCEDURE — 99999 PR PBB SHADOW E&M-EST. PATIENT-LVL III: CPT | Mod: PBBFAC,,, | Performed by: INTERNAL MEDICINE

## 2020-05-11 PROCEDURE — 93280 CARDIAC DEVICE CHECK - IN CLINIC & HOSPITAL: ICD-10-PCS | Mod: 26,,, | Performed by: INTERNAL MEDICINE

## 2020-05-11 PROCEDURE — 93280 PM DEVICE PROGR EVAL DUAL: CPT | Mod: 26,,, | Performed by: INTERNAL MEDICINE

## 2020-05-11 PROCEDURE — 99999 PR PBB SHADOW E&M-EST. PATIENT-LVL III: ICD-10-PCS | Mod: PBBFAC,,, | Performed by: INTERNAL MEDICINE

## 2020-05-11 PROCEDURE — 93010 ELECTROCARDIOGRAM REPORT: CPT | Mod: S$PBB,,, | Performed by: INTERNAL MEDICINE

## 2020-05-11 PROCEDURE — 99213 OFFICE O/P EST LOW 20 MIN: CPT | Mod: PBBFAC,25 | Performed by: INTERNAL MEDICINE

## 2020-05-11 PROCEDURE — 99214 PR OFFICE/OUTPT VISIT, EST, LEVL IV, 30-39 MIN: ICD-10-PCS | Mod: S$PBB,,, | Performed by: INTERNAL MEDICINE

## 2020-05-11 PROCEDURE — 93010 RHYTHM STRIP: ICD-10-PCS | Mod: S$PBB,,, | Performed by: INTERNAL MEDICINE

## 2020-05-11 RX ORDER — ATORVASTATIN CALCIUM 10 MG/1
10 TABLET, FILM COATED ORAL DAILY
Status: ON HOLD | COMMUNITY
End: 2020-10-17 | Stop reason: SDUPTHER

## 2020-06-16 ENCOUNTER — CLINICAL SUPPORT (OUTPATIENT)
Dept: CARDIOLOGY | Facility: HOSPITAL | Age: 80
End: 2020-06-16
Payer: MEDICARE

## 2020-06-16 DIAGNOSIS — Z95.0 PRESENCE OF CARDIAC PACEMAKER: ICD-10-CM

## 2020-06-16 PROCEDURE — 93296 REM INTERROG EVL PM/IDS: CPT | Performed by: INTERNAL MEDICINE

## 2020-06-16 PROCEDURE — 93294 CARDIAC DEVICE CHECK - REMOTE: ICD-10-PCS | Mod: ,,, | Performed by: INTERNAL MEDICINE

## 2020-06-16 PROCEDURE — 93294 REM INTERROG EVL PM/LDLS PM: CPT | Mod: ,,, | Performed by: INTERNAL MEDICINE

## 2020-07-21 DIAGNOSIS — M81.0 SENILE OSTEOPOROSIS: Primary | ICD-10-CM

## 2020-07-28 ENCOUNTER — HOSPITAL ENCOUNTER (INPATIENT)
Facility: HOSPITAL | Age: 80
LOS: 5 days | Discharge: HOME | DRG: 037 | End: 2020-08-04
Attending: INTERNAL MEDICINE | Admitting: INTERNAL MEDICINE
Payer: MEDICARE

## 2020-07-28 DIAGNOSIS — I65.23 BILATERAL CAROTID ARTERY OCCLUSION: Primary | Chronic | ICD-10-CM

## 2020-07-28 DIAGNOSIS — I63.312 THROMBOTIC STROKE INVOLVING LEFT MIDDLE CEREBRAL ARTERY: ICD-10-CM

## 2020-07-28 DIAGNOSIS — R55 SYNCOPE AND COLLAPSE: ICD-10-CM

## 2020-07-28 DIAGNOSIS — R55 SYNCOPE: ICD-10-CM

## 2020-07-28 DIAGNOSIS — E11.9 DIABETES MELLITUS WITHOUT COMPLICATION: Chronic | ICD-10-CM

## 2020-07-28 DIAGNOSIS — I10 ESSENTIAL HYPERTENSION: Chronic | ICD-10-CM

## 2020-07-28 PROBLEM — N20.1 LEFT URETERAL STONE: Status: RESOLVED | Noted: 2019-11-13 | Resolved: 2020-07-28

## 2020-07-28 PROBLEM — R06.02 SHORTNESS OF BREATH: Status: RESOLVED | Noted: 2020-02-11 | Resolved: 2020-07-28

## 2020-07-28 PROBLEM — N13.2 HYDRONEPHROSIS WITH URINARY OBSTRUCTION DUE TO URETERAL CALCULUS: Status: RESOLVED | Noted: 2019-11-11 | Resolved: 2020-07-28

## 2020-07-28 PROBLEM — E86.0 DEHYDRATION SYNDROME: Status: RESOLVED | Noted: 2020-02-11 | Resolved: 2020-07-28

## 2020-07-28 LAB
ALBUMIN SERPL BCP-MCNC: 4.3 G/DL (ref 3.5–5.2)
ALP SERPL-CCNC: 94 U/L (ref 55–135)
ALT SERPL W/O P-5'-P-CCNC: 25 U/L (ref 10–44)
ANION GAP SERPL CALC-SCNC: 10 MMOL/L (ref 8–16)
AST SERPL-CCNC: 32 U/L (ref 10–40)
BASOPHILS # BLD AUTO: 0.04 K/UL (ref 0–0.2)
BASOPHILS NFR BLD: 0.5 % (ref 0–1.9)
BILIRUB SERPL-MCNC: 1 MG/DL (ref 0.1–1)
BUN SERPL-MCNC: 30 MG/DL (ref 8–23)
CALCIUM SERPL-MCNC: 9.9 MG/DL (ref 8.7–10.5)
CHLORIDE SERPL-SCNC: 107 MMOL/L (ref 95–110)
CO2 SERPL-SCNC: 23 MMOL/L (ref 23–29)
CREAT SERPL-MCNC: 1.1 MG/DL (ref 0.5–1.4)
DIFFERENTIAL METHOD: ABNORMAL
EOSINOPHIL # BLD AUTO: 0.2 K/UL (ref 0–0.5)
EOSINOPHIL NFR BLD: 3.1 % (ref 0–8)
ERYTHROCYTE [DISTWIDTH] IN BLOOD BY AUTOMATED COUNT: 13.4 % (ref 11.5–14.5)
ERYTHROCYTE [SEDIMENTATION RATE] IN BLOOD BY WESTERGREN METHOD: 12 MM/HR (ref 0–20)
EST. GFR  (AFRICAN AMERICAN): 54.8 ML/MIN/1.73 M^2
EST. GFR  (NON AFRICAN AMERICAN): 47.5 ML/MIN/1.73 M^2
GLUCOSE SERPL-MCNC: 105 MG/DL (ref 70–110)
GLUCOSE SERPL-MCNC: 158 MG/DL (ref 70–110)
HCT VFR BLD AUTO: 45 % (ref 37–48.5)
HGB BLD-MCNC: 14.8 G/DL (ref 12–16)
IMM GRANULOCYTES # BLD AUTO: 0.02 K/UL (ref 0–0.04)
IMM GRANULOCYTES NFR BLD AUTO: 0.3 % (ref 0–0.5)
LYMPHOCYTES # BLD AUTO: 1.6 K/UL (ref 1–4.8)
LYMPHOCYTES NFR BLD: 20.1 % (ref 18–48)
MCH RBC QN AUTO: 29.4 PG (ref 27–31)
MCHC RBC AUTO-ENTMCNC: 32.9 G/DL (ref 32–36)
MCV RBC AUTO: 89 FL (ref 82–98)
MONOCYTES # BLD AUTO: 0.6 K/UL (ref 0.3–1)
MONOCYTES NFR BLD: 7.3 % (ref 4–15)
NEUTROPHILS # BLD AUTO: 5.4 K/UL (ref 1.8–7.7)
NEUTROPHILS NFR BLD: 68.7 % (ref 38–73)
NRBC BLD-RTO: 0 /100 WBC
PLATELET # BLD AUTO: 127 K/UL (ref 150–350)
PMV BLD AUTO: 10.5 FL (ref 9.2–12.9)
POTASSIUM SERPL-SCNC: 4.6 MMOL/L (ref 3.5–5.1)
PROT SERPL-MCNC: 7.7 G/DL (ref 6–8.4)
RBC # BLD AUTO: 5.04 M/UL (ref 4–5.4)
SODIUM SERPL-SCNC: 140 MMOL/L (ref 136–145)
WBC # BLD AUTO: 7.85 K/UL (ref 3.9–12.7)

## 2020-07-28 PROCEDURE — G0379 DIRECT REFER HOSPITAL OBSERV: HCPCS

## 2020-07-28 PROCEDURE — U0003 INFECTIOUS AGENT DETECTION BY NUCLEIC ACID (DNA OR RNA); SEVERE ACUTE RESPIRATORY SYNDROME CORONAVIRUS 2 (SARS-COV-2) (CORONAVIRUS DISEASE [COVID-19]), AMPLIFIED PROBE TECHNIQUE, MAKING USE OF HIGH THROUGHPUT TECHNOLOGIES AS DESCRIBED BY CMS-2020-01-R: HCPCS

## 2020-07-28 PROCEDURE — G0378 HOSPITAL OBSERVATION PER HR: HCPCS

## 2020-07-28 PROCEDURE — 36415 COLL VENOUS BLD VENIPUNCTURE: CPT

## 2020-07-28 PROCEDURE — 85651 RBC SED RATE NONAUTOMATED: CPT

## 2020-07-28 PROCEDURE — 80053 COMPREHEN METABOLIC PANEL: CPT

## 2020-07-28 PROCEDURE — 93005 ELECTROCARDIOGRAM TRACING: CPT | Performed by: INTERNAL MEDICINE

## 2020-07-28 PROCEDURE — 25500020 PHARM REV CODE 255: Performed by: INTERNAL MEDICINE

## 2020-07-28 PROCEDURE — 85025 COMPLETE CBC W/AUTO DIFF WBC: CPT

## 2020-07-28 RX ORDER — LISINOPRIL 10 MG/1
10 TABLET ORAL EVERY MORNING
Status: DISCONTINUED | OUTPATIENT
Start: 2020-07-29 | End: 2020-07-31

## 2020-07-28 RX ORDER — NITROGLYCERIN 20 MG/1
1 PATCH TRANSDERMAL DAILY
Status: DISCONTINUED | OUTPATIENT
Start: 2020-07-29 | End: 2020-07-31

## 2020-07-28 RX ORDER — CLOPIDOGREL BISULFATE 75 MG/1
75 TABLET ORAL EVERY MORNING
Status: DISCONTINUED | OUTPATIENT
Start: 2020-07-29 | End: 2020-07-29

## 2020-07-28 RX ORDER — METOPROLOL SUCCINATE 25 MG/1
25 TABLET, EXTENDED RELEASE ORAL EVERY MORNING
Status: DISCONTINUED | OUTPATIENT
Start: 2020-07-29 | End: 2020-07-31

## 2020-07-28 RX ORDER — ATORVASTATIN CALCIUM 10 MG/1
10 TABLET, FILM COATED ORAL DAILY
Status: DISCONTINUED | OUTPATIENT
Start: 2020-07-29 | End: 2020-08-01

## 2020-07-28 RX ORDER — GLUCAGON 1 MG
1 KIT INJECTION
Status: DISCONTINUED | OUTPATIENT
Start: 2020-07-28 | End: 2020-08-04 | Stop reason: HOSPADM

## 2020-07-28 RX ORDER — IBUPROFEN 200 MG
24 TABLET ORAL
Status: DISCONTINUED | OUTPATIENT
Start: 2020-07-28 | End: 2020-08-04 | Stop reason: HOSPADM

## 2020-07-28 RX ORDER — MELATONIN 10 MG/ML
2000 DROPS ORAL EVERY MORNING
COMMUNITY
Start: 2011-06-06

## 2020-07-28 RX ORDER — IBUPROFEN 200 MG
16 TABLET ORAL
Status: DISCONTINUED | OUTPATIENT
Start: 2020-07-28 | End: 2020-08-04 | Stop reason: HOSPADM

## 2020-07-28 RX ORDER — SODIUM CHLORIDE 0.9 % (FLUSH) 0.9 %
10 SYRINGE (ML) INJECTION
Status: DISCONTINUED | OUTPATIENT
Start: 2020-07-28 | End: 2020-08-04 | Stop reason: HOSPADM

## 2020-07-28 RX ADMIN — IOHEXOL 60 ML: 350 INJECTION, SOLUTION INTRAVENOUS at 06:07

## 2020-07-28 NOTE — H&P
"07/28/2020 @ 3:54 PM     History & Physical Examination Report, Attending Physician  [facility]     Patient Name: Roxanne Hernandez   MRN: [id number]   Admission Date and Time: 7/28/2020  3:03 PM   Patient Class: OP- Observation   Hospital length of stay: 0 days    Code status: Full Code   Attending Physician: Yoni Hernandez MD   Principal Problem: Syncope and collapse      Chief Complaint and History of Present Illness:   Subjective:   This 80 year old female presented to my office acutely with the following symptom complex:     Dizziness, described as an unsteady gait and a sensation that the ground she is walking on is moving below her but without true vertigo reported 07/28/2020 as having started ~2 weeks prior, with variable left sided visual disturbances described as moving spots before her eyes without blindness, with no weakness of her arms or legs, but with episodic sensations of impending loss of consciousness, concomitant reduced memory capacity, with no fever or headache, with episodic nausea, and new emotional changes with the desire to cry all of the time, and episodic palpitations described as her heart is just not beating right and the sensation of the need to breathe deeply and faster episodically.       Review of Systems: obtained comprehensively is otherwise negative     Review of patient's allergies indicates:   Allergen Reactions    Azithromycin Swelling     All mycins    Statins-hmg-coa reductase inhibitors Other (See Comments)     Liver function  "It attacks my liver and makes me very sick"    Sulfa (sulfonamide antibiotics) Hives      Current Facility-Administered Medications on File Prior to Encounter   Medication    0.9%  NaCl infusion    aspirin EC tablet 325 mg    diazePAM tablet 5 mg    diphenhydrAMINE capsule 25 mg     Current Outpatient Medications on File Prior to Encounter   Medication Sig    cholecalciferol, vitamin D3, 50 mcg (2,000 unit) Chew Take 2,000 " Units by mouth every morning.    atorvastatin (LIPITOR) 10 MG tablet Take 10 mg by mouth once daily.    calcium carbonate (CALCIUM 300 ORAL) Take 600 mg by mouth.    clopidogreL (PLAVIX) 75 mg tablet Take 1 tablet (75 mg total) by mouth every morning.    lisinopril 10 MG tablet Take 1 tablet (10 mg total) by mouth every morning.    metoprolol succinate (TOPROL-XL) 25 MG 24 hr tablet Take 1 tablet (25 mg total) by mouth every morning.    nitroGLYCERIN 0.1 mg/hr TD PT24 (NITRODUR) 0.1 mg/hr PT24 Place 1 patch onto the skin once daily.        Past Medical History, Hospitalization History, Vaccine History, Surgical History, Social History, Family History are extensively recorded on my office [facility] document, a copy of which is copied and pasted to the end of this document.     Physical Examination:     Temp:  [98.6 °F (37 °C)]   Pulse:  [69]   Resp:  [18-20]   BP: (129-145)/(73-78)   SpO2:  [98 %-99 %]     A&Ox4 in NAD  HEENT = normal, PERRL, EOMI, fundi benign  Neck = normal  Lungs = clear  Heart = RRR S1 & S2 with no murmurs, rubs, or gallops   Breasts = not examined today  Abdomen = obese benign   /GYN/rectal = not examined  Ext = no CCE  Pulses = 2+ carotids, brachials, radials, 1+ femorals, 3+ dorsal pedals, absent posterior tibials (no bruits)   Neurologic = completely normal EXCEPT for mild ataxia with mild stumbling primarily to the right   NO DVT  NO bedsores     Objective:     Admission on 07/28/2020   Component Date Value Ref Range Status    WBC 07/28/2020 7.85  3.90 - 12.70 K/uL Final    RBC 07/28/2020 5.04  4.00 - 5.40 M/uL Final    Hemoglobin 07/28/2020 14.8  12.0 - 16.0 g/dL Final    Hematocrit 07/28/2020 45.0  37.0 - 48.5 % Final    Mean Corpuscular Volume 07/28/2020 89  82 - 98 fL Final    Mean Corpuscular Hemoglobin 07/28/2020 29.4  27.0 - 31.0 pg Final    Mean Corpuscular Hemoglobin Conc 07/28/2020 32.9  32.0 - 36.0 g/dL Final    RDW 07/28/2020 13.4  11.5 - 14.5 % Final    Platelets  07/28/2020 127* 150 - 350 K/uL Final    MPV 07/28/2020 10.5  9.2 - 12.9 fL Final    Immature Granulocytes 07/28/2020 0.3  0.0 - 0.5 % Final    Gran # (ANC) 07/28/2020 5.4  1.8 - 7.7 K/uL Final    Immature Grans (Abs) 07/28/2020 0.02  0.00 - 0.04 K/uL Final    Comment: Mild elevation in immature granulocytes is non specific and   can be seen in a variety of conditions including stress response,   acute inflammation, trauma and pregnancy. Correlation with other   laboratory and clinical findings is essential.      Lymph # 07/28/2020 1.6  1.0 - 4.8 K/uL Final    Mono # 07/28/2020 0.6  0.3 - 1.0 K/uL Final    Eos # 07/28/2020 0.2  0.0 - 0.5 K/uL Final    Baso # 07/28/2020 0.04  0.00 - 0.20 K/uL Final    nRBC 07/28/2020 0  0 /100 WBC Final    Gran% 07/28/2020 68.7  38.0 - 73.0 % Final    Lymph% 07/28/2020 20.1  18.0 - 48.0 % Final    Mono% 07/28/2020 7.3  4.0 - 15.0 % Final    Eosinophil% 07/28/2020 3.1  0.0 - 8.0 % Final    Basophil% 07/28/2020 0.5  0.0 - 1.9 % Final    Differential Method 07/28/2020 Automated   Final    Sodium 07/28/2020 140  136 - 145 mmol/L Final    Potassium 07/28/2020 4.6  3.5 - 5.1 mmol/L Final    Chloride 07/28/2020 107  95 - 110 mmol/L Final    CO2 07/28/2020 23  23 - 29 mmol/L Final    Glucose 07/28/2020 105  70 - 110 mg/dL Final    BUN, Bld 07/28/2020 30* 8 - 23 mg/dL Final    Creatinine 07/28/2020 1.1  0.5 - 1.4 mg/dL Final    Calcium 07/28/2020 9.9  8.7 - 10.5 mg/dL Final    Total Protein 07/28/2020 7.7  6.0 - 8.4 g/dL Final    Albumin 07/28/2020 4.3  3.5 - 5.2 g/dL Final    Total Bilirubin 07/28/2020 1.0  0.1 - 1.0 mg/dL Final    Comment: For infants and newborns, interpretation of results should be based  on gestational age, weight and in agreement with clinical  observations.  Premature Infant recommended reference ranges:  Up to 24 hours.............<8.0 mg/dL  Up to 48 hours............<12.0 mg/dL  3-5 days..................<15.0 mg/dL  6-29  days.................<15.0 mg/dL      Alkaline Phosphatase 07/28/2020 94  55 - 135 U/L Final    AST 07/28/2020 32  10 - 40 U/L Final    ALT 07/28/2020 25  10 - 44 U/L Final    Anion Gap 07/28/2020 10  8 - 16 mmol/L Final    eGFR if African American 07/28/2020 54.8* >60 mL/min/1.73 m^2 Final    eGFR if non African American 07/28/2020 47.5* >60 mL/min/1.73 m^2 Final    Comment: Calculation used to obtain the estimated glomerular filtration  rate (eGFR) is the CKD-EPI equation.       Sed Rate 07/28/2020 12  0 - 20 mm/Hr Final    POC Glucose 07/28/2020 158* 70 - 110 Final     EKG history:   EKG 12/01/2000 = NSR, leftward axis, normal   EKG 05/01/2006 = NSR, leftward axis, normal   EKG 06/01/2011 = NSR, PACs, mild left axis, normal     EKG 08/30/2019 = NSR, LAD, LVH with secondary ST-T changes (inverted Ts in I, aVL)   EKG 02/11/2020 = NSR, LAD, LVH, inverted Ts in I, aVL   EKG 07/28/2020 = NSR, LAD, normal T waves diffusely (her previously inverted Ts in I and aVL are now upright)      CT head (no contrast) 07/28/2020 = small focal area of probable remote infarction involves the splenium of the corpus callosum on the left, mild chronic small vessel ischemia, scattered vascular calcifications within the intracranial segments of the internal carotid and vertebral arteries     CTA neck and brain (with contrast) 07/28/2020:   o Moderate atheromatous changes are noted in the aortic arch. There are moderate degrees of narrowing of the proximal segments of the left common carotid artery and subclavian artery at their origin.  o There is mild tortuosity of the common carotid arteries.  o There is prominent plaque formation observed bilaterally at the level of the carotid bifurcations.   o There is bilateral high-grade stenosis of the origins of the internal carotid arteries (approximately 90%). The remainder of the internal carotid arteries are tortuous in their extracranial segment but patent. There is stenosis of  "the origin of the right external carotid artery.  o There is significant focal stenosis of the intracranial segment of the left internal carotid artery near the level of the anterior clinoid process.  o There is mild tortuosity of the vertebral arteries.   o There are no findings of high-grade stenosis or occlusion of the major intracranial arteries otherwise. No aneurysm or vascular malformation is noted.  o The cervical segments of the vertebral arteries are patent. There are mild-moderate stenoses of the origins of the vertebral arteries bilaterally.  o Scattered groundglass type opacities are observed within the visualized lung apices bilaterally. There is prominent multilevel cervical spondylosis.    CXR 07/28/2020:   The heart size is within normal limits and there is no evidence of acute pulmonary vascular engorgement. A metallic stent projected over the cardiac silhouette. There is arteriosclerosis in the arch of the aorta. A dual lead right-sided cardiac device is unchanged in position.  The lungs are free of confluent parenchymal infiltrates. There are minor linear parenchymal opacities in the lung bases compatible with minimal atelectasis or scarring. There are no significant effusions.   There is unchanged apical pleural thickening. Monitoring electrodes overlie the chest.    Impression:   1. Plethora of complaints, beginning about 2 weeks prior to admission, suggestive of either stroke occurring about 2 weeks ago, or severe new onset "first-time-in-her-life" severe vertigo, along with sensations of episodic impending loss of consciousness with palpitaitons in the recent TAVR state (since 12/17/2019) and pacemaker implantation state (since 12/18/2019).   2. 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 Severe progression of her cerebrovascular disease; whether this is now "symptomatic" or " ""asymptomatic" is uncertain, but is important in regards to whether it is proper to advise her to consider bilateral sequential prophylactic carotid endarterectomy (see detailed discussion in the Problem Overview "bilateral carotid artery occlusion" section below)   4. 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 surgical therapy options discussion: patient advised that (in the asymptomatic state) medical treatment reduces her 5 year risk of stroke to 11%, whereas carotid endarterectomy reduces her 5 year risk of stroke to 5% with a small risk of karyn-operative stroke (see copy in her chart of the handout I gave her)she advised me 06/06/2011 that she wishes to proceed with medical treatment for now    CTA neck and brain (with contrast) 07/28/2020:   o Moderate atheromatous changes are noted in the aortic arch. There are moderate degrees of narrowing of the proximal segments of the left common carotid artery and subclavian artery at their origin.  o There is mild tortuosity of the common carotid arteries.  o There is prominent plaque formation observed bilaterally at the level of the carotid bifurcations.   o There is bilateral high-grade stenosis of the origins of the internal carotid arteries (approximately 90%). The remainder of the internal carotid arteries are tortuous in their extracranial segment but patent. There is stenosis of the origin of the right external carotid artery.  o There is significant focal " stenosis of the intracranial segment of the left internal carotid artery near the level of the anterior clinoid process.  o There is mild tortuosity of the vertebral arteries.   o There are no findings of high-grade stenosis or occlusion of the major intracranial arteries otherwise. No aneurysm or vascular malformation is noted.  o The cervical segments of the vertebral arteries are patent. There are mild-moderate stenoses of the origins of the vertebral arteries bilaterally.  o Scattered groundglass type opacities are observed within the visualized lung apices bilaterally. There is prominent multilevel cervical spondylosis.   Medical Literature Discussion re: carotid stenosis:    1.  Symptomatic patients:  Both the North American Symptomatic Carotid Endarterectomy Trial (NASCET)1 and the European Carotid Surgery Trial (ECST)2 showed a substantial benefit for surgery in patients with a symptomatic carotid artery stenosis of greater than 70 percent. In NASCET, the average cumulative ipsilateral stroke rate at 2 years was 26 percent for patients treated medically and 9 percent for those receiving the same medical treatment plus a carotid endarterectomy, corresponding to a 65 percent relative risk reduction favoring surgery. For every 100 patients treated surgically, 17 were spared an ipsilateral stroke over the next 2 years. The perioperative stroke plus death rate was 5.8 percent in the surgical group and 3.3 percent in the medical group in the same 1-month period, in spite of optimal medical therapy. Thus the excess morbidity and mortality attributable to the surgery was 2.5 percent. If minor ischemic events are excluded from the analyses, the excess major stroke and death rate for the surgical patients was 1.2 percent (2.1 less 0.9 percent) and the excess fatality rate was 0.3 percent (0.6 less 0.3 percent). The NASCET and ECST investigators concluded that their patients with a 70 to 99 percent stenosis clearly  "benefited from carotid endarterectomy. Results in [facility] hospitals should be similar if patients are properly selected and the perioperative morbidity and mortality are comparably low.  Analysis of several subgroups yielded important information. The cumulative risk of ipsilateral stroke correlated with the degree of stenosis. While the absolute risk reduction favoring the entire surgical group was 17 percent in NASCET, the risk reduction favoring those with a 90 to 99 percent stenosis was 26 percent, and this decreased to 12 percent for those with a 70 to 79 percent stenosis. The overall 26 percent cumulative risk of an ipsilateral stroke at 2 years for this group of patients with "high-grade" stenosis calculates to an annual risk of 13 percent. The operative morbidity and mortality were similar for patients with different degrees of stenosis.   2.  Asymptomatic patients.  The Asymptomatic Carotid Atherosclerosis Study (ACAS)3  demonstrated that asymptomatic people with internal carotid artery stenoses greater than 60 percent are at low risk for stroke. More than 1600 patients were randomized to treatment with the best medical therapies available versus the same medical treatment plus carotid endarterectomy. Patients in the surgical group had a risk over 5 years for ipsilateral stroke (and any perioperative stroke or death) of 5.1 percent, whereas the risk for the nonsurgical group was 11 percent. While this demonstrates a 53 percent relative risk reduction, the absolute risk reduction is only 5.9 percent over 5 years, or 1.2 percent annually. If only major strokes and death are counted, the comparable numbers are 43 percent relative risk reduction, and an absolute risk reduction of only 2.6 percent over 5 years, or 0.5 percent annually. This latter result was not statistically significant. There was no clear difference in benefit for those with moderate (60 to 80 percent) compared with severe (80 to 99 " percent) stenosis.   Many of the strokes in the surgery group were caused by preoperative angiograms. If carotid artery ultrasonography and MR angiography replace catheter angiography, this morbidity and mortality will be obviated.   While there are benefits for carotid endarterectomy in patients with asymptomatic carotid stenosis, the benefits are very small. Medical therapy for reduction of atherosclerosis risk factors in addition to aspirin (325 mg/d) generally are recommended for patients with asymptomatic carotid stenosis.        Leakage of Periprosthetic Valve, Subsequent Encounter 2/11/2020    Aortic stenosis saga:   Echocardiogram ([facility]ICKY) 09/04/2019:    Aortic valve: heavily calcified with restricted leaflet motion. Moderate aortic regurgitation.    Mitral valve: thick and calcified with restricted leaflet motion. Mild mitral stenosis and regurgitation.    Tricuspid valve: is structurally normal with good leaflet excursion. Mild regurgitation.   Pulmonary valve: is structurally normal with good leaflet excursion. No significant regurgitation.   Overall LVEF appears normal.    No obvious shunt across the interatrial septum by color flow Doppler.     Cardiac catheterization 09/14/2019 (Timothy Salgado MD):   · Mid RCA lesion, 50% stenosed, nonsignificant by iFR assessment.  · Known severe aortic stenosis from echo    CTA Cardiac TAVR 09/19/2019:    Large left UVJ stone measuring at 2.6 cm with severe left-sided hydroureteronephrosis.   Significant pneumobilia in the left hepatic lobe with air within the common bile duct.  Unclear if this is due to biliary enteric fistula, infectious process such as cholangitis, or incompetent sphincter of Oddi.  Unfortunately this is not well evaluated on this dedicated TAVR protocol examination.  Clinical correlation is advised.  Further evaluation could be performed with ERCP and dedicated abdominal CT with intravenous contrast.   TAVR measurements, as  above.   Significant aortic valve and abdominal aorta atherosclerosis.   Nonspecific mesenteric haziness, possibly mesenteric panniculitis or adenitis.   Heterogeneity of the pulmonary parenchyma possibly related to elevated pulmonary vascular resistance, small airways disease, or both.   Small bilateral pleural effusions.    Cardiac catheterization 12/17/2019 (Herbert Ashley):    Successful left transfemoral 23 mm Evolute TAVR.  No paravalvular leak, mean gradient 3, peak velocity 1.5 post TAVR.    Echocardiogram (TTE) 12/26/2019   · Mild concentric left ventricular hypertrophy   · Normal left ventricular systolic function. The estimated ejection fraction is 60%.  · No wall motion abnormalities.  · Grade I (mild) left ventricular diastolic dysfunction consistent with impaired relaxation.  · There is a transcutaneously-placed aortic bioprosthesis present.  · Mild aortic regurgitation.  · Mild-to-moderate aortic valve stenosis.  · Aortic valve area is 1.23 cm2; peak velocity is 2.84 m/s; mean gradient is 18 mmHg.  · Mild left atrial enlargement.  · Mild tricuspid regurgitation.  · Normal right ventricular systolic function.  · Moderate mitral sclerosis.  · There is mild leaflet calcification of the Mitral Valve.  · Mild mitral stenosis. MVA by PHT is 2.21 sq cm.  · Normal central venous pressure (3 mmHg).   · The estimated PA systolic pressure is 32 mmHg.:     Echocardiogram (TTE) 01/22/2020:   · Normal left ventricular systolic function. The estimated ejection fraction is 65%.  · Concentric left ventricular remodeling.  · No wall motion abnormalities.  · Grade I (mild) left ventricular diastolic dysfunction consistent with impaired relaxation.  · Normal right ventricular systolic function.  · Severe left atrial enlargement.  · There is a transcutaneously-placed aortic bioprosthesis present.   · There is paravalvular aortic insufficiency present   · Mild mitral regurgitation.  · Normal central venous pressure  (3 mmHg).  · The estimated PA systolic pressure is 29 mmHg.   · Trivial posterior pericardial effusion.      Chronic Diastolic Heart Failure 12/1/2000    Echocardiogram 12/01/2000 = hyperdynamic LV, with mild to moderate amount of pericardial fat, and left ventricular diastolic dysfunction   Echocardiogram 05/20/2011 = dilated left atrial cavity, normal LV thickness and function with LVEF @ 70%, mild aortic stenosis with valve area @ 1.09cm2, peak gradient = 34 mmHg, minimal prolapse of anterior mitral leaflet, mild TI, very small pericardial effusion        Coronary Artery Disease of Native Artery of Native Heart With Stable Angina Pectoris 9/14/2019    Cardiac catheterization 09/14/2019 (Timothy Salgado MD):    Mid RCA lesion, 50% stenosed, nonsignificant by iFR assessment.   Known severe aortic stenosis from echo  02/13/2020: it is unlikely that this trivial coronary artery disease is the cause of her episodic dyspnea that has been present since prior to 09/04/2019.     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   Ultrasound 01/31/1992 = cholelithiasis with tender gallbladder   Ultrasound abdomen 06/22/1995 = normal except for hypoechoic acoustic texture of the left hepatic lobe   ERCP #1 02/01/1992 (Pellegrin) = choledocholithiasis with cholangitis   ERCP #2 06/28/1995 (Leonor) = multiple filling defects due to stones   CT abdomen (with & without) 06/26/1995 = normal except for air in the biliary tree, fatty infiltration of the left lobe, calcified granuloma at right lung base   CT abdomen & pelvis (with & without) 12/02/1997 = normal/normal except for air in the biliary tree due to prior sphincterotomy   CTA Cardiac TAVR 09/19/2019: significant pneumobilia in the left hepatic lobe with air within the common bile duct         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 In reality, this dyspnea syndrome has been going on since prior to her TAVR and since prior to the beginning of her workup which is dated to just prior to 09/04/2019.  2. 02/12/2020: I was in the process of writing discharge orders on this lady the evening of 02/12/2020 when her nurse came to me and reported that the patient was acutely dyspneic again, without chest pain, and at that point I did not have a proper diagnosis to explain her episodic dyspnea.    3. Consultation from Pulmonologist Caden received 02/13/2020   4. Consultation from Cardiologist Marleni received 02/13/2020   5. We are attributing this to her karyn-valvular TAVR leak, with plans for tincture of time to cause this problem to settle down      Dehydration Syndrome (Resolved) 2/11/2020   Left Ureteral Stone (Resolved) 11/13/2019   Hydronephrosis With Urinary Obstruction Due to Ureteral Calculus (Resolved) 11/11/2019    Left ureteral stone with hydronephrosis requiring  manipulation 11/13/2019 @ [facility] (Paddy Dunham MD)    CTA Cardiac TAVR 09/19/2019: large left UVJ stone measuring at 2.6 cm with severe left-sided hydroureteronephrosis    Intervention 11/13/2019: 1.  Left ureteroscopy 2.  Cystoscopy 3.  Stone basket extraction 4.  Laser lithotripsy 5.  Left  ureteral stent placement 6.  Fluoro < 1h 2019 (Paddy Dunham @ [facility])    Ultrasound kidneys 2020 = moderate left-sided hydronephrosis with no shadowing stone identified; nonobstructing right-sided renal stones; likely debris within the bladder, consider correlation with urinalysis.      Urinalysis 2020 = normal    As of - she has been completely asymptomatic for this abnormality    2020 on discharge day I am ordering her to report back to her established Urologist Paddy Dunham for further investigation for this problem.        Plan:   1. Observation status   2. Holter, 24 hour, to begin 2020  3. Pacemaker interrogation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 cholelithiasis è ERCP è cholecystectomy 02/03/1992   2. 07/08-11/1994 for erysipelas of the left lower extremity   3. 06/22-23/1995 for transaminasemia, hyperbilirubinemia, cause to be determined on discharge    4. 06/26-30/1995 for acute cholangitis due to choledocholithiasis with obstructive jaundice   5. 09/14-20/1996 for acute severe RLE cellulitis    6. -2000 for chest fluttering and dyspnea   7. - for erysipelas LLE, worsening with outpatient therapy   8. - for TAVR ([facility])   9. 2019 [facility] ER visit for dyspnea on exertion and worsening leg edema, sent home from ER with negative DVT workup and slight elevation of BNP @ 188   10. - for dyspnea thought to be post-TAVR perivalvular leak syndrome complicated by dehydration on admission    11. - for new gait disturbances, loss of memory, palpitations, mild dyspnea, and proven severe progression of bilateral carotid vascular stenoses to 90% bilaterally in the ICA distributions      VACCINE HISTORY:   Td:   2005   Pneumovax:  2006   Flu Vac: she refuses to take them since prior to 2007, refuses 2013   Hepatitis B Vac: ?   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

## 2020-07-28 NOTE — NURSING
PATIENT AMBULATING AROUND ROOM. REFUSES BED ALARM. INFORMED PATIENT OF SAFETY PRECAUTIONS. CHARGE NURSE INFORMED. INSTRUCTED TO CALL BEFORE GETTING OOB. VERBALIZES UNDERSTANDING

## 2020-07-29 ENCOUNTER — CLINICAL SUPPORT (OUTPATIENT)
Dept: CARDIOLOGY | Facility: HOSPITAL | Age: 80
DRG: 037 | End: 2020-07-29
Attending: INTERNAL MEDICINE
Payer: MEDICARE

## 2020-07-29 LAB
GLUCOSE SERPL-MCNC: 125 MG/DL (ref 70–110)
GLUCOSE SERPL-MCNC: 133 MG/DL (ref 70–110)
GLUCOSE SERPL-MCNC: 191 MG/DL (ref 70–110)
GLUCOSE SERPL-MCNC: 250 MG/DL (ref 70–110)

## 2020-07-29 PROCEDURE — G0378 HOSPITAL OBSERVATION PER HR: HCPCS

## 2020-07-29 PROCEDURE — 93226 XTRNL ECG REC<48 HR SCAN A/R: CPT

## 2020-07-29 PROCEDURE — 25000003 PHARM REV CODE 250: Performed by: INTERNAL MEDICINE

## 2020-07-29 RX ORDER — ACETAMINOPHEN 325 MG/1
650 TABLET ORAL EVERY 8 HOURS PRN
Status: DISCONTINUED | OUTPATIENT
Start: 2020-07-29 | End: 2020-08-04 | Stop reason: HOSPADM

## 2020-07-29 RX ADMIN — ACETAMINOPHEN 650 MG: 325 TABLET, FILM COATED ORAL at 11:07

## 2020-07-29 RX ADMIN — LISINOPRIL 10 MG: 10 TABLET ORAL at 07:07

## 2020-07-29 RX ADMIN — ATORVASTATIN CALCIUM 10 MG: 10 TABLET, FILM COATED ORAL at 08:07

## 2020-07-29 RX ADMIN — METOPROLOL SUCCINATE 25 MG: 25 TABLET, FILM COATED, EXTENDED RELEASE ORAL at 07:07

## 2020-07-29 RX ADMIN — NITROGLYCERIN 1 PATCH: 0.1 PATCH TRANSDERMAL at 09:07

## 2020-07-29 NOTE — CONSULTS
"[facility]  Department of Cardiothoracic Surgery  Consult Note      PATIENT NAME: Roxanne Hernandez  MRN: [id number]  TODAY'S DATE: 07/29/2020  ADMIT DATE: 7/28/2020                                                                 CONSULT REQUESTED BY: Yoni Hernandez MD      PRINCIPAL PROBLEM: Syncope and collapse      REASON FOR CONSULT: Carotid stenosis        HPI: Patient seen, chart/films reviewed        Review of patient's allergies indicates:   Allergen Reactions    Azithromycin Swelling     All mycins    Statins-hmg-coa reductase inhibitors Other (See Comments)     Liver function  "It attacks my liver and makes me very sick"    Sulfa (sulfonamide antibiotics) Hives       REVIEW OF SYSTEMS  CARDIOVASCULAR: No recent chest pain, palpitations, arm, neck, or jaw pain  RESPIRATORY: No recent fever, cough chills, SOB or congestion  : No blood in the urine  GI: No Nausea, vomiting, constipation, diarrhea, blood, or reflux  MUSCULOSKELETAL: No myalgias  NEURO: No lightheadedness or dizziness  EYES: No Double vision, blurry, vision or headache       VITAL SIGNS (Most Recent)  Temp: 98.1 °F (36.7 °C) (07/29/20 1513)  Pulse: 66 (07/29/20 1513)  Resp: 20 (07/29/20 1513)  BP: (!) 140/73 (07/29/20 1513)  SpO2: 95 % (07/29/20 1513)    VENTILATION STATUS  Resp: 20 (07/29/20 1513)  SpO2: 95 % (07/29/20 1513)       I & O (Last 24H):No intake or output data in the 24 hours ending 07/29/20 1604    WEIGHTS  Wt Readings from Last 1 Encounters:   07/29/20 0406 84.7 kg (186 lb 11.7 oz)   07/28/20 1626 81.4 kg (179 lb 7.3 oz)       PHYSICAL EXAM  CONSTITUTIONAL: Well built, well nourished in no apparent distress  NECK: no carotid bruit, no JVD  LUNGS: CTA  CHEST WALL: No tenderness  HEART: regular rate and rhythm, S1, S2 normal, no murmur, click, rub or gallop   ABDOMEN: soft, non-tender; bowel sounds normal; no masses, no organomegaly  EXTREMITIES: Extremities normal, no edema  NEURO: AAO X 3    MEDICATIONS   SCHEDULED " MEDS:   atorvastatin  10 mg Oral Daily    clopidogreL  75 mg Oral QAM    lisinopriL  10 mg Oral QAM    metoprolol succinate  25 mg Oral QAM    nitroGLYCERIN 0.1 mg/hr TD PT24  1 patch Transdermal Daily       CONTINUOUS INFUSIONS:    PRN MEDS:acetaminophen, dextrose 50%, dextrose 50%, glucagon (human recombinant), glucose, glucose, sodium chloride 0.9%    LABS AND DIAGNOSTICS   CBC LAST 3 DAYS  Recent Labs   Lab 07/28/20  1635   WBC 7.85   RBC 5.04   HGB 14.8   HCT 45.0   MCV 89   MCH 29.4   MCHC 32.9   RDW 13.4   *   MPV 10.5   GRAN 68.7  5.4   LYMPH 20.1  1.6   MONO 7.3  0.6   BASO 0.04   NRBC 0       COAGULATION LAST 3 DAYS  No results for input(s): LABPT, INR, APTT in the last 168 hours.    CHEMISTRY LAST 3 DAYS  Recent Labs   Lab 07/28/20  1635      K 4.6      CO2 23   ANIONGAP 10   BUN 30*   CREATININE 1.1      CALCIUM 9.9   ALBUMIN 4.3   PROT 7.7   ALKPHOS 94   ALT 25   AST 32   BILITOT 1.0       CARDIAC PROFILE LAST 3 DAYS  No results for input(s): BNP, CPK, CPKMB, LDH, TROPONINI in the last 168 hours.    ENDOCRINE LAST 3 DAYS  No results for input(s): TSH, PROCAL in the last 168 hours.    LAST ARTERIAL BLOOD GAS  ABG  No results for input(s): PH, PO2, PCO2, HCO3, BE in the last 168 hours.    LAST 7 DAYS MICROBIOLOGY   Microbiology Results (last 7 days)     ** No results found for the last 168 hours. **          MOST RECENT IMAGING  CTA Head and Neck (xpd)  The study was performed with thin sections with subsequent 3-D  reformations  and reconstructions at multiple planes with images  permanently stored in the PACS system.    All CT scans at this facility use dose modulation, degenerative  reconstruction  and/or weight-based dosing when appropriate to reduce  radiation dose to as low as reasonably achievable.    CTA of the head and neck    HISTORY: Ataxia.    Examination utilizes 100 cc of intravenous nonionic contrast material.    There is degradation of the examination by  patient motion.    Moderate atheromatous changes are noted in the aortic arch. There are  moderate degrees of narrowing of the proximal segments of the left  common carotid artery and subclavian artery at their origin.    There is mild tortuosity of the common carotid arteries.    There is prominent plaque formation observed bilaterally at the level  of the carotid bifurcations. There is bilateral high-grade stenosis of  the origins of the internal carotid arteries (approximately 90%). The  remainder of the internal carotid arteries are tortuous in their  extracranial segment but patent. There is stenosis of the origin of  the right external carotid artery.    There is significant focal stenosis of the intracranial segment of the  left internal carotid artery near the level of the anterior clinoid  process.    There is mild tortuosity of the vertebral arteries. There are no  findings of high-grade stenosis or occlusion of the major intracranial  arteries otherwise. No aneurysm or vascular malformation is noted.    The cervical segments of the vertebral arteries are patent. There are  mild-moderate stenoses of the origins of the vertebral arteries  bilaterally.    Scattered groundglass type opacities are observed within the  visualized lung apices bilaterally. There is prominent multilevel  cervical spondylosis.    IMPRESSION:    High-grade stenoses involving the origin/proximal segments of the  internal carotid arteries bilaterally. There is significant focal  stenosis of the intracranial segment of the left ICA.    No findings of high-grade stenosis or occlusion involving the  remaining intracranial arteries.    Scattered groundglass type pulmonary opacities in the visualized lung  apices.    Electronically Signed by Kunal Benavides M.D. on 7/28/2020 6:53 PM  CT Head Without Contrast  CMS MANDATED QUALITY DATA - CT RADIATION 436    All CT scans at this facility utilize dose modulation, iterative  reconstruction, and/or  weight based dosing when appropriate to reduce  radiation dose to as low as reasonably achievable.    CT of the brain without contrast    HISTORY: Cerebrovascular accident.    There are no findings of acute hemorrhage or infarction. A small focal  area of probable remote infarction involves the splenium of the corpus  callosum on the left. There are scattered changes of decrease in  attenuation of the subcortical and periventricular white matter of  both hemispheres compatible with mild chronic small vessel ischemia.    There is no evidence of intra-axial mass or mass effect. There is no  midline shift. The ventricular system is appropriate in size and  position for age. There are no extra-axial fluid collections.    There are scattered vascular calcifications within the intracranial  segments of the internal carotid and vertebral arteries.    No acute osseous abnormality is identified.    IMPRESSION:    No acute intracranial abnormality.    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    Chronic small vessel ischemic changes.    Electronically Signed by Kunal Benavides M.D. on 7/28/2020 6:40 PM  X-Ray Chest PA And Lateral  Chest, 2 views    HISTORY: Syncope.    Comparison is made to 2/11/2020.    The heart size is within normal limits and there is no evidence of  acute pulmonary vascular engorgement. A metallic stent projected over  the cardiac silhouette. There is arteriosclerosis in the arch of the  aorta. A dual lead right-sided cardiac device is unchanged in  position.    The lungs are free of confluent parenchymal infiltrates. There are  minor linear parenchymal opacities in the lung bases compatible with  minimal atelectasis or scarring. There are no significant effusions.  There is unchanged apical pleural thickening. Monitoring electrodes  overlie the chest.    IMPRESSION:    Minimal basilar platelike atelectasis or scarring.    Additional incidental observations as  above.    Electronically Signed by Kunal Benavides M.D. on 7/28/2020 5:26 PM      [facility]  Results for orders placed during the hospital encounter of 01/22/20   Echo Color Flow Doppler? Yes    Narrative · Normal left ventricular systolic function. The estimated ejection   fraction is 65%.  · Concentric left ventricular remodeling.  · No wall motion abnormalities.  · Grade I (mild) left ventricular diastolic dysfunction consistent with   impaired relaxation.  · Normal right ventricular systolic function.  · Severe left atrial enlargement.  · There is a transcutaneously-placed aortic bioprosthesis present. There   is paravalvular aortic insufficiency present.  · Mild mitral regurgitation.  · Normal central venous pressure (3 mmHg).  · The estimated PA systolic pressure is 29 mmHg.  · Trivial posterior pericardial effusion.          CURRENT/PREVIOUS VISIT EKG  Results for orders placed or performed during the hospital encounter of 07/28/20   EKG 12-lead    Collection Time: 07/28/20  8:42 PM    Narrative    Test Reason : R55,    Vent. Rate : 071 BPM     Atrial Rate : 071 BPM     P-R Int : 148 ms          QRS Dur : 096 ms      QT Int : 412 ms       P-R-T Axes : 062 -38 036 degrees     QTc Int : 447 ms    Normal sinus rhythm  Left axis deviation  Minimal voltage criteria for LVH, may be normal variant  Abnormal ECG  When compared with ECG of 11-MAY-2020 08:48,  No significant change was found    Referred By:             Confirmed By:          HOSPITAL PROBLEMS WITH ASSESSMENT & PLAN:     Active Hospital Problems    Diagnosis    *Syncope and collapse    Bilateral carotid artery occlusion      Carotid ultrasound 05/20/2011 = right 50-69% stenosis, left >70% stenosis, normal vertebrals    CTA neck and brain 06/01/2011:   o 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 surgical therapy options discussion: patient advised that (in the asymptomatic state) medical treatment reduces her 5 year risk of stroke to 11%, whereas carotid endarterectomy reduces her 5 year risk of stroke to 5% with a small risk of karyn-operative stroke (see copy in her chart of the handout I gave her)she advised me 06/06/2011 that she wishes to proceed with medical treatment for now    CTA neck and brain (with contrast) 07/28/2020:   o Moderate atheromatous changes are noted in the aortic arch. There are moderate degrees of narrowing of the proximal segments of the left common carotid artery and subclavian artery at their origin.  o There is mild tortuosity of the common carotid arteries.  o There is prominent plaque formation observed bilaterally at the level of the carotid bifurcations.   o There is bilateral high-grade stenosis of the origins of the internal carotid arteries (approximately 90%). The remainder of the internal carotid arteries are tortuous in their extracranial segment but patent. There is stenosis of the origin of the right external carotid artery.  o There is significant focal stenosis of the intracranial segment of the left internal carotid artery near the level of the anterior clinoid process.  o There is mild tortuosity of the vertebral arteries.   o There are no findings of high-grade stenosis or occlusion of the major intracranial arteries otherwise. No aneurysm or vascular malformation is noted.  o The cervical segments of the vertebral arteries are patent. There are mild-moderate stenoses of the origins of the vertebral arteries bilaterally.  o Scattered groundglass type opacities are observed within the visualized lung apices bilaterally. There is prominent multilevel cervical spondylosis.   Medical Literature Discussion re: carotid stenosis:    1.  Symptomatic patients:  Both the North American Symptomatic Carotid Endarterectomy  "Trial (NASCET)1 and the European Carotid Surgery Trial (ECST)2 showed a substantial benefit for surgery in patients with a symptomatic carotid artery stenosis of greater than 70 percent. In NASCET, the average cumulative ipsilateral stroke rate at 2 years was 26 percent for patients treated medically and 9 percent for those receiving the same medical treatment plus a carotid endarterectomy, corresponding to a 65 percent relative risk reduction favoring surgery. For every 100 patients treated surgically, 17 were spared an ipsilateral stroke over the next 2 years. The perioperative stroke plus death rate was 5.8 percent in the surgical group and 3.3 percent in the medical group in the same 1-month period, in spite of optimal medical therapy. Thus the excess morbidity and mortality attributable to the surgery was 2.5 percent. If minor ischemic events are excluded from the analyses, the excess major stroke and death rate for the surgical patients was 1.2 percent (2.1 less 0.9 percent) and the excess fatality rate was 0.3 percent (0.6 less 0.3 percent). The NASCET and ECST investigators concluded that their patients with a 70 to 99 percent stenosis clearly benefited from carotid endarterectomy. Results in [facility] hospitals should be similar if patients are properly selected and the perioperative morbidity and mortality are comparably low.  Analysis of several subgroups yielded important information. The cumulative risk of ipsilateral stroke correlated with the degree of stenosis. While the absolute risk reduction favoring the entire surgical group was 17 percent in NASCET, the risk reduction favoring those with a 90 to 99 percent stenosis was 26 percent, and this decreased to 12 percent for those with a 70 to 79 percent stenosis. The overall 26 percent cumulative risk of an ipsilateral stroke at 2 years for this group of patients with "high-grade" stenosis calculates to an annual risk of 13 percent. The operative " morbidity and mortality were similar for patients with different degrees of stenosis.   2.  Asymptomatic patients.  The Asymptomatic Carotid Atherosclerosis Study (ACAS)3  demonstrated that asymptomatic people with internal carotid artery stenoses greater than 60 percent are at low risk for stroke. More than 1600 patients were randomized to treatment with the best medical therapies available versus the same medical treatment plus carotid endarterectomy. Patients in the surgical group had a risk over 5 years for ipsilateral stroke (and any perioperative stroke or death) of 5.1 percent, whereas the risk for the nonsurgical group was 11 percent. While this demonstrates a 53 percent relative risk reduction, the absolute risk reduction is only 5.9 percent over 5 years, or 1.2 percent annually. If only major strokes and death are counted, the comparable numbers are 43 percent relative risk reduction, and an absolute risk reduction of only 2.6 percent over 5 years, or 0.5 percent annually. This latter result was not statistically significant. There was no clear difference in benefit for those with moderate (60 to 80 percent) compared with severe (80 to 99 percent) stenosis.   Many of the strokes in the surgery group were caused by preoperative angiograms. If carotid artery ultrasonography and MR angiography replace catheter angiography, this morbidity and mortality will be obviated.   While there are benefits for carotid endarterectomy in patients with asymptomatic carotid stenosis, the benefits are very small. Medical therapy for reduction of atherosclerosis risk factors in addition to aspirin (325 mg/d) generally are recommended for patients with asymptomatic carotid stenosis.            ASSESSMENT & PLAN:  Bilateral high grade ICA stenosis ( unclear if admitting symptoms represent symptomatic disease or not)       RECOMMENDATIONS:  Given severity of disease bilateral carotid artery repair reasonable even if this is  consideredasymptomatic)  Can proceed this hospitalization if all agree  Since there is tandem stenosis on left side would consider addressing that side first      Thank you for the consult.     Radu Kent MD  [facility]  Department of Cardiothoracic Surgery  Date of Service: 07/29/2020  4:04 PM

## 2020-07-29 NOTE — PLAN OF CARE
FRASER form scanned into patient record.    07/29/20 1153   FRASER Message   Medicare Outpatient and Observation Notification regarding financial responsibility Given to patient/caregiver;Explained to patient/caregiver;Signed/date by patient/caregiver   Date FRASER was signed 07/29/20   Time FRASER was signed 1143

## 2020-07-29 NOTE — CONSULTS
Former[facility]  Cardiology  Consult Note    Patient Name: Roxanne Hernandez  MRN: [id number]  Admission Date: 7/28/2020  Hospital Length of Stay: 0 days  Code Status: Full Code   Attending Provider: Yoni Hernandez MD   Consulting Provider: Carmen Yepez MD  Primary Care Physician: Yoni Hernandez MD  Principal Problem:Syncope and collapse    Patient information was obtained from patient, past medical records and ER records.     Consults  Subjective:     REASON FOR CONSULT:  Altered mental status     HPI:  80-year-old female with a past medical history significant for transcatheter aortic valve replacement, pacemaker in-situ, hypertension, dyslipidemia was admitted to the hospital after being evaluated by Dr. Hernandez in the clinic.  She reportedly was having episodes over the past 2 weeks where she is feeling transiently dizzy whenever she tries gets up and walks.  She also reports palpitations with a sensation of heart pounding.  She denies any heart racing episodes.  She denies any syncopal episodes.  She reports seeing black spots in front of her eyes bilaterally.  Symptoms however are more prominent in the left eye.  CT of the head does not show any acute intracranial abnormalities.  CTA showed high-grade bilateral internal carotid artery stenosis.    Past Medical History:   Diagnosis Date    Cervical cancer     Diabetes mellitus     Hypertension        Past Surgical History:   Procedure Laterality Date    A-V CARDIAC PACEMAKER INSERTION N/A 12/18/2019    Procedure: INSERTION, CARDIAC PACEMAKER, DUAL CHAMBER;  Surgeon: Nnamdi Macdonald MD;  Location: [facility] EP LAB;  Service: Cardiology;  Laterality: N/A;  lbbb, dppm, SJM, anes, pr, 6077    CARDIAC CATHETERIZATION      CHOLECYSTECTOMY      CORONARY ANGIOGRAPHY Left 9/4/2019    Procedure: ANGIOGRAM, CORONARY ARTERY;  Surgeon: Carmen Yepez MD;  Location: [facility] CATH/EP LAB;  Service: Cardiology;  Laterality: Left;    CYSTOSCOPY N/A 11/13/2019     "Procedure: CYSTOSCOPY;  Surgeon: Paddy Dunham MD;  Location: [facility] OR [address] [place] [address];  Service: Urology;  Laterality: N/A;    HYSTERECTOMY      JOINT REPLACEMENT      KNEE ARTHROPLASTY Right     LASER LITHOTRIPSY Left 11/13/2019    Procedure: LITHOTRIPSY, USING LASER;  Surgeon: Paddy Dunham MD;  [facility];  Service: Urology;  Laterality: Left;    TRANSCATHETER AORTIC VALVE REPLACEMENT (TAVR) N/A 12/17/2019    Procedure: REPLACEMENT, AORTIC VALVE, TRANSCATHETER (TAVR);  Surgeon: Herbert Ashley MD;  Location: [facility] CATH LAB;  Service: Cardiology;  Laterality: N/A;    URETEROSCOPIC REMOVAL OF URETERIC CALCULUS Left 11/13/2019    Procedure: REMOVAL, CALCULUS, URETER, URETEROSCOPIC;  Surgeon: Paddy Dunham MD;  Location: [facility] OR [address] [place] [address];  Service: Urology;  Laterality: Left;    URETEROSCOPY Left 11/13/2019    Procedure: URETEROSCOPY;  Surgeon: Paddy Dunham MD;  Location: [address];  Service: Urology;  Laterality: Left;       Review of patient's allergies indicates:   Allergen Reactions    Azithromycin Swelling     All mycins    Statins-hmg-coa reductase inhibitors Other (See Comments)     Liver function  "It attacks my liver and makes me very sick"    Sulfa (sulfonamide antibiotics) Hives       Current Facility-Administered Medications on File Prior to Encounter   Medication    0.9%  NaCl infusion    aspirin EC tablet 325 mg    diazePAM tablet 5 mg    diphenhydrAMINE capsule 25 mg     Current Outpatient Medications on File Prior to Encounter   Medication Sig    cholecalciferol, vitamin D3, 50 mcg (2,000 unit) Chew Take 2,000 Units by mouth every morning.    atorvastatin (LIPITOR) 10 MG tablet Take 10 mg by mouth once daily.    calcium carbonate (CALCIUM 300 ORAL) Take 600 mg by mouth.    clopidogreL (PLAVIX) 75 mg tablet Take 1 tablet (75 mg total) by mouth every morning.    lisinopril 10 MG tablet Take 1 tablet (10 mg total) by mouth every morning.    metoprolol succinate " (TOPROL-XL) 25 MG 24 hr tablet Take 1 tablet (25 mg total) by mouth every morning.    nitroGLYCERIN 0.1 mg/hr TD PT24 (NITRODUR) 0.1 mg/hr PT24 Place 1 patch onto the skin once daily.       Scheduled Meds:   atorvastatin  10 mg Oral Daily    clopidogreL  75 mg Oral QAM    lisinopriL  10 mg Oral QAM    metoprolol succinate  25 mg Oral QAM    nitroGLYCERIN 0.1 mg/hr TD PT24  1 patch Transdermal Daily     Continuous Infusions:  PRN Meds:.dextrose 50%, dextrose 50%, glucagon (human recombinant), glucose, glucose, sodium chloride 0.9%    Family History     Problem Relation (Age of Onset)    Cancer Maternal Grandfather, Paternal Grandfather    Hypertension Father          Tobacco Use    Smoking status: Former Smoker     Packs/day: 1.00     Years: 25.00     Pack years: 25.00     Types: Cigarettes     Quit date: 1980     Years since quittin.6    Smokeless tobacco: Never Used   Substance and Sexual Activity    Alcohol use: Not Currently    Drug use: Never    Sexual activity: Not on file       ROS   No significant headaches or sore throat or runny nose.   No recent changes in vision.   No recent changes in hearing.  No dysphagia or odynophagia.  Denies any chest pain and shortness of breath.   Denies any cough or hemoptysis.   Denies any abdominal pain, nausea, vomiting, diarrhea or constipation.   Denies any dysuria or polyuria.   Denies any fevers or chills.   Denies any recent significant weight changes.   Denies bleeding diathesis    Objective:     Vital Signs (Most Recent):  Temp: 97.8 °F (36.6 °C) (20)  Pulse: 72 (20)  Resp: 16 (20)  BP: 131/67 (20)  SpO2: 98 % (20) Vital Signs (24h Range):  Temp:  [97.8 °F (36.6 °C)-98.6 °F (37 °C)] 97.8 °F (36.6 °C)  Pulse:  [68-72] 72  Resp:  [16-20] 16  SpO2:  [97 %-99 %] 98 %  BP: (129-146)/(65-78) 131/67     Weight: 84.7 kg (186 lb 11.7 oz)  Body mass index is 40.41 kg/m².    SpO2: 98 %  O2 Device (Oxygen  Therapy): room air    No intake or output data in the 24 hours ending 07/29/20 0950    Lines/Drains/Airways     Peripheral Intravenous Line                 Peripheral IV - Single Lumen 07/28/20 1715 20 G Posterior;Right Forearm less than 1 day                Physical Exam  HEENT: Normocephalic, atraumatic, PERRL, Conjunctiva pink, no scleral icterus.   CVS: S1S2+, RRR, ESM+, no rubs or gallops, JVP: Normal.  LUNGS: Clear  ABDOMEN: Soft, NT, BS+  EXTREMITIES: No cyanosis, clubbing or edema  NEURO: AAO X 3.       Significant Labs:   BMP:   Recent Labs   Lab 07/28/20  1635         K 4.6      CO2 23   BUN 30*   CREATININE 1.1   CALCIUM 9.9   , CMP   Recent Labs   Lab 07/28/20  1635      K 4.6      CO2 23      BUN 30*   CREATININE 1.1   CALCIUM 9.9   PROT 7.7   ALBUMIN 4.3   BILITOT 1.0   ALKPHOS 94   AST 32   ALT 25   ANIONGAP 10   ESTGFRAFRICA 54.8*   EGFRNONAA 47.5*   , CBC   Recent Labs   Lab 07/28/20  1635   WBC 7.85   HGB 14.8   HCT 45.0   *   , INR No results for input(s): INR, PROTIME in the last 48 hours., Lipid Panel No results for input(s): CHOL, HDL, LDLCALC, TRIG, CHOLHDL in the last 48 hours. and Troponin No results for input(s): TROPONINI in the last 48 hours.    Significant Imaging: Reviewed  Assessment and Plan:     IMPRESSION:  Episodes of dizziness and vision problems. Likely etiology is carotid artery stenosis.   Carotid artery stenosis. High-grade stenoses (90%) involving the origin/proximal segments of the internal carotid arteries bilaterally. There is significant focal stenosis of the intracranial segment of the left ICA.  Palpitations. No significant arrhythmias on telemetry thus far.   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   Severe aortic valve stenosis. Aortic valve area is 0.82 cm2; peak velocity is 4.6 m/s; mean gradient is 53 mmHg. s/p TAVR 12/17/2019  Hypertension.  Controlled.  Dyslipidemia.  Diabetes mellitus.     Dual chamber PM in situ. [employer] device. 12/19/2019. Functioning appropriately per last check in 5/2020.   LBBB    RECOMMENDATIONS:  1.  In view of the symptomatic high-grade 90% carotid artery stenosis I would recommend carotid artery intervention.  At this point in time I would recommend surgical carotid endarterectomy.  2.  Interrogate pacemaker.  3.  Follow-up on the COVID-19 testing.  4.  Continue current medical regimen for now.  5.  Further decisions to be follow based upon the hospital course.  6.  Discussed with Dr. Hernandez and Dr. Kent    Thank you for your consult. I will follow-up with patient. Please contact us if you have any additional questions.    Carmen Yepez MD  Cardiology   [facility]

## 2020-07-30 ENCOUNTER — ANESTHESIA EVENT (OUTPATIENT)
Dept: SURGERY | Facility: HOSPITAL | Age: 80
DRG: 037 | End: 2020-07-30
Payer: MEDICARE

## 2020-07-30 PROBLEM — R55 SYNCOPE: Status: ACTIVE | Noted: 2020-07-30

## 2020-07-30 LAB
ABO + RH BLD: NORMAL
BLD GP AB SCN CELLS X3 SERPL QL: NORMAL
GLUCOSE SERPL-MCNC: 130 MG/DL (ref 70–110)
GLUCOSE SERPL-MCNC: 136 MG/DL (ref 70–110)
GLUCOSE SERPL-MCNC: 172 MG/DL (ref 70–110)
GLUCOSE SERPL-MCNC: 235 MG/DL (ref 70–110)
SARS-COV-2 RNA RESP QL NAA+PROBE: NOT DETECTED

## 2020-07-30 PROCEDURE — 36415 COLL VENOUS BLD VENIPUNCTURE: CPT

## 2020-07-30 PROCEDURE — 21400001 HC TELEMETRY ROOM

## 2020-07-30 PROCEDURE — 86901 BLOOD TYPING SEROLOGIC RH(D): CPT

## 2020-07-30 PROCEDURE — 25000003 PHARM REV CODE 250: Performed by: INTERNAL MEDICINE

## 2020-07-30 PROCEDURE — 82962 GLUCOSE BLOOD TEST: CPT

## 2020-07-30 RX ADMIN — NITROGLYCERIN 1 PATCH: 0.1 PATCH TRANSDERMAL at 09:07

## 2020-07-30 RX ADMIN — METOPROLOL SUCCINATE 25 MG: 25 TABLET, FILM COATED, EXTENDED RELEASE ORAL at 08:07

## 2020-07-30 RX ADMIN — METOPROLOL SUCCINATE 25 MG: 25 TABLET, FILM COATED, EXTENDED RELEASE ORAL at 05:07

## 2020-07-30 RX ADMIN — ATORVASTATIN CALCIUM 10 MG: 10 TABLET, FILM COATED ORAL at 08:07

## 2020-07-30 RX ADMIN — ACETAMINOPHEN 650 MG: 325 TABLET, FILM COATED ORAL at 05:07

## 2020-07-30 RX ADMIN — LISINOPRIL 10 MG: 10 TABLET ORAL at 05:07

## 2020-07-30 RX ADMIN — LISINOPRIL 10 MG: 10 TABLET ORAL at 08:07

## 2020-07-30 RX ADMIN — ACETAMINOPHEN 650 MG: 325 TABLET, FILM COATED ORAL at 10:07

## 2020-07-30 NOTE — ANESTHESIA PREPROCEDURE EVALUATION
07/30/2020  Roxanne Hernandez is a 80 y.o., female.    Patient Active Problem List   Diagnosis    Nonrheumatic aortic valve stenosis    Essential hypertension    Diabetes mellitus without complication    Pneumobilia    Nonrheumatic mitral valve stenosis    Nonrheumatic aortic valve insufficiency    Nodular calcific aortic valve stenosis    Acute on chronic diastolic heart failure    S/P TAVR (transcatheter aortic valve replacement)    Left bundle branch block    Presence of permanent cardiac pacemaker    Leakage of periprosthetic valve, subsequent encounter    Chronic diastolic heart failure    Benign neoplasm of transverse colon    Coronary artery disease of native artery of native heart with stable angina pectoris    Syncope and collapse    Bilateral carotid artery occlusion    Syncope       Past Surgical History:   Procedure Laterality Date    A-V CARDIAC PACEMAKER INSERTION N/A 12/18/2019    Procedure: INSERTION, CARDIAC PACEMAKER, DUAL CHAMBER;  Surgeon: Nnamdi Macdonald MD;  Location: [facility] EP LAB;  Service: Cardiology;  Laterality: N/A;  lbbb, dppm, SJM, anes, pr, 6077    CARDIAC CATHETERIZATION      CHOLECYSTECTOMY      CORONARY ANGIOGRAPHY Left 9/4/2019    Procedure: ANGIOGRAM, CORONARY ARTERY;  Surgeon: Carmen Yepez MD;  Location: [facility] CATH/EP LAB;  Service: Cardiology;  Laterality: Left;    CYSTOSCOPY N/A 11/13/2019    Procedure: CYSTOSCOPY;  Surgeon: Paddy Dunham MD;  Location: [facility] OR [address] [place] [address];  Service: Urology;  Laterality: N/A;    HYSTERECTOMY      JOINT REPLACEMENT      KNEE ARTHROPLASTY Right     LASER LITHOTRIPSY Left 11/13/2019    Procedure: LITHOTRIPSY, USING LASER;  Surgeon: Paddy Dunham MD;  Location: [facility] OR [address] [place] [address];  Service: Urology;  Laterality: Left;    TRANSCATHETER AORTIC VALVE REPLACEMENT (TAVR) N/A 12/17/2019    Procedure: REPLACEMENT, AORTIC  VALVE, TRANSCATHETER (TAVR);  Surgeon: Herbert Ashley MD;  Location: [facility] CATH LAB;  Service: Cardiology;  Laterality: N/A;    URETEROSCOPIC REMOVAL OF URETERIC CALCULUS Left 11/13/2019    Procedure: REMOVAL, CALCULUS, URETER, URETEROSCOPIC;  Surgeon: Paddy Dunham MD;  [facility];  Service: Urology;  Laterality: Left;    URETEROSCOPY Left 11/13/2019    Procedure: URETEROSCOPY;  Surgeon: Paddy Dunham MD;  [facility];  Service: Urology;  Laterality: Left;        Tobacco Use:  The patient  reports that she quit smoking about 40 years ago. Her smoking use included cigarettes. She has a 25.00 pack-year smoking history. She has never used smokeless tobacco.     Results for orders placed or performed during the hospital encounter of 07/28/20   EKG 12-lead    Collection Time: 07/28/20  8:42 PM    Narrative    Test Reason : R55,    Vent. Rate : 071 BPM     Atrial Rate : 071 BPM     P-R Int : 148 ms          QRS Dur : 096 ms      QT Int : 412 ms       P-R-T Axes : 062 -38 036 degrees     QTc Int : 447 ms    Normal sinus rhythm  Left axis deviation  Minimal voltage criteria for LVH, may be normal variant  Abnormal ECG  When compared with ECG of 11-MAY-2020 08:48,  No significant change was found  Confirmed by Dima Carr MD ([id number]) on 7/30/2020 12:23:12 PM    Referred By:             Confirmed By:Dima Carr MD             Lab Results   Component Value Date    WBC 7.85 07/28/2020    HGB 14.8 07/28/2020    HCT 45.0 07/28/2020    MCV 89 07/28/2020     (L) 07/28/2020     BMP  Lab Results   Component Value Date     07/28/2020    K 4.6 07/28/2020     07/28/2020    CO2 23 07/28/2020    BUN 30 (H) 07/28/2020    CREATININE 1.1 07/28/2020    CALCIUM 9.9 07/28/2020    ANIONGAP 10 07/28/2020     07/28/2020     (H) 03/16/2020     (H) 02/13/2020       Results for orders placed during the hospital encounter of 01/22/20   Echo Color Flow Doppler? Yes     Narrative · Normal left ventricular systolic function. The estimated ejection   fraction is 65%.  · Concentric left ventricular remodeling.  · No wall motion abnormalities.  · Grade I (mild) left ventricular diastolic dysfunction consistent with   impaired relaxation.  · Normal right ventricular systolic function.  · Severe left atrial enlargement.  · There is a transcutaneously-placed aortic bioprosthesis present. There   is paravalvular aortic insufficiency present.  · Mild mitral regurgitation.  · Normal central venous pressure (3 mmHg).  · The estimated PA systolic pressure is 29 mmHg.  · Trivial posterior pericardial effusion.              Pre-op Assessment    I have reviewed the Patient Summary Reports.     I have reviewed the Nursing Notes. I have reviewed the NPO Status.   I have reviewed the Medications.     Review of Systems  Anesthesia Hx:  No problems with previous Anesthesia    Social:  Former Smoker, No Alcohol Use    Hematology/Oncology:  Hematology Normal       -- Cancer in past history (cervical s/p dung):    EENT/Dental:EENT/Dental Normal   Cardiovascular:   Pacemaker ([employer]) Hypertension Valvular problems/Murmurs ECG has been reviewed.    Pulmonary:   Shortness of breath (related to Valve Dz)    Renal/:   renal calculi    Hepatic/GI:   GERD, well controlled    Musculoskeletal:  Musculoskeletal Normal    Neurological:  Neurology Normal Presyncopal episodes   Endocrine:   Diabetes (Blood Glucose this am 116)    Dermatological:  Skin Normal    Psych:  Psychiatric Normal           Physical Exam  General:  Well nourished, Obesity    Airway/Jaw/Neck:  Airway Findings: Mouth Opening: Normal Tongue: Normal  General Airway Assessment: Adult  Mallampati: III  Improves to II with phonation.  TM Distance: Normal, at least 6 cm  Jaw/Neck Findings:     Neck ROM: Normal ROM      Dental:  Dental Findings: Edentulous   Chest/Lungs:  Chest/Lungs Findings: Clear to auscultation, Normal Respiratory Rate      Heart/Vascular:  Heart Findings: Rate: Normal  Rhythm: Regular Rhythm  Sounds: Normal  Heart Murmur  Systolic, Diastolic        Mental Status:  Mental Status Findings:  Cooperative, Alert and Oriented         Anesthesia Plan  Type of Anesthesia, risks & benefits discussed:  Anesthesia Type:  general  Patient's Preference:   Intra-op Monitoring Plan: standard ASA monitors and arterial line  Intra-op Monitoring Plan Comments:   Post Op Pain Control Plan: per primary service following discharge from PACU  Post Op Pain Control Plan Comments:   Induction:   IV  Beta Blocker:  Patient is on a Beta-Blocker and has received one dose within the past 24 hours (No further documentation required).       Informed Consent: Patient understands risks and agrees with Anesthesia plan.  Questions answered. Anesthesia consent signed with patient.  ASA Score: 4     Day of Surgery Review of History & Physical:            Ready For Surgery From Anesthesia Perspective.

## 2020-07-30 NOTE — PROGRESS NOTES
"07/29/2020 @ 8:07 PM     Progress Note, Attending Physician  [facility]     Patient Name: Roxanne Hernandez   MRN: [id number]   Admission Date and Time: 7/28/2020  3:03 PM   Patient Class: OP- Observation   Hospital length of stay: 0 days    Code status: Full Code   Attending Physician: Yoni Hernandez MD   Principal Problem: Syncope and collapse      Current Facility-Administered Medications:     acetaminophen tablet 650 mg, 650 mg, Oral, Q8H PRN, Yoni Hernandez MD, 650 mg at 07/29/20 1114    atorvastatin tablet 10 mg, 10 mg, Oral, Daily, Yoni Hernandez MD, 10 mg at 07/29/20 0831    clopidogreL tablet 75 mg, 75 mg, Oral, QAM, Yoni Hernandez MD    dextrose 50% injection 12.5 g, 12.5 g, Intravenous, PRN, Yoni Hernandez MD    dextrose 50% injection 25 g, 25 g, Intravenous, PRN, Yoni Hernandez MD    glucagon (human recombinant) injection 1 mg, 1 mg, Intramuscular, PRN, Yoni Hernandez MD    glucose chewable tablet 16 g, 16 g, Oral, PRN, Yoni Hernandez MD    glucose chewable tablet 24 g, 24 g, Oral, PRN, Yoni Hernandez MD    lisinopriL tablet 10 mg, 10 mg, Oral, QAM, Yoni Hernandez MD, 10 mg at 07/29/20 0751    metoprolol succinate (TOPROL-XL) 24 hr tablet 25 mg, 25 mg, Oral, QAM, Yoin Hernandez MD, 25 mg at 07/29/20 0751    nitroGLYCERIN 0.1 mg/hr TD PT24 1 patch, 1 patch, Transdermal, Daily, Yoni Hernandez MD, 1 patch at 07/29/20 0900    sodium chloride 0.9% flush 10 mL, 10 mL, Intravenous, PRN, Yoni Hernandez MD    Facility-Administered Medications Ordered in Other Encounters:     0.9%  NaCl infusion, , Intravenous, Continuous, Carmen Yepez MD, Last Rate: 0 mL/hr at 09/04/19 1141    aspirin EC tablet 325 mg, 325 mg, Oral, Once, Carmen Yepez MD    diazePAM tablet 5 mg, 5 mg, Oral, On Call Procedure, Carmen Yepez MD    diphenhydrAMINE capsule 25 mg, 25 mg, Oral, Once, Carmen Yepez MD     Subjective:   She feels "the same" today, not worse, " and not better.     Physical Examination:     Temp:  [97.7 °F (36.5 °C)-98.3 °F (36.8 °C)]   Pulse:  [66-72]   Resp:  [16-20]   BP: (127-149)/(65-75)   SpO2:  [95 %-100 %]     A&Ox4 in NAD  HEENT = normal, PERRL, EOMI, fundi benign  Neck = normal  Lungs = clear  Heart = RRR S1 & S2 with no murmurs, rubs, or gallops   Abdomen = obese benign   Ext = no CCE  Neurologic = stable non-focal exam   NO DVT  NO bedsores   IV clean     Objective:     EKG history:   EKG 12/01/2000 = NSR, leftward axis, normal   EKG 05/01/2006 = NSR, leftward axis, normal   EKG 06/01/2011 = NSR, PACs, mild left axis, normal     EKG 08/30/2019 = NSR, LAD, LVH with secondary ST-T changes (inverted Ts in I, aVL)   EKG 02/11/2020 = NSR, LAD, LVH, inverted Ts in I, aVL   EKG 07/28/2020 = NSR, LAD, normal T waves diffusely (her previously inverted Ts in I and aVL are now upright)      CT head (no contrast) 07/28/2020 = small focal area of probable remote infarction involves the splenium of the corpus callosum on the left, mild chronic small vessel ischemia, scattered vascular calcifications within the intracranial segments of the internal carotid and vertebral arteries     CTA neck and brain (with contrast) 07/28/2020:   o Moderate atheromatous changes are noted in the aortic arch. There are moderate degrees of narrowing of the proximal segments of the left common carotid artery and subclavian artery at their origin.  o There is mild tortuosity of the common carotid arteries.  o There is prominent plaque formation observed bilaterally at the level of the carotid bifurcations.   o There is bilateral high-grade stenosis of the origins of the internal carotid arteries (approximately 90%). The remainder of the internal carotid arteries are tortuous in their extracranial segment but patent. There is stenosis of the origin of the right external carotid artery.  o There is significant focal stenosis of the intracranial segment of the left internal  "carotid artery near the level of the anterior clinoid process.  o There is mild tortuosity of the vertebral arteries.   o There are no findings of high-grade stenosis or occlusion of the major intracranial arteries otherwise. No aneurysm or vascular malformation is noted.  o The cervical segments of the vertebral arteries are patent. There are mild-moderate stenoses of the origins of the vertebral arteries bilaterally.  o Scattered groundglass type opacities are observed within the visualized lung apices bilaterally. There is prominent multilevel cervical spondylosis.    CXR 07/28/2020:   The heart size is within normal limits and there is no evidence of acute pulmonary vascular engorgement. A metallic stent projected over the cardiac silhouette. There is arteriosclerosis in the arch of the aorta. A dual lead right-sided cardiac device is unchanged in position.  The lungs are free of confluent parenchymal infiltrates. There are minor linear parenchymal opacities in the lung bases compatible with minimal atelectasis or scarring. There are no significant effusions.   There is unchanged apical pleural thickening. Monitoring electrodes overlie the chest.    24 hour Holter: started the morning of 07/29/2020 and in process at this time.     Impression:   1. Plethora of complaints, beginning about 2 weeks prior to admission, suggestive of either stroke occurring about 2 weeks ago, or severe new onset "first-time-in-her-life" severe vertigo, along with sensations of episodic impending loss of consciousness with palpitaitons in the recent TAVR state (since 12/17/2019) and pacemaker implantation state (since 12/18/2019).   2. 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 Severe progression of her cerebrovascular disease; whether this is now "symptomatic" or "asymptomatic" is uncertain, but the severity is sufficient to " "warrant sequential CEA regardless to reduce her future risk of recurrent stroke.    a. 07/29/2020: Cardiologist Marleni advises sequential carotid endarterectomy   b. 07/29/2020: CT Surgeon/Vascular Surgeon Donte advises sequential carotid endarterectomy, declaring that whether her stenoses are asymptomatic or asymptomatic, sequential CEA is indicated if patient agrees and Cardiologist Marleni and I agree.   c. 07/29/2020: I believe the medical literature is reasonably clear that she is statistically likely to do better with sequential carotid endarterectomy than with "medical therapy alone".  I have advised the patient of my perspective, and that I advise her to proceed.   4. 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 surgical therapy options discussion: patient advised that (in the asymptomatic state) medical treatment reduces her 5 year risk of stroke to 11%, whereas carotid endarterectomy reduces her 5 year risk of stroke to 5% with a small risk of karyn-operative stroke (see copy in her chart of the handout I gave her)she advised me 06/06/2011 that she wishes to proceed with medical treatment for now    CTA neck and brain (with contrast) 07/28/2020:   o Moderate atheromatous changes are noted in the aortic arch. There are moderate degrees of narrowing of the proximal segments of the left common carotid artery and subclavian artery at their origin.  o There is mild tortuosity of the common carotid " arteries.  o There is prominent plaque formation observed bilaterally at the level of the carotid bifurcations.   o There is bilateral high-grade stenosis of the origins of the internal carotid arteries (approximately 90%). The remainder of the internal carotid arteries are tortuous in their extracranial segment but patent. There is stenosis of the origin of the right external carotid artery.  o There is significant focal stenosis of the intracranial segment of the left internal carotid artery near the level of the anterior clinoid process.  o There is mild tortuosity of the vertebral arteries.   o There are no findings of high-grade stenosis or occlusion of the major intracranial arteries otherwise. No aneurysm or vascular malformation is noted.  o The cervical segments of the vertebral arteries are patent. There are mild-moderate stenoses of the origins of the vertebral arteries bilaterally.  o Scattered groundglass type opacities are observed within the visualized lung apices bilaterally. There is prominent multilevel cervical spondylosis.   Medical Literature Discussion re: carotid stenosis:    1.  Symptomatic patients:  Both the North American Symptomatic Carotid Endarterectomy Trial (NASCET)1 and the European Carotid Surgery Trial (ECST)2 showed a substantial benefit for surgery in patients with a symptomatic carotid artery stenosis of greater than 70 percent. In NASCET, the average cumulative ipsilateral stroke rate at 2 years was 26 percent for patients treated medically and 9 percent for those receiving the same medical treatment plus a carotid endarterectomy, corresponding to a 65 percent relative risk reduction favoring surgery. For every 100 patients treated surgically, 17 were spared an ipsilateral stroke over the next 2 years. The perioperative stroke plus death rate was 5.8 percent in the surgical group and 3.3 percent in the medical group in the same 1-month period, in spite of optimal medical  "therapy. Thus the excess morbidity and mortality attributable to the surgery was 2.5 percent. If minor ischemic events are excluded from the analyses, the excess major stroke and death rate for the surgical patients was 1.2 percent (2.1 less 0.9 percent) and the excess fatality rate was 0.3 percent (0.6 less 0.3 percent). The NASCET and ECST investigators concluded that their patients with a 70 to 99 percent stenosis clearly benefited from carotid endarterectomy. Results in [facility] hospitals should be similar if patients are properly selected and the perioperative morbidity and mortality are comparably low.  Analysis of several subgroups yielded important information. The cumulative risk of ipsilateral stroke correlated with the degree of stenosis. While the absolute risk reduction favoring the entire surgical group was 17 percent in NASCET, the risk reduction favoring those with a 90 to 99 percent stenosis was 26 percent, and this decreased to 12 percent for those with a 70 to 79 percent stenosis. The overall 26 percent cumulative risk of an ipsilateral stroke at 2 years for this group of patients with "high-grade" stenosis calculates to an annual risk of 13 percent. The operative morbidity and mortality were similar for patients with different degrees of stenosis.   2.  Asymptomatic patients.  The Asymptomatic Carotid Atherosclerosis Study (ACAS)3  demonstrated that asymptomatic people with internal carotid artery stenoses greater than 60 percent are at low risk for stroke. More than 1600 patients were randomized to treatment with the best medical therapies available versus the same medical treatment plus carotid endarterectomy. Patients in the surgical group had a risk over 5 years for ipsilateral stroke (and any perioperative stroke or death) of 5.1 percent, whereas the risk for the nonsurgical group was 11 percent. While this demonstrates a 53 percent relative risk reduction, the absolute risk reduction is " only 5.9 percent over 5 years, or 1.2 percent annually. If only major strokes and death are counted, the comparable numbers are 43 percent relative risk reduction, and an absolute risk reduction of only 2.6 percent over 5 years, or 0.5 percent annually. This latter result was not statistically significant. There was no clear difference in benefit for those with moderate (60 to 80 percent) compared with severe (80 to 99 percent) stenosis.   Many of the strokes in the surgery group were caused by preoperative angiograms. If carotid artery ultrasonography and MR angiography replace catheter angiography, this morbidity and mortality will be obviated.   While there are benefits for carotid endarterectomy in patients with asymptomatic carotid stenosis, the benefits are very small. Medical therapy for reduction of atherosclerosis risk factors in addition to aspirin (325 mg/d) generally are recommended for patients with asymptomatic carotid stenosis.        Leakage of Periprosthetic Valve, Subsequent Encounter 2/11/2020    Aortic stenosis saga:   Echocardiogram ([facility]) 09/04/2019:    Aortic valve: heavily calcified with restricted leaflet motion. Moderate aortic regurgitation.    Mitral valve: thick and calcified with restricted leaflet motion. Mild mitral stenosis and regurgitation.    Tricuspid valve: is structurally normal with good leaflet excursion. Mild regurgitation.   Pulmonary valve: is structurally normal with good leaflet excursion. No significant regurgitation.   Overall LVEF appears normal.    No obvious shunt across the interatrial septum by color flow Doppler.     Cardiac catheterization 09/14/2019 (Timothy Salgado MD):   · Mid RCA lesion, 50% stenosed, nonsignificant by iFR assessment.  · Known severe aortic stenosis from echo    CTA Cardiac TAVR 09/19/2019:    Large left UVJ stone measuring at 2.6 cm with severe left-sided hydroureteronephrosis.   Significant pneumobilia in the left hepatic lobe with  air within the common bile duct.  Unclear if this is due to biliary enteric fistula, infectious process such as cholangitis, or incompetent sphincter of Oddi.  Unfortunately this is not well evaluated on this dedicated TAVR protocol examination.  Clinical correlation is advised.  Further evaluation could be performed with ERCP and dedicated abdominal CT with intravenous contrast.   TAVR measurements, as above.   Significant aortic valve and abdominal aorta atherosclerosis.   Nonspecific mesenteric haziness, possibly mesenteric panniculitis or adenitis.   Heterogeneity of the pulmonary parenchyma possibly related to elevated pulmonary vascular resistance, small airways disease, or both.   Small bilateral pleural effusions.    Cardiac catheterization 12/17/2019 (Herbert Ashley):    Successful left transfemoral 23 mm Evolute TAVR.  No paravalvular leak, mean gradient 3, peak velocity 1.5 post TAVR.    Echocardiogram (TTE) 12/26/2019   · Mild concentric left ventricular hypertrophy   · Normal left ventricular systolic function. The estimated ejection fraction is 60%.  · No wall motion abnormalities.  · Grade I (mild) left ventricular diastolic dysfunction consistent with impaired relaxation.  · There is a transcutaneously-placed aortic bioprosthesis present.  · Mild aortic regurgitation.  · Mild-to-moderate aortic valve stenosis.  · Aortic valve area is 1.23 cm2; peak velocity is 2.84 m/s; mean gradient is 18 mmHg.  · Mild left atrial enlargement.  · Mild tricuspid regurgitation.  · Normal right ventricular systolic function.  · Moderate mitral sclerosis.  · There is mild leaflet calcification of the Mitral Valve.  · Mild mitral stenosis. MVA by PHT is 2.21 sq cm.  · Normal central venous pressure (3 mmHg).   · The estimated PA systolic pressure is 32 mmHg.:     Echocardiogram (TTE) 01/22/2020:   · Normal left ventricular systolic function. The estimated ejection fraction is 65%.  · Concentric left ventricular  remodeling.  · No wall motion abnormalities.  · Grade I (mild) left ventricular diastolic dysfunction consistent with impaired relaxation.  · Normal right ventricular systolic function.  · Severe left atrial enlargement.  · There is a transcutaneously-placed aortic bioprosthesis present.   · There is paravalvular aortic insufficiency present   · Mild mitral regurgitation.  · Normal central venous pressure (3 mmHg).  · The estimated PA systolic pressure is 29 mmHg.   · Trivial posterior pericardial effusion.      Chronic Diastolic Heart Failure 12/1/2000    Echocardiogram 12/01/2000 = hyperdynamic LV, with mild to moderate amount of pericardial fat, and left ventricular diastolic dysfunction   Echocardiogram 05/20/2011 = dilated left atrial cavity, normal LV thickness and function with LVEF @ 70%, mild aortic stenosis with valve area @ 1.09cm2, peak gradient = 34 mmHg, minimal prolapse of anterior mitral leaflet, mild TI, very small pericardial effusion        Coronary Artery Disease of Native Artery of Native Heart With Stable Angina Pectoris 9/14/2019    Cardiac catheterization 09/14/2019 (Timothy Salgado MD):    Mid RCA lesion, 50% stenosed, nonsignificant by iFR assessment.   Known severe aortic stenosis from echo  02/13/2020: it is unlikely that this trivial coronary artery disease is the cause of her episodic dyspnea that has been present since prior to 09/04/2019.     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   Ultrasound 01/31/1992 = cholelithiasis with tender gallbladder   Ultrasound abdomen 06/22/1995 = normal except for hypoechoic acoustic texture of the left hepatic lobe   ERCP #1 02/01/1992 ([place]) = choledocholithiasis with cholangitis   ERCP #2 06/28/1995 ([place]) = multiple filling defects due to stones   CT abdomen (with & without) 06/26/1995  = normal except for air in the biliary tree, fatty infiltration of the left lobe, calcified granuloma at right lung base   CT abdomen & pelvis (with & without) 12/02/1997 = normal/normal except for air in the biliary tree due to prior sphincterotomy   CTA Cardiac TAVR 09/19/2019: significant pneumobilia in the left hepatic lobe with air within the common bile duct         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 In reality, this dyspnea syndrome has been going on since prior to her TAVR and since prior to the beginning of her workup which is dated to just prior to 09/04/2019.  2. 02/12/2020: I was in the process of writing discharge orders on this lady the evening of 02/12/2020 when her nurse came to me and reported that the patient was acutely dyspneic again, without chest pain, and at that point I did not have a proper diagnosis to explain her episodic dyspnea.    3. Consultation from Pulmonologist Caden received 02/13/2020   4. Consultation from Cardiologist Marleni received 02/13/2020   5. We are attributing this to her karyn-valvular TAVR leak, with plans for tincture of time to cause this problem to settle down      Dehydration Syndrome (Resolved) 2/11/2020   Left Ureteral Stone (Resolved) 11/13/2019   Hydronephrosis With Urinary  "Obstruction Due to Ureteral Calculus (Resolved) 11/11/2019    Left ureteral stone with hydronephrosis requiring  manipulation 11/13/2019 @ [facility] (Paddy Dunham MD)    CTA Cardiac TAVR 09/19/2019: large left UVJ stone measuring at 2.6 cm with severe left-sided hydroureteronephrosis    Intervention 11/13/2019: 1.  Left ureteroscopy 2.  Cystoscopy 3.  Stone basket extraction 4.  Laser lithotripsy 5.  Left ureteral stent placement 6.  Fluoro < 1h 11/13/2019 (Paddy Dunham @ [facility])    Ultrasound kidneys 02/11/2020 = moderate left-sided hydronephrosis with no shadowing stone identified; nonobstructing right-sided renal stones; likely debris within the bladder, consider correlation with urinalysis.      Urinalysis 02/12/2020 = normal    As of 02/11-13/2020 she has been completely asymptomatic for this abnormality    02/13/2020 on discharge day I am ordering her to report back to her established Urologist Paddy Dunham for further investigation for this problem.        Plan:   1. Observation status continues on Medicare demand.   2. Holter, 24 hour, in process.   3. Pacemaker interrogation 07/29/2020, in process.  4. 07/28/2020 COVID-19 nasal swab PCR is still pending.    5. Tentatively plan for left CEA on Friday 07/31/2020, pending negative COVID-19 swab, results of her Holter, and "clearance" from Cardiologist Marleni.     Yoni Hernandez MD       "

## 2020-07-30 NOTE — PROGRESS NOTES
"[facility]  Department of Cardiothoracic Surgery  Progress Note      PATIENT NAME: Roxanne Hernandez  MRN: [id number]  TODAY'S DATE: 07/30/2020  ADMIT DATE: 7/28/2020      PRINCIPLE PROBLEM: Syncope and collapse    PROCEDURES:  Procedure(s) (LRB):  ENDARTERECTOMY-CAROTID (Left)    INTERVAL HISTORY:   Chart reviewed                                          Comfortable, no complaint                                          Viral test negative      Review of patient's allergies indicates:   Allergen Reactions    Azithromycin Swelling     All mycins    Statins-hmg-coa reductase inhibitors Other (See Comments)     Liver function  "It attacks my liver and makes me very sick"    Sulfa (sulfonamide antibiotics) Hives       PHYSICAL EXAM  CONSTITUTIONAL: Well built, well nourished in no apparent distress  NECK: no carotid bruit, no JVD  LUNGS: CTA  CHEST WALL: No tenderness  HEART: regular rate and rhythm, S1, S2 normal, no murmur, click, rub or gallop   ABDOMEN: soft, non-tender; bowel sounds normal; no masses, no organomegaly  EXTREMITIES: Extremities normal, no edema  NEURO: AAO X 3    VITAL SIGNS (Most Recent)  Temp: 96.7 °F (35.9 °C) (07/30/20 1125)  Pulse: 94 (07/30/20 1125)  Resp: 20 (07/30/20 1125)  BP: 127/63 (07/30/20 1125)  SpO2: (!) 94 % (07/30/20 1125)    VENTILATION STATUS  Resp: 20 (07/30/20 1125)  SpO2: (!) 94 % (07/30/20 1125)       I & O (Last 24H):    Intake/Output Summary (Last 24 hours) at 7/30/2020 1354  Last data filed at 7/30/2020 1124  Gross per 24 hour   Intake 360 ml   Output --   Net 360 ml       WEIGHTS  Wt Readings from Last 1 Encounters:   07/29/20 0406 84.7 kg (186 lb 11.7 oz)   07/28/20 1626 81.4 kg (179 lb 7.3 oz)         MEDICATIONS   SCHEDULED MEDS:   atorvastatin  10 mg Oral Daily    lisinopriL  10 mg Oral QAM    metoprolol succinate  25 mg Oral QAM    nitroGLYCERIN 0.1 mg/hr TD PT24  1 patch Transdermal Daily       CONTINUOUS INFUSIONS:    PRN MEDS:acetaminophen, dextrose " 50%, dextrose 50%, glucagon (human recombinant), glucose, glucose, sodium chloride 0.9%    LABS AND DIAGNOSTICS   CBC LAST 3 DAYS  Recent Labs   Lab 07/28/20  1635   WBC 7.85   RBC 5.04   HGB 14.8   HCT 45.0   MCV 89   MCH 29.4   MCHC 32.9   RDW 13.4   *   MPV 10.5   GRAN 68.7  5.4   LYMPH 20.1  1.6   MONO 7.3  0.6   BASO 0.04   NRBC 0       COAGULATION LAST 3 DAYS  No results for input(s): LABPT, INR, APTT in the last 168 hours.    CHEMISTRY LAST 3 DAYS  Recent Labs   Lab 07/28/20  1635      K 4.6      CO2 23   ANIONGAP 10   BUN 30*   CREATININE 1.1      CALCIUM 9.9   ALBUMIN 4.3   PROT 7.7   ALKPHOS 94   ALT 25   AST 32   BILITOT 1.0       CARDIAC PROFILE LAST 3 DAYS  No results for input(s): BNP, CPK, CPKMB, LDH, TROPONINI in the last 168 hours.    ENDOCRINE LAST 3 DAYS  No results for input(s): TSH, PROCAL in the last 168 hours.    LAST ARTERIAL BLOOD GAS  ABG  No results for input(s): PH, PO2, PCO2, HCO3, BE in the last 168 hours.    LAST 7 DAYS MICROBIOLOGY   Microbiology Results (last 7 days)     ** No results found for the last 168 hours. **          MOST RECENT IMAGING  CTA Head and Neck (xpd)  The study was performed with thin sections with subsequent 3-D  reformations  and reconstructions at multiple planes with images  permanently stored in the PACS system.    All CT scans at this facility use dose modulation, degenerative  reconstruction  and/or weight-based dosing when appropriate to reduce  radiation dose to as low as reasonably achievable.    CTA of the head and neck    HISTORY: Ataxia.    Examination utilizes 100 cc of intravenous nonionic contrast material.    There is degradation of the examination by patient motion.    Moderate atheromatous changes are noted in the aortic arch. There are  moderate degrees of narrowing of the proximal segments of the left  common carotid artery and subclavian artery at their origin.    There is mild tortuosity of the common carotid  arteries.    There is prominent plaque formation observed bilaterally at the level  of the carotid bifurcations. There is bilateral high-grade stenosis of  the origins of the internal carotid arteries (approximately 90%). The  remainder of the internal carotid arteries are tortuous in their  extracranial segment but patent. There is stenosis of the origin of  the right external carotid artery.    There is significant focal stenosis of the intracranial segment of the  left internal carotid artery near the level of the anterior clinoid  process.    There is mild tortuosity of the vertebral arteries. There are no  findings of high-grade stenosis or occlusion of the major intracranial  arteries otherwise. No aneurysm or vascular malformation is noted.    The cervical segments of the vertebral arteries are patent. There are  mild-moderate stenoses of the origins of the vertebral arteries  bilaterally.    Scattered groundglass type opacities are observed within the  visualized lung apices bilaterally. There is prominent multilevel  cervical spondylosis.    IMPRESSION:    High-grade stenoses involving the origin/proximal segments of the  internal carotid arteries bilaterally. There is significant focal  stenosis of the intracranial segment of the left ICA.    No findings of high-grade stenosis or occlusion involving the  remaining intracranial arteries.    Scattered groundglass type pulmonary opacities in the visualized lung  apices.    Electronically Signed by Kunal Benavides M.D. on 7/28/2020 6:53 PM  CT Head Without Contrast  CMS MANDATED QUALITY DATA - CT RADIATION 436    All CT scans at this facility utilize dose modulation, iterative  reconstruction, and/or weight based dosing when appropriate to reduce  radiation dose to as low as reasonably achievable.    CT of the brain without contrast    HISTORY: Cerebrovascular accident.    There are no findings of acute hemorrhage or infarction. A small focal  area of probable  remote infarction involves the splenium of the corpus  callosum on the left. There are scattered changes of decrease in  attenuation of the subcortical and periventricular white matter of  both hemispheres compatible with mild chronic small vessel ischemia.    There is no evidence of intra-axial mass or mass effect. There is no  midline shift. The ventricular system is appropriate in size and  position for age. There are no extra-axial fluid collections.    There are scattered vascular calcifications within the intracranial  segments of the internal carotid and vertebral arteries.    No acute osseous abnormality is identified.    IMPRESSION:    No acute intracranial abnormality.    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    Chronic small vessel ischemic changes.    Electronically Signed by Kunal Benavides M.D. on 7/28/2020 6:40 PM  X-Ray Chest PA And Lateral  Chest, 2 views    HISTORY: Syncope.    Comparison is made to 2/11/2020.    The heart size is within normal limits and there is no evidence of  acute pulmonary vascular engorgement. A metallic stent projected over  the cardiac silhouette. There is arteriosclerosis in the arch of the  aorta. A dual lead right-sided cardiac device is unchanged in  position.    The lungs are free of confluent parenchymal infiltrates. There are  minor linear parenchymal opacities in the lung bases compatible with  minimal atelectasis or scarring. There are no significant effusions.  There is unchanged apical pleural thickening. Monitoring electrodes  overlie the chest.    IMPRESSION:    Minimal basilar platelike atelectasis or scarring.    Additional incidental observations as above.    Electronically Signed by Kunal Benavides M.D. on 7/28/2020 5:26 PM      [facility]  Results for orders placed during the hospital encounter of 01/22/20   Echo Color Flow Doppler? Yes    Narrative · Normal left ventricular systolic function. The estimated ejection    fraction is 65%.  · Concentric left ventricular remodeling.  · No wall motion abnormalities.  · Grade I (mild) left ventricular diastolic dysfunction consistent with   impaired relaxation.  · Normal right ventricular systolic function.  · Severe left atrial enlargement.  · There is a transcutaneously-placed aortic bioprosthesis present. There   is paravalvular aortic insufficiency present.  · Mild mitral regurgitation.  · Normal central venous pressure (3 mmHg).  · The estimated PA systolic pressure is 29 mmHg.  · Trivial posterior pericardial effusion.          CURRENT/PREVIOUS VISIT EKG  Results for orders placed or performed during the hospital encounter of 07/28/20   EKG 12-lead    Collection Time: 07/28/20  8:42 PM    Narrative    Test Reason : R55,    Vent. Rate : 071 BPM     Atrial Rate : 071 BPM     P-R Int : 148 ms          QRS Dur : 096 ms      QT Int : 412 ms       P-R-T Axes : 062 -38 036 degrees     QTc Int : 447 ms    Normal sinus rhythm  Left axis deviation  Minimal voltage criteria for LVH, may be normal variant  Abnormal ECG  When compared with ECG of 11-MAY-2020 08:48,  No significant change was found  Confirmed by Dima Carr MD ([id number]) on 7/30/2020 12:23:12 PM    Referred By:             Confirmed By:Dima Carr MD         HOSPITAL PROBLEMS WITH ASSESSMENT & PLAN:     Active Hospital Problems    Diagnosis    *Syncope and collapse    Syncope    Bilateral carotid artery occlusion      Carotid ultrasound 05/20/2011 = right 50-69% stenosis, left >70% stenosis, normal vertebrals    CTA neck and brain 06/01/2011:   o 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 surgical therapy options discussion: patient advised that (in the asymptomatic state) medical treatment reduces her 5 year risk of  stroke to 11%, whereas carotid endarterectomy reduces her 5 year risk of stroke to 5% with a small risk of karyn-operative stroke (see copy in her chart of the handout I gave her)she advised me 06/06/2011 that she wishes to proceed with medical treatment for now    CTA neck and brain (with contrast) 07/28/2020:   o Moderate atheromatous changes are noted in the aortic arch. There are moderate degrees of narrowing of the proximal segments of the left common carotid artery and subclavian artery at their origin.  o There is mild tortuosity of the common carotid arteries.  o There is prominent plaque formation observed bilaterally at the level of the carotid bifurcations.   o There is bilateral high-grade stenosis of the origins of the internal carotid arteries (approximately 90%). The remainder of the internal carotid arteries are tortuous in their extracranial segment but patent. There is stenosis of the origin of the right external carotid artery.  o There is significant focal stenosis of the intracranial segment of the left internal carotid artery near the level of the anterior clinoid process.  o There is mild tortuosity of the vertebral arteries.   o There are no findings of high-grade stenosis or occlusion of the major intracranial arteries otherwise. No aneurysm or vascular malformation is noted.  o The cervical segments of the vertebral arteries are patent. There are mild-moderate stenoses of the origins of the vertebral arteries bilaterally.  o Scattered groundglass type opacities are observed within the visualized lung apices bilaterally. There is prominent multilevel cervical spondylosis.   Medical Literature Discussion re: carotid stenosis:    1.  Symptomatic patients:  Both the [place] American Symptomatic Carotid Endarterectomy Trial (NASCET)1 and the European Carotid Surgery Trial (ECST)2 showed a substantial benefit for surgery in patients with a symptomatic carotid artery stenosis of greater than 70  "percent. In NASCET, the average cumulative ipsilateral stroke rate at 2 years was 26 percent for patients treated medically and 9 percent for those receiving the same medical treatment plus a carotid endarterectomy, corresponding to a 65 percent relative risk reduction favoring surgery. For every 100 patients treated surgically, 17 were spared an ipsilateral stroke over the next 2 years. The perioperative stroke plus death rate was 5.8 percent in the surgical group and 3.3 percent in the medical group in the same 1-month period, in spite of optimal medical therapy. Thus the excess morbidity and mortality attributable to the surgery was 2.5 percent. If minor ischemic events are excluded from the analyses, the excess major stroke and death rate for the surgical patients was 1.2 percent (2.1 less 0.9 percent) and the excess fatality rate was 0.3 percent (0.6 less 0.3 percent). The NASCET and ECST investigators concluded that their patients with a 70 to 99 percent stenosis clearly benefited from carotid endarterectomy. Results in [facility] hospitals should be similar if patients are properly selected and the perioperative morbidity and mortality are comparably low.  Analysis of several subgroups yielded important information. The cumulative risk of ipsilateral stroke correlated with the degree of stenosis. While the absolute risk reduction favoring the entire surgical group was 17 percent in NASCET, the risk reduction favoring those with a 90 to 99 percent stenosis was 26 percent, and this decreased to 12 percent for those with a 70 to 79 percent stenosis. The overall 26 percent cumulative risk of an ipsilateral stroke at 2 years for this group of patients with "high-grade" stenosis calculates to an annual risk of 13 percent. The operative morbidity and mortality were similar for patients with different degrees of stenosis.   2.  Asymptomatic patients.  The Asymptomatic Carotid Atherosclerosis Study (ACAS)3  " demonstrated that asymptomatic people with internal carotid artery stenoses greater than 60 percent are at low risk for stroke. More than 1600 patients were randomized to treatment with the best medical therapies available versus the same medical treatment plus carotid endarterectomy. Patients in the surgical group had a risk over 5 years for ipsilateral stroke (and any perioperative stroke or death) of 5.1 percent, whereas the risk for the nonsurgical group was 11 percent. While this demonstrates a 53 percent relative risk reduction, the absolute risk reduction is only 5.9 percent over 5 years, or 1.2 percent annually. If only major strokes and death are counted, the comparable numbers are 43 percent relative risk reduction, and an absolute risk reduction of only 2.6 percent over 5 years, or 0.5 percent annually. This latter result was not statistically significant. There was no clear difference in benefit for those with moderate (60 to 80 percent) compared with severe (80 to 99 percent) stenosis.   Many of the strokes in the surgery group were caused by preoperative angiograms. If carotid artery ultrasonography and MR angiography replace catheter angiography, this morbidity and mortality will be obviated.   While there are benefits for carotid endarterectomy in patients with asymptomatic carotid stenosis, the benefits are very small. Medical therapy for reduction of atherosclerosis risk factors in addition to aspirin (325 mg/d) generally are recommended for patients with asymptomatic carotid stenosis.            ASSESSMENT & PLAN:  Bilateral carotid artery stenosis      RECOMMENDATIONS:  L CEA tomorrow  Consent signed  See orders          Radu Kent MD  [facility]  Department of Cardiothoracic Surgery  Date of Service: 07/30/2020 1:54 PM

## 2020-07-30 NOTE — PLAN OF CARE
Problem: Adult Inpatient Plan of Care  Goal: Plan of Care Review  Outcome: Ongoing, Progressing  Goal: Patient-Specific Goal (Individualization)  Outcome: Ongoing, Progressing  Goal: Absence of Hospital-Acquired Illness or Injury  Outcome: Ongoing, Progressing  Goal: Optimal Comfort and Wellbeing  Outcome: Ongoing, Progressing  Goal: Readiness for Transition of Care  Outcome: Ongoing, Progressing  Goal: Rounds/Family Conference  Outcome: Ongoing, Progressing     Problem: Diabetes Comorbidity  Goal: Blood Glucose Level Within Desired Range  Outcome: Ongoing, Progressing     Problem: Fall Injury Risk  Goal: Absence of Fall and Fall-Related Injury  Outcome: Ongoing, Progressing     Problem: Bariatric Environmental Safety  Goal: Safety Maintained with Care  Outcome: Ongoing, Progressing

## 2020-07-31 ENCOUNTER — ANESTHESIA (OUTPATIENT)
Dept: SURGERY | Facility: HOSPITAL | Age: 80
DRG: 037 | End: 2020-07-31
Payer: MEDICARE

## 2020-07-31 PROBLEM — R55 SYNCOPE: Status: ACTIVE | Noted: 2020-07-31

## 2020-07-31 LAB
ANION GAP SERPL CALC-SCNC: 9 MMOL/L (ref 8–16)
APTT PPP: 26.7 SEC (ref 23.6–33.3)
BASOPHILS # BLD AUTO: 0.03 K/UL (ref 0–0.2)
BASOPHILS NFR BLD: 0.4 % (ref 0–1.9)
BUN SERPL-MCNC: 36 MG/DL (ref 8–23)
CALCIUM SERPL-MCNC: 8.8 MG/DL (ref 8.7–10.5)
CHLORIDE SERPL-SCNC: 108 MMOL/L (ref 95–110)
CO2 SERPL-SCNC: 22 MMOL/L (ref 23–29)
CREAT SERPL-MCNC: 1.3 MG/DL (ref 0.5–1.4)
DIFFERENTIAL METHOD: ABNORMAL
EOSINOPHIL # BLD AUTO: 0.3 K/UL (ref 0–0.5)
EOSINOPHIL NFR BLD: 3.6 % (ref 0–8)
ERYTHROCYTE [DISTWIDTH] IN BLOOD BY AUTOMATED COUNT: 13.3 % (ref 11.5–14.5)
EST. GFR  (AFRICAN AMERICAN): 44.8 ML/MIN/1.73 M^2
EST. GFR  (NON AFRICAN AMERICAN): 38.8 ML/MIN/1.73 M^2
GLUCOSE SERPL-MCNC: 139 MG/DL (ref 70–110)
GLUCOSE SERPL-MCNC: 146 MG/DL (ref 70–110)
GLUCOSE SERPL-MCNC: 167 MG/DL (ref 70–110)
HCO3 UR-SCNC: 20.5 MMOL/L (ref 24–28)
HCT VFR BLD AUTO: 41.7 % (ref 37–48.5)
HCT VFR BLD CALC: 42 %PCV (ref 36–54)
HGB BLD-MCNC: 13.9 G/DL (ref 12–16)
IMM GRANULOCYTES # BLD AUTO: 0.03 K/UL (ref 0–0.04)
IMM GRANULOCYTES NFR BLD AUTO: 0.4 % (ref 0–0.5)
INR PPP: 1.1
LYMPHOCYTES # BLD AUTO: 1.9 K/UL (ref 1–4.8)
LYMPHOCYTES NFR BLD: 23 % (ref 18–48)
MCH RBC QN AUTO: 29.1 PG (ref 27–31)
MCHC RBC AUTO-ENTMCNC: 33.3 G/DL (ref 32–36)
MCV RBC AUTO: 87 FL (ref 82–98)
MONOCYTES # BLD AUTO: 0.8 K/UL (ref 0.3–1)
MONOCYTES NFR BLD: 10.1 % (ref 4–15)
NEUTROPHILS # BLD AUTO: 5.1 K/UL (ref 1.8–7.7)
NEUTROPHILS NFR BLD: 62.5 % (ref 38–73)
NRBC BLD-RTO: 0 /100 WBC
OHS CV EVENT MONITOR DAY: 0
OHS CV HOLTER LENGTH DECIMAL HOURS: 23
OHS CV HOLTER LENGTH HOURS: 23
OHS CV HOLTER LENGTH MINUTES: 0
PCO2 BLDA: 32.3 MMHG (ref 35–45)
PH SMN: 7.41 [PH] (ref 7.35–7.45)
PLATELET # BLD AUTO: 128 K/UL (ref 150–350)
PMV BLD AUTO: 10.1 FL (ref 9.2–12.9)
PO2 BLDA: 343 MMHG (ref 80–100)
POC ACTIVATED CLOTTING TIME K: 109 SEC (ref 74–137)
POC ACTIVATED CLOTTING TIME K: 131 SEC (ref 74–137)
POC ACTIVATED CLOTTING TIME K: 362 SEC (ref 74–137)
POC BE: -4 MMOL/L
POC IONIZED CALCIUM: 1.14 MMOL/L (ref 1.06–1.42)
POC SATURATED O2: 100 % (ref 95–100)
POC TCO2: 21 MMOL/L (ref 23–27)
POTASSIUM BLD-SCNC: 4.2 MMOL/L (ref 3.5–5.1)
POTASSIUM SERPL-SCNC: 4.4 MMOL/L (ref 3.5–5.1)
PROTHROMBIN TIME: 13.6 SEC (ref 10.6–14.8)
RBC # BLD AUTO: 4.77 M/UL (ref 4–5.4)
SAMPLE: ABNORMAL
SAMPLE: ABNORMAL
SAMPLE: NORMAL
SAMPLE: NORMAL
SODIUM BLD-SCNC: 136 MMOL/L (ref 136–145)
SODIUM SERPL-SCNC: 139 MMOL/L (ref 136–145)
WBC # BLD AUTO: 8.09 K/UL (ref 3.9–12.7)

## 2020-07-31 PROCEDURE — C9248 INJ, CLEVIDIPINE BUTYRATE: HCPCS | Performed by: INTERNAL MEDICINE

## 2020-07-31 PROCEDURE — 63600175 PHARM REV CODE 636 W HCPCS: Performed by: NURSE ANESTHETIST, CERTIFIED REGISTERED

## 2020-07-31 PROCEDURE — 27800903 OPTIME MED/SURG SUP & DEVICES OTHER IMPLANTS: Performed by: THORACIC SURGERY (CARDIOTHORACIC VASCULAR SURGERY)

## 2020-07-31 PROCEDURE — 27000666 HC INFUSOR PRESSURE BAG: Performed by: ANESTHESIOLOGY

## 2020-07-31 PROCEDURE — 63600175 PHARM REV CODE 636 W HCPCS: Performed by: THORACIC SURGERY (CARDIOTHORACIC VASCULAR SURGERY)

## 2020-07-31 PROCEDURE — 25000003 PHARM REV CODE 250: Performed by: THORACIC SURGERY (CARDIOTHORACIC VASCULAR SURGERY)

## 2020-07-31 PROCEDURE — 25000003 PHARM REV CODE 250: Performed by: NURSE ANESTHETIST, CERTIFIED REGISTERED

## 2020-07-31 PROCEDURE — 27202107 HC XP QUATRO SENSOR: Performed by: ANESTHESIOLOGY

## 2020-07-31 PROCEDURE — 20000000 HC ICU ROOM

## 2020-07-31 PROCEDURE — 36000707: Performed by: THORACIC SURGERY (CARDIOTHORACIC VASCULAR SURGERY)

## 2020-07-31 PROCEDURE — 99900035 HC TECH TIME PER 15 MIN (STAT)

## 2020-07-31 PROCEDURE — 85610 PROTHROMBIN TIME: CPT

## 2020-07-31 PROCEDURE — C9248 INJ, CLEVIDIPINE BUTYRATE: HCPCS

## 2020-07-31 PROCEDURE — 27000673 HC TUBING BLOOD Y: Performed by: ANESTHESIOLOGY

## 2020-07-31 PROCEDURE — 27000654 HC CATH IV JELCO: Performed by: ANESTHESIOLOGY

## 2020-07-31 PROCEDURE — 27000656 HC EYE GOGGLES: Performed by: ANESTHESIOLOGY

## 2020-07-31 PROCEDURE — S0028 INJECTION, FAMOTIDINE, 20 MG: HCPCS | Performed by: NURSE ANESTHETIST, CERTIFIED REGISTERED

## 2020-07-31 PROCEDURE — 36415 COLL VENOUS BLD VENIPUNCTURE: CPT

## 2020-07-31 PROCEDURE — 27000658 HC ARTERIAL LINE ALL: Performed by: ANESTHESIOLOGY

## 2020-07-31 PROCEDURE — 63600175 PHARM REV CODE 636 W HCPCS: Performed by: INTERNAL MEDICINE

## 2020-07-31 PROCEDURE — C9248 INJ, CLEVIDIPINE BUTYRATE: HCPCS | Performed by: NURSE ANESTHETIST, CERTIFIED REGISTERED

## 2020-07-31 PROCEDURE — 27202177 HC INTRODUCER, TRACHEAL TUBE: Performed by: ANESTHESIOLOGY

## 2020-07-31 PROCEDURE — 27201423 OPTIME MED/SURG SUP & DEVICES STERILE SUPPLY: Performed by: THORACIC SURGERY (CARDIOTHORACIC VASCULAR SURGERY)

## 2020-07-31 PROCEDURE — 37000008 HC ANESTHESIA 1ST 15 MINUTES: Performed by: THORACIC SURGERY (CARDIOTHORACIC VASCULAR SURGERY)

## 2020-07-31 PROCEDURE — 85025 COMPLETE CBC W/AUTO DIFF WBC: CPT

## 2020-07-31 PROCEDURE — C1763 CONN TISS, NON-HUMAN: HCPCS | Performed by: THORACIC SURGERY (CARDIOTHORACIC VASCULAR SURGERY)

## 2020-07-31 PROCEDURE — 27000671 HC TUBING MICROBORE EXT: Performed by: ANESTHESIOLOGY

## 2020-07-31 PROCEDURE — 27800505 HC CATH, RADIAL ARTERY KIT: Performed by: ANESTHESIOLOGY

## 2020-07-31 PROCEDURE — 36000706: Performed by: THORACIC SURGERY (CARDIOTHORACIC VASCULAR SURGERY)

## 2020-07-31 PROCEDURE — 27202103: Performed by: ANESTHESIOLOGY

## 2020-07-31 PROCEDURE — 85730 THROMBOPLASTIN TIME PARTIAL: CPT

## 2020-07-31 PROCEDURE — 27000675 HC TUBING MICRODRIP: Performed by: ANESTHESIOLOGY

## 2020-07-31 PROCEDURE — 63600175 PHARM REV CODE 636 W HCPCS

## 2020-07-31 PROCEDURE — 25000003 PHARM REV CODE 250: Performed by: INTERNAL MEDICINE

## 2020-07-31 PROCEDURE — 27200677 HC TRANSDUCER MONITOR KIT SINGLE: Performed by: ANESTHESIOLOGY

## 2020-07-31 PROCEDURE — 80048 BASIC METABOLIC PNL TOTAL CA: CPT

## 2020-07-31 PROCEDURE — 88304 TISSUE EXAM BY PATHOLOGIST: CPT | Mod: TC

## 2020-07-31 PROCEDURE — 94761 N-INVAS EAR/PLS OXIMETRY MLT: CPT

## 2020-07-31 PROCEDURE — 37000009 HC ANESTHESIA EA ADD 15 MINS: Performed by: THORACIC SURGERY (CARDIOTHORACIC VASCULAR SURGERY)

## 2020-07-31 PROCEDURE — 27000676 HC TUBING PRIMARY PLUMSET: Performed by: ANESTHESIOLOGY

## 2020-07-31 DEVICE — PATCH VASCULAR .8CMX8CM: Type: IMPLANTABLE DEVICE | Site: CAROTID | Status: FUNCTIONAL

## 2020-07-31 RX ORDER — FENTANYL CITRATE 50 UG/ML
INJECTION, SOLUTION INTRAMUSCULAR; INTRAVENOUS
Status: DISCONTINUED | OUTPATIENT
Start: 2020-07-31 | End: 2020-07-31

## 2020-07-31 RX ORDER — HEPARIN SODIUM 5000 [USP'U]/ML
INJECTION, SOLUTION INTRAVENOUS; SUBCUTANEOUS
Status: DISCONTINUED | OUTPATIENT
Start: 2020-07-31 | End: 2020-07-31 | Stop reason: HOSPADM

## 2020-07-31 RX ORDER — EPHEDRINE SULFATE 50 MG/ML
INJECTION, SOLUTION INTRAVENOUS
Status: DISCONTINUED | OUTPATIENT
Start: 2020-07-31 | End: 2020-07-31

## 2020-07-31 RX ORDER — CEFAZOLIN SODIUM 2 G/50ML
2 SOLUTION INTRAVENOUS
Status: COMPLETED | OUTPATIENT
Start: 2020-07-31 | End: 2020-08-01

## 2020-07-31 RX ORDER — NITROGLYCERIN 20 MG/1
1 PATCH TRANSDERMAL
Status: DISCONTINUED | OUTPATIENT
Start: 2020-07-31 | End: 2020-08-04 | Stop reason: HOSPADM

## 2020-07-31 RX ORDER — LISINOPRIL 10 MG/1
10 TABLET ORAL EVERY MORNING
Status: DISCONTINUED | OUTPATIENT
Start: 2020-07-31 | End: 2020-08-04 | Stop reason: HOSPADM

## 2020-07-31 RX ORDER — SODIUM CHLORIDE, SODIUM LACTATE, POTASSIUM CHLORIDE, CALCIUM CHLORIDE 600; 310; 30; 20 MG/100ML; MG/100ML; MG/100ML; MG/100ML
INJECTION, SOLUTION INTRAVENOUS CONTINUOUS PRN
Status: DISCONTINUED | OUTPATIENT
Start: 2020-07-31 | End: 2020-07-31

## 2020-07-31 RX ORDER — BACITRACIN 50000 [IU]/1
INJECTION, POWDER, FOR SOLUTION INTRAMUSCULAR
Status: DISCONTINUED | OUTPATIENT
Start: 2020-07-31 | End: 2020-07-31 | Stop reason: HOSPADM

## 2020-07-31 RX ORDER — SODIUM CHLORIDE 9 MG/ML
INJECTION, SOLUTION INTRAVENOUS CONTINUOUS
Status: DISCONTINUED | OUTPATIENT
Start: 2020-07-31 | End: 2020-08-02

## 2020-07-31 RX ORDER — DEXAMETHASONE SODIUM PHOSPHATE 4 MG/ML
INJECTION, SOLUTION INTRA-ARTICULAR; INTRALESIONAL; INTRAMUSCULAR; INTRAVENOUS; SOFT TISSUE
Status: DISCONTINUED | OUTPATIENT
Start: 2020-07-31 | End: 2020-07-31

## 2020-07-31 RX ORDER — HEPARIN SODIUM 10000 [USP'U]/ML
INJECTION, SOLUTION INTRAVENOUS; SUBCUTANEOUS
Status: DISCONTINUED | OUTPATIENT
Start: 2020-07-31 | End: 2020-07-31

## 2020-07-31 RX ORDER — PROPOFOL 10 MG/ML
VIAL (ML) INTRAVENOUS
Status: DISCONTINUED | OUTPATIENT
Start: 2020-07-31 | End: 2020-07-31

## 2020-07-31 RX ORDER — ONDANSETRON 2 MG/ML
4 INJECTION INTRAMUSCULAR; INTRAVENOUS EVERY 12 HOURS PRN
Status: DISCONTINUED | OUTPATIENT
Start: 2020-07-31 | End: 2020-08-02

## 2020-07-31 RX ORDER — METOPROLOL SUCCINATE 25 MG/1
25 TABLET, EXTENDED RELEASE ORAL EVERY MORNING
Status: DISCONTINUED | OUTPATIENT
Start: 2020-07-31 | End: 2020-08-04 | Stop reason: HOSPADM

## 2020-07-31 RX ORDER — ONDANSETRON 2 MG/ML
INJECTION INTRAMUSCULAR; INTRAVENOUS
Status: DISCONTINUED | OUTPATIENT
Start: 2020-07-31 | End: 2020-07-31

## 2020-07-31 RX ORDER — PHENYLEPHRINE HYDROCHLORIDE 10 MG/ML
INJECTION INTRAVENOUS
Status: DISCONTINUED | OUTPATIENT
Start: 2020-07-31 | End: 2020-07-31

## 2020-07-31 RX ORDER — CEFAZOLIN SODIUM 1 G/3ML
INJECTION, POWDER, FOR SOLUTION INTRAMUSCULAR; INTRAVENOUS
Status: DISCONTINUED | OUTPATIENT
Start: 2020-07-31 | End: 2020-07-31

## 2020-07-31 RX ORDER — PROTAMINE SULFATE 10 MG/ML
INJECTION, SOLUTION INTRAVENOUS
Status: DISCONTINUED | OUTPATIENT
Start: 2020-07-31 | End: 2020-07-31

## 2020-07-31 RX ORDER — LIDOCAINE HYDROCHLORIDE 20 MG/ML
INJECTION, SOLUTION EPIDURAL; INFILTRATION; INTRACAUDAL; PERINEURAL
Status: DISCONTINUED | OUTPATIENT
Start: 2020-07-31 | End: 2020-07-31

## 2020-07-31 RX ORDER — LIDOCAINE HYDROCHLORIDE 40 MG/ML
SOLUTION TOPICAL
Status: DISCONTINUED | OUTPATIENT
Start: 2020-07-31 | End: 2020-07-31

## 2020-07-31 RX ORDER — LIDOCAINE HYDROCHLORIDE 20 MG/ML
JELLY TOPICAL
Status: DISCONTINUED | OUTPATIENT
Start: 2020-07-31 | End: 2020-07-31

## 2020-07-31 RX ORDER — ROCURONIUM BROMIDE 10 MG/ML
INJECTION, SOLUTION INTRAVENOUS
Status: DISCONTINUED | OUTPATIENT
Start: 2020-07-31 | End: 2020-07-31

## 2020-07-31 RX ORDER — SUCCINYLCHOLINE CHLORIDE 20 MG/ML
INJECTION INTRAMUSCULAR; INTRAVENOUS
Status: DISCONTINUED | OUTPATIENT
Start: 2020-07-31 | End: 2020-07-31

## 2020-07-31 RX ORDER — ACETAMINOPHEN 10 MG/ML
INJECTION, SOLUTION INTRAVENOUS
Status: DISCONTINUED | OUTPATIENT
Start: 2020-07-31 | End: 2020-07-31

## 2020-07-31 RX ORDER — HYDROCODONE BITARTRATE AND ACETAMINOPHEN 5; 325 MG/1; MG/1
1 TABLET ORAL EVERY 4 HOURS PRN
Status: DISCONTINUED | OUTPATIENT
Start: 2020-07-31 | End: 2020-08-04 | Stop reason: HOSPADM

## 2020-07-31 RX ORDER — SODIUM CHLORIDE 9 MG/ML
INJECTION, SOLUTION INTRAVENOUS CONTINUOUS PRN
Status: DISCONTINUED | OUTPATIENT
Start: 2020-07-31 | End: 2020-07-31

## 2020-07-31 RX ORDER — FAMOTIDINE 10 MG/ML
INJECTION INTRAVENOUS
Status: DISCONTINUED | OUTPATIENT
Start: 2020-07-31 | End: 2020-07-31

## 2020-07-31 RX ORDER — MUPIROCIN 20 MG/G
1 OINTMENT TOPICAL 2 TIMES DAILY
Status: DISCONTINUED | OUTPATIENT
Start: 2020-07-31 | End: 2020-08-04 | Stop reason: HOSPADM

## 2020-07-31 RX ADMIN — PHENYLEPHRINE HYDROCHLORIDE 0.03 MCG/KG/MIN: 10 INJECTION INTRAVENOUS at 10:07

## 2020-07-31 RX ADMIN — CLEVIPIDINE 6 MG/HR: 0.5 EMULSION INTRAVENOUS at 05:07

## 2020-07-31 RX ADMIN — SODIUM CHLORIDE, SODIUM LACTATE, POTASSIUM CHLORIDE, AND CALCIUM CHLORIDE: .6; .31; .03; .02 INJECTION, SOLUTION INTRAVENOUS at 08:07

## 2020-07-31 RX ADMIN — METOPROLOL SUCCINATE 25 MG: 25 TABLET, FILM COATED, EXTENDED RELEASE ORAL at 04:07

## 2020-07-31 RX ADMIN — LIDOCAINE HYDROCHLORIDE 4 ML: 40 SOLUTION TOPICAL at 09:07

## 2020-07-31 RX ADMIN — DEXAMETHASONE SODIUM PHOSPHATE 8 MG: 4 INJECTION, SOLUTION INTRAMUSCULAR; INTRAVENOUS at 09:07

## 2020-07-31 RX ADMIN — MUPIROCIN 1 G: 20 OINTMENT TOPICAL at 09:07

## 2020-07-31 RX ADMIN — CLEVIPIDINE 5 MG/HR: 0.5 EMULSION INTRAVENOUS at 11:07

## 2020-07-31 RX ADMIN — LIDOCAINE HYDROCHLORIDE 5 ML: 20 JELLY TOPICAL at 09:07

## 2020-07-31 RX ADMIN — SUGAMMADEX 162 MG: 100 INJECTION, SOLUTION INTRAVENOUS at 11:07

## 2020-07-31 RX ADMIN — CEFAZOLIN 2 G: 330 INJECTION, POWDER, FOR SOLUTION INTRAMUSCULAR; INTRAVENOUS at 09:07

## 2020-07-31 RX ADMIN — MUPIROCIN 1 G: 20 OINTMENT TOPICAL at 03:07

## 2020-07-31 RX ADMIN — PROPOFOL 80 MG: 10 INJECTION, EMULSION INTRAVENOUS at 09:07

## 2020-07-31 RX ADMIN — CLEVIPIDINE 1.5 MG/HR: 0.5 EMULSION INTRAVENOUS at 09:07

## 2020-07-31 RX ADMIN — CEFAZOLIN SODIUM 2 G: 2 SOLUTION INTRAVENOUS at 07:07

## 2020-07-31 RX ADMIN — SODIUM CHLORIDE, SODIUM LACTATE, POTASSIUM CHLORIDE, AND CALCIUM CHLORIDE: .6; .31; .03; .02 INJECTION, SOLUTION INTRAVENOUS at 10:07

## 2020-07-31 RX ADMIN — ACETAMINOPHEN 1000 MG: 10 INJECTION, SOLUTION INTRAVENOUS at 09:07

## 2020-07-31 RX ADMIN — ROCURONIUM BROMIDE 5 MG: 10 INJECTION, SOLUTION INTRAVENOUS at 09:07

## 2020-07-31 RX ADMIN — PROTAMINE SULFATE 25 MG: 10 INJECTION, SOLUTION INTRAVENOUS at 10:07

## 2020-07-31 RX ADMIN — ROCURONIUM BROMIDE 45 MG: 10 INJECTION, SOLUTION INTRAVENOUS at 09:07

## 2020-07-31 RX ADMIN — ONDANSETRON 4 MG: 2 INJECTION INTRAMUSCULAR; INTRAVENOUS at 01:07

## 2020-07-31 RX ADMIN — PHENYLEPHRINE HYDROCHLORIDE 120 MCG: 10 INJECTION INTRAVENOUS at 09:07

## 2020-07-31 RX ADMIN — SUCCINYLCHOLINE CHLORIDE 120 MG: 20 INJECTION, SOLUTION INTRAMUSCULAR; INTRAVENOUS at 09:07

## 2020-07-31 RX ADMIN — FAMOTIDINE 20 MG: 10 INJECTION, SOLUTION INTRAVENOUS at 08:07

## 2020-07-31 RX ADMIN — CLEVIPIDINE 12 MG/HR: 0.5 EMULSION INTRAVENOUS at 09:07

## 2020-07-31 RX ADMIN — SODIUM CHLORIDE: 0.9 INJECTION, SOLUTION INTRAVENOUS at 09:07

## 2020-07-31 RX ADMIN — EPHEDRINE SULFATE 10 MG: 50 INJECTION, SOLUTION INTRAVENOUS at 09:07

## 2020-07-31 RX ADMIN — LIDOCAINE HYDROCHLORIDE 80 MG: 20 INJECTION, SOLUTION INTRAVENOUS at 09:07

## 2020-07-31 RX ADMIN — NITROGLYCERIN 1 PATCH: 0.1 PATCH TRANSDERMAL at 05:07

## 2020-07-31 RX ADMIN — SODIUM CHLORIDE: 0.9 INJECTION, SOLUTION INTRAVENOUS at 12:07

## 2020-07-31 RX ADMIN — FENTANYL CITRATE 50 MCG: 50 INJECTION INTRAMUSCULAR; INTRAVENOUS at 09:07

## 2020-07-31 RX ADMIN — HEPARIN SODIUM 10000 UNITS: 10000 INJECTION, SOLUTION INTRAVENOUS; SUBCUTANEOUS at 10:07

## 2020-07-31 RX ADMIN — ONDANSETRON 4 MG: 2 INJECTION INTRAMUSCULAR; INTRAVENOUS at 08:07

## 2020-07-31 RX ADMIN — PHENYLEPHRINE HYDROCHLORIDE 320 MCG: 10 INJECTION INTRAVENOUS at 10:07

## 2020-07-31 RX ADMIN — LISINOPRIL 10 MG: 10 TABLET ORAL at 04:07

## 2020-07-31 NOTE — ANESTHESIA POSTPROCEDURE EVALUATION
Anesthesia Post Evaluation    Patient: Roxanne Hernandez    Procedure(s) Performed: Procedure(s) (LRB):  ENDARTERECTOMY-CAROTID (Left)    Final Anesthesia Type: general    Patient location during evaluation: PACU  Patient participation: Yes- Able to Participate  Level of consciousness: awake and alert  Post-procedure vital signs: reviewed and stable  Pain management: adequate  Airway patency: patent    PONV status at discharge: No PONV  Anesthetic complications: no      Cardiovascular status: blood pressure returned to baseline and stable  Respiratory status: unassisted and room air  Hydration status: euvolemic  Follow-up not needed.  Comments: Patient with nausea treated with zofran.          Vitals Value Taken Time   /68 07/31/20 1310   Temp 36.5 °C (97.7 °F) 07/31/20 1200   Pulse 76 07/31/20 1310   Resp 18 07/31/20 1310   SpO2 93 % 07/31/20 1310   Vitals shown include unvalidated device data.      No case tracking events are documented in the log.      Pain/Paula Score: Pain Rating Prior to Med Admin: 4 (7/30/2020 10:39 PM)

## 2020-07-31 NOTE — PROGRESS NOTES
07/31/2020 @ 5:10 PM     Progress Note, Attending Physician  [facility]     Patient Name: Roxanne Hernandez   MRN: [id number]   Admission Date and Time: 7/28/2020  3:03 PM   Patient Class: IP- Inpatient   Hospital length of stay: 1 days    Code status: Full Code   Attending Physician: Yoni Hernandez MD   Principal Problem: Bilateral carotid artery occlusion      Current Facility-Administered Medications:     0.9%  NaCl infusion, , Intravenous, Continuous, Radu Kent MD, Last Rate: 75 mL/hr at 07/31/20 1210    acetaminophen tablet 650 mg, 650 mg, Oral, Q8H PRN, Radu Kent MD, 650 mg at 07/30/20 2239    atorvastatin tablet 10 mg, 10 mg, Oral, Daily, Radu Kent MD, 10 mg at 07/30/20 0812    cefazolin (ANCEF) 2 gram in dextrose 5% 50 mL IVPB (premix), 2 g, Intravenous, Q8H, Radu Kent MD    clevidipine (CLEVIPREX) 25 mg/50 mL infusion, , , ,     clevidipine (CLEVIPREX) infusion 0.5 mg/mL, 1 mg/hr, Intravenous, Continuous, Yoni Hernandez MD    dextrose 50% injection 12.5 g, 12.5 g, Intravenous, PRN, Radu Kent MD    dextrose 50% injection 25 g, 25 g, Intravenous, PRN, Radu Kent MD    glucagon (human recombinant) injection 1 mg, 1 mg, Intramuscular, PRN, Radu Kent MD    glucose chewable tablet 16 g, 16 g, Oral, PRN, Radu Kent MD    glucose chewable tablet 24 g, 24 g, Oral, PRN, Radu Kent MD    HYDROcodone-acetaminophen 5-325 mg per tablet 1 tablet, 1 tablet, Oral, Q4H PRN, Radu Kent MD    lisinopriL tablet 10 mg, 10 mg, Oral, QAANDRES, Yoni Hernandez MD, 10 mg at 07/31/20 1601    metoprolol succinate (TOPROL-XL) 24 hr tablet 25 mg, 25 mg, Oral, QAANDRES, Yoni Hernandez MD, 25 mg at 07/31/20 1601    mupirocin 2 % ointment 1 g, 1 g, Nasal, BID, Radu Kent MD, 1 g at 07/31/20 1557    nitroGLYCERIN 0.1 mg/hr TD PT24 1 patch, 1 patch, Transdermal, Q24H, Yoni Hernandez MD    ondansetron injection 4 mg, 4 mg, Intravenous, Q12H PRN, Radu  MD Donte, 4 mg at 07/31/20 1303    sodium chloride 0.9% flush 10 mL, 10 mL, Intravenous, PRN, Radu Kent MD    Facility-Administered Medications Ordered in Other Encounters:     0.9%  NaCl infusion, , Intravenous, Continuous, Carmen Yepez MD, Last Rate: 0 mL/hr at 09/04/19 1141    aspirin EC tablet 325 mg, 325 mg, Oral, Once, Carmen Yepez MD    diazePAM tablet 5 mg, 5 mg, Oral, On Call Procedure, Carmen Yepez MD    diphenhydrAMINE capsule 25 mg, 25 mg, Oral, Once, Carmen Yepez MD     Subjective:   Remarkably, post-operatively she feels very well, with only minor incisional soreness, and NO new neurologic complaints or deficits.     Physical Examination:     Temp:  [97.7 °F (36.5 °C)-98 °F (36.7 °C)]   Pulse:  [66-81]   Resp:  [18-23]   BP: (118-166)/(56-82)   SpO2:  [92 %-99 %]   Arterial Line BP: (136)/(48)     A&Ox4 in NAD  HEENT = normal, PERRL, EOMI, fundi benign  Neck = normal  Lungs = clear  Heart = RRR S1 & S2 with no murmurs, rubs, or gallops   Abdomen = obese benign   Ext = no CCE  Neurologic = stable non-focal exam, she moves all extremities well    NO DVT  NO bedsores   IV clean     Objective:     EKG history:   EKG 12/01/2000 = NSR, leftward axis, normal   EKG 05/01/2006 = NSR, leftward axis, normal   EKG 06/01/2011 = NSR, PACs, mild left axis, normal     EKG 08/30/2019 = NSR, LAD, LVH with secondary ST-T changes (inverted Ts in I, aVL)   EKG 02/11/2020 = NSR, LAD, LVH, inverted Ts in I, aVL   EKG 07/28/2020 = NSR, LAD, normal T waves diffusely (her previously inverted Ts in I and aVL are now upright)      CT head (no contrast) 07/28/2020 = small focal area of probable remote infarction involves the splenium of the corpus callosum on the left, mild chronic small vessel ischemia, scattered vascular calcifications within the intracranial segments of the internal carotid and vertebral arteries     CTA neck and brain (with contrast) 07/28/2020:    o Moderate atheromatous changes are noted in the aortic arch. There are moderate degrees of narrowing of the proximal segments of the left common carotid artery and subclavian artery at their origin.  o There is mild tortuosity of the common carotid arteries.  o There is prominent plaque formation observed bilaterally at the level of the carotid bifurcations.   o There is bilateral high-grade stenosis of the origins of the internal carotid arteries (approximately 90%). The remainder of the internal carotid arteries are tortuous in their extracranial segment but patent. There is stenosis of the origin of the right external carotid artery.  o There is significant focal stenosis of the intracranial segment of the left internal carotid artery near the level of the anterior clinoid process.  o There is mild tortuosity of the vertebral arteries.   o There are no findings of high-grade stenosis or occlusion of the major intracranial arteries otherwise. No aneurysm or vascular malformation is noted.  o The cervical segments of the vertebral arteries are patent. There are mild-moderate stenoses of the origins of the vertebral arteries bilaterally.  o Scattered groundglass type opacities are observed within the visualized lung apices bilaterally. There is prominent multilevel cervical spondylosis.    CXR 07/28/2020:   The heart size is within normal limits and there is no evidence of acute pulmonary vascular engorgement. A metallic stent projected over the cardiac silhouette. There is arteriosclerosis in the arch of the aorta. A dual lead right-sided cardiac device is unchanged in position.  The lungs are free of confluent parenchymal infiltrates. There are minor linear parenchymal opacities in the lung bases compatible with minimal atelectasis or scarring. There are no significant effusions.   There is unchanged apical pleural thickening. Monitoring electrodes overlie the chest.    24 hour Holter: started the morning of  "07/29/2020 and in process at this time.     Impression:   1. Plethora of complaints, beginning about 2 weeks prior to admission, suggestive of either stroke occurring about 2 weeks ago, or severe new onset "first-time-in-her-life" severe vertigo, along with sensations of episodic impending loss of consciousness with palpitaitons in the recent TAVR state (since 12/17/2019) and pacemaker implantation state (since 12/18/2019).    a. Per Cardiologist Marleni 07/30/2020: "No significant arrhythmias on telemetry thus far.  Pacemaker interrogated on 07/30/2020.  No significant arrhythmias noted.  Pacemaker functioning appropriately."   2. 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 Severe progression of her cerebrovascular disease; whether this is now "symptomatic" or "asymptomatic" is uncertain, but the severity is sufficient to warrant sequential CEA regardless to reduce her future risk of recurrent stroke.    a. 07/29/2020: Cardiologist Marleni advises sequential carotid endarterectomy   b. 07/29/2020: CT Surgeon/Vascular Surgeon Donte advises sequential carotid endarterectomy, declaring that whether her stenoses are asymptomatic or asymptomatic, sequential CEA is indicated if patient agrees and Cardiologist Marleni and I agree.   c. 07/29/2020: I believe the medical literature is reasonably clear that she is statistically likely to do better with sequential carotid endarterectomy than with "medical therapy alone".  I have advised the patient of my perspective, and that I advise her to proceed.   d. 07/30/2020: I reiterated to the patient and daughter Donna that sequential bilateral CEA is technically the best option for her, with a measurable future stroke risk reduction to be gained.    e. 07/31/2020: left CEA performed by CT Surgeon Donte  f. 07/31/2020 @ 17:11h: she is doing very well " post-operatively from a neurologic and general standpoint   4. She is having post-carotid endarterectomy hypertension (mechanism postulated by some authors, but actual cause unknown).   5. 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 surgical therapy options discussion: patient advised that (in the asymptomatic state) medical treatment reduces her 5 year risk of stroke to 11%, whereas carotid endarterectomy reduces her 5 year risk of stroke to 5% with a small risk of karyn-operative stroke (see copy in her chart of the handout I gave her)she advised me 06/06/2011 that she wishes to proceed with medical treatment for now    CTA neck and brain (with contrast) 07/28/2020:   o Moderate atheromatous changes are noted in the aortic arch. There are moderate degrees of narrowing of the proximal segments of the left common carotid artery and subclavian artery at their origin.  o There is mild tortuosity of the common carotid arteries.  o There is prominent plaque formation observed bilaterally at the level of the carotid bifurcations.   o There is bilateral high-grade stenosis of the origins of the internal carotid arteries (approximately 90%). The remainder of the internal carotid arteries are tortuous in their extracranial segment but patent. There is stenosis of the origin of the right external carotid artery.  o There is significant focal stenosis of the intracranial segment of the left internal carotid artery near the level  of the anterior clinoid process.  o There is mild tortuosity of the vertebral arteries.   o There are no findings of high-grade stenosis or occlusion of the major intracranial arteries otherwise. No aneurysm or vascular malformation is noted.  o The cervical segments of the vertebral arteries are patent. There are mild-moderate stenoses of the origins of the vertebral arteries bilaterally.  o Scattered groundglass type opacities are observed within the visualized lung apices bilaterally. There is prominent multilevel cervical spondylosis.   Medical Literature Discussion re: carotid stenosis:    1.  Symptomatic patients:  Both the North American Symptomatic Carotid Endarterectomy Trial (NASCET)1 and the European Carotid Surgery Trial (ECST)2 showed a substantial benefit for surgery in patients with a symptomatic carotid artery stenosis of greater than 70 percent. In NASCET, the average cumulative ipsilateral stroke rate at 2 years was 26 percent for patients treated medically and 9 percent for those receiving the same medical treatment plus a carotid endarterectomy, corresponding to a 65 percent relative risk reduction favoring surgery. For every 100 patients treated surgically, 17 were spared an ipsilateral stroke over the next 2 years. The perioperative stroke plus death rate was 5.8 percent in the surgical group and 3.3 percent in the medical group in the same 1-month period, in spite of optimal medical therapy. Thus the excess morbidity and mortality attributable to the surgery was 2.5 percent. If minor ischemic events are excluded from the analyses, the excess major stroke and death rate for the surgical patients was 1.2 percent (2.1 less 0.9 percent) and the excess fatality rate was 0.3 percent (0.6 less 0.3 percent). The NASCET and ECST investigators concluded that their patients with a 70 to 99 percent stenosis clearly benefited from carotid endarterectomy. Results in [facility] hospitals should be similar  "if patients are properly selected and the perioperative morbidity and mortality are comparably low.  Analysis of several subgroups yielded important information. The cumulative risk of ipsilateral stroke correlated with the degree of stenosis. While the absolute risk reduction favoring the entire surgical group was 17 percent in NASCET, the risk reduction favoring those with a 90 to 99 percent stenosis was 26 percent, and this decreased to 12 percent for those with a 70 to 79 percent stenosis. The overall 26 percent cumulative risk of an ipsilateral stroke at 2 years for this group of patients with "high-grade" stenosis calculates to an annual risk of 13 percent. The operative morbidity and mortality were similar for patients with different degrees of stenosis.   2.  Asymptomatic patients.  The Asymptomatic Carotid Atherosclerosis Study (ACAS)3  demonstrated that asymptomatic people with internal carotid artery stenoses greater than 60 percent are at low risk for stroke. More than 1600 patients were randomized to treatment with the best medical therapies available versus the same medical treatment plus carotid endarterectomy. Patients in the surgical group had a risk over 5 years for ipsilateral stroke (and any perioperative stroke or death) of 5.1 percent, whereas the risk for the nonsurgical group was 11 percent. While this demonstrates a 53 percent relative risk reduction, the absolute risk reduction is only 5.9 percent over 5 years, or 1.2 percent annually. If only major strokes and death are counted, the comparable numbers are 43 percent relative risk reduction, and an absolute risk reduction of only 2.6 percent over 5 years, or 0.5 percent annually. This latter result was not statistically significant. There was no clear difference in benefit for those with moderate (60 to 80 percent) compared with severe (80 to 99 percent) stenosis.   Many of the strokes in the surgery group were caused by preoperative " angiograms. If carotid artery ultrasonography and MR angiography replace catheter angiography, this morbidity and mortality will be obviated.   While there are benefits for carotid endarterectomy in patients with asymptomatic carotid stenosis, the benefits are very small. Medical therapy for reduction of atherosclerosis risk factors in addition to aspirin (325 mg/d) generally are recommended for patients with asymptomatic carotid stenosis.        Leakage of Periprosthetic Valve, Subsequent Encounter 2/11/2020    Aortic stenosis saga:   Echocardiogram ([facility]) 09/04/2019:    Aortic valve: heavily calcified with restricted leaflet motion. Moderate aortic regurgitation.    Mitral valve: thick and calcified with restricted leaflet motion. Mild mitral stenosis and regurgitation.    Tricuspid valve: is structurally normal with good leaflet excursion. Mild regurgitation.   Pulmonary valve: is structurally normal with good leaflet excursion. No significant regurgitation.   Overall LVEF appears normal.    No obvious shunt across the interatrial septum by color flow Doppler.     Cardiac catheterization 09/14/2019 (Timothy Salgado MD):   · Mid RCA lesion, 50% stenosed, nonsignificant by iFR assessment.  · Known severe aortic stenosis from echo    CTA Cardiac TAVR 09/19/2019:    Large left UVJ stone measuring at 2.6 cm with severe left-sided hydroureteronephrosis.   Significant pneumobilia in the left hepatic lobe with air within the common bile duct.  Unclear if this is due to biliary enteric fistula, infectious process such as cholangitis, or incompetent sphincter of Oddi.  Unfortunately this is not well evaluated on this dedicated TAVR protocol examination.  Clinical correlation is advised.  Further evaluation could be performed with ERCP and dedicated abdominal CT with intravenous contrast.   TAVR measurements, as above.   Significant aortic valve and abdominal aorta atherosclerosis.   Nonspecific mesenteric  haziness, possibly mesenteric panniculitis or adenitis.   Heterogeneity of the pulmonary parenchyma possibly related to elevated pulmonary vascular resistance, small airways disease, or both.   Small bilateral pleural effusions.    Cardiac catheterization 12/17/2019 (Herbert Ashley):    Successful left transfemoral 23 mm Evolute TAVR.  No paravalvular leak, mean gradient 3, peak velocity 1.5 post TAVR.    Echocardiogram (TTE) 12/26/2019   · Mild concentric left ventricular hypertrophy   · Normal left ventricular systolic function. The estimated ejection fraction is 60%.  · No wall motion abnormalities.  · Grade I (mild) left ventricular diastolic dysfunction consistent with impaired relaxation.  · There is a transcutaneously-placed aortic bioprosthesis present.  · Mild aortic regurgitation.  · Mild-to-moderate aortic valve stenosis.  · Aortic valve area is 1.23 cm2; peak velocity is 2.84 m/s; mean gradient is 18 mmHg.  · Mild left atrial enlargement.  · Mild tricuspid regurgitation.  · Normal right ventricular systolic function.  · Moderate mitral sclerosis.  · There is mild leaflet calcification of the Mitral Valve.  · Mild mitral stenosis. MVA by PHT is 2.21 sq cm.  · Normal central venous pressure (3 mmHg).   · The estimated PA systolic pressure is 32 mmHg.:     Echocardiogram (TTE) 01/22/2020:   · Normal left ventricular systolic function. The estimated ejection fraction is 65%.  · Concentric left ventricular remodeling.  · No wall motion abnormalities.  · Grade I (mild) left ventricular diastolic dysfunction consistent with impaired relaxation.  · Normal right ventricular systolic function.  · Severe left atrial enlargement.  · There is a transcutaneously-placed aortic bioprosthesis present.   · There is paravalvular aortic insufficiency present   · Mild mitral regurgitation.  · Normal central venous pressure (3 mmHg).  · The estimated PA systolic pressure is 29 mmHg.   · Trivial posterior pericardial  effusion.      Chronic Diastolic Heart Failure 12/1/2000    Echocardiogram 12/01/2000 = hyperdynamic LV, with mild to moderate amount of pericardial fat, and left ventricular diastolic dysfunction   Echocardiogram 05/20/2011 = dilated left atrial cavity, normal LV thickness and function with LVEF @ 70%, mild aortic stenosis with valve area @ 1.09cm2, peak gradient = 34 mmHg, minimal prolapse of anterior mitral leaflet, mild TI, very small pericardial effusion        Coronary Artery Disease of Native Artery of Native Heart With Stable Angina Pectoris 9/14/2019    Cardiac catheterization 09/14/2019 (Timothy Salgado MD):    Mid RCA lesion, 50% stenosed, nonsignificant by iFR assessment.   Known severe aortic stenosis from echo  02/13/2020: it is unlikely that this trivial coronary artery disease is the cause of her episodic dyspnea that has been present since prior to 09/04/2019.     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   Ultrasound 01/31/1992 = cholelithiasis with tender gallbladder   Ultrasound abdomen 06/22/1995 = normal except for hypoechoic acoustic texture of the left hepatic lobe   ERCP #1 02/01/1992 (P[place]grin) = choledocholithiasis with cholangitis   ERCP #2 06/28/1995 (Leonor) = multiple filling defects due to stones   CT abdomen (with & without) 06/26/1995 = normal except for air in the biliary tree, fatty infiltration of the left lobe, calcified granuloma at right lung base   CT abdomen & pelvis (with & without) 12/02/1997 = normal/normal except for air in the biliary tree due to prior sphincterotomy   CTA Cardiac TAVR 09/19/2019: significant pneumobilia in the left hepatic lobe with air within the common bile duct         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 In reality, this dyspnea syndrome has been going on since prior to her TAVR and since prior to the beginning of her workup which is dated to just prior to 09/04/2019.  2. 02/12/2020: I was in the process of writing discharge orders on this lady the evening of 02/12/2020 when her nurse came to me and reported that the patient was acutely dyspneic again, without chest pain, and at that point I did not have a proper diagnosis to explain her episodic dyspnea.    3. Consultation from Pulmonologist Caden received 02/13/2020   4. Consultation from Cardiologist Marleni received 02/13/2020   5. We are attributing this to her karyn-valvular TAVR leak, with plans for tincture of time to cause this problem to settle down      Dehydration Syndrome (Resolved) 2/11/2020   Left Ureteral Stone (Resolved) 11/13/2019   Hydronephrosis With Urinary Obstruction Due to Ureteral Calculus (Resolved) 11/11/2019    Left ureteral stone with hydronephrosis requiring  manipulation 11/13/2019 @ [facility] (Paddy Dunham MD)    CTA Cardiac TAVR 09/19/2019: large left UVJ stone measuring at 2.6 cm with severe left-sided hydroureteronephrosis    Intervention 11/13/2019: 1.  Left ureteroscopy 2.  Cystoscopy 3.  Stone basket extraction 4.  Laser lithotripsy 5.  Left ureteral stent placement 6.  Fluoro < 1h 11/13/2019 (Paddy Dunham @ [facility])     Ultrasound kidneys 02/11/2020 = moderate left-sided hydronephrosis with no shadowing stone identified; nonobstructing right-sided renal stones; likely debris within the bladder, consider correlation with urinalysis.      Urinalysis 02/12/2020 = normal    As of 02/11-13/2020 she has been completely asymptomatic for this abnormality    02/13/2020 on discharge day I am ordering her to report back to her established Urologist Paddy Dunham for further investigation for this problem.        Plan:   1. Inpatient status permitted today 07/30/2020 per Utilization Management.   2. 07/28/2020 COVID-19 nasal swab PCR is negative     6. Post-CEA care in ICU  7. Variable Cleviprex infusion to control her post-carotid endarterectomy hypertension.   8. Left CEA performed done Friday 07/31/2020.     Yoni Hernandez MD

## 2020-07-31 NOTE — PROGRESS NOTES
[facility]  Cardiology  Progress Note    Patient Name: Roxanne Hernandez  MRN: [id number]  Admission Date: 7/28/2020  Hospital Length of Stay: 0 days  Code Status: Full Code   Attending Physician: Yoni Hernandez MD   Primary Care Physician: Yoni Hernandez MD  Expected Discharge Date:   Principal Problem:Syncope and collapse    Subjective:       Interval History:  Denies any chest pain or shortness of breath.  Appreciate Dr. Kent's input.  Plans noted for surgery tomorrow.    ROS  Objective:     Vital Signs (Most Recent):  Temp: 98.1 °F (36.7 °C) (07/30/20 1522)  Pulse: 66 (07/30/20 1522)  Resp: 20 (07/30/20 1522)  BP: 133/62 (07/30/20 1522)  SpO2: 96 % (07/30/20 1522) Vital Signs (24h Range):  Temp:  [96.7 °F (35.9 °C)-98.1 °F (36.7 °C)] 98.1 °F (36.7 °C)  Pulse:  [66-94] 66  Resp:  [20-22] 20  SpO2:  [94 %-100 %] 96 %  BP: (127-143)/(57-65) 133/62     Weight: 84.7 kg (186 lb 11.7 oz)  Body mass index is 40.41 kg/m².    SpO2: 96 %  O2 Device (Oxygen Therapy): room air      Intake/Output Summary (Last 24 hours) at 7/30/2020 1949  Last data filed at 7/30/2020 1521  Gross per 24 hour   Intake 720 ml   Output --   Net 720 ml       Lines/Drains/Airways     Peripheral Intravenous Line                 Peripheral IV - Single Lumen 07/28/20 1715 20 G Posterior;Right Forearm 2 days                Scheduled Meds:   atorvastatin  10 mg Oral Daily    lisinopriL  10 mg Oral QAM    metoprolol succinate  25 mg Oral QAM    nitroGLYCERIN 0.1 mg/hr TD PT24  1 patch Transdermal Daily     Continuous Infusions:  PRN Meds:.acetaminophen, dextrose 50%, dextrose 50%, glucagon (human recombinant), glucose, glucose, sodium chloride 0.9%     Physical Exam  HEENT: Normocephalic, atraumatic, PERRL, Conjunctiva pink, no scleral icterus.   CVS: S1S2+, RRR, ESM+, no rubs or gallops, JVP: Normal.  LUNGS: Clear  ABDOMEN: Soft, NT, BS+  EXTREMITIES: No cyanosis, clubbing or edema  NEURO: AAO X 3.       Significant Labs: BMP: No results  for input(s): GLU, NA, K, CL, CO2, BUN, CREATININE, CALCIUM, MG in the last 48 hours., CMP No results for input(s): NA, K, CL, CO2, GLU, BUN, CREATININE, CALCIUM, PROT, ALBUMIN, BILITOT, ALKPHOS, AST, ALT, ANIONGAP, ESTGFRAFRICA, EGFRNONAA in the last 48 hours., CBC No results for input(s): WBC, HGB, HCT, PLT in the last 48 hours., INR No results for input(s): INR, PROTIME in the last 48 hours., Lipid Panel No results for input(s): CHOL, HDL, LDLCALC, TRIG, CHOLHDL in the last 48 hours. and Troponin No results for input(s): TROPONINI in the last 48 hours.    Significant Imaging: Reviewed  Assessment and Plan:     IMPRESSION:  Episodes of dizziness and vision problems. Likely etiology is carotid artery stenosis.   Carotid artery stenosis. High-grade stenoses (90%) involving the origin/proximal segments of the internal carotid arteries bilaterally. There is significant focal stenosis of the intracranial segment of the left ICA.  Palpitations. No significant arrhythmias on telemetry thus far.  Pacemaker interrogated on 07/30/2020.  No significant arrhythmias noted.  Pacemaker functioning appropriately.  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   Severe aortic valve stenosis. Aortic valve area is 0.82 cm2; peak velocity is 4.6 m/s; mean gradient is 53 mmHg. s/p TAVR 12/17/2019  Hypertension.  Controlled.  Dyslipidemia.  Diabetes mellitus.    Dual chamber PM in situ. [employer] device. 12/19/2019. Functioning appropriately per last check in 5/2020.   LBBB    PLAN:  1.  Plans noted for left carotid endarterectomy tomorrow.  2.  Discussed with patient as well as her daughter and informed her of the plan.      Carmen Yepez MD  Cardiology  [facility]

## 2020-07-31 NOTE — PROGRESS NOTES
[facility]  Cardiology  Progress Note    Patient Name: Roxanne Hernandez  MRN: [id number]  Admission Date: 7/28/2020  Hospital Length of Stay: 1 days  Code Status: Full Code   Attending Physician: Yoni Hernandez MD   Primary Care Physician: Yoni Hernandez MD  Expected Discharge Date:   Principal Problem:Bilateral carotid artery occlusion    Subjective:       Interval History:  Denies any chest pain or shortness of breath.  Patient is status post left carotid endarterectomy.  The site looks good.  She reports some pain at the surgical site.    ROS  Objective:     Vital Signs (Most Recent):  Temp: 97.7 °F (36.5 °C) (07/31/20 1200)  Pulse: 78 (07/31/20 1200)  Resp: (!) 23 (07/31/20 1200)  BP: (!) 124/58 (07/31/20 1200)  SpO2: 96 % (07/31/20 1200) Vital Signs (24h Range):  Temp:  [97.7 °F (36.5 °C)-98 °F (36.7 °C)] 97.7 °F (36.5 °C)  Pulse:  [66-81] 78  Resp:  [18-23] 23  SpO2:  [92 %-99 %] 96 %  BP: (118-166)/(56-82) 124/58  Arterial Line BP: (136)/(48) 136/48     Weight: 80.8 kg (178 lb 2.1 oz)  Body mass index is 38.55 kg/m².    SpO2: 96 %  O2 Device (Oxygen Therapy): room air      Intake/Output Summary (Last 24 hours) at 7/31/2020 1532  Last data filed at 7/31/2020 1138  Gross per 24 hour   Intake 2400 ml   Output 420 ml   Net 1980 ml       Lines/Drains/Airways     Drain                 Urethral Catheter 07/31/20 0918 Non-latex;Straight-tip 16 Fr. less than 1 day          Arterial Line                 Arterial Line 07/31/20 0905 Left Radial less than 1 day          Peripheral Intravenous Line                 Peripheral IV - Single Lumen 07/28/20 1715 20 G Posterior;Right Forearm 2 days                Scheduled Meds:   atorvastatin  10 mg Oral Daily    ceFAZolin (ANCEF) IVPB  2 g Intravenous Q8H    lisinopriL  10 mg Oral QAM    metoprolol succinate  25 mg Oral QAM    mupirocin  1 g Nasal BID    nitroGLYCERIN 0.1 mg/hr TD PT24  1 patch Transdermal Daily     Continuous Infusions:   sodium chloride 0.9%  75 mL/hr at 07/31/20 1210     PRN Meds:.acetaminophen, dextrose 50%, dextrose 50%, glucagon (human recombinant), glucose, glucose, HYDROcodone-acetaminophen, ondansetron, sodium chloride 0.9%     Physical Exam  HEENT: Normocephalic, atraumatic, PERRL, Conjunctiva pink, no scleral icterus.  Wound dressing in place -C/D/I  CVS: S1S2+, RRR, ESM+, no rubs or gallops, JVP: Normal.  LUNGS: Clear  ABDOMEN: Soft, NT, BS+  EXTREMITIES: No cyanosis, clubbing or edema  NEURO: AAO X 3.       Significant Labs: BMP:   Recent Labs   Lab 07/31/20  0436   *      K 4.4      CO2 22*   BUN 36*   CREATININE 1.3   CALCIUM 8.8   , CMP   Recent Labs   Lab 07/31/20 0436      K 4.4      CO2 22*   *   BUN 36*   CREATININE 1.3   CALCIUM 8.8   ANIONGAP 9   ESTGFRAFRICA 44.8*   EGFRNONAA 38.8*   , CBC   Recent Labs   Lab 07/31/20  0436 07/31/20  0947   WBC 8.09  --    HGB 13.9  --    HCT 41.7 42   *  --    , INR   Recent Labs   Lab 07/31/20 0436   INR 1.1   , Lipid Panel No results for input(s): CHOL, HDL, LDLCALC, TRIG, CHOLHDL in the last 48 hours. and Troponin No results for input(s): TROPONINI in the last 48 hours.    Significant Imaging: Reviewed  Assessment and Plan:     IMPRESSION:  Episodes of dizziness and vision problems. Likely etiology is carotid artery stenosis.   Carotid artery stenosis. High-grade stenoses (90%) involving the origin/proximal segments of the internal carotid arteries bilaterally. There is significant focal stenosis of the intracranial segment of the left ICA.  Status post left carotid endarterectomy on 07/31/2020.  Currently hemodynamically stable with no obvious neurological deficits.  Palpitations. No significant arrhythmias on telemetry thus far.  Pacemaker interrogated on 07/30/2020.  No significant arrhythmias noted.  Pacemaker functioning appropriately.  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   Severe aortic valve stenosis. Aortic valve  area is 0.82 cm2; peak velocity is 4.6 m/s; mean gradient is 53 mmHg. s/p TAVR 12/17/2019  Hypertension.  Controlled.  Dyslipidemia.  Diabetes mellitus.    Dual chamber PM in situ. [employer] device. 12/19/2019. Functioning appropriately per last check in 5/2020.   LBBB    PLAN:  1.  Continue current medical regimen.  2.  Discussed with Dr. Hernandez.  3.  Dr. Carr will be available as needed over the weekend.      Carmen Yepez MD  Cardiology  [facility]

## 2020-07-31 NOTE — PROGRESS NOTES
07/30/2020 @ 9:17 PM     Progress Note, Attending Physician  [facility]     Patient Name: Roxanne Hernandez   MRN: [id number]   Admission Date and Time: 7/28/2020  3:03 PM   Patient Class: IP- Inpatient   Hospital length of stay: 0 days    Code status: Full Code   Attending Physician: Yoni Hernandez MD   Principal Problem: Syncope and collapse      Current Facility-Administered Medications:     acetaminophen tablet 650 mg, 650 mg, Oral, Q8H PRN, Yoni Hernandez MD, 650 mg at 07/30/20 0517    atorvastatin tablet 10 mg, 10 mg, Oral, Daily, Yoni Hernandez MD, 10 mg at 07/30/20 0812    dextrose 50% injection 12.5 g, 12.5 g, Intravenous, PRN, Yoni Hernandez MD    dextrose 50% injection 25 g, 25 g, Intravenous, PRN, Yoni Hernandez MD    glucagon (human recombinant) injection 1 mg, 1 mg, Intramuscular, PRN, Yoni Hernandez MD    glucose chewable tablet 16 g, 16 g, Oral, PRN, Yoni Hernandez MD    glucose chewable tablet 24 g, 24 g, Oral, PRN, Yoni Hernandez MD    lisinopriL tablet 10 mg, 10 mg, Oral, QAM, Yoni Hernandez MD, 10 mg at 07/30/20 0812    metoprolol succinate (TOPROL-XL) 24 hr tablet 25 mg, 25 mg, Oral, QAM, Yoni Hernandez MD, 25 mg at 07/30/20 0812    nitroGLYCERIN 0.1 mg/hr TD PT24 1 patch, 1 patch, Transdermal, Daily, Yoni Hernandez MD, 1 patch at 07/30/20 0945    sodium chloride 0.9% flush 10 mL, 10 mL, Intravenous, PRN, Yoni Hernandez MD    Facility-Administered Medications Ordered in Other Encounters:     0.9%  NaCl infusion, , Intravenous, Continuous, Carmen Yepez MD, Last Rate: 0 mL/hr at 09/04/19 1141    aspirin EC tablet 325 mg, 325 mg, Oral, Once, Carmen Yepez MD    diazePAM tablet 5 mg, 5 mg, Oral, On Call Procedure, Carmen Yepez MD    diphenhydrAMINE capsule 25 mg, 25 mg, Oral, Once, Carmen Yepez MD     Subjective:   She actually feels much better overall today.  Her dizziness is gone. No new issues.     Physical Examination:      Temp:  [96.7 °F (35.9 °C)-98.1 °F (36.7 °C)]   Pulse:  [66-94]   Resp:  [20-22]   BP: (127-166)/(57-82)   SpO2:  [94 %-100 %]     A&Ox4 in NAD  HEENT = normal, PERRL, EOMI, fundi benign  Neck = normal  Lungs = clear  Heart = RRR S1 & S2 with no murmurs, rubs, or gallops   Abdomen = obese benign   Ext = no CCE  Neurologic = stable non-focal exam   NO DVT  NO bedsores   IV clean     Objective:     EKG history:   EKG 12/01/2000 = NSR, leftward axis, normal   EKG 05/01/2006 = NSR, leftward axis, normal   EKG 06/01/2011 = NSR, PACs, mild left axis, normal     EKG 08/30/2019 = NSR, LAD, LVH with secondary ST-T changes (inverted Ts in I, aVL)   EKG 02/11/2020 = NSR, LAD, LVH, inverted Ts in I, aVL   EKG 07/28/2020 = NSR, LAD, normal T waves diffusely (her previously inverted Ts in I and aVL are now upright)      CT head (no contrast) 07/28/2020 = small focal area of probable remote infarction involves the splenium of the corpus callosum on the left, mild chronic small vessel ischemia, scattered vascular calcifications within the intracranial segments of the internal carotid and vertebral arteries     CTA neck and brain (with contrast) 07/28/2020:   o Moderate atheromatous changes are noted in the aortic arch. There are moderate degrees of narrowing of the proximal segments of the left common carotid artery and subclavian artery at their origin.  o There is mild tortuosity of the common carotid arteries.  o There is prominent plaque formation observed bilaterally at the level of the carotid bifurcations.   o There is bilateral high-grade stenosis of the origins of the internal carotid arteries (approximately 90%). The remainder of the internal carotid arteries are tortuous in their extracranial segment but patent. There is stenosis of the origin of the right external carotid artery.  o There is significant focal stenosis of the intracranial segment of the left internal carotid artery near the level of the anterior  "clinoid process.  o There is mild tortuosity of the vertebral arteries.   o There are no findings of high-grade stenosis or occlusion of the major intracranial arteries otherwise. No aneurysm or vascular malformation is noted.  o The cervical segments of the vertebral arteries are patent. There are mild-moderate stenoses of the origins of the vertebral arteries bilaterally.  o Scattered groundglass type opacities are observed within the visualized lung apices bilaterally. There is prominent multilevel cervical spondylosis.    CXR 07/28/2020:   The heart size is within normal limits and there is no evidence of acute pulmonary vascular engorgement. A metallic stent projected over the cardiac silhouette. There is arteriosclerosis in the arch of the aorta. A dual lead right-sided cardiac device is unchanged in position.  The lungs are free of confluent parenchymal infiltrates. There are minor linear parenchymal opacities in the lung bases compatible with minimal atelectasis or scarring. There are no significant effusions.   There is unchanged apical pleural thickening. Monitoring electrodes overlie the chest.    24 hour Holter: started the morning of 07/29/2020 and in process at this time.     Impression:   1. Plethora of complaints, beginning about 2 weeks prior to admission, suggestive of either stroke occurring about 2 weeks ago, or severe new onset "first-time-in-her-life" severe vertigo, along with sensations of episodic impending loss of consciousness with palpitaitons in the recent TAVR state (since 12/17/2019) and pacemaker implantation state (since 12/18/2019).    analy Per Cardiologist Marleni 07/30/2020: "No significant arrhythmias on telemetry thus far.  Pacemaker interrogated on 07/30/2020.  No significant arrhythmias noted.  Pacemaker functioning appropriately."   2. 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 Severe progression of her cerebrovascular disease; whether this is now "symptomatic" or "asymptomatic" is uncertain, but the severity is sufficient to warrant sequential CEA regardless to reduce her future risk of recurrent stroke.    a. 07/29/2020: Cardiologist Marleni advises sequential carotid endarterectomy   b. 07/29/2020: CT Surgeon/Vascular Surgeon Donte advises sequential carotid endarterectomy, declaring that whether her stenoses are asymptomatic or asymptomatic, sequential CEA is indicated if patient agrees and Cardiologist Marleni and I agree.   c. 07/29/2020: I believe the medical literature is reasonably clear that she is statistically likely to do better with sequential carotid endarterectomy than with "medical therapy alone".  I have advised the patient of my perspective, and that I advise her to proceed.   d. 07/30/2020: I reiterated to the patient and daughter Donna that sequential bilateral CEA is technically the best option for her, with a measurable future stroke risk reduction to be gained.    4. 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 surgical therapy options discussion: patient advised that (in the asymptomatic state) medical treatment reduces her 5 year risk of stroke to 11%, whereas carotid endarterectomy reduces her 5 year risk of stroke to 5% with a small risk of karyn-operative stroke (see copy in her chart of the handout I gave her)she advised me 06/06/2011 that she wishes " to proceed with medical treatment for now    CTA neck and brain (with contrast) 07/28/2020:   o Moderate atheromatous changes are noted in the aortic arch. There are moderate degrees of narrowing of the proximal segments of the left common carotid artery and subclavian artery at their origin.  o There is mild tortuosity of the common carotid arteries.  o There is prominent plaque formation observed bilaterally at the level of the carotid bifurcations.   o There is bilateral high-grade stenosis of the origins of the internal carotid arteries (approximately 90%). The remainder of the internal carotid arteries are tortuous in their extracranial segment but patent. There is stenosis of the origin of the right external carotid artery.  o There is significant focal stenosis of the intracranial segment of the left internal carotid artery near the level of the anterior clinoid process.  o There is mild tortuosity of the vertebral arteries.   o There are no findings of high-grade stenosis or occlusion of the major intracranial arteries otherwise. No aneurysm or vascular malformation is noted.  o The cervical segments of the vertebral arteries are patent. There are mild-moderate stenoses of the origins of the vertebral arteries bilaterally.  o Scattered groundglass type opacities are observed within the visualized lung apices bilaterally. There is prominent multilevel cervical spondylosis.   Medical Literature Discussion re: carotid stenosis:    1.  Symptomatic patients:  Both the North American Symptomatic Carotid Endarterectomy Trial (NASCET)1 and the European Carotid Surgery Trial (ECST)2 showed a substantial benefit for surgery in patients with a symptomatic carotid artery stenosis of greater than 70 percent. In NASCET, the average cumulative ipsilateral stroke rate at 2 years was 26 percent for patients treated medically and 9 percent for those receiving the same medical treatment plus a carotid endarterectomy,  "corresponding to a 65 percent relative risk reduction favoring surgery. For every 100 patients treated surgically, 17 were spared an ipsilateral stroke over the next 2 years. The perioperative stroke plus death rate was 5.8 percent in the surgical group and 3.3 percent in the medical group in the same 1-month period, in spite of optimal medical therapy. Thus the excess morbidity and mortality attributable to the surgery was 2.5 percent. If minor ischemic events are excluded from the analyses, the excess major stroke and death rate for the surgical patients was 1.2 percent (2.1 less 0.9 percent) and the excess fatality rate was 0.3 percent (0.6 less 0.3 percent). The NASCET and ECST investigators concluded that their patients with a 70 to 99 percent stenosis clearly benefited from carotid endarterectomy. Results in [facility] hospitals should be similar if patients are properly selected and the perioperative morbidity and mortality are comparably low.  Analysis of several subgroups yielded important information. The cumulative risk of ipsilateral stroke correlated with the degree of stenosis. While the absolute risk reduction favoring the entire surgical group was 17 percent in NASCET, the risk reduction favoring those with a 90 to 99 percent stenosis was 26 percent, and this decreased to 12 percent for those with a 70 to 79 percent stenosis. The overall 26 percent cumulative risk of an ipsilateral stroke at 2 years for this group of patients with "high-grade" stenosis calculates to an annual risk of 13 percent. The operative morbidity and mortality were similar for patients with different degrees of stenosis.   2.  Asymptomatic patients.  The Asymptomatic Carotid Atherosclerosis Study (ACAS)3  demonstrated that asymptomatic people with internal carotid artery stenoses greater than 60 percent are at low risk for stroke. More than 1600 patients were randomized to treatment with the best medical therapies available " versus the same medical treatment plus carotid endarterectomy. Patients in the surgical group had a risk over 5 years for ipsilateral stroke (and any perioperative stroke or death) of 5.1 percent, whereas the risk for the nonsurgical group was 11 percent. While this demonstrates a 53 percent relative risk reduction, the absolute risk reduction is only 5.9 percent over 5 years, or 1.2 percent annually. If only major strokes and death are counted, the comparable numbers are 43 percent relative risk reduction, and an absolute risk reduction of only 2.6 percent over 5 years, or 0.5 percent annually. This latter result was not statistically significant. There was no clear difference in benefit for those with moderate (60 to 80 percent) compared with severe (80 to 99 percent) stenosis.   Many of the strokes in the surgery group were caused by preoperative angiograms. If carotid artery ultrasonography and MR angiography replace catheter angiography, this morbidity and mortality will be obviated.   While there are benefits for carotid endarterectomy in patients with asymptomatic carotid stenosis, the benefits are very small. Medical therapy for reduction of atherosclerosis risk factors in addition to aspirin (325 mg/d) generally are recommended for patients with asymptomatic carotid stenosis.        Leakage of Periprosthetic Valve, Subsequent Encounter 2/11/2020    Aortic stenosis saga:   Echocardiogram ([facility]) 09/04/2019:    Aortic valve: heavily calcified with restricted leaflet motion. Moderate aortic regurgitation.    Mitral valve: thick and calcified with restricted leaflet motion. Mild mitral stenosis and regurgitation.    Tricuspid valve: is structurally normal with good leaflet excursion. Mild regurgitation.   Pulmonary valve: is structurally normal with good leaflet excursion. No significant regurgitation.   Overall LVEF appears normal.    No obvious shunt across the interatrial septum by color flow Doppler.      Cardiac catheterization 09/14/2019 (Timothy Salgado MD):   · Mid RCA lesion, 50% stenosed, nonsignificant by iFR assessment.  · Known severe aortic stenosis from echo    CTA Cardiac TAVR 09/19/2019:    Large left UVJ stone measuring at 2.6 cm with severe left-sided hydroureteronephrosis.   Significant pneumobilia in the left hepatic lobe with air within the common bile duct.  Unclear if this is due to biliary enteric fistula, infectious process such as cholangitis, or incompetent sphincter of Oddi.  Unfortunately this is not well evaluated on this dedicated TAVR protocol examination.  Clinical correlation is advised.  Further evaluation could be performed with ERCP and dedicated abdominal CT with intravenous contrast.   TAVR measurements, as above.   Significant aortic valve and abdominal aorta atherosclerosis.   Nonspecific mesenteric haziness, possibly mesenteric panniculitis or adenitis.   Heterogeneity of the pulmonary parenchyma possibly related to elevated pulmonary vascular resistance, small airways disease, or both.   Small bilateral pleural effusions.    Cardiac catheterization 12/17/2019 (Herbert Ashley):    Successful left transfemoral 23 mm Evolute TAVR.  No paravalvular leak, mean gradient 3, peak velocity 1.5 post TAVR.    Echocardiogram (TTE) 12/26/2019   · Mild concentric left ventricular hypertrophy   · Normal left ventricular systolic function. The estimated ejection fraction is 60%.  · No wall motion abnormalities.  · Grade I (mild) left ventricular diastolic dysfunction consistent with impaired relaxation.  · There is a transcutaneously-placed aortic bioprosthesis present.  · Mild aortic regurgitation.  · Mild-to-moderate aortic valve stenosis.  · Aortic valve area is 1.23 cm2; peak velocity is 2.84 m/s; mean gradient is 18 mmHg.  · Mild left atrial enlargement.  · Mild tricuspid regurgitation.  · Normal right ventricular systolic function.  · Moderate mitral sclerosis.  · There is  mild leaflet calcification of the Mitral Valve.  · Mild mitral stenosis. MVA by PHT is 2.21 sq cm.  · Normal central venous pressure (3 mmHg).   · The estimated PA systolic pressure is 32 mmHg.:     Echocardiogram (TTE) 01/22/2020:   · Normal left ventricular systolic function. The estimated ejection fraction is 65%.  · Concentric left ventricular remodeling.  · No wall motion abnormalities.  · Grade I (mild) left ventricular diastolic dysfunction consistent with impaired relaxation.  · Normal right ventricular systolic function.  · Severe left atrial enlargement.  · There is a transcutaneously-placed aortic bioprosthesis present.   · There is paravalvular aortic insufficiency present   · Mild mitral regurgitation.  · Normal central venous pressure (3 mmHg).  · The estimated PA systolic pressure is 29 mmHg.   · Trivial posterior pericardial effusion.      Chronic Diastolic Heart Failure 12/1/2000    Echocardiogram 12/01/2000 = hyperdynamic LV, with mild to moderate amount of pericardial fat, and left ventricular diastolic dysfunction   Echocardiogram 05/20/2011 = dilated left atrial cavity, normal LV thickness and function with LVEF @ 70%, mild aortic stenosis with valve area @ 1.09cm2, peak gradient = 34 mmHg, minimal prolapse of anterior mitral leaflet, mild TI, very small pericardial effusion        Coronary Artery Disease of Native Artery of Native Heart With Stable Angina Pectoris 9/14/2019    Cardiac catheterization 09/14/2019 (Timothy Salgado MD):    Mid RCA lesion, 50% stenosed, nonsignificant by iFR assessment.   Known severe aortic stenosis from echo  02/13/2020: it is unlikely that this trivial coronary artery disease is the cause of her episodic dyspnea that has been present since prior to 09/04/2019.     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    Ultrasound 01/31/1992 = cholelithiasis with tender gallbladder   Ultrasound abdomen 06/22/1995 = normal except for hypoechoic acoustic texture of the left hepatic lobe   ERCP #1 02/01/1992 ([place]) = choledocholithiasis with cholangitis   ERCP #2 06/28/1995 ([place]) = multiple filling defects due to stones   CT abdomen (with & without) 06/26/1995 = normal except for air in the biliary tree, fatty infiltration of the left lobe, calcified granuloma at right lung base   CT abdomen & pelvis (with & without) 12/02/1997 = normal/normal except for air in the biliary tree due to prior sphincterotomy   CTA Cardiac TAVR 09/19/2019: significant pneumobilia in the left hepatic lobe with air within the common bile duct         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 In reality, this dyspnea syndrome has been going on since prior to her TAVR and since prior to the beginning of her workup which is dated to just prior to 09/04/2019.  2. 02/12/2020: I was in the process of writing discharge orders on this lady the evening of 02/12/2020 when her nurse came to me and reported that the patient was acutely dyspneic again, without chest pain, and at that point I did not have a proper diagnosis to explain her episodic dyspnea.    3. Consultation from  Pulmonologist Caden received 02/13/2020   4. Consultation from Cardiologist Marleni received 02/13/2020   5. We are attributing this to her karyn-valvular TAVR leak, with plans for tincture of time to cause this problem to settle down      Dehydration Syndrome (Resolved) 2/11/2020   Left Ureteral Stone (Resolved) 11/13/2019   Hydronephrosis With Urinary Obstruction Due to Ureteral Calculus (Resolved) 11/11/2019    Left ureteral stone with hydronephrosis requiring  manipulation 11/13/2019 @ [facility] (Paddy Dunham MD)    CTA Cardiac TAVR 09/19/2019: large left UVJ stone measuring at 2.6 cm with severe left-sided hydroureteronephrosis    Intervention 11/13/2019: 1.  Left ureteroscopy 2.  Cystoscopy 3.  Stone basket extraction 4.  Laser lithotripsy 5.  Left ureteral stent placement 6.  Fluoro < 1h 11/13/2019 (Paddy Dunham @ [facility])    Ultrasound kidneys 02/11/2020 = moderate left-sided hydronephrosis with no shadowing stone identified; nonobstructing right-sided renal stones; likely debris within the bladder, consider correlation with urinalysis.      Urinalysis 02/12/2020 = normal    As of 02/11-13/2020 she has been completely asymptomatic for this abnormality    02/13/2020 on discharge day I am ordering her to report back to her established Urologist Paddy Dunham for further investigation for this problem.        Plan:   1. Inpatient status permitted today 07/30/2020 per Utilization Management.   2. 07/28/2020 COVID-19 nasal swab PCR is negative     5. Left CEA on Friday 07/31/2020.     Yoni Hernandez MD

## 2020-07-31 NOTE — PROGRESS NOTES
"[facility]  Adult Nutrition   Progress Note (Initial Assessment)     SUMMARY     Recommendations/Interventions:    Recommendation/Intervention: 1. Advance diet as medically able per MD, add DM/Cardiac Diet restrictions. 2. RD to monitor # of days NPO and make recommendations accordingly.  Goals: 1. Diet to advance and patient to meet at least 75% of estimated needs via PO intake of meals.  Nutrition Goal Status: new    Dietitian Rounds Brief:  · Seen 2' LOS. Patient presented 2' dizziness, syncopal episode, and heart "not beating right". CTA showed high-grade bilateral internal carotid artery stenosis. Patient scheduled for carotid endarterectomy today. Intake good prior to NPO status.  Patient out of room for procedure during time of rounds. Will continue to monitor NPO status, intake, labs, and plan of care.  Reason for Assessment  Reason For Assessment: length of stay  Diagnosis: (syncope and collapse)  Relevant Medical History: cervical cancers, DM, HTN  Interdisciplinary Rounds: attended    Nutrition Risk Screen  Nutrition Risk Screen: no indicators present    Nutrition/Diet History  Food Allergies: NKFA  Factors Affecting Nutritional Intake: None identified at this time    Anthropometrics  Temp: 97.8 °F (36.6 °C)  Height Method: Stated  Height: 4' 9" (144.8 cm)  Height (inches): 57 in  Weight Method: Standard Scale  Weight: 80.8 kg (178 lb 2.1 oz)  Weight (lb): 178.13 lb  Ideal Body Weight (IBW), Female: 85 lb  % Ideal Body Weight, Female (lb): 211.13 %  BMI (Calculated): 38.5  BMI Grade: 35 - 39.9 - obesity - grade II     Weight History:  Wt Readings from Last 10 Encounters:   07/31/20 80.8 kg (178 lb 2.1 oz)   05/11/20 82.1 kg (181 lb)   03/04/20 74 kg (163 lb 2.2 oz)   02/18/20 74 kg (163 lb 2.3 oz)   02/11/20 77.8 kg (171 lb 8.3 oz)   01/22/20 75.8 kg (167 lb)   01/22/20 75.8 kg (167 lb 1.7 oz)   12/26/19 75.8 kg (167 lb)   12/26/19 75.8 kg (167 lb)   12/17/19 75.7 kg (166 lb 14.2 oz) "     Lab/Procedures/Meds: Pertinent Labs Reviewed  Clinical Chemistry:  Recent Labs   Lab 07/28/20  1635 07/31/20  0436    139   K 4.6 4.4    108   CO2 23 22*    146*   BUN 30* 36*   CREATININE 1.1 1.3   CALCIUM 9.9 8.8   PROT 7.7  --    ALBUMIN 4.3  --    BILITOT 1.0  --    ALKPHOS 94  --    AST 32  --    ALT 25  --    ANIONGAP 10 9   ESTGFRAFRICA 54.8* 44.8*   EGFRNONAA 47.5* 38.8*     CBC:   Recent Labs   Lab 07/31/20  0436   WBC 8.09   RBC 4.77   HGB 13.9   HCT 41.7   *   MCV 87   MCH 29.1   MCHC 33.3     Medications: Pertinent Medications reviewed  Scheduled Meds:   atorvastatin  10 mg Oral Daily    lisinopriL  10 mg Oral QAM    metoprolol succinate  25 mg Oral QAM    nitroGLYCERIN 0.1 mg/hr TD PT24  1 patch Transdermal Daily     Continuous Infusions:  PRN Meds:.acetaminophen, dextrose 50%, dextrose 50%, glucagon (human recombinant), glucose, glucose, sodium chloride 0.9%    Estimated/Assessed Needs    Weight Used For Calorie Calculations: 80.8 kg (178 lb 2.1 oz)  Energy Calorie Requirements (kcal): 6475-7413 kcals/day (20-25 kcals/kg)  Energy Need Method: Kcal/kg  Protein Requirements: 66-88 g/day (1.5-2.0 g/kg IBW)  Weight Used For Protein Calculations: 43.9 kg (96 lb 12.8 oz)     Estimated Fluid Requirement Method: RDA Method    Nutrition Prescription Ordered    Current Diet Order: NPO    Evaluation of Received Nutrient/Fluid Intake    Energy Calories Required: not meeting needs  Protein Required: not meeting needs  Fluid Required: meeting needs  Tolerance: (NPO)  % Intake of Estimated Energy Needs: 0%  % Meal Intake: NPO    Intake/Output Summary (Last 24 hours) at 7/31/2020 075  Last data filed at 7/30/2020 1521  Gross per 24 hour   Intake 480 ml   Output --   Net 480 ml      Nutrition Risk    Level of Risk/Frequency of Follow-up: high   Monitor and Evaluation    Food and Nutrient Adminstration: diet order  Physical Activity and Function: nutrition-related ADLs and IADLs,  factors affecting access to physical activity  Anthropometric Measurements: weight, weight change, body mass index  Biochemical Data, Medical Tests and Procedures: electrolyte and renal panel, lipid profile, gastrointestinal profile, glucose/endocrine profile, inflammatory profile  Nutrition-Focused Physical Findings: overall appearance     Nutrition Follow-Up    RD Follow-up?: Yes  July Rogel RD 07/31/2020 8:57 AM

## 2020-07-31 NOTE — TRANSFER OF CARE
"Anesthesia Transfer of Care Note    Patient: Roxanne Hernandez    Procedure(s) Performed: Procedure(s) (LRB):  ENDARTERECTOMY-CAROTID (Left)    Patient location: ICU    Anesthesia Type: general    Transport from OR: Transported from OR on 100% O2 by closed face mask with adequate spontaneous ventilation. Continuous ECG monitoring in transport. Continuous SpO2 monitoring in transport. Continuos invasive BP monitoring in transport    Post pain: adequate analgesia    Post assessment: no apparent anesthetic complications    Post vital signs: stable    Level of consciousness: awake and alert    Nausea/Vomiting: no nausea/vomiting    Complications: none    Transfer of care protocol was followed      Last vitals:   Visit Vitals  BP (!) 120/56 (BP Location: Right arm, Patient Position: Lying)   Pulse 66   Temp 36.6 °C (97.8 °F) (Oral)   Resp 18   Ht 4' 9" (1.448 m)   Wt 80.8 kg (178 lb 2.1 oz)   SpO2 98%   Breastfeeding No   BMI 38.55 kg/m²     "

## 2020-07-31 NOTE — ANESTHESIA PROCEDURE NOTES
Arterial    Diagnosis: hemodynamic instability    Patient location during procedure: done in OR  Procedure start time: 7/31/2020 9:05 AM  Timeout: 7/31/2020 9:05 AM  Procedure end time: 7/31/2020 9:14 AM    Staffing  Authorizing Provider: Jim Michael MD  Performing Provider: Jim Michael MD    Anesthesiologist was present at the time of the procedure.    Preanesthetic Checklist  Completed: patient identified, timeout performed, risks and benefits discussed, monitors and equipment checked and anesthesia consent givenArterial  Skin Prep: chlorhexidine gluconate  Local Infiltration: lidocaine  Orientation: left  Location: radial  Catheter Size: 20 G  Catheter placement by Ultrasound guidance. Heme positive aspiration all ports.  Vessel Caliber: medium, patent, compressibility normal  Vascular Doppler:  not done  Needle advanced into vessel with real time Ultrasound guidance.  Sterile sheath used.Insertion Attempts: 1  Assessment  Dressing: sutured in place and taped and tegaderm  Patient: Tolerated well

## 2020-07-31 NOTE — OP NOTE
Formerly [facility], [facility]  Surgery Department  Operative Note    SUMMARY     Date of Procedure: 7/31/2020     Procedure: Procedure(s) (LRB):  ENDARTERECTOMY-CAROTID (Left)     Surgeon(s) and Role:     * Radu Kent MD - Primary    Assisting Surgeon: None    Pre-Operative Diagnosis: Bilateral carotid artery occlusion [I65.23]    Post-Operative Diagnosis: Post-Op Diagnosis Codes:     * Bilateral carotid artery occlusion [I65.23]    Anesthesia: General    Description of the Procedure: Patient taken operating room placed in supine position after adequate induction of general anesthesia was prepped and draped in usual sterile fashion.  Oblique incision was made in the neck taken down to subcutaneous tissue hemostasis maintained electrocautery.  Dissection was continued over the anterior border sternocleidomastoid muscle down to the level of the carotid artery.  The common carotid internal carotid external carotid vessels were individually identified mobilized isolated with a right angle clamp and encircled with either a vessel loop umbilical tape.  Superior thyroid artery was also isolated with a right angle clamp and encircled with a heavy silk suture.  With all vessels controlled the patient is systemically anticoagulated after the heparin was allowed to circulate for several minutes we proceeded.  The vessels were occluded and using 11 blade incision was made in the common carotid artery and then with the Luna scissor this was extended across the lesion into the internal carotid artery.  Once past the lesion we then went ahead and proceeded to place the shunt in the usual fashion and once the Sundt shunt was in place and secure we proceeded with the endarterectomy.  Starting proximally the plaque was mobilized and transected and then using the [place] we continued to mobilize the plaque in a cephalad direction working 1st up into the external carotid artery where the plaque was pulled free and then the continuing  into the internal carotid artery until the plaque feathered out quite nicely.  The intimal edge was then tacked down using interrupted 7 0 Prolene sutures.  We then irrigated with heparinized saline and meticulously removed any loose debris that was identified from the vessel.  Once this was done to a satisfactory degree the patch was brought up onto the field and using a 6 0 Prolene suture the arteriotomy was closed using the patch.  Prior to completing the patch suture line the shunt was removed in usual fashion without any difficulty backbleeding and flushing was done and then the common carotid internal carotid vessels were occluded again leaving the external carotid to back bleed as we tied the suture line.  The common carotid was then opened into the external carotid artery and then the internal carotid vessel was released.  After good hemostasis was assured the wound was copiously irrigated with antibiotic solution and we proceeded to close 1st the subcutaneous layer using 2 0 Vicryl running suture and then the skin edges reapproximated using a 4 0 Monocryl subcuticular stitch.  The wound was clean sterile dressing was applied the patient tolerated the procedure well was transported to the intensive care unit in stable condition    Complications: No    Estimated Blood Loss (EBL): 100 mL           Implants:   Implant Name Type Inv. Item Serial No.  Lot No. LRB No. Used Action   PATCH VASCULAR .8CMX.8CM [id number] - [id number]  PATCH VASCULAR .8CMX.8CM [id number]   [id number] Left 1 Implanted       Specimens:   Specimen (12h ago, onward)     Start     Ordered    07/31/20 0945  Specimen to Pathology - Surgery  Once     Comments: Pre-op Diagnosis: Bilateral carotid artery occlusion [I65.23]Procedure(s):ENDARTERECTOMY-CAROTID Number of specimens: 1Name of specimens: left carotid plaque      07/31/20 0944                        Condition: Good    Disposition: ICU - extubated and stable.

## 2020-07-31 NOTE — ANESTHESIA PROCEDURE NOTES
Intubation  Performed by: Wiley Jaramillo CRNA  Authorized by: Jim Michael MD     Intubation:     Induction:  Intravenous    Intubated:  Postinduction    Mask Ventilation:  Easy mask    Attempts:  1    Attempted By:  CRNA    Method of Intubation:  Direct    Blade:  Michael 2    Laryngeal View Grade: Grade I - full view of chords      Difficult Airway Encountered?: No      Complications:  None    Airway Device:  Oral endotracheal tube    Airway Device Size:  7.0    Style/Cuff Inflation:  Cuffed    Tube secured:  19    Secured at:  The lips    Placement Verified By:  Capnometry    Complicating Factors:  None    Findings Post-Intubation:  BS equal bilateral and atraumatic/condition of teeth unchanged

## 2020-08-01 LAB
ANION GAP SERPL CALC-SCNC: 8 MMOL/L (ref 8–16)
BASOPHILS # BLD AUTO: 0.01 K/UL (ref 0–0.2)
BASOPHILS NFR BLD: 0.1 % (ref 0–1.9)
BUN SERPL-MCNC: 32 MG/DL (ref 8–23)
CALCIUM SERPL-MCNC: 8.1 MG/DL (ref 8.7–10.5)
CHLORIDE SERPL-SCNC: 110 MMOL/L (ref 95–110)
CO2 SERPL-SCNC: 20 MMOL/L (ref 23–29)
CREAT SERPL-MCNC: 1.1 MG/DL (ref 0.5–1.4)
DIFFERENTIAL METHOD: ABNORMAL
EOSINOPHIL # BLD AUTO: 0 K/UL (ref 0–0.5)
EOSINOPHIL NFR BLD: 0 % (ref 0–8)
ERYTHROCYTE [DISTWIDTH] IN BLOOD BY AUTOMATED COUNT: 13.2 % (ref 11.5–14.5)
EST. GFR  (AFRICAN AMERICAN): 54.8 ML/MIN/1.73 M^2
EST. GFR  (NON AFRICAN AMERICAN): 47.5 ML/MIN/1.73 M^2
GLUCOSE SERPL-MCNC: 178 MG/DL (ref 70–110)
GLUCOSE SERPL-MCNC: 239 MG/DL (ref 70–110)
HCT VFR BLD AUTO: 36.4 % (ref 37–48.5)
HGB BLD-MCNC: 12.2 G/DL (ref 12–16)
IMM GRANULOCYTES # BLD AUTO: 0.06 K/UL (ref 0–0.04)
IMM GRANULOCYTES NFR BLD AUTO: 0.5 % (ref 0–0.5)
LYMPHOCYTES # BLD AUTO: 0.7 K/UL (ref 1–4.8)
LYMPHOCYTES NFR BLD: 5.3 % (ref 18–48)
MCH RBC QN AUTO: 29.4 PG (ref 27–31)
MCHC RBC AUTO-ENTMCNC: 33.5 G/DL (ref 32–36)
MCV RBC AUTO: 88 FL (ref 82–98)
MONOCYTES # BLD AUTO: 0.8 K/UL (ref 0.3–1)
MONOCYTES NFR BLD: 6.4 % (ref 4–15)
NEUTROPHILS # BLD AUTO: 10.7 K/UL (ref 1.8–7.7)
NEUTROPHILS NFR BLD: 87.7 % (ref 38–73)
NRBC BLD-RTO: 0 /100 WBC
PLATELET # BLD AUTO: 126 K/UL (ref 150–350)
PMV BLD AUTO: 10.2 FL (ref 9.2–12.9)
POTASSIUM SERPL-SCNC: 4.6 MMOL/L (ref 3.5–5.1)
RBC # BLD AUTO: 4.15 M/UL (ref 4–5.4)
SODIUM SERPL-SCNC: 138 MMOL/L (ref 136–145)
WBC # BLD AUTO: 12.21 K/UL (ref 3.9–12.7)

## 2020-08-01 PROCEDURE — 85025 COMPLETE CBC W/AUTO DIFF WBC: CPT

## 2020-08-01 PROCEDURE — 25000003 PHARM REV CODE 250: Performed by: THORACIC SURGERY (CARDIOTHORACIC VASCULAR SURGERY)

## 2020-08-01 PROCEDURE — 80048 BASIC METABOLIC PNL TOTAL CA: CPT

## 2020-08-01 PROCEDURE — 63600175 PHARM REV CODE 636 W HCPCS: Performed by: INTERNAL MEDICINE

## 2020-08-01 PROCEDURE — 36415 COLL VENOUS BLD VENIPUNCTURE: CPT

## 2020-08-01 PROCEDURE — C9248 INJ, CLEVIDIPINE BUTYRATE: HCPCS | Performed by: INTERNAL MEDICINE

## 2020-08-01 PROCEDURE — 27000221 HC OXYGEN, UP TO 24 HOURS

## 2020-08-01 PROCEDURE — 25000003 PHARM REV CODE 250: Performed by: INTERNAL MEDICINE

## 2020-08-01 PROCEDURE — 94761 N-INVAS EAR/PLS OXIMETRY MLT: CPT

## 2020-08-01 PROCEDURE — 20000000 HC ICU ROOM

## 2020-08-01 PROCEDURE — 63600175 PHARM REV CODE 636 W HCPCS: Performed by: THORACIC SURGERY (CARDIOTHORACIC VASCULAR SURGERY)

## 2020-08-01 PROCEDURE — 99900035 HC TECH TIME PER 15 MIN (STAT)

## 2020-08-01 PROCEDURE — 99900031 HC PATIENT EDUCATION (STAT)

## 2020-08-01 RX ORDER — AMLODIPINE BESYLATE 5 MG/1
5 TABLET ORAL EVERY MORNING
Status: DISCONTINUED | OUTPATIENT
Start: 2020-08-01 | End: 2020-08-04 | Stop reason: HOSPADM

## 2020-08-01 RX ORDER — CLOPIDOGREL BISULFATE 75 MG/1
75 TABLET ORAL DAILY
Status: DISCONTINUED | OUTPATIENT
Start: 2020-08-01 | End: 2020-08-02

## 2020-08-01 RX ORDER — ATORVASTATIN CALCIUM 10 MG/1
10 TABLET, FILM COATED ORAL NIGHTLY
Status: DISCONTINUED | OUTPATIENT
Start: 2020-08-01 | End: 2020-08-04 | Stop reason: HOSPADM

## 2020-08-01 RX ADMIN — NITROGLYCERIN 1 PATCH: 0.1 PATCH TRANSDERMAL at 05:08

## 2020-08-01 RX ADMIN — MUPIROCIN 1 G: 20 OINTMENT TOPICAL at 08:08

## 2020-08-01 RX ADMIN — AMLODIPINE BESYLATE 5 MG: 5 TABLET ORAL at 12:08

## 2020-08-01 RX ADMIN — CLEVIPIDINE 1 MG/HR: 0.5 EMULSION INTRAVENOUS at 08:08

## 2020-08-01 RX ADMIN — ACETAMINOPHEN 650 MG: 325 TABLET, FILM COATED ORAL at 09:08

## 2020-08-01 RX ADMIN — ATORVASTATIN CALCIUM 10 MG: 10 TABLET, FILM COATED ORAL at 08:08

## 2020-08-01 RX ADMIN — MUPIROCIN 1 G: 20 OINTMENT TOPICAL at 09:08

## 2020-08-01 RX ADMIN — SODIUM CHLORIDE: 0.9 INJECTION, SOLUTION INTRAVENOUS at 12:08

## 2020-08-01 RX ADMIN — LISINOPRIL 10 MG: 10 TABLET ORAL at 12:08

## 2020-08-01 RX ADMIN — CLOPIDOGREL BISULFATE 75 MG: 75 TABLET, FILM COATED ORAL at 08:08

## 2020-08-01 RX ADMIN — SODIUM CHLORIDE: 0.9 INJECTION, SOLUTION INTRAVENOUS at 04:08

## 2020-08-01 RX ADMIN — METOPROLOL SUCCINATE 25 MG: 25 TABLET, FILM COATED, EXTENDED RELEASE ORAL at 06:08

## 2020-08-01 RX ADMIN — CEFAZOLIN SODIUM 2 G: 2 SOLUTION INTRAVENOUS at 01:08

## 2020-08-01 NOTE — PROGRESS NOTES
08/01/2020 @ 11:53 AM     Progress Note, Attending Physician  Count includes the [facility]     Patient Name: Roxanne Hernandez   MRN: [id number]   Admission Date and Time: 7/28/2020  3:03 PM   Patient Class: IP- Inpatient   Hospital length of stay: 2 days    Code status: Full Code   Attending Physician: Yoni Hernandez MD   Principal Problem: Bilateral carotid artery occlusion      Current Facility-Administered Medications:     0.9%  NaCl infusion, , Intravenous, Continuous, Radu Kent MD, Last Rate: 75 mL/hr at 08/01/20 0050    acetaminophen tablet 650 mg, 650 mg, Oral, Q8H PRN, Radu Kent MD, 650 mg at 07/30/20 2239    amLODIPine tablet 5 mg, 5 mg, Oral, QAM, Yoni Hernandez MD    atorvastatin tablet 10 mg, 10 mg, Oral, QHS, Yoni Hernandez MD    clevidipine (CLEVIPREX) infusion 0.5 mg/mL, 1 mg/hr, Intravenous, Continuous, Yoni Hernandez MD, Last Rate: 2 mL/hr at 08/01/20 0821, 1 mg/hr at 08/01/20 0821    dextrose 50% injection 12.5 g, 12.5 g, Intravenous, PRN, Radu Kent MD    dextrose 50% injection 25 g, 25 g, Intravenous, PRN, Radu Kent MD    glucagon (human recombinant) injection 1 mg, 1 mg, Intramuscular, PRN, Radu Kent MD    glucose chewable tablet 16 g, 16 g, Oral, PRN, Radu Kent MD    glucose chewable tablet 24 g, 24 g, Oral, PRN, Radu Kent MD    HYDROcodone-acetaminophen 5-325 mg per tablet 1 tablet, 1 tablet, Oral, Q4H PRN, Radu Kent MD    lisinopriL tablet 10 mg, 10 mg, Oral, QAYoni CAMPOS MD, 10 mg at 07/31/20 1601    metoprolol succinate (TOPROL-XL) 24 hr tablet 25 mg, 25 mg, Oral, Yoni HERNÁNDEZ MD, 25 mg at 08/01/20 0624    mupirocin 2 % ointment 1 g, 1 g, Nasal, BID, Radu Kent MD, 1 g at 08/01/20 0821    nitroGLYCERIN 0.1 mg/hr TD PT24 1 patch, 1 patch, Transdermal, Q24H, Yoni Hernandez MD, 1 patch at 07/31/20 1718    ondansetron injection 4 mg, 4 mg, Intravenous, Q12H PRN, Radu Kent MD, 4 mg at 07/31/20 1303     sodium chloride 0.9% flush 10 mL, 10 mL, Intravenous, PRN, Radu Kent MD    Facility-Administered Medications Ordered in Other Encounters:     0.9%  NaCl infusion, , Intravenous, Continuous, Carmen Yepez MD, Last Rate: 0 mL/hr at 09/04/19 1141    aspirin EC tablet 325 mg, 325 mg, Oral, Once, Carmen Yepez MD    diazePAM tablet 5 mg, 5 mg, Oral, On Call Procedure, Carmen Yepez MD    diphenhydrAMINE capsule 25 mg, 25 mg, Oral, Once, Carmen Yepez MD     Subjective:   Remarkably, POD #1 she continues to feel very well, with only slight hoarseness and NO pain, with NO new neurologic complaints or deficits.     Physical Examination:     Temp:  [97.7 °F (36.5 °C)-98.4 °F (36.9 °C)]   Pulse:  []   Resp:  [14-50]   BP: (105-163)/(46-70)   SpO2:  [87 %-100 %]   Arterial Line BP: (114-181)/(41-66)     A&Ox4 in NAD  HEENT = normal, PERRL, EOMI, fundi benign  Neck = normal  Lungs = clear  Heart = RRR S1 & S2 with no murmurs, rubs, or gallops   Abdomen = obese benign   Ext = no CCE  Neurologic = stable non-focal exam, she moves all extremities well    NO DVT  NO bedsores   IV clean     Objective:     EKG history:   EKG 12/01/2000 = NSR, leftward axis, normal   EKG 05/01/2006 = NSR, leftward axis, normal   EKG 06/01/2011 = NSR, PACs, mild left axis, normal     EKG 08/30/2019 = NSR, LAD, LVH with secondary ST-T changes (inverted Ts in I, aVL)   EKG 02/11/2020 = NSR, LAD, LVH, inverted Ts in I, aVL   EKG 07/28/2020 = NSR, LAD, normal T waves diffusely (her previously inverted Ts in I and aVL are now upright)      CT head (no contrast) 07/28/2020 = small focal area of probable remote infarction involves the splenium of the corpus callosum on the left, mild chronic small vessel ischemia, scattered vascular calcifications within the intracranial segments of the internal carotid and vertebral arteries     CTA neck and brain (with contrast) 07/28/2020:   o Moderate  atheromatous changes are noted in the aortic arch. There are moderate degrees of narrowing of the proximal segments of the left common carotid artery and subclavian artery at their origin.  o There is mild tortuosity of the common carotid arteries.  o There is prominent plaque formation observed bilaterally at the level of the carotid bifurcations.   o There is bilateral high-grade stenosis of the origins of the internal carotid arteries (approximately 90%). The remainder of the internal carotid arteries are tortuous in their extracranial segment but patent. There is stenosis of the origin of the right external carotid artery.  o There is significant focal stenosis of the intracranial segment of the left internal carotid artery near the level of the anterior clinoid process.  o There is mild tortuosity of the vertebral arteries.   o There are no findings of high-grade stenosis or occlusion of the major intracranial arteries otherwise. No aneurysm or vascular malformation is noted.  o The cervical segments of the vertebral arteries are patent. There are mild-moderate stenoses of the origins of the vertebral arteries bilaterally.  o Scattered groundglass type opacities are observed within the visualized lung apices bilaterally. There is prominent multilevel cervical spondylosis.    CXR 07/28/2020:   The heart size is within normal limits and there is no evidence of acute pulmonary vascular engorgement. A metallic stent projected over the cardiac silhouette. There is arteriosclerosis in the arch of the aorta. A dual lead right-sided cardiac device is unchanged in position.  The lungs are free of confluent parenchymal infiltrates. There are minor linear parenchymal opacities in the lung bases compatible with minimal atelectasis or scarring. There are no significant effusions.   There is unchanged apical pleural thickening. Monitoring electrodes overlie the chest.    24 hour Holter: started the morning of 07/29/2020 and  "in process at this time.     Impression:   1. Plethora of complaints, beginning about 2 weeks prior to admission, suggestive of either stroke occurring about 2 weeks ago, or severe new onset "first-time-in-her-life" severe vertigo, along with sensations of episodic impending loss of consciousness with palpitaitons in the recent TAVR state (since 12/17/2019) and pacemaker implantation state (since 12/18/2019).    a. Per Cardiologist Marleni 07/30/2020: "No significant arrhythmias on telemetry thus far.  Pacemaker interrogated on 07/30/2020.  No significant arrhythmias noted.  Pacemaker functioning appropriately."   2. 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 Severe progression of her cerebrovascular disease; whether this is now "symptomatic" or "asymptomatic" is uncertain, but the severity is sufficient to warrant sequential CEA regardless to reduce her future risk of recurrent stroke.    a. 07/29/2020: Cardiologist Marleni advises sequential carotid endarterectomy   b. 07/29/2020: CT Surgeon/Vascular Surgeon Donte advises sequential carotid endarterectomy, declaring that whether her stenoses are asymptomatic or asymptomatic, sequential CEA is indicated if patient agrees and Cardiologist Marleni and I agree.   c. 07/29/2020: I believe the medical literature is reasonably clear that she is statistically likely to do better with sequential carotid endarterectomy than with "medical therapy alone".  I have advised the patient of my perspective, and that I advise her to proceed.   d. 07/30/2020: I reiterated to the patient and daughter Donna that sequential bilateral CEA is technically the best option for her, with a measurable future stroke risk reduction to be gained.    e. 07/31/2020: left CEA performed by CT Surgeon Donte  f. 07/31/2020 @ 17:11h: she is doing very well post-operatively from a " "neurologic and general standpoint   g. 08/01/2020: she is doing great POD #1 except she is still on IV clevidipine   4. She is having post-carotid endarterectomy hypertension (mechanism postulated by some authors, but actual cause unknown).   a. 08/01/2020: amlodipine 5mg po "now" and qam is ordered to enable weaning her off of clevidipine   5. 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 surgical therapy options discussion: patient advised that (in the asymptomatic state) medical treatment reduces her 5 year risk of stroke to 11%, whereas carotid endarterectomy reduces her 5 year risk of stroke to 5% with a small risk of karyn-operative stroke (see copy in her chart of the handout I gave her)she advised me 06/06/2011 that she wishes to proceed with medical treatment for now    CTA neck and brain (with contrast) 07/28/2020:   o Moderate atheromatous changes are noted in the aortic arch. There are moderate degrees of narrowing of the proximal segments of the left common carotid artery and subclavian artery at their origin.  o There is mild tortuosity of the common carotid arteries.  o There is prominent plaque formation observed bilaterally at the level of the carotid bifurcations.   o There is bilateral high-grade stenosis of the origins of the internal carotid arteries (approximately 90%). The remainder of the internal carotid arteries are tortuous in their extracranial segment but patent. There is stenosis of the origin " of the right external carotid artery.  o There is significant focal stenosis of the intracranial segment of the left internal carotid artery near the level of the anterior clinoid process.  o There is mild tortuosity of the vertebral arteries.   o There are no findings of high-grade stenosis or occlusion of the major intracranial arteries otherwise. No aneurysm or vascular malformation is noted.  o The cervical segments of the vertebral arteries are patent. There are mild-moderate stenoses of the origins of the vertebral arteries bilaterally.  o Scattered groundglass type opacities are observed within the visualized lung apices bilaterally. There is prominent multilevel cervical spondylosis.   Medical Literature Discussion re: carotid stenosis:    1.  Symptomatic patients:  Both the North American Symptomatic Carotid Endarterectomy Trial (NASCET)1 and the European Carotid Surgery Trial (ECST)2 showed a substantial benefit for surgery in patients with a symptomatic carotid artery stenosis of greater than 70 percent. In NASCET, the average cumulative ipsilateral stroke rate at 2 years was 26 percent for patients treated medically and 9 percent for those receiving the same medical treatment plus a carotid endarterectomy, corresponding to a 65 percent relative risk reduction favoring surgery. For every 100 patients treated surgically, 17 were spared an ipsilateral stroke over the next 2 years. The perioperative stroke plus death rate was 5.8 percent in the surgical group and 3.3 percent in the medical group in the same 1-month period, in spite of optimal medical therapy. Thus the excess morbidity and mortality attributable to the surgery was 2.5 percent. If minor ischemic events are excluded from the analyses, the excess major stroke and death rate for the surgical patients was 1.2 percent (2.1 less 0.9 percent) and the excess fatality rate was 0.3 percent (0.6 less 0.3 percent). The NASCET and ECST investigators  "concluded that their patients with a 70 to 99 percent stenosis clearly benefited from carotid endarterectomy. Results in [facility] hospitals should be similar if patients are properly selected and the perioperative morbidity and mortality are comparably low.  Analysis of several subgroups yielded important information. The cumulative risk of ipsilateral stroke correlated with the degree of stenosis. While the absolute risk reduction favoring the entire surgical group was 17 percent in NASCET, the risk reduction favoring those with a 90 to 99 percent stenosis was 26 percent, and this decreased to 12 percent for those with a 70 to 79 percent stenosis. The overall 26 percent cumulative risk of an ipsilateral stroke at 2 years for this group of patients with "high-grade" stenosis calculates to an annual risk of 13 percent. The operative morbidity and mortality were similar for patients with different degrees of stenosis.   2.  Asymptomatic patients.  The Asymptomatic Carotid Atherosclerosis Study (ACAS)3  demonstrated that asymptomatic people with internal carotid artery stenoses greater than 60 percent are at low risk for stroke. More than 1600 patients were randomized to treatment with the best medical therapies available versus the same medical treatment plus carotid endarterectomy. Patients in the surgical group had a risk over 5 years for ipsilateral stroke (and any perioperative stroke or death) of 5.1 percent, whereas the risk for the nonsurgical group was 11 percent. While this demonstrates a 53 percent relative risk reduction, the absolute risk reduction is only 5.9 percent over 5 years, or 1.2 percent annually. If only major strokes and death are counted, the comparable numbers are 43 percent relative risk reduction, and an absolute risk reduction of only 2.6 percent over 5 years, or 0.5 percent annually. This latter result was not statistically significant. There was no clear difference in benefit for those " with moderate (60 to 80 percent) compared with severe (80 to 99 percent) stenosis.   Many of the strokes in the surgery group were caused by preoperative angiograms. If carotid artery ultrasonography and MR angiography replace catheter angiography, this morbidity and mortality will be obviated.   While there are benefits for carotid endarterectomy in patients with asymptomatic carotid stenosis, the benefits are very small. Medical therapy for reduction of atherosclerosis risk factors in addition to aspirin (325 mg/d) generally are recommended for patients with asymptomatic carotid stenosis.        Leakage of Periprosthetic Valve, Subsequent Encounter 2/11/2020    Aortic stenosis saga:   Echocardiogram ([facility]) 09/04/2019:    Aortic valve: heavily calcified with restricted leaflet motion. Moderate aortic regurgitation.    Mitral valve: thick and calcified with restricted leaflet motion. Mild mitral stenosis and regurgitation.    Tricuspid valve: is structurally normal with good leaflet excursion. Mild regurgitation.   Pulmonary valve: is structurally normal with good leaflet excursion. No significant regurgitation.   Overall LVEF appears normal.    No obvious shunt across the interatrial septum by color flow Doppler.     Cardiac catheterization 09/14/2019 (Timothy Salgado MD):   · Mid RCA lesion, 50% stenosed, nonsignificant by iFR assessment.  · Known severe aortic stenosis from echo    CTA Cardiac TAVR 09/19/2019:    Large left UVJ stone measuring at 2.6 cm with severe left-sided hydroureteronephrosis.   Significant pneumobilia in the left hepatic lobe with air within the common bile duct.  Unclear if this is due to biliary enteric fistula, infectious process such as cholangitis, or incompetent sphincter of Oddi.  Unfortunately this is not well evaluated on this dedicated TAVR protocol examination.  Clinical correlation is advised.  Further evaluation could be performed with ERCP and dedicated abdominal CT  with intravenous contrast.   TAVR measurements, as above.   Significant aortic valve and abdominal aorta atherosclerosis.   Nonspecific mesenteric haziness, possibly mesenteric panniculitis or adenitis.   Heterogeneity of the pulmonary parenchyma possibly related to elevated pulmonary vascular resistance, small airways disease, or both.   Small bilateral pleural effusions.    Cardiac catheterization 12/17/2019 (Herbetr Ashley):    Successful left transfemoral 23 mm Evolute TAVR.  No paravalvular leak, mean gradient 3, peak velocity 1.5 post TAVR.    Echocardiogram (TTE) 12/26/2019   · Mild concentric left ventricular hypertrophy   · Normal left ventricular systolic function. The estimated ejection fraction is 60%.  · No wall motion abnormalities.  · Grade I (mild) left ventricular diastolic dysfunction consistent with impaired relaxation.  · There is a transcutaneously-placed aortic bioprosthesis present.  · Mild aortic regurgitation.  · Mild-to-moderate aortic valve stenosis.  · Aortic valve area is 1.23 cm2; peak velocity is 2.84 m/s; mean gradient is 18 mmHg.  · Mild left atrial enlargement.  · Mild tricuspid regurgitation.  · Normal right ventricular systolic function.  · Moderate mitral sclerosis.  · There is mild leaflet calcification of the Mitral Valve.  · Mild mitral stenosis. MVA by PHT is 2.21 sq cm.  · Normal central venous pressure (3 mmHg).   · The estimated PA systolic pressure is 32 mmHg.:     Echocardiogram (TTE) 01/22/2020:   · Normal left ventricular systolic function. The estimated ejection fraction is 65%.  · Concentric left ventricular remodeling.  · No wall motion abnormalities.  · Grade I (mild) left ventricular diastolic dysfunction consistent with impaired relaxation.  · Normal right ventricular systolic function.  · Severe left atrial enlargement.  · There is a transcutaneously-placed aortic bioprosthesis present.   · There is paravalvular aortic insufficiency present   · Mild  mitral regurgitation.  · Normal central venous pressure (3 mmHg).  · The estimated PA systolic pressure is 29 mmHg.   · Trivial posterior pericardial effusion.      Chronic Diastolic Heart Failure 12/1/2000    Echocardiogram 12/01/2000 = hyperdynamic LV, with mild to moderate amount of pericardial fat, and left ventricular diastolic dysfunction   Echocardiogram 05/20/2011 = dilated left atrial cavity, normal LV thickness and function with LVEF @ 70%, mild aortic stenosis with valve area @ 1.09cm2, peak gradient = 34 mmHg, minimal prolapse of anterior mitral leaflet, mild TI, very small pericardial effusion        Coronary Artery Disease of Native Artery of Native Heart With Stable Angina Pectoris 9/14/2019    Cardiac catheterization 09/14/2019 (Timothy Salgado MD):    Mid RCA lesion, 50% stenosed, nonsignificant by iFR assessment.   Known severe aortic stenosis from echo  02/13/2020: it is unlikely that this trivial coronary artery disease is the cause of her episodic dyspnea that has been present since prior to 09/04/2019.     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   Ultrasound 01/31/1992 = cholelithiasis with tender gallbladder   Ultrasound abdomen 06/22/1995 = normal except for hypoechoic acoustic texture of the left hepatic lobe   ERCP #1 02/01/1992 (P[place]in) = choledocholithiasis with cholangitis   ERCP #2 06/28/1995 (Leonor) = multiple filling defects due to stones   CT abdomen (with & without) 06/26/1995 = normal except for air in the biliary tree, fatty infiltration of the left lobe, calcified granuloma at right lung base   CT abdomen & pelvis (with & without) 12/02/1997 = normal/normal except for air in the biliary tree due to prior sphincterotomy   CTA Cardiac TAVR 09/19/2019: significant pneumobilia in the left hepatic lobe with air within the  common bile duct         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 In reality, this dyspnea syndrome has been going on since prior to her TAVR and since prior to the beginning of her workup which is dated to just prior to 09/04/2019.  2. 02/12/2020: I was in the process of writing discharge orders on this lady the evening of 02/12/2020 when her nurse came to me and reported that the patient was acutely dyspneic again, without chest pain, and at that point I did not have a proper diagnosis to explain her episodic dyspnea.    3. Consultation from Pulmonologist Caden received 02/13/2020   4. Consultation from Cardiologist Marleni received 02/13/2020   5. We are attributing this to her karyn-valvular TAVR leak, with plans for tincture of time to cause this problem to settle down      Dehydration Syndrome (Resolved) 2/11/2020   Left Ureteral Stone (Resolved) 11/13/2019   Hydronephrosis With Urinary Obstruction Due to Ureteral Calculus (Resolved) 11/11/2019    Left ureteral stone with hydronephrosis requiring  manipulation 11/13/2019 @ [facility] (Paddy Dunham MD)    CTA Cardiac TAVR 09/19/2019: large left UVJ stone measuring at 2.6 cm with severe left-sided hydroureteronephrosis    Intervention 11/13/2019: 1.  Left ureteroscopy 2.  Cystoscopy 3.   "Stone basket extraction 4.  Laser lithotripsy 5.  Left ureteral stent placement 6.  Fluoro < 1h 11/13/2019 (Paddy Dunham @ [facility])    Ultrasound kidneys 02/11/2020 = moderate left-sided hydronephrosis with no shadowing stone identified; nonobstructing right-sided renal stones; likely debris within the bladder, consider correlation with urinalysis.      Urinalysis 02/12/2020 = normal    As of 02/11-13/2020 she has been completely asymptomatic for this abnormality    02/13/2020 on discharge day I am ordering her to report back to her established Urologist Paddy Dunham for further investigation for this problem.        Plan:   1. Inpatient status permitted 07/30/2020 per Utilization Management.   2. CEA, left performed 07/31/2020.   3. Post-CEA care in ICU continues, currently stuck on clevidipine IV.   4. Add amlodipine 5mg po "now" and qam to enable weaning from clevidipine   5. Remove Fowler   6. Resume solid food diet  7. Out of bed and progressive ambulation     Yoni Hernandez MD       "

## 2020-08-01 NOTE — PLAN OF CARE
07/31/20 1920   PRE-TX-O2   O2 Device (Oxygen Therapy) nasal cannula   Flow (L/min) 2   SpO2 96 %   Pulse Oximetry Type Continuous   $ Pulse Oximetry - Multiple Charge Pulse Oximetry - Multiple   Pulse 101   Resp (!) 50   Respiratory Evaluation   $ Care Plan Tech Time 15 min   Evaluation For New Orders

## 2020-08-02 LAB
BASOPHILS # BLD AUTO: 0.03 K/UL (ref 0–0.2)
BASOPHILS NFR BLD: 0.2 % (ref 0–1.9)
BNP SERPL-MCNC: 332 PG/ML (ref 0–99)
DIFFERENTIAL METHOD: ABNORMAL
EOSINOPHIL # BLD AUTO: 0.1 K/UL (ref 0–0.5)
EOSINOPHIL NFR BLD: 0.3 % (ref 0–8)
ERYTHROCYTE [DISTWIDTH] IN BLOOD BY AUTOMATED COUNT: 13.7 % (ref 11.5–14.5)
GLUCOSE SERPL-MCNC: 249 MG/DL (ref 70–110)
GLUCOSE SERPL-MCNC: 257 MG/DL (ref 70–110)
HCT VFR BLD AUTO: 39.3 % (ref 37–48.5)
HGB BLD-MCNC: 12.6 G/DL (ref 12–16)
IMM GRANULOCYTES # BLD AUTO: 0.08 K/UL (ref 0–0.04)
IMM GRANULOCYTES NFR BLD AUTO: 0.5 % (ref 0–0.5)
LYMPHOCYTES # BLD AUTO: 1.1 K/UL (ref 1–4.8)
LYMPHOCYTES NFR BLD: 7.3 % (ref 18–48)
MCH RBC QN AUTO: 29.6 PG (ref 27–31)
MCHC RBC AUTO-ENTMCNC: 32.1 G/DL (ref 32–36)
MCV RBC AUTO: 92 FL (ref 82–98)
MONOCYTES # BLD AUTO: 1.3 K/UL (ref 0.3–1)
MONOCYTES NFR BLD: 8.8 % (ref 4–15)
NEUTROPHILS # BLD AUTO: 12.2 K/UL (ref 1.8–7.7)
NEUTROPHILS NFR BLD: 82.9 % (ref 38–73)
NRBC BLD-RTO: 0 /100 WBC
PLATELET # BLD AUTO: 112 K/UL (ref 150–350)
PMV BLD AUTO: 9.9 FL (ref 9.2–12.9)
RBC # BLD AUTO: 4.26 M/UL (ref 4–5.4)
WBC # BLD AUTO: 14.72 K/UL (ref 3.9–12.7)

## 2020-08-02 PROCEDURE — 25000003 PHARM REV CODE 250: Performed by: INTERNAL MEDICINE

## 2020-08-02 PROCEDURE — 63600175 PHARM REV CODE 636 W HCPCS: Performed by: INTERNAL MEDICINE

## 2020-08-02 PROCEDURE — 85025 COMPLETE CBC W/AUTO DIFF WBC: CPT

## 2020-08-02 PROCEDURE — 87040 BLOOD CULTURE FOR BACTERIA: CPT | Mod: 59

## 2020-08-02 PROCEDURE — 36415 COLL VENOUS BLD VENIPUNCTURE: CPT

## 2020-08-02 PROCEDURE — 21400001 HC TELEMETRY ROOM

## 2020-08-02 PROCEDURE — 27000221 HC OXYGEN, UP TO 24 HOURS

## 2020-08-02 PROCEDURE — 99900035 HC TECH TIME PER 15 MIN (STAT)

## 2020-08-02 PROCEDURE — 83880 ASSAY OF NATRIURETIC PEPTIDE: CPT

## 2020-08-02 PROCEDURE — 25000003 PHARM REV CODE 250: Performed by: THORACIC SURGERY (CARDIOTHORACIC VASCULAR SURGERY)

## 2020-08-02 PROCEDURE — 94761 N-INVAS EAR/PLS OXIMETRY MLT: CPT

## 2020-08-02 RX ORDER — ASPIRIN 325 MG
325 TABLET, DELAYED RELEASE (ENTERIC COATED) ORAL EVERY MORNING
Status: DISCONTINUED | OUTPATIENT
Start: 2020-08-03 | End: 2020-08-04 | Stop reason: HOSPADM

## 2020-08-02 RX ORDER — SYRING-NEEDL,DISP,INSUL,0.3 ML 29 G X1/2"
148 SYRINGE, EMPTY DISPOSABLE MISCELLANEOUS ONCE
Status: COMPLETED | OUTPATIENT
Start: 2020-08-02 | End: 2020-08-02

## 2020-08-02 RX ORDER — ONDANSETRON 2 MG/ML
4 INJECTION INTRAMUSCULAR; INTRAVENOUS EVERY 4 HOURS PRN
Status: DISCONTINUED | OUTPATIENT
Start: 2020-08-02 | End: 2020-08-04 | Stop reason: HOSPADM

## 2020-08-02 RX ORDER — FUROSEMIDE 10 MG/ML
20 INJECTION INTRAMUSCULAR; INTRAVENOUS ONCE
Status: COMPLETED | OUTPATIENT
Start: 2020-08-02 | End: 2020-08-02

## 2020-08-02 RX ADMIN — Medication 148 ML: at 04:08

## 2020-08-02 RX ADMIN — NITROGLYCERIN 1 PATCH: 0.1 PATCH TRANSDERMAL at 04:08

## 2020-08-02 RX ADMIN — AMLODIPINE BESYLATE 5 MG: 5 TABLET ORAL at 07:08

## 2020-08-02 RX ADMIN — CLOPIDOGREL BISULFATE 75 MG: 75 TABLET, FILM COATED ORAL at 08:08

## 2020-08-02 RX ADMIN — ATORVASTATIN CALCIUM 10 MG: 10 TABLET, FILM COATED ORAL at 09:08

## 2020-08-02 RX ADMIN — LISINOPRIL 10 MG: 10 TABLET ORAL at 08:08

## 2020-08-02 RX ADMIN — FUROSEMIDE 20 MG: 10 INJECTION, SOLUTION INTRAMUSCULAR; INTRAVENOUS at 09:08

## 2020-08-02 RX ADMIN — MUPIROCIN 1 G: 20 OINTMENT TOPICAL at 08:08

## 2020-08-02 RX ADMIN — METOPROLOL SUCCINATE 25 MG: 25 TABLET, FILM COATED, EXTENDED RELEASE ORAL at 08:08

## 2020-08-02 RX ADMIN — PIPERACILLIN SODIUM AND TAZOBACTAM SODIUM 3.38 G: 3; .375 INJECTION, POWDER, LYOPHILIZED, FOR SOLUTION INTRAVENOUS at 06:08

## 2020-08-02 RX ADMIN — ACETAMINOPHEN 650 MG: 325 TABLET, FILM COATED ORAL at 03:08

## 2020-08-02 NOTE — RESPIRATORY THERAPY
This note also relates to the following rows which could not be included:  Pulse - Cannot attach notes to unvalidated device data  BP - Cannot attach notes to unvalidated device data       08/01/20 2300   Patient Assessment/Suction   Level of Consciousness (AVPU) alert   Respiratory Effort Unlabored   Expansion/Accessory Muscles/Retractions expansion symmetric;no retractions;no use of accessory muscles   All Lung Fields Breath Sounds diminished;clear   Rhythm/Pattern, Respiratory no shortness of breath reported;pattern regular;unlabored   PRE-TX-O2   O2 Device (Oxygen Therapy) nasal cannula   $ Is the patient on Low Flow Oxygen? Yes   Flow (L/min) 3   SpO2 (!) 92 %   Pulse Oximetry Type Continuous   $ Pulse Oximetry - Multiple Charge Pulse Oximetry - Multiple   Resp 16   Education   $ Education Other (see comment);15 min  (O2,pox)   Respiratory Evaluation   $ Care Plan Tech Time 15 min   Evaluation For   (care plan)        08/01/20 2300   Patient Assessment/Suction   Level of Consciousness (AVPU) alert   Respiratory Effort Unlabored   Expansion/Accessory Muscles/Retractions expansion symmetric;no retractions;no use of accessory muscles   All Lung Fields Breath Sounds diminished;clear   Rhythm/Pattern, Respiratory no shortness of breath reported;pattern regular;unlabored   PRE-TX-O2   O2 Device (Oxygen Therapy) nasal cannula   $ Is the patient on Low Flow Oxygen? Yes   Flow (L/min) 3   SpO2 (!) 92 %   Pulse Oximetry Type Continuous   $ Pulse Oximetry - Multiple Charge Pulse Oximetry - Multiple   Resp 16   Education   $ Education Other (see comment);15 min  (O2,pox)   Respiratory Evaluation   $ Care Plan Tech Time 15 min   Evaluation For   (care plan)

## 2020-08-02 NOTE — NURSING
Ambulates to bathroom, winded on return, sats 78-80% on RA on return. o2 @2 liters sats go up to 95%. Frequent coughing.

## 2020-08-02 NOTE — NURSING
Transferred to cardio B, room 2219. Alert and oriented x 3. Pt has her phone, 2 phone chargers, drink, kleenex, IS, book. Pt placed in bed and cardio nurse there to meet her.

## 2020-08-02 NOTE — PROGRESS NOTES
08/02/2020 @ 3:17 PM     POD #2 for left CEA     Progress Note, Attending Physician  [facility]     Patient Name: Roxanne Hernandez   MRN: [id number]   Admission Date and Time: 7/28/2020  3:03 PM   Patient Class: IP- Inpatient   Hospital length of stay: 3 days    Code status: Full Code   Attending Physician: Yoni Hernandez MD   Principal Problem: Bilateral carotid artery occlusion      Current Facility-Administered Medications:     acetaminophen tablet 650 mg, 650 mg, Oral, Q8H PRN, Radu Kent MD, 650 mg at 08/01/20 2116    amLODIPine tablet 5 mg, 5 mg, Oral, QAANDRES, Yoni Hernandez MD, 5 mg at 08/02/20 0729    [START ON 8/3/2020] aspirin EC tablet 325 mg, 325 mg, Oral, QAANDRES, Yoni Hernandez MD    atorvastatin tablet 10 mg, 10 mg, Oral, QHS, Yoni Hernandez MD    dextrose 50% injection 12.5 g, 12.5 g, Intravenous, PRN, Radu Kent MD    dextrose 50% injection 25 g, 25 g, Intravenous, PRN, Radu Kent MD    glucagon (human recombinant) injection 1 mg, 1 mg, Intramuscular, PRN, Radu Kent MD    glucose chewable tablet 16 g, 16 g, Oral, PRN, Radu Kent MD    glucose chewable tablet 24 g, 24 g, Oral, PRN, Radu Kent MD    HYDROcodone-acetaminophen 5-325 mg per tablet 1 tablet, 1 tablet, Oral, Q4H PRN, Radu Kent MD    lisinopriL tablet 10 mg, 10 mg, Oral, BETTY, Yoni Hernandez MD, 10 mg at 08/02/20 0822    metoprolol succinate (TOPROL-XL) 24 hr tablet 25 mg, 25 mg, Oral, BETTY, Yoni Hernandez MD, 25 mg at 08/02/20 0822    mupirocin 2 % ointment 1 g, 1 g, Nasal, BID, Radu Kent MD, 1 g at 08/02/20 0823    nitroGLYCERIN 0.1 mg/hr TD PT24 1 patch, 1 patch, Transdermal, Q24H, Yoni Hernandez MD, 1 patch at 08/01/20 1732    ondansetron injection 4 mg, 4 mg, Intravenous, Q4H PRN, Yoni Hernandez MD    piperacillin-tazobactam 3.375 g in dextrose 5 % 50 mL IVPB (ready to mix system), 3.375 g, Intravenous, Q8H, Yoni Hernandez MD    sodium chloride 0.9% flush 10  mL, 10 mL, Intravenous, PRN, Radu Kent MD    Facility-Administered Medications Ordered in Other Encounters:     0.9%  NaCl infusion, , Intravenous, Continuous, Carmen Yepez MD, Last Rate: 0 mL/hr at 09/04/19 1141    aspirin EC tablet 325 mg, 325 mg, Oral, Once, Carmen Yepez MD    diazePAM tablet 5 mg, 5 mg, Oral, On Call Procedure, Carmen Yepez MD    diphenhydrAMINE capsule 25 mg, 25 mg, Oral, Once, Carmen Yepez MD     Subjective:   Today 08/02/2020, POD #2 she feels generally worse, with a new and worsening productive cough, without chest pain or dyspnea.    Nursing staff report desaturations to 78% on ambulation to the bathroom, but most recorded O2 saturations are in the 90 to 94% range.   Patient reports no bowel movement in about 5 or more days and is concerned about this.     Physical Examination:     Temp:  [97.8 °F (36.6 °C)-99.1 °F (37.3 °C)]   Pulse:  []   Resp:  [13-52]   BP: (0-173)/(0-81)   SpO2:  [81 %-97 %]     A&Ox4 in NAD  HEENT = normal, PERRL, EOMI, fundi benign  Neck = normal  Lungs = clear  Heart = RRR S1 & S2 with no murmurs, rubs, or gallops   Abdomen = obese benign   Ext = no CCE  Neurologic = stable non-focal exam, she moves all extremities well    NO DVT  NO bedsores   IV clean     Objective:     Recent Labs   Lab 07/28/20  1635 07/31/20  0436 07/31/20  0947 08/01/20  0347   WBC 7.85 8.09  --  12.21   HGB 14.8 13.9  --  12.2   HCT 45.0 41.7 42 36.4*   * 128*  --  126*     Recent Labs   Lab 07/28/20  1635 07/31/20  0436 08/01/20  0347    139 138   K 4.6 4.4 4.6    108 110   CO2 23 22* 20*   BUN 30* 36* 32*   CREATININE 1.1 1.3 1.1   CALCIUM 9.9 8.8 8.1*   PROT 7.7  --   --    BILITOT 1.0  --   --    ALKPHOS 94  --   --    ALT 25  --   --    AST 32  --   --      EKG history:   EKG 12/01/2000 = NSR, leftward axis, normal   EKG 05/01/2006 = NSR, leftward axis, normal   EKG 06/01/2011 = NSR, PACs, mild left axis,  normal     EKG 08/30/2019 = NSR, LAD, LVH with secondary ST-T changes (inverted Ts in I, aVL)   EKG 02/11/2020 = NSR, LAD, LVH, inverted Ts in I, aVL   EKG 07/28/2020 = NSR, LAD, normal T waves diffusely (her previously inverted Ts in I and aVL are now upright)      CT head (no contrast) 07/28/2020 = small focal area of probable remote infarction involves the splenium of the corpus callosum on the left, mild chronic small vessel ischemia, scattered vascular calcifications within the intracranial segments of the internal carotid and vertebral arteries     CTA neck and brain (with contrast) 07/28/2020:   o Moderate atheromatous changes are noted in the aortic arch. There are moderate degrees of narrowing of the proximal segments of the left common carotid artery and subclavian artery at their origin.  o There is mild tortuosity of the common carotid arteries.  o There is prominent plaque formation observed bilaterally at the level of the carotid bifurcations.   o There is bilateral high-grade stenosis of the origins of the internal carotid arteries (approximately 90%). The remainder of the internal carotid arteries are tortuous in their extracranial segment but patent. There is stenosis of the origin of the right external carotid artery.  o There is significant focal stenosis of the intracranial segment of the left internal carotid artery near the level of the anterior clinoid process.  o There is mild tortuosity of the vertebral arteries.   o There are no findings of high-grade stenosis or occlusion of the major intracranial arteries otherwise. No aneurysm or vascular malformation is noted.  o The cervical segments of the vertebral arteries are patent. There are mild-moderate stenoses of the origins of the vertebral arteries bilaterally.  o Scattered groundglass type opacities are observed within the visualized lung apices bilaterally. There is prominent multilevel cervical spondylosis.    CXR 07/28/2020:   The  "heart size is within normal limits and there is no evidence of acute pulmonary vascular engorgement. A metallic stent projected over the cardiac silhouette. There is arteriosclerosis in the arch of the aorta. A dual lead right-sided cardiac device is unchanged in position.  The lungs are free of confluent parenchymal infiltrates. There are minor linear parenchymal opacities in the lung bases compatible with minimal atelectasis or scarring. There are no significant effusions.   There is unchanged apical pleural thickening. Monitoring electrodes overlie the chest.    24 hour Holter: started the morning of 07/29/2020 and in process at this time.     Impression:   1. Plethora of complaints, beginning about 2 weeks prior to admission, suggestive of either stroke occurring about 2 weeks ago, or severe new onset "first-time-in-her-life" severe vertigo, along with sensations of episodic impending loss of consciousness with palpitaitons in the recent TAVR state (since 12/17/2019) and pacemaker implantation state (since 12/18/2019).    a. Per Cardiologist Marleni 07/30/2020: "No significant arrhythmias on telemetry thus far.  Pacemaker interrogated on 07/30/2020.  No significant arrhythmias noted.  Pacemaker functioning appropriately."   2. 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 Severe progression of her cerebrovascular disease; whether this is now "symptomatic" or "asymptomatic" is uncertain, but the severity is sufficient to warrant sequential CEA regardless to reduce her future risk of recurrent stroke.    a. 07/29/2020: Cardiologist Marleni advises sequential carotid endarterectomy   b. 07/29/2020: CT Surgeon/Vascular Surgeon Donte advises sequential carotid endarterectomy, declaring that whether her stenoses are asymptomatic or asymptomatic, sequential CEA is indicated if patient agrees and Cardiologist " "Marleni and I agree.   c. 07/29/2020: I believe the medical literature is reasonably clear that she is statistically likely to do better with sequential carotid endarterectomy than with "medical therapy alone".  I have advised the patient of my perspective, and that I advise her to proceed.   d. 07/30/2020: I reiterated to the patient and daughter Donna that sequential bilateral CEA is technically the best option for her, with a measurable future stroke risk reduction to be gained.    e. 07/31/2020: left CEA performed by CT Surgeon Donte  f. 07/31/2020 @ 17:11h: she is doing very well post-operatively from a neurologic and general standpoint   g. 08/01/2020: she is doing great POD #1 except she is still on IV clevidipine   4. She is having post-carotid endarterectomy hypertension (mechanism postulated by some authors, but actual cause unknown).   a. 08/01/2020: amlodipine 5mg po "now" and qam is ordered to enable weaning her off of clevidipine   5. POD #2 cough and fever with abnormal CXR at the bedside 08/02/2020, reviewed by me   6. 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 surgical therapy options discussion: patient advised that (in the asymptomatic state) medical treatment reduces her 5 year risk of stroke to 11%, whereas carotid endarterectomy reduces her 5 year risk of stroke to 5% with a small risk of karyn-operative stroke (see copy in her chart of the handout I gave her)she advised me 06/06/2011 that " she wishes to proceed with medical treatment for now    CTA neck and brain (with contrast) 07/28/2020:   o Moderate atheromatous changes are noted in the aortic arch. There are moderate degrees of narrowing of the proximal segments of the left common carotid artery and subclavian artery at their origin.  o There is mild tortuosity of the common carotid arteries.  o There is prominent plaque formation observed bilaterally at the level of the carotid bifurcations.   o There is bilateral high-grade stenosis of the origins of the internal carotid arteries (approximately 90%). The remainder of the internal carotid arteries are tortuous in their extracranial segment but patent. There is stenosis of the origin of the right external carotid artery.  o There is significant focal stenosis of the intracranial segment of the left internal carotid artery near the level of the anterior clinoid process.  o There is mild tortuosity of the vertebral arteries.   o There are no findings of high-grade stenosis or occlusion of the major intracranial arteries otherwise. No aneurysm or vascular malformation is noted.  o The cervical segments of the vertebral arteries are patent. There are mild-moderate stenoses of the origins of the vertebral arteries bilaterally.  o Scattered groundglass type opacities are observed within the visualized lung apices bilaterally. There is prominent multilevel cervical spondylosis.   Medical Literature Discussion re: carotid stenosis:    1.  Symptomatic patients:  Both the North American Symptomatic Carotid Endarterectomy Trial (NASCET)1 and the European Carotid Surgery Trial (ECST)2 showed a substantial benefit for surgery in patients with a symptomatic carotid artery stenosis of greater than 70 percent. In NASCET, the average cumulative ipsilateral stroke rate at 2 years was 26 percent for patients treated medically and 9 percent for those receiving the same medical treatment plus a carotid  "endarterectomy, corresponding to a 65 percent relative risk reduction favoring surgery. For every 100 patients treated surgically, 17 were spared an ipsilateral stroke over the next 2 years. The perioperative stroke plus death rate was 5.8 percent in the surgical group and 3.3 percent in the medical group in the same 1-month period, in spite of optimal medical therapy. Thus the excess morbidity and mortality attributable to the surgery was 2.5 percent. If minor ischemic events are excluded from the analyses, the excess major stroke and death rate for the surgical patients was 1.2 percent (2.1 less 0.9 percent) and the excess fatality rate was 0.3 percent (0.6 less 0.3 percent). The NASCET and ECST investigators concluded that their patients with a 70 to 99 percent stenosis clearly benefited from carotid endarterectomy. Results in [facility] hospitals should be similar if patients are properly selected and the perioperative morbidity and mortality are comparably low.  Analysis of several subgroups yielded important information. The cumulative risk of ipsilateral stroke correlated with the degree of stenosis. While the absolute risk reduction favoring the entire surgical group was 17 percent in NASCET, the risk reduction favoring those with a 90 to 99 percent stenosis was 26 percent, and this decreased to 12 percent for those with a 70 to 79 percent stenosis. The overall 26 percent cumulative risk of an ipsilateral stroke at 2 years for this group of patients with "high-grade" stenosis calculates to an annual risk of 13 percent. The operative morbidity and mortality were similar for patients with different degrees of stenosis.   2.  Asymptomatic patients.  The Asymptomatic Carotid Atherosclerosis Study (ACAS)3  demonstrated that asymptomatic people with internal carotid artery stenoses greater than 60 percent are at low risk for stroke. More than 1600 patients were randomized to treatment with the best medical " therapies available versus the same medical treatment plus carotid endarterectomy. Patients in the surgical group had a risk over 5 years for ipsilateral stroke (and any perioperative stroke or death) of 5.1 percent, whereas the risk for the nonsurgical group was 11 percent. While this demonstrates a 53 percent relative risk reduction, the absolute risk reduction is only 5.9 percent over 5 years, or 1.2 percent annually. If only major strokes and death are counted, the comparable numbers are 43 percent relative risk reduction, and an absolute risk reduction of only 2.6 percent over 5 years, or 0.5 percent annually. This latter result was not statistically significant. There was no clear difference in benefit for those with moderate (60 to 80 percent) compared with severe (80 to 99 percent) stenosis.   Many of the strokes in the surgery group were caused by preoperative angiograms. If carotid artery ultrasonography and MR angiography replace catheter angiography, this morbidity and mortality will be obviated.   While there are benefits for carotid endarterectomy in patients with asymptomatic carotid stenosis, the benefits are very small. Medical therapy for reduction of atherosclerosis risk factors in addition to aspirin (325 mg/d) generally are recommended for patients with asymptomatic carotid stenosis.        Leakage of Periprosthetic Valve, Subsequent Encounter 2/11/2020    Aortic stenosis saga:   Echocardiogram ([facility]) 09/04/2019:    Aortic valve: heavily calcified with restricted leaflet motion. Moderate aortic regurgitation.    Mitral valve: thick and calcified with restricted leaflet motion. Mild mitral stenosis and regurgitation.    Tricuspid valve: is structurally normal with good leaflet excursion. Mild regurgitation.   Pulmonary valve: is structurally normal with good leaflet excursion. No significant regurgitation.   Overall LVEF appears normal.    No obvious shunt across the interatrial septum by  color flow Doppler.     Cardiac catheterization 09/14/2019 (Timothy Salgado MD):   · Mid RCA lesion, 50% stenosed, nonsignificant by iFR assessment.  · Known severe aortic stenosis from echo    CTA Cardiac TAVR 09/19/2019:    Large left UVJ stone measuring at 2.6 cm with severe left-sided hydroureteronephrosis.   Significant pneumobilia in the left hepatic lobe with air within the common bile duct.  Unclear if this is due to biliary enteric fistula, infectious process such as cholangitis, or incompetent sphincter of Oddi.  Unfortunately this is not well evaluated on this dedicated TAVR protocol examination.  Clinical correlation is advised.  Further evaluation could be performed with ERCP and dedicated abdominal CT with intravenous contrast.   TAVR measurements, as above.   Significant aortic valve and abdominal aorta atherosclerosis.   Nonspecific mesenteric haziness, possibly mesenteric panniculitis or adenitis.   Heterogeneity of the pulmonary parenchyma possibly related to elevated pulmonary vascular resistance, small airways disease, or both.   Small bilateral pleural effusions.    Cardiac catheterization 12/17/2019 (Herbert Ashley):    Successful left transfemoral 23 mm Evolute TAVR.  No paravalvular leak, mean gradient 3, peak velocity 1.5 post TAVR.    Echocardiogram (TTE) 12/26/2019   · Mild concentric left ventricular hypertrophy   · Normal left ventricular systolic function. The estimated ejection fraction is 60%.  · No wall motion abnormalities.  · Grade I (mild) left ventricular diastolic dysfunction consistent with impaired relaxation.  · There is a transcutaneously-placed aortic bioprosthesis present.  · Mild aortic regurgitation.  · Mild-to-moderate aortic valve stenosis.  · Aortic valve area is 1.23 cm2; peak velocity is 2.84 m/s; mean gradient is 18 mmHg.  · Mild left atrial enlargement.  · Mild tricuspid regurgitation.  · Normal right ventricular systolic function.  · Moderate mitral  sclerosis.  · There is mild leaflet calcification of the Mitral Valve.  · Mild mitral stenosis. MVA by PHT is 2.21 sq cm.  · Normal central venous pressure (3 mmHg).   · The estimated PA systolic pressure is 32 mmHg.:     Echocardiogram (TTE) 01/22/2020:   · Normal left ventricular systolic function. The estimated ejection fraction is 65%.  · Concentric left ventricular remodeling.  · No wall motion abnormalities.  · Grade I (mild) left ventricular diastolic dysfunction consistent with impaired relaxation.  · Normal right ventricular systolic function.  · Severe left atrial enlargement.  · There is a transcutaneously-placed aortic bioprosthesis present.   · There is paravalvular aortic insufficiency present   · Mild mitral regurgitation.  · Normal central venous pressure (3 mmHg).  · The estimated PA systolic pressure is 29 mmHg.   · Trivial posterior pericardial effusion.      Chronic Diastolic Heart Failure 12/1/2000    Echocardiogram 12/01/2000 = hyperdynamic LV, with mild to moderate amount of pericardial fat, and left ventricular diastolic dysfunction   Echocardiogram 05/20/2011 = dilated left atrial cavity, normal LV thickness and function with LVEF @ 70%, mild aortic stenosis with valve area @ 1.09cm2, peak gradient = 34 mmHg, minimal prolapse of anterior mitral leaflet, mild TI, very small pericardial effusion        Coronary Artery Disease of Native Artery of Native Heart With Stable Angina Pectoris 9/14/2019    Cardiac catheterization 09/14/2019 (Timothy Salgado MD):    Mid RCA lesion, 50% stenosed, nonsignificant by iFR assessment.   Known severe aortic stenosis from echo  02/13/2020: it is unlikely that this trivial coronary artery disease is the cause of her episodic dyspnea that has been present since prior to 09/04/2019.     Essential Hypertension 1/31/1992   Diabetes Mellitus Without Complication 12/1/2000   Pneumobilia 6/26/1995    S/P cholecystectomy state since 12/03/1992  Pneumobilia, chronic,  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   Ultrasound 01/31/1992 = cholelithiasis with tender gallbladder   Ultrasound abdomen 06/22/1995 = normal except for hypoechoic acoustic texture of the left hepatic lobe   ERCP #1 02/01/1992 ([place]) = choledocholithiasis with cholangitis   ERCP #2 06/28/1995 ([place]) = multiple filling defects due to stones   CT abdomen (with & without) 06/26/1995 = normal except for air in the biliary tree, fatty infiltration of the left lobe, calcified granuloma at right lung base   CT abdomen & pelvis (with & without) 12/02/1997 = normal/normal except for air in the biliary tree due to prior sphincterotomy   CTA Cardiac TAVR 09/19/2019: significant pneumobilia in the left hepatic lobe with air within the common bile duct         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 In reality, this dyspnea syndrome has been going on since prior to her TAVR and since prior to the beginning of her workup which is dated to just prior to 09/04/2019.  2. 02/12/2020: I was in the process of writing discharge orders on this lady the evening of 02/12/2020 when her nurse came to me and reported that the patient was acutely dyspneic again, without chest pain, and at that point I did not have a proper diagnosis to explain her episodic dyspnea.   "  3. Consultation from Pulmonologist Caden received 02/13/2020   4. Consultation from Cardiologist Marleni received 02/13/2020   5. We are attributing this to her karyn-valvular TAVR leak, with plans for tincture of time to cause this problem to settle down      Dehydration Syndrome (Resolved) 2/11/2020   Left Ureteral Stone (Resolved) 11/13/2019   Hydronephrosis With Urinary Obstruction Due to Ureteral Calculus (Resolved) 11/11/2019    Left ureteral stone with hydronephrosis requiring  manipulation 11/13/2019 @ [facility] (Paddy Dunham MD)    CTA Cardiac TAVR 09/19/2019: large left UVJ stone measuring at 2.6 cm with severe left-sided hydroureteronephrosis    Intervention 11/13/2019: 1.  Left ureteroscopy 2.  Cystoscopy 3.  Stone basket extraction 4.  Laser lithotripsy 5.  Left ureteral stent placement 6.  Fluoro < 1h 11/13/2019 (Paddy Dunham @ [facility])    Ultrasound kidneys 02/11/2020 = moderate left-sided hydronephrosis with no shadowing stone identified; nonobstructing right-sided renal stones; likely debris within the bladder, consider correlation with urinalysis.      Urinalysis 02/12/2020 = normal    As of 02/11-13/2020 she has been completely asymptomatic for this abnormality    02/13/2020 on discharge day I am ordering her to report back to her established Urologist Paddy Dunham for further investigation for this problem.        Plan:   1. Inpatient status permitted 07/30/2020 per Utilization Management.   2. CEA, left performed 07/31/2020.   3. Resume solid food diet  4. Out of bed and progressive ambulation   5. Blood and sputum cultures "stat" followed by Zosyn   6. Transfer out of MICU to Cardiology B.  7. Add Lovenox 30mg subq now and q24h (I verified that Vascular Surgeon Donte is OK with this).   8. Discontinue Plavix and start aspirin EC 325mg po qam beginning 08/03/2020 (discussed with Donte) since her neurologic events were not "an aspirin failure" since she was on neither aspirin " nor Plavix in the weeks prior to admission.   9. Add oral citrate of magnesia for constipation.     Yoni Hernandez MD

## 2020-08-03 LAB
ALBUMIN SERPL BCP-MCNC: 3 G/DL (ref 3.5–5.2)
ALP SERPL-CCNC: 94 U/L (ref 55–135)
ALT SERPL W/O P-5'-P-CCNC: 40 U/L (ref 10–44)
ANION GAP SERPL CALC-SCNC: 8 MMOL/L (ref 8–16)
AST SERPL-CCNC: 51 U/L (ref 10–40)
BASOPHILS # BLD AUTO: 0.02 K/UL (ref 0–0.2)
BASOPHILS NFR BLD: 0.2 % (ref 0–1.9)
BILIRUB SERPL-MCNC: 1.8 MG/DL (ref 0.1–1)
BNP SERPL-MCNC: 363 PG/ML (ref 0–99)
BUN SERPL-MCNC: 26 MG/DL (ref 8–23)
CALCIUM SERPL-MCNC: 8.3 MG/DL (ref 8.7–10.5)
CHLORIDE SERPL-SCNC: 106 MMOL/L (ref 95–110)
CO2 SERPL-SCNC: 26 MMOL/L (ref 23–29)
CREAT SERPL-MCNC: 1 MG/DL (ref 0.5–1.4)
DIFFERENTIAL METHOD: ABNORMAL
EOSINOPHIL # BLD AUTO: 0.1 K/UL (ref 0–0.5)
EOSINOPHIL NFR BLD: 1.1 % (ref 0–8)
ERYTHROCYTE [DISTWIDTH] IN BLOOD BY AUTOMATED COUNT: 13.6 % (ref 11.5–14.5)
EST. GFR  (AFRICAN AMERICAN): >60 ML/MIN/1.73 M^2
EST. GFR  (NON AFRICAN AMERICAN): 53.3 ML/MIN/1.73 M^2
GLUCOSE SERPL-MCNC: 204 MG/DL (ref 70–110)
GLUCOSE SERPL-MCNC: 219 MG/DL (ref 70–110)
GLUCOSE SERPL-MCNC: 227 MG/DL (ref 70–110)
GLUCOSE SERPL-MCNC: 238 MG/DL (ref 70–110)
GLUCOSE SERPL-MCNC: 278 MG/DL (ref 70–110)
HCT VFR BLD AUTO: 35 % (ref 37–48.5)
HGB BLD-MCNC: 11.4 G/DL (ref 12–16)
IMM GRANULOCYTES # BLD AUTO: 0.05 K/UL (ref 0–0.04)
IMM GRANULOCYTES NFR BLD AUTO: 0.4 % (ref 0–0.5)
LYMPHOCYTES # BLD AUTO: 1.2 K/UL (ref 1–4.8)
LYMPHOCYTES NFR BLD: 10 % (ref 18–48)
MCH RBC QN AUTO: 29.4 PG (ref 27–31)
MCHC RBC AUTO-ENTMCNC: 32.6 G/DL (ref 32–36)
MCV RBC AUTO: 90 FL (ref 82–98)
MONOCYTES # BLD AUTO: 0.9 K/UL (ref 0.3–1)
MONOCYTES NFR BLD: 7.9 % (ref 4–15)
NEUTROPHILS # BLD AUTO: 9.2 K/UL (ref 1.8–7.7)
NEUTROPHILS NFR BLD: 80.4 % (ref 38–73)
NRBC BLD-RTO: 0 /100 WBC
PLATELET # BLD AUTO: 130 K/UL (ref 150–350)
PMV BLD AUTO: 10 FL (ref 9.2–12.9)
POTASSIUM SERPL-SCNC: 4.1 MMOL/L (ref 3.5–5.1)
PROT SERPL-MCNC: 5.9 G/DL (ref 6–8.4)
RBC # BLD AUTO: 3.88 M/UL (ref 4–5.4)
SODIUM SERPL-SCNC: 140 MMOL/L (ref 136–145)
WBC # BLD AUTO: 11.47 K/UL (ref 3.9–12.7)

## 2020-08-03 PROCEDURE — 25000003 PHARM REV CODE 250: Performed by: THORACIC SURGERY (CARDIOTHORACIC VASCULAR SURGERY)

## 2020-08-03 PROCEDURE — 21400001 HC TELEMETRY ROOM

## 2020-08-03 PROCEDURE — 97161 PT EVAL LOW COMPLEX 20 MIN: CPT

## 2020-08-03 PROCEDURE — 83880 ASSAY OF NATRIURETIC PEPTIDE: CPT

## 2020-08-03 PROCEDURE — 36415 COLL VENOUS BLD VENIPUNCTURE: CPT

## 2020-08-03 PROCEDURE — 80053 COMPREHEN METABOLIC PANEL: CPT

## 2020-08-03 PROCEDURE — 25000003 PHARM REV CODE 250: Performed by: INTERNAL MEDICINE

## 2020-08-03 PROCEDURE — 94761 N-INVAS EAR/PLS OXIMETRY MLT: CPT

## 2020-08-03 PROCEDURE — 97116 GAIT TRAINING THERAPY: CPT

## 2020-08-03 PROCEDURE — 85025 COMPLETE CBC W/AUTO DIFF WBC: CPT

## 2020-08-03 PROCEDURE — 63600175 PHARM REV CODE 636 W HCPCS: Performed by: INTERNAL MEDICINE

## 2020-08-03 RX ORDER — SYRING-NEEDL,DISP,INSUL,0.3 ML 29 G X1/2"
148 SYRINGE, EMPTY DISPOSABLE MISCELLANEOUS ONCE
Status: COMPLETED | OUTPATIENT
Start: 2020-08-03 | End: 2020-08-03

## 2020-08-03 RX ADMIN — ACETAMINOPHEN 650 MG: 325 TABLET, FILM COATED ORAL at 02:08

## 2020-08-03 RX ADMIN — METOPROLOL SUCCINATE 25 MG: 25 TABLET, FILM COATED, EXTENDED RELEASE ORAL at 08:08

## 2020-08-03 RX ADMIN — AMLODIPINE BESYLATE 5 MG: 5 TABLET ORAL at 06:08

## 2020-08-03 RX ADMIN — Medication 148 ML: at 02:08

## 2020-08-03 RX ADMIN — ATORVASTATIN CALCIUM 10 MG: 10 TABLET, FILM COATED ORAL at 08:08

## 2020-08-03 RX ADMIN — ACETAMINOPHEN 650 MG: 325 TABLET, FILM COATED ORAL at 07:08

## 2020-08-03 RX ADMIN — NITROGLYCERIN 1 PATCH: 0.1 PATCH TRANSDERMAL at 06:08

## 2020-08-03 RX ADMIN — LISINOPRIL 10 MG: 10 TABLET ORAL at 08:08

## 2020-08-03 RX ADMIN — PIPERACILLIN SODIUM AND TAZOBACTAM SODIUM 3.38 G: 3; .375 INJECTION, POWDER, LYOPHILIZED, FOR SOLUTION INTRAVENOUS at 07:08

## 2020-08-03 RX ADMIN — ASPIRIN 325 MG: 325 TABLET, COATED ORAL at 06:08

## 2020-08-03 RX ADMIN — PIPERACILLIN SODIUM AND TAZOBACTAM SODIUM 3.38 G: 3; .375 INJECTION, POWDER, LYOPHILIZED, FOR SOLUTION INTRAVENOUS at 08:08

## 2020-08-03 RX ADMIN — PIPERACILLIN SODIUM AND TAZOBACTAM SODIUM 3.38 G: 3; .375 INJECTION, POWDER, LYOPHILIZED, FOR SOLUTION INTRAVENOUS at 01:08

## 2020-08-03 NOTE — PLAN OF CARE
Vital signs, cardiac, and lab monitoring. IV antibiotics.  Increase activity as tolerated. Bed alarm on. Watch for falls. Watch for signs and symptoms of bleeding. Accuchecks per order. Breathing treatments and adjust oxygen as needed.Strict I&O. Daily weights.

## 2020-08-03 NOTE — NURSING
"Pt appears to be mouth breathing, stated her nose was "stuffy". O2 sat 91-92%. Placed pt on Oxymask since she is mouth breathing, O2 sats now 94-95%, pt states the mask is much more comfortable. Will continue to monitor.  "

## 2020-08-03 NOTE — PT/OT/SLP EVAL
Physical Therapy Evaluation and Discharge Note    Patient Name:  Roxanne Hernandez   MRN:  [id number]    Recommendations:     Discharge Recommendations:  home   Discharge Equipment Recommendations: none   Barriers to discharge: None    Assessment:     Roxanne Hernandez is a 80 y.o. female admitted with a medical diagnosis of Bilateral carotid artery occlusion. Pt is S/P  L carotid endarterectomy. Pt lives at home with her  and daughter and  was Independent with mobilization w/o  an AD. Pt t/'ed OOB with supervision and ambulated 120  Ft  SBA no AD. 02 sat on RA was 95% after gait and heart rate was 88 bpm. Pt reported that she would have an additional  daughter to stay with her upon D/C for extra support.   At this time, patient is functioning at their prior level of function and does not require further acute PT services.     Recent Surgery: Procedure(s) (LRB):  ENDARTERECTOMY-CAROTID (Left) 3 Days Post-Op    Plan:     During this hospitalization, patient does not require further acute PT services.  Please re-consult if situation changes.      Subjective      Chief Complaint:Desire for D/C home  Patient/Family Comments/goals: Get back to her normal routine at home.  Pain/Comfort:  ·      Patients cultural, spiritual, Caodaism conflicts given the current situation:      Living Environment:    Prior to admission, patients level of function was independent   Equipment used at home:None  .  DME owned (not currently used): none.  Upon discharge, patient will have assistance from  and daughters.    Objective:     Communicated with nurse prior to session.  Patient found supine with bed alarm, telemetry upon PT entry to room.    General Precautions: Standard, fall   Orthopedic Precautions:    Braces:       Exams:  · Cognitive Exam:  Patient is oriented to Person, Place, Time and Situation  · RLE ROM: WNL  · RLE Strength: WNL  · LLE ROM: WNL  · LLE Strength: WNL    Functional Mobility:  · Bed Mobility:     · Supine to  Sit: supervision  · Sit to Supine: supervision  · Transfers:     · Sit to Stand:  supervision with no AD  · Gait: 120 ft no AD    AM-PAC 6 CLICK MOBILITY  Total Score:24       Therapeutic Activities and Exercises:  T/f'ed OOB with Supervison. ambulated 120 ft no AD. Cueing for decreased pace. Stable vitals    AM-PAC 6 CLICK MOBILITY  Total Score:24     Patient left supine with all lines intact, call button in reach and bed alarm on.    GOALS:   Multidisciplinary Problems     Physical Therapy Goals     Not on file                History:     Past Medical History:   Diagnosis Date    Cervical cancer     Diabetes mellitus     Hypertension        Past Surgical History:   Procedure Laterality Date    A-V CARDIAC PACEMAKER INSERTION N/A 12/18/2019    Procedure: INSERTION, CARDIAC PACEMAKER, DUAL CHAMBER;  Surgeon: Nnamdi Macdonald MD;  Location: [facility] EP LAB;  Service: Cardiology;  Laterality: N/A;  lbbb, dppm, SJM, anes, pr, 6077    CARDIAC CATHETERIZATION      CAROTID ENDARTERECTOMY Left 7/31/2020    Procedure: ENDARTERECTOMY-CAROTID;  Surgeon: Radu Kent MD;  Location: [facility] OR;  Service: Cardiothoracic;  Laterality: Left;    CHOLECYSTECTOMY      CORONARY ANGIOGRAPHY Left 9/4/2019    Procedure: ANGIOGRAM, CORONARY ARTERY;  Surgeon: Carmen Yepez MD;  Location: [facility] CATH/EP LAB;  Service: Cardiology;  Laterality: Left;    CYSTOSCOPY N/A 11/13/2019    Procedure: CYSTOSCOPY;  Surgeon: Paddy Dunham MD;  Location: [address];  Service: Urology;  Laterality: N/A;    HYSTERECTOMY      JOINT REPLACEMENT      KNEE ARTHROPLASTY Right     LASER LITHOTRIPSY Left 11/13/2019    Procedure: LITHOTRIPSY, USING LASER;  Surgeon: Paddy Dunham MD;  [address];  Service: Urology;  Laterality: Left;    TRANSCATHETER AORTIC VALVE REPLACEMENT (TAVR) N/A 12/17/2019    Procedure: REPLACEMENT, AORTIC VALVE, TRANSCATHETER (TAVR);  Surgeon: Herbert Ashley MD;  Location: [facility] CATH LAB;  Service:  Cardiology;  Laterality: N/A;    URETEROSCOPIC REMOVAL OF URETERIC CALCULUS Left 11/13/2019    Procedure: REMOVAL, CALCULUS, URETER, URETEROSCOPIC;  Surgeon: Padyd Dunham MD;  Location: [facility] OR [address] [place] [address];  Service: Urology;  Laterality: Left;    URETEROSCOPY Left 11/13/2019    Procedure: URETEROSCOPY;  Surgeon: Paddy Dunham MD;  Location: [facility] OR [address] [place] [address];  Service: Urology;  Laterality: Left;       Time Tracking:     PT Received On: 08/03/20  PT Start Time: 1040     PT Stop Time: 1058  PT Total Time (min): 18 min     Billable Minutes: Evaluation 10 minutes  and Gait Training 8 minutes      Ariane Flaherty, PT  08/03/2020

## 2020-08-03 NOTE — PLAN OF CARE
Problem: Oral Intake Inadequate  Goal: Improved Oral Intake  Outcome: Ongoing, Progressing  Intervention: Promote and Optimize Oral Intake  Flowsheets (Taken 8/3/2020 1050)  Oral Nutrition Promotion: calorie dense liquids provided   Added Glucerna BID to aid in meeting estimated needs

## 2020-08-03 NOTE — PROGRESS NOTES
08/03/2020 @ 9:00 AM     POD #3 for left CEA performed on 07/31/2020     Progress Note, Attending Physician  [facility]     Patient Name: Roxanne Hernandez   MRN: [id number]   Admission Date and Time: 7/28/2020  3:03 PM   Patient Class: IP- Inpatient   Hospital length of stay: 4 days    Code status: Full Code   Attending Physician: Yoni Hernandez MD   Principal Problem: Bilateral carotid artery occlusion      Current Facility-Administered Medications:     acetaminophen tablet 650 mg, 650 mg, Oral, Q8H PRN, Radu Kent MD, 650 mg at 08/03/20 0211    amLODIPine tablet 5 mg, 5 mg, Oral, QAYoni CAMPOS MD, 5 mg at 08/03/20 0604    aspirin EC tablet 325 mg, 325 mg, Oral, QAYoni CAMPOS MD, 325 mg at 08/03/20 0604    atorvastatin tablet 10 mg, 10 mg, Oral, QHS, Yoni Hernandez MD, 10 mg at 08/02/20 2101    dextrose 50% injection 12.5 g, 12.5 g, Intravenous, PRN, Radu Kent MD    dextrose 50% injection 25 g, 25 g, Intravenous, PRN, Radu Kent MD    glucagon (human recombinant) injection 1 mg, 1 mg, Intramuscular, PRN, Radu Kent MD    glucose chewable tablet 16 g, 16 g, Oral, PRN, Radu Kent MD    glucose chewable tablet 24 g, 24 g, Oral, PRN, Radu Kent MD    HYDROcodone-acetaminophen 5-325 mg per tablet 1 tablet, 1 tablet, Oral, Q4H PRN, Radu Kent MD    lisinopriL tablet 10 mg, 10 mg, Oral, Yoni HERNÁNDEZ MD, 10 mg at 08/02/20 0822    metoprolol succinate (TOPROL-XL) 24 hr tablet 25 mg, 25 mg, Oral, Yoni HERNÁNDEZ MD, 25 mg at 08/02/20 0822    mupirocin 2 % ointment 1 g, 1 g, Nasal, BID, Radu Kent MD, 1 g at 08/02/20 0823    nitroGLYCERIN 0.1 mg/hr TD PT24 1 patch, 1 patch, Transdermal, Q24H, Yoni Hernandez MD, 1 patch at 08/02/20 1630    ondansetron injection 4 mg, 4 mg, Intravenous, Q4H PRN, Yoni Hernandez MD    piperacillin-tazobactam 3.375 g in dextrose 5 % 50 mL IVPB (ready to mix system), 3.375 g, Intravenous, Q8H,  Yoni Hernandez MD, Last Rate: 12.5 mL/hr at 08/03/20 0151, 3.375 g at 08/03/20 0151    sodium chloride 0.9% flush 10 mL, 10 mL, Intravenous, PRN, Radu Kent MD    Facility-Administered Medications Ordered in Other Encounters:     0.9%  NaCl infusion, , Intravenous, Continuous, Carmen Yepez MD, Last Rate: 0 mL/hr at 09/04/19 1141    aspirin EC tablet 325 mg, 325 mg, Oral, Once, Carmen Yepez MD    diazePAM tablet 5 mg, 5 mg, Oral, On Call Procedure, Carmen Yepez MD    diphenhydrAMINE capsule 25 mg, 25 mg, Oral, Once, Carmen Yepez MD     Subjective:   Today 08/03/2020, POD #3 she feels much better.  She said that yesterday 08/02/2020 she felt terrible, being short of breath and uncomfortable with fever.  Her cough is already much better.   Patient reports still no bowel movement in about 6 or more days and is concerned about this.     Physical Examination:     Temp:  [96.7 °F (35.9 °C)-98.4 °F (36.9 °C)]   Pulse:  []   Resp:  [17-52]   BP: (126-163)/(60-69)   SpO2:  [81 %-97 %]     Telemetry reveals NSR 08/03/2020     A&Ox4 in NAD  HEENT = normal, PERRL, EOMI, fundi benign  Neck = normal  Lungs = clear with mildly decreased breath sounds in both lung bases (lower 1/5)   Heart = RRR S1 & S2 with no murmurs, rubs, or gallops   Abdomen = obese benign   Ext = trace edema   Neurologic = stable non-focal exam, she moves all extremities well    NO DVT  NO bedsores   IV clean     Objective:     Recent Labs   Lab 08/01/20  0347 08/02/20  1520 08/03/20  0502   WBC 12.21 14.72* 11.47   HGB 12.2 12.6 11.4*   HCT 36.4* 39.3 35.0*   * 112* 130*     Recent Labs   Lab 07/28/20  1635 07/31/20  0436 08/01/20  0347 08/03/20  0502    139 138 140   K 4.6 4.4 4.6 4.1    108 110 106   CO2 23 22* 20* 26   BUN 30* 36* 32* 26*   CREATININE 1.1 1.3 1.1 1.0   CALCIUM 9.9 8.8 8.1* 8.3*   PROT 7.7  --   --  5.9*   BILITOT 1.0  --   --  1.8*   ALKPHOS 94  --   --  94    ALT 25  --   --  40   AST 32  --   --  51*     Recent Labs   Lab 08/02/20  1520 08/03/20  0502   * 363*     EKG history:   EKG 12/01/2000 = NSR, leftward axis, normal   EKG 05/01/2006 = NSR, leftward axis, normal   EKG 06/01/2011 = NSR, PACs, mild left axis, normal     EKG 08/30/2019 = NSR, LAD, LVH with secondary ST-T changes (inverted Ts in I, aVL)   EKG 02/11/2020 = NSR, LAD, LVH, inverted Ts in I, aVL   EKG 07/28/2020 = NSR, LAD, normal T waves diffusely (her previously inverted Ts in I and aVL are now upright)      CT head (no contrast) 07/28/2020 = small focal area of probable remote infarction involves the splenium of the corpus callosum on the left, mild chronic small vessel ischemia, scattered vascular calcifications within the intracranial segments of the internal carotid and vertebral arteries     CTA neck and brain (with contrast) 07/28/2020:   o Moderate atheromatous changes are noted in the aortic arch. There are moderate degrees of narrowing of the proximal segments of the left common carotid artery and subclavian artery at their origin.  o There is mild tortuosity of the common carotid arteries.  o There is prominent plaque formation observed bilaterally at the level of the carotid bifurcations.   o There is bilateral high-grade stenosis of the origins of the internal carotid arteries (approximately 90%). The remainder of the internal carotid arteries are tortuous in their extracranial segment but patent. There is stenosis of the origin of the right external carotid artery.  o There is significant focal stenosis of the intracranial segment of the left internal carotid artery near the level of the anterior clinoid process.  o There is mild tortuosity of the vertebral arteries.   o There are no findings of high-grade stenosis or occlusion of the major intracranial arteries otherwise. No aneurysm or vascular malformation is noted.  o The cervical segments of the vertebral arteries are  "patent. There are mild-moderate stenoses of the origins of the vertebral arteries bilaterally.  o Scattered groundglass type opacities are observed within the visualized lung apices bilaterally. There is prominent multilevel cervical spondylosis.    CXR 07/28/2020:   The heart size is within normal limits and there is no evidence of acute pulmonary vascular engorgement. A metallic stent projected over the cardiac silhouette. There is arteriosclerosis in the arch of the aorta. A dual lead right-sided cardiac device is unchanged in position.  The lungs are free of confluent parenchymal infiltrates. There are minor linear parenchymal opacities in the lung bases compatible with minimal atelectasis or scarring. There are no significant effusions.   There is unchanged apical pleural thickening. Monitoring electrodes overlie the chest.    24 hour Holter: started the morning of 07/29/2020 and in process at this time.     Impression:   1. Plethora of complaints, beginning about 2 weeks prior to admission, suggestive of either stroke occurring about 2 weeks ago, or severe new onset "first-time-in-her-life" severe vertigo, along with sensations of episodic impending loss of consciousness with palpitaitons in the recent TAVR state (since 12/17/2019) and pacemaker implantation state (since 12/18/2019).    a. Per Cardiologist Marleni 07/30/2020: "No significant arrhythmias on telemetry thus far.  Pacemaker interrogated on 07/30/2020.  No significant arrhythmias noted.  Pacemaker functioning appropriately."   b. 08/03/2020: NSR no complaints of dizziness or ataxia of gait   2. 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 Severe progression of her cerebrovascular disease; whether this is now "symptomatic" or "asymptomatic" is uncertain, but the severity is sufficient to warrant sequential CEA regardless to reduce her future " "risk of recurrent stroke.    a. 07/29/2020: Cardiologist Marleni advises sequential carotid endarterectomy   b. 07/29/2020: CT Surgeon/Vascular Surgeon Donte advises sequential carotid endarterectomy, declaring that whether her stenoses are asymptomatic or asymptomatic, sequential CEA is indicated if patient agrees and Cardiologist Marleni and I agree.   c. 07/29/2020: I believe the medical literature is reasonably clear that she is statistically likely to do better with sequential carotid endarterectomy than with "medical therapy alone".  I have advised the patient of my perspective, and that I advise her to proceed.   d. 07/30/2020: I reiterated to the patient and daughter Donna that sequential bilateral CEA is technically the best option for her, with a measurable future stroke risk reduction to be gained.    e. 07/31/2020: left CEA performed by CT Surgeon Donte  f. 07/31/2020 @ 17:11h: she is doing very well post-operatively from a neurologic and general standpoint   g. 08/01/2020: she is doing great POD #1 except she is still on IV clevidipine   h. 08/03/2020: neurologically doing great   4. She is having post-carotid endarterectomy hypertension (mechanism postulated by some authors, but actual cause unknown).   a. 08/01/2020: amlodipine 5mg po "now" and qam is ordered to enable weaning her off of clevidipine   b. 08/03/2020: her blood pressure is perfect to slightly high   5. POD #2 she began to have cough, fever, abnormal CXR, leukocytosis to 14.7 08/02/2020, suggesting hospital acquired pneumonia    a. 08/02/2020: blood and sputum cultures submitted and Zosyn started   b. 08/03/2020: today she already feels much better with leukocytosis improved to 11.47   6. Hypoxemia, episodic, severe, beginning 08/02/2020   a. 08/03/2020: her last vital signs record of hypoxemia was 81% on 08/02/2020 @ 10:00am; this morning 08/03/2020 her O2 saturations are 92 to 97% on nasal cannula 2lpm   7. 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 surgical therapy options discussion: patient advised that (in the asymptomatic state) medical treatment reduces her 5 year risk of stroke to 11%, whereas carotid endarterectomy reduces her 5 year risk of stroke to 5% with a small risk of karyn-operative stroke (see copy in her chart of the handout I gave her)she advised me 06/06/2011 that she wishes to proceed with medical treatment for now    CTA neck and brain (with contrast) 07/28/2020:   o Moderate atheromatous changes are noted in the aortic arch. There are moderate degrees of narrowing of the proximal segments of the left common carotid artery and subclavian artery at their origin.  o There is mild tortuosity of the common carotid arteries.  o There is prominent plaque formation observed bilaterally at the level of the carotid bifurcations.   o There is bilateral high-grade stenosis of the origins of the internal carotid arteries (approximately 90%). The remainder of the internal carotid arteries are tortuous in their extracranial segment but patent. There is stenosis of the origin of the right external carotid artery.  o There is significant focal stenosis of the intracranial segment of the left internal carotid artery near the level of the anterior clinoid process.  o There is mild tortuosity of the vertebral arteries.   o There are no findings of high-grade stenosis or occlusion of the major intracranial arteries otherwise. No aneurysm  or vascular malformation is noted.  o The cervical segments of the vertebral arteries are patent. There are mild-moderate stenoses of the origins of the vertebral arteries bilaterally.  o Scattered groundglass type opacities are observed within the visualized lung apices bilaterally. There is prominent multilevel cervical spondylosis.   Medical Literature Discussion re: carotid stenosis:    1.  Symptomatic patients:  Both the North American Symptomatic Carotid Endarterectomy Trial (NASCET)1 and the European Carotid Surgery Trial (ECST)2 showed a substantial benefit for surgery in patients with a symptomatic carotid artery stenosis of greater than 70 percent. In NASCET, the average cumulative ipsilateral stroke rate at 2 years was 26 percent for patients treated medically and 9 percent for those receiving the same medical treatment plus a carotid endarterectomy, corresponding to a 65 percent relative risk reduction favoring surgery. For every 100 patients treated surgically, 17 were spared an ipsilateral stroke over the next 2 years. The perioperative stroke plus death rate was 5.8 percent in the surgical group and 3.3 percent in the medical group in the same 1-month period, in spite of optimal medical therapy. Thus the excess morbidity and mortality attributable to the surgery was 2.5 percent. If minor ischemic events are excluded from the analyses, the excess major stroke and death rate for the surgical patients was 1.2 percent (2.1 less 0.9 percent) and the excess fatality rate was 0.3 percent (0.6 less 0.3 percent). The NASCET and ECST investigators concluded that their patients with a 70 to 99 percent stenosis clearly benefited from carotid endarterectomy. Results in [facility] hospitals should be similar if patients are properly selected and the perioperative morbidity and mortality are comparably low.  Analysis of several subgroups yielded important information. The cumulative risk of ipsilateral stroke  "correlated with the degree of stenosis. While the absolute risk reduction favoring the entire surgical group was 17 percent in NASCET, the risk reduction favoring those with a 90 to 99 percent stenosis was 26 percent, and this decreased to 12 percent for those with a 70 to 79 percent stenosis. The overall 26 percent cumulative risk of an ipsilateral stroke at 2 years for this group of patients with "high-grade" stenosis calculates to an annual risk of 13 percent. The operative morbidity and mortality were similar for patients with different degrees of stenosis.   2.  Asymptomatic patients.  The Asymptomatic Carotid Atherosclerosis Study (ACAS)3  demonstrated that asymptomatic people with internal carotid artery stenoses greater than 60 percent are at low risk for stroke. More than 1600 patients were randomized to treatment with the best medical therapies available versus the same medical treatment plus carotid endarterectomy. Patients in the surgical group had a risk over 5 years for ipsilateral stroke (and any perioperative stroke or death) of 5.1 percent, whereas the risk for the nonsurgical group was 11 percent. While this demonstrates a 53 percent relative risk reduction, the absolute risk reduction is only 5.9 percent over 5 years, or 1.2 percent annually. If only major strokes and death are counted, the comparable numbers are 43 percent relative risk reduction, and an absolute risk reduction of only 2.6 percent over 5 years, or 0.5 percent annually. This latter result was not statistically significant. There was no clear difference in benefit for those with moderate (60 to 80 percent) compared with severe (80 to 99 percent) stenosis.   Many of the strokes in the surgery group were caused by preoperative angiograms. If carotid artery ultrasonography and MR angiography replace catheter angiography, this morbidity and mortality will be obviated.   While there are benefits for carotid endarterectomy in patients " with asymptomatic carotid stenosis, the benefits are very small. Medical therapy for reduction of atherosclerosis risk factors in addition to aspirin (325 mg/d) generally are recommended for patients with asymptomatic carotid stenosis.        Leakage of Periprosthetic Valve, Subsequent Encounter 2/11/2020    Aortic stenosis saga:   Echocardiogram ([facility]) 09/04/2019:    Aortic valve: heavily calcified with restricted leaflet motion. Moderate aortic regurgitation.    Mitral valve: thick and calcified with restricted leaflet motion. Mild mitral stenosis and regurgitation.    Tricuspid valve: is structurally normal with good leaflet excursion. Mild regurgitation.   Pulmonary valve: is structurally normal with good leaflet excursion. No significant regurgitation.   Overall LVEF appears normal.    No obvious shunt across the interatrial septum by color flow Doppler.     Cardiac catheterization 09/14/2019 (Timothy Salgado MD):   · Mid RCA lesion, 50% stenosed, nonsignificant by iFR assessment.  · Known severe aortic stenosis from echo    CTA Cardiac TAVR 09/19/2019:    Large left UVJ stone measuring at 2.6 cm with severe left-sided hydroureteronephrosis.   Significant pneumobilia in the left hepatic lobe with air within the common bile duct.  Unclear if this is due to biliary enteric fistula, infectious process such as cholangitis, or incompetent sphincter of Oddi.  Unfortunately this is not well evaluated on this dedicated TAVR protocol examination.  Clinical correlation is advised.  Further evaluation could be performed with ERCP and dedicated abdominal CT with intravenous contrast.   TAVR measurements, as above.   Significant aortic valve and abdominal aorta atherosclerosis.   Nonspecific mesenteric haziness, possibly mesenteric panniculitis or adenitis.   Heterogeneity of the pulmonary parenchyma possibly related to elevated pulmonary vascular resistance, small airways disease, or both.   Small bilateral  pleural effusions.    Cardiac catheterization 12/17/2019 (Herbert Ashley):    Successful left transfemoral 23 mm Evolute TAVR.  No paravalvular leak, mean gradient 3, peak velocity 1.5 post TAVR.    Echocardiogram (TTE) 12/26/2019   · Mild concentric left ventricular hypertrophy   · Normal left ventricular systolic function. The estimated ejection fraction is 60%.  · No wall motion abnormalities.  · Grade I (mild) left ventricular diastolic dysfunction consistent with impaired relaxation.  · There is a transcutaneously-placed aortic bioprosthesis present.  · Mild aortic regurgitation.  · Mild-to-moderate aortic valve stenosis.  · Aortic valve area is 1.23 cm2; peak velocity is 2.84 m/s; mean gradient is 18 mmHg.  · Mild left atrial enlargement.  · Mild tricuspid regurgitation.  · Normal right ventricular systolic function.  · Moderate mitral sclerosis.  · There is mild leaflet calcification of the Mitral Valve.  · Mild mitral stenosis. MVA by PHT is 2.21 sq cm.  · Normal central venous pressure (3 mmHg).   · The estimated PA systolic pressure is 32 mmHg.:     Echocardiogram (TTE) 01/22/2020:   · Normal left ventricular systolic function. The estimated ejection fraction is 65%.  · Concentric left ventricular remodeling.  · No wall motion abnormalities.  · Grade I (mild) left ventricular diastolic dysfunction consistent with impaired relaxation.  · Normal right ventricular systolic function.  · Severe left atrial enlargement.  · There is a transcutaneously-placed aortic bioprosthesis present.   · There is paravalvular aortic insufficiency present   · Mild mitral regurgitation.  · Normal central venous pressure (3 mmHg).  · The estimated PA systolic pressure is 29 mmHg.   · Trivial posterior pericardial effusion.      Chronic Diastolic Heart Failure 12/1/2000    Echocardiogram 12/01/2000 = hyperdynamic LV, with mild to moderate amount of pericardial fat, and left ventricular diastolic dysfunction   Echocardiogram  05/20/2011 = dilated left atrial cavity, normal LV thickness and function with LVEF @ 70%, mild aortic stenosis with valve area @ 1.09cm2, peak gradient = 34 mmHg, minimal prolapse of anterior mitral leaflet, mild TI, very small pericardial effusion        Coronary Artery Disease of Native Artery of Native Heart With Stable Angina Pectoris 9/14/2019    Cardiac catheterization 09/14/2019 (Timothy Salgado MD):    Mid RCA lesion, 50% stenosed, nonsignificant by iFR assessment.   Known severe aortic stenosis from echo  02/13/2020: it is unlikely that this trivial coronary artery disease is the cause of her episodic dyspnea that has been present since prior to 09/04/2019.     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   Ultrasound 01/31/1992 = cholelithiasis with tender gallbladder   Ultrasound abdomen 06/22/1995 = normal except for hypoechoic acoustic texture of the left hepatic lobe   ERCP #1 02/01/1992 ([facility]) = choledocholithiasis with cholangitis   ERCP #2 06/28/1995 ([facility]) = multiple filling defects due to stones   CT abdomen (with & without) 06/26/1995 = normal except for air in the biliary tree, fatty infiltration of the left lobe, calcified granuloma at right lung base   CT abdomen & pelvis (with & without) 12/02/1997 = normal/normal except for air in the biliary tree due to prior sphincterotomy   CTA Cardiac TAVR 09/19/2019: significant pneumobilia in the left hepatic lobe with air within the common bile duct         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 In reality, this dyspnea syndrome has been going on since prior to her TAVR and since prior to the beginning of her workup which is dated to just prior to 09/04/2019.  2. 02/12/2020: I was in the process of writing discharge orders on this lady the evening of 02/12/2020 when her nurse came to me and reported that the patient was acutely dyspneic again, without chest pain, and at that point I did not have a proper diagnosis to explain her episodic dyspnea.    3. Consultation from Pulmonologist Caden received 02/13/2020   4. Consultation from Cardiologist Marleni received 02/13/2020   5. We are attributing this to her karyn-valvular TAVR leak, with plans for tincture of time to cause this problem to settle down      Dehydration Syndrome (Resolved) 2/11/2020   Left Ureteral Stone (Resolved) 11/13/2019   Hydronephrosis With Urinary Obstruction Due to Ureteral Calculus (Resolved) 11/11/2019    Left ureteral stone with hydronephrosis requiring  manipulation 11/13/2019 @ [facility] (Paddy Dunham MD)    CTA Cardiac TAVR 09/19/2019: large left UVJ stone measuring at 2.6 cm with severe left-sided hydroureteronephrosis    Intervention 11/13/2019: 1.  Left ureteroscopy 2.  Cystoscopy 3.  Stone basket extraction 4.  Laser lithotripsy 5.  Left ureteral stent placement 6.  Fluoro < 1h 11/13/2019 (Paddy Dunham @ [facility])    Ultrasound kidneys 02/11/2020 = moderate left-sided hydronephrosis with no shadowing stone identified; nonobstructing right-sided renal stones; likely debris within the bladder, consider correlation with urinalysis.    "   Urinalysis 02/12/2020 = normal    As of 02/11-13/2020 she has been completely asymptomatic for this abnormality    02/13/2020 on discharge day I am ordering her to report back to her established Urologist Paddy Dunham for further investigation for this problem.        Plan:   1. Inpatient status permitted 07/30/2020 per Utilization Management.   2. CEA, left performed 07/31/2020.   3. Out of bed and progressive ambulation continues   4. Blood and sputum cultures "stat" done 08/02/2020 followed by Zosyn begun 08/02/2020 for presumed hospital acquired pneumonia   5. Discontinue nasal cannula O2 the morning of 08/03/2020 and monitor her response  6. Repeat CXR this morning 08/03/2020   7. Continue Lovenox 30mg subq q24h since 08/02/2020 (I verified that Vascular Surgeon Donte is OK with this).   8. Discontinue Plavix and start aspirin EC 325mg po qam beginning 08/03/2020 (discussed with Donte) since her neurologic events were not "an aspirin failure" since she was on neither aspirin nor Plavix in the weeks prior to admission.     Yoni Hernandez MD       "

## 2020-08-03 NOTE — CARE UPDATE
08/02/20 2102   Patient Assessment/Suction   Level of Consciousness (AVPU) alert   Respiratory Effort Unlabored   Expansion/Accessory Muscles/Retractions no use of accessory muscles   All Lung Fields Breath Sounds clear;coarse   Rhythm/Pattern, Respiratory unlabored   Cough Frequency no cough   PRE-TX-O2   O2 Device (Oxygen Therapy) nasal cannula   $ Is the patient on Low Flow Oxygen? Yes   Flow (L/min) 2   SpO2 96 %   Pulse Oximetry Type Intermittent   $ Pulse Oximetry - Multiple Charge Pulse Oximetry - Multiple   Pulse 83   Resp 20   Positioning   Head of Bed (HOB) HOB elevated   Aerosol Therapy   $ Aerosol Therapy Charges PRN treatment not required   Respiratory Evaluation   $ Care Plan Tech Time 15 min

## 2020-08-03 NOTE — PROGRESS NOTES
"[facility]  Adult Nutrition   Progress Note (Follow-Up)    SUMMARY     Recommendations/Interventions:    Recommendation/Intervention: 1. Added Diabetic Diet restrictions 2' Hx of DM and Hyperglycemia. 2. Added Glucerna BID to aid in meeting estimated needs. 3. Encourage intake of meals and supplements.  Goals: 1. Patient to meet at least 75% of estimated needs via PO intake of meals and supplements.  Nutrition Goal Status: new    Dietitian Rounds Brief:  · Seen 2' f/u. S/p left carotid endarterectomy.  Patient having issues with constipation-MD added Citrate of magnesia. Patient ate 1/2 of breakfast this morning. Added Glucerna to aid in intake. Will continue to monitor intake, labs, and plan of care.  Reason for Assessment  Reason For Assessment: RD follow-up  Diagnosis: (syncope and collapse)  Relevant Medical History: cervical cancers, DM, HTN  Interdisciplinary Rounds: attended    Nutrition Risk Screen  Nutrition Risk Screen: no indicators present    Nutrition/Diet History  Food Allergies: NKFA  Factors Affecting Nutritional Intake: None identified at this time    Anthropometrics  Temp: 98.2 °F (36.8 °C)  Height Method: Stated  Height: 4' 9" (144.8 cm)  Height (inches): 57 in  Weight Method: Bed Scale  Weight: 85.4 kg (188 lb 4.4 oz)  Weight (lb): 188.27 lb  Ideal Body Weight (IBW), Female: 85 lb  % Ideal Body Weight, Female (lb): 221.76 %  BMI (Calculated): 40.7  BMI Grade: 35 - 39.9 - obesity - grade II     Weight History:  Wt Readings from Last 10 Encounters:   08/03/20 85.4 kg (188 lb 4.4 oz)   05/11/20 82.1 kg (181 lb)   03/04/20 74 kg (163 lb 2.2 oz)   02/18/20 74 kg (163 lb 2.3 oz)   02/11/20 77.8 kg (171 lb 8.3 oz)   01/22/20 75.8 kg (167 lb)   01/22/20 75.8 kg (167 lb 1.7 oz)   12/26/19 75.8 kg (167 lb)   12/26/19 75.8 kg (167 lb)   12/17/19 75.7 kg (166 lb 14.2 oz)     Lab/Procedures/Meds: Pertinent Labs Reviewed  Clinical Chemistry:  Recent Labs   Lab 08/03/20  0502      K 4.1 "      CO2 26   *   BUN 26*   CREATININE 1.0   CALCIUM 8.3*   PROT 5.9*   ALBUMIN 3.0*   BILITOT 1.8*   ALKPHOS 94   AST 51*   ALT 40   ANIONGAP 8   ESTGFRAFRICA >60.0   EGFRNONAA 53.3*     CBC:   Recent Labs   Lab 08/03/20  0502   WBC 11.47   RBC 3.88*   HGB 11.4*   HCT 35.0*   *   MCV 90   MCH 29.4   MCHC 32.6   Cardiac Profile:  Recent Labs   Lab 08/02/20  1520 08/03/20  0502   * 363*     Medications: Pertinent Medications reviewed  Scheduled Meds:   amLODIPine  5 mg Oral QAM    aspirin  325 mg Oral QAM    atorvastatin  10 mg Oral QHS    lisinopriL  10 mg Oral QAM    metoprolol succinate  25 mg Oral QAM    mupirocin  1 g Nasal BID    nitroGLYCERIN 0.1 mg/hr TD PT24  1 patch Transdermal Q24H    piperacillin-tazobactam (ZOSYN) IVPB  3.375 g Intravenous Q8H     Continuous Infusions:  PRN Meds:.acetaminophen, dextrose 50%, dextrose 50%, glucagon (human recombinant), glucose, glucose, HYDROcodone-acetaminophen, ondansetron, sodium chloride 0.9%    Antibiotics (From admission, onward)    Start     Stop Route Frequency Ordered    08/02/20 1745  piperacillin-tazobactam 3.375 g in dextrose 5 % 50 mL IVPB (ready to mix system)      -- IV Every 8 hours (non-standard times) 08/02/20 1510    07/31/20 1300  mupirocin 2 % ointment 1 g      08/05 0859 Nasl 2 times daily 07/31/20 1147        Estimated/Assessed Needs    Weight Used For Calorie Calculations: 80.8 kg (178 lb 2.1 oz)  Energy Calorie Requirements (kcal): 0072-0549 kcals/day (20-25 kcals/kg)  Energy Need Method: Kcal/kg  Protein Requirements: 66-88 g/day (1.5-2.0 g/kg IBW)  Weight Used For Protein Calculations: 43.9 kg (96 lb 12.8 oz)     Estimated Fluid Requirement Method: RDA Method    Nutrition Prescription Ordered    Current Diet Order: Cardiac Diet    Evaluation of Received Nutrient/Fluid Intake    Energy Calories Required: not meeting needs  Protein Required: not meeting needs  Fluid Required: meeting needs  Tolerance:  tolerating  % Intake of Estimated Energy Needs: 0 - 25 %  % Meal Intake: 0 - 25 %    Intake/Output Summary (Last 24 hours) at 8/3/2020 0751  Last data filed at 8/2/2020 2209  Gross per 24 hour   Intake 665 ml   Output --   Net 665 ml      Nutrition Risk    Level of Risk/Frequency of Follow-up: moderate - high   Monitor and Evaluation    Food and Nutrient Intake: energy intake, food and beverage intake  Food and Nutrient Adminstration: diet order  Knowledge/Beliefs/Attitudes: food and nutrition knowledge/skill, beliefs and attitudes  Physical Activity and Function: nutrition-related ADLs and IADLs, factors affecting access to physical activity  Anthropometric Measurements: weight, weight change, body mass index  Biochemical Data, Medical Tests and Procedures: electrolyte and renal panel, lipid profile, gastrointestinal profile, glucose/endocrine profile, inflammatory profile  Nutrition-Focused Physical Findings: overall appearance     Nutrition Follow-Up    RD Follow-up?: Yes  July Rogel RD 08/03/2020 10:49 AM

## 2020-08-04 VITALS
HEART RATE: 85 BPM | TEMPERATURE: 99 F | HEIGHT: 57 IN | DIASTOLIC BLOOD PRESSURE: 60 MMHG | BODY MASS INDEX: 40.61 KG/M2 | RESPIRATION RATE: 18 BRPM | SYSTOLIC BLOOD PRESSURE: 132 MMHG | WEIGHT: 188.25 LBS | OXYGEN SATURATION: 96 %

## 2020-08-04 PROBLEM — J13 PNEUMOCOCCAL PNEUMONIA: Status: ACTIVE | Noted: 2020-08-02

## 2020-08-04 PROBLEM — I16.0 HYPERTENSIVE URGENCY: Status: ACTIVE | Noted: 2020-07-31

## 2020-08-04 PROBLEM — I63.312 THROMBOTIC STROKE INVOLVING LEFT MIDDLE CEREBRAL ARTERY: Status: ACTIVE | Noted: 2020-07-28

## 2020-08-04 LAB
ALBUMIN SERPL BCP-MCNC: 2.9 G/DL (ref 3.5–5.2)
ALP SERPL-CCNC: 97 U/L (ref 55–135)
ALT SERPL W/O P-5'-P-CCNC: 32 U/L (ref 10–44)
ANION GAP SERPL CALC-SCNC: 8 MMOL/L (ref 8–16)
AST SERPL-CCNC: 30 U/L (ref 10–40)
BASOPHILS # BLD AUTO: 0.04 K/UL (ref 0–0.2)
BASOPHILS NFR BLD: 0.4 % (ref 0–1.9)
BILIRUB SERPL-MCNC: 1.2 MG/DL (ref 0.1–1)
BUN SERPL-MCNC: 34 MG/DL (ref 8–23)
CALCIUM SERPL-MCNC: 8.2 MG/DL (ref 8.7–10.5)
CHLORIDE SERPL-SCNC: 105 MMOL/L (ref 95–110)
CO2 SERPL-SCNC: 26 MMOL/L (ref 23–29)
CREAT SERPL-MCNC: 1.2 MG/DL (ref 0.5–1.4)
DIFFERENTIAL METHOD: ABNORMAL
EOSINOPHIL # BLD AUTO: 0.4 K/UL (ref 0–0.5)
EOSINOPHIL NFR BLD: 4 % (ref 0–8)
ERYTHROCYTE [DISTWIDTH] IN BLOOD BY AUTOMATED COUNT: 13.4 % (ref 11.5–14.5)
EST. GFR  (AFRICAN AMERICAN): 49.3 ML/MIN/1.73 M^2
EST. GFR  (NON AFRICAN AMERICAN): 42.8 ML/MIN/1.73 M^2
GLUCOSE SERPL-MCNC: 188 MG/DL (ref 70–110)
GLUCOSE SERPL-MCNC: 194 MG/DL (ref 70–110)
GLUCOSE SERPL-MCNC: 283 MG/DL (ref 70–110)
HCT VFR BLD AUTO: 33.8 % (ref 37–48.5)
HGB BLD-MCNC: 11.2 G/DL (ref 12–16)
IMM GRANULOCYTES # BLD AUTO: 0.05 K/UL (ref 0–0.04)
IMM GRANULOCYTES NFR BLD AUTO: 0.5 % (ref 0–0.5)
LYMPHOCYTES # BLD AUTO: 1.6 K/UL (ref 1–4.8)
LYMPHOCYTES NFR BLD: 16.6 % (ref 18–48)
MCH RBC QN AUTO: 29.9 PG (ref 27–31)
MCHC RBC AUTO-ENTMCNC: 33.1 G/DL (ref 32–36)
MCV RBC AUTO: 90 FL (ref 82–98)
MONOCYTES # BLD AUTO: 0.7 K/UL (ref 0.3–1)
MONOCYTES NFR BLD: 7.1 % (ref 4–15)
NEUTROPHILS # BLD AUTO: 6.9 K/UL (ref 1.8–7.7)
NEUTROPHILS NFR BLD: 71.4 % (ref 38–73)
NRBC BLD-RTO: 0 /100 WBC
PLATELET # BLD AUTO: 144 K/UL (ref 150–350)
PMV BLD AUTO: 10.3 FL (ref 9.2–12.9)
POTASSIUM SERPL-SCNC: 3.7 MMOL/L (ref 3.5–5.1)
PROT SERPL-MCNC: 5.8 G/DL (ref 6–8.4)
RBC # BLD AUTO: 3.75 M/UL (ref 4–5.4)
SODIUM SERPL-SCNC: 139 MMOL/L (ref 136–145)
WBC # BLD AUTO: 9.61 K/UL (ref 3.9–12.7)

## 2020-08-04 PROCEDURE — 63600175 PHARM REV CODE 636 W HCPCS: Performed by: INTERNAL MEDICINE

## 2020-08-04 PROCEDURE — 25000003 PHARM REV CODE 250: Performed by: THORACIC SURGERY (CARDIOTHORACIC VASCULAR SURGERY)

## 2020-08-04 PROCEDURE — 25000003 PHARM REV CODE 250: Performed by: INTERNAL MEDICINE

## 2020-08-04 PROCEDURE — 36415 COLL VENOUS BLD VENIPUNCTURE: CPT

## 2020-08-04 PROCEDURE — 94761 N-INVAS EAR/PLS OXIMETRY MLT: CPT

## 2020-08-04 PROCEDURE — 85025 COMPLETE CBC W/AUTO DIFF WBC: CPT

## 2020-08-04 PROCEDURE — 80053 COMPREHEN METABOLIC PANEL: CPT

## 2020-08-04 RX ORDER — AMLODIPINE BESYLATE 5 MG/1
5 TABLET ORAL NIGHTLY
Qty: 30 TABLET | Refills: 11 | Status: ON HOLD | OUTPATIENT
Start: 2020-08-04 | End: 2020-10-17 | Stop reason: HOSPADM

## 2020-08-04 RX ORDER — CEFUROXIME AXETIL 500 MG/1
500 TABLET ORAL 2 TIMES DAILY
Qty: 10 TABLET | Refills: 0
Start: 2020-08-04 | End: 2020-08-09

## 2020-08-04 RX ORDER — FUROSEMIDE 20 MG/1
20 TABLET ORAL EVERY MORNING
Qty: 30 TABLET | Refills: 11
Start: 2020-08-04 | End: 2024-02-06

## 2020-08-04 RX ORDER — ASPIRIN 325 MG
325 TABLET, DELAYED RELEASE (ENTERIC COATED) ORAL EVERY MORNING
Refills: 0
Start: 2020-08-05 | End: 2021-08-02

## 2020-08-04 RX ORDER — CAL/D3/MAG11/ZINC/COP/MANG/BOR 600 MG-800
1 TABLET ORAL EVERY MORNING
COMMUNITY
Start: 2020-08-04

## 2020-08-04 RX ADMIN — AMLODIPINE BESYLATE 5 MG: 5 TABLET ORAL at 06:08

## 2020-08-04 RX ADMIN — ACETAMINOPHEN 650 MG: 325 TABLET, FILM COATED ORAL at 09:08

## 2020-08-04 RX ADMIN — ASPIRIN 325 MG: 325 TABLET, COATED ORAL at 06:08

## 2020-08-04 RX ADMIN — METOPROLOL SUCCINATE 25 MG: 25 TABLET, FILM COATED, EXTENDED RELEASE ORAL at 09:08

## 2020-08-04 RX ADMIN — PIPERACILLIN SODIUM AND TAZOBACTAM SODIUM 3.38 G: 3; .375 INJECTION, POWDER, LYOPHILIZED, FOR SOLUTION INTRAVENOUS at 01:08

## 2020-08-04 RX ADMIN — LISINOPRIL 10 MG: 10 TABLET ORAL at 09:08

## 2020-08-04 RX ADMIN — PIPERACILLIN SODIUM AND TAZOBACTAM SODIUM 3.38 G: 3; .375 INJECTION, POWDER, LYOPHILIZED, FOR SOLUTION INTRAVENOUS at 09:08

## 2020-08-04 NOTE — PLAN OF CARE
08/04/20 1406   Final Note   Assessment Type Final Discharge Note   Anticipated Discharge Disposition Home

## 2020-08-04 NOTE — PROGRESS NOTES
"[facility]  Department of Cardiothoracic Surgery  Progress Note      PATIENT NAME: Roxanne Hernandez  MRN: [id number]  TODAY'S DATE: 08/03/2020  ADMIT DATE: 7/28/2020      PRINCIPLE PROBLEM: Bilateral carotid artery occlusion    PROCEDURES:  Procedure(s) (LRB):  ENDARTERECTOMY-CAROTID (Left)    INTERVAL HISTORY:  Up to chair                                         Comfortable, no specific complaint      Review of patient's allergies indicates:   Allergen Reactions    Azithromycin Swelling     All mycins    Statins-hmg-coa reductase inhibitors Other (See Comments)     Liver function  "It attacks my liver and makes me very sick"    Sulfa (sulfonamide antibiotics) Hives       PHYSICAL EXAM  CONSTITUTIONAL: Well built, well nourished in no apparent distress  NECK: no carotid bruit, no JVD  LUNGS: CTA  CHEST WALL: No tenderness  HEART: regular rate and rhythm, S1, S2 normal, no murmur, click, rub or gallop   ABDOMEN: soft, non-tender; bowel sounds normal; no masses, no organomegaly  EXTREMITIES: Extremities normal, no edema  NEURO: AAO X 3    VITAL SIGNS (Most Recent)  Temp: 98.1 °F (36.7 °C) (08/03/20 1939)  Pulse: 91 (08/03/20 1939)  Resp: 18 (08/03/20 1939)  BP: (!) 128/58 (08/03/20 1939)  SpO2: (!) 92 % (08/03/20 1939)    VENTILATION STATUS  Resp: 18 (08/03/20 1939)  SpO2: (!) 92 % (08/03/20 1939)       I & O (Last 24H):    Intake/Output Summary (Last 24 hours) at 8/3/2020 2239  Last data filed at 8/3/2020 1300  Gross per 24 hour   Intake 480 ml   Output --   Net 480 ml       WEIGHTS  Wt Readings from Last 1 Encounters:   08/03/20 0605 85.4 kg (188 lb 4.4 oz)   08/02/20 1610 85.5 kg (188 lb 7.9 oz)   07/31/20 0410 80.8 kg (178 lb 2.1 oz)   07/29/20 0406 84.7 kg (186 lb 11.7 oz)   07/28/20 1626 81.4 kg (179 lb 7.3 oz)         MEDICATIONS   SCHEDULED MEDS:   amLODIPine  5 mg Oral QAM    aspirin  325 mg Oral QAM    atorvastatin  10 mg Oral QHS    lisinopriL  10 mg Oral QAM    metoprolol succinate  25 mg " Oral QAM    mupirocin  1 g Nasal BID    nitroGLYCERIN 0.1 mg/hr TD PT24  1 patch Transdermal Q24H    piperacillin-tazobactam (ZOSYN) IVPB  3.375 g Intravenous Q8H       CONTINUOUS INFUSIONS:    PRN MEDS:acetaminophen, dextrose 50%, dextrose 50%, glucagon (human recombinant), glucose, glucose, HYDROcodone-acetaminophen, ondansetron, sodium chloride 0.9%    LABS AND DIAGNOSTICS   CBC LAST 3 DAYS  Recent Labs   Lab 08/01/20  0347 08/02/20  1520 08/03/20  0502   WBC 12.21 14.72* 11.47   RBC 4.15 4.26 3.88*   HGB 12.2 12.6 11.4*   HCT 36.4* 39.3 35.0*   MCV 88 92 90   MCH 29.4 29.6 29.4   MCHC 33.5 32.1 32.6   RDW 13.2 13.7 13.6   * 112* 130*   MPV 10.2 9.9 10.0   GRAN 87.7*  10.7* 82.9*  12.2* 80.4*  9.2*   LYMPH 5.3*  0.7* 7.3*  1.1 10.0*  1.2   MONO 6.4  0.8 8.8  1.3* 7.9  0.9   BASO 0.01 0.03 0.02   NRBC 0 0 0       COAGULATION LAST 3 DAYS  Recent Labs   Lab 07/31/20  0436   LABPT 13.6   INR 1.1   APTT 26.7       CHEMISTRY LAST 3 DAYS  Recent Labs   Lab 07/28/20  1635 07/31/20  0436 07/31/20  0947 08/01/20  0347 08/03/20  0502    139  --  138 140   K 4.6 4.4  --  4.6 4.1    108  --  110 106   CO2 23 22*  --  20* 26   ANIONGAP 10 9  --  8 8   BUN 30* 36*  --  32* 26*   CREATININE 1.1 1.3  --  1.1 1.0    146*  --  239* 227*   CALCIUM 9.9 8.8  --  8.1* 8.3*   PH  --   --  7.411  --   --    ALBUMIN 4.3  --   --   --  3.0*   PROT 7.7  --   --   --  5.9*   ALKPHOS 94  --   --   --  94   ALT 25  --   --   --  40   AST 32  --   --   --  51*   BILITOT 1.0  --   --   --  1.8*       CARDIAC PROFILE LAST 3 DAYS  Recent Labs   Lab 08/02/20  1520 08/03/20  0502   * 363*       ENDOCRINE LAST 3 DAYS  No results for input(s): TSH, PROCAL in the last 168 hours.    LAST ARTERIAL BLOOD GAS  ABG  Recent Labs   Lab 07/31/20  0947   PH 7.411   PO2 343*   PCO2 32.3*   HCO3 20.5*   BE -4       LAST 7 DAYS MICROBIOLOGY   Microbiology Results (last 7 days)     Procedure Component Value Units  Date/Time    Blood culture [[id number]] Collected: 08/02/20 1520    Order Status: Completed Specimen: Blood from Peripheral, Right Arm Updated: 08/03/20 1632     Blood Culture, Routine No Growth to date      No Growth to date    Blood culture [[id number]] Collected: 08/02/20 1520    Order Status: Completed Specimen: Blood from Peripheral, Left Hand Updated: 08/03/20 1632     Blood Culture, Routine No Growth to date      No Growth to date    Culture, Respiratory with Gram Stain [[id number]]     Order Status: Sent Specimen: Respiratory from Sputum, Expectorated           MOST RECENT IMAGING  X-Ray Chest PA And Lateral  2 views of the chest are compared to prior study dated 8/2/2020    Clinical history is pneumonia    There is a right subclavian vein implantable cardiac device with lead  tips overlying the right atrium and right ventricle. There is an  endovascular stent in the region of the aortic valve. The heart is  normal in size. There is improvement of the airspace disease in the  lung bases with tiny pleural effusions. The upper lobes are clear.  There are mild degenerative changes of the spine    IMPRESSION: Small bilateral pleural effusions with improvement of the  groundglass opacities in the lung bases suggestive of resolving edema    Endovascular stent at the aortic valve    Electronically Signed by Faith Rai M.D. on 8/3/2020 10:30 AM      [facility]  Results for orders placed during the hospital encounter of 01/22/20   Echo Color Flow Doppler? Yes    Narrative · Normal left ventricular systolic function. The estimated ejection   fraction is 65%.  · Concentric left ventricular remodeling.  · No wall motion abnormalities.  · Grade I (mild) left ventricular diastolic dysfunction consistent with   impaired relaxation.  · Normal right ventricular systolic function.  · Severe left atrial enlargement.  · There is a transcutaneously-placed aortic bioprosthesis present. There   is paravalvular aortic insufficiency  present.  · Mild mitral regurgitation.  · Normal central venous pressure (3 mmHg).  · The estimated PA systolic pressure is 29 mmHg.  · Trivial posterior pericardial effusion.          CURRENT/PREVIOUS VISIT EKG  Results for orders placed or performed during the hospital encounter of 07/28/20   EKG 12-lead    Collection Time: 07/28/20  8:42 PM    Narrative    Test Reason : R55,    Vent. Rate : 071 BPM     Atrial Rate : 071 BPM     P-R Int : 148 ms          QRS Dur : 096 ms      QT Int : 412 ms       P-R-T Axes : 062 -38 036 degrees     QTc Int : 447 ms    Normal sinus rhythm  Left axis deviation  Minimal voltage criteria for LVH, may be normal variant  Abnormal ECG  When compared with ECG of 11-MAY-2020 08:48,  No significant change was found  Confirmed by Dima Carr MD ([id number]) on 7/30/2020 12:23:12 PM    Referred By:             Confirmed By:Dima Carr MD         HOSPITAL PROBLEMS WITH ASSESSMENT & PLAN:     Active Hospital Problems    Diagnosis    *Bilateral carotid artery occlusion      Carotid ultrasound 05/20/2011 = right 50-69% stenosis, left >70% stenosis, normal vertebrals    CTA neck and brain 06/01/2011:   o 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 surgical therapy options discussion: patient advised that (in the asymptomatic state) medical treatment reduces her 5 year risk of stroke to 11%, whereas carotid endarterectomy reduces her 5 year risk of stroke to 5% with a small risk of karyn-operative stroke (see copy in her chart of the handout I gave her)she advised me 06/06/2011 that she wishes to proceed with medical treatment for now    CTA neck and brain (with contrast) 07/28/2020:   o Moderate atheromatous changes are noted in the aortic arch. There are moderate degrees of narrowing of  the proximal segments of the left common carotid artery and subclavian artery at their origin.  o There is mild tortuosity of the common carotid arteries.  o There is prominent plaque formation observed bilaterally at the level of the carotid bifurcations.   o There is bilateral high-grade stenosis of the origins of the internal carotid arteries (approximately 90%). The remainder of the internal carotid arteries are tortuous in their extracranial segment but patent. There is stenosis of the origin of the right external carotid artery.  o There is significant focal stenosis of the intracranial segment of the left internal carotid artery near the level of the anterior clinoid process.  o There is mild tortuosity of the vertebral arteries.   o There are no findings of high-grade stenosis or occlusion of the major intracranial arteries otherwise. No aneurysm or vascular malformation is noted.  o The cervical segments of the vertebral arteries are patent. There are mild-moderate stenoses of the origins of the vertebral arteries bilaterally.  o Scattered groundglass type opacities are observed within the visualized lung apices bilaterally. There is prominent multilevel cervical spondylosis.   Medical Literature Discussion re: carotid stenosis:    1.  Symptomatic patients:  Both the North American Symptomatic Carotid Endarterectomy Trial (NASCET)1 and the European Carotid Surgery Trial (ECST)2 showed a substantial benefit for surgery in patients with a symptomatic carotid artery stenosis of greater than 70 percent. In NASCET, the average cumulative ipsilateral stroke rate at 2 years was 26 percent for patients treated medically and 9 percent for those receiving the same medical treatment plus a carotid endarterectomy, corresponding to a 65 percent relative risk reduction favoring surgery. For every 100 patients treated surgically, 17 were spared an ipsilateral stroke over the next 2 years. The perioperative stroke plus  "death rate was 5.8 percent in the surgical group and 3.3 percent in the medical group in the same 1-month period, in spite of optimal medical therapy. Thus the excess morbidity and mortality attributable to the surgery was 2.5 percent. If minor ischemic events are excluded from the analyses, the excess major stroke and death rate for the surgical patients was 1.2 percent (2.1 less 0.9 percent) and the excess fatality rate was 0.3 percent (0.6 less 0.3 percent). The NASCET and ECST investigators concluded that their patients with a 70 to 99 percent stenosis clearly benefited from carotid endarterectomy. Results in [facility] should be similar if patients are properly selected and the perioperative morbidity and mortality are comparably low.  Analysis of several subgroups yielded important information. The cumulative risk of ipsilateral stroke correlated with the degree of stenosis. While the absolute risk reduction favoring the entire surgical group was 17 percent in NASCET, the risk reduction favoring those with a 90 to 99 percent stenosis was 26 percent, and this decreased to 12 percent for those with a 70 to 79 percent stenosis. The overall 26 percent cumulative risk of an ipsilateral stroke at 2 years for this group of patients with "high-grade" stenosis calculates to an annual risk of 13 percent. The operative morbidity and mortality were similar for patients with different degrees of stenosis.   2.  Asymptomatic patients.  The Asymptomatic Carotid Atherosclerosis Study (ACAS)3  demonstrated that asymptomatic people with internal carotid artery stenoses greater than 60 percent are at low risk for stroke. More than 1600 patients were randomized to treatment with the best medical therapies available versus the same medical treatment plus carotid endarterectomy. Patients in the surgical group had a risk over 5 years for ipsilateral stroke (and any perioperative stroke or death) of 5.1 percent, whereas the " risk for the nonsurgical group was 11 percent. While this demonstrates a 53 percent relative risk reduction, the absolute risk reduction is only 5.9 percent over 5 years, or 1.2 percent annually. If only major strokes and death are counted, the comparable numbers are 43 percent relative risk reduction, and an absolute risk reduction of only 2.6 percent over 5 years, or 0.5 percent annually. This latter result was not statistically significant. There was no clear difference in benefit for those with moderate (60 to 80 percent) compared with severe (80 to 99 percent) stenosis.   Many of the strokes in the surgery group were caused by preoperative angiograms. If carotid artery ultrasonography and MR angiography replace catheter angiography, this morbidity and mortality will be obviated.   While there are benefits for carotid endarterectomy in patients with asymptomatic carotid stenosis, the benefits are very small. Medical therapy for reduction of atherosclerosis risk factors in addition to aspirin (325 mg/d) generally are recommended for patients with asymptomatic carotid stenosis.         Syncope    Syncope and collapse       ASSESSMENT & PLAN:  S/p L CEA      RECOMMENDATIONS:  Can be discharged from surgery standpoint  Follow up with [facility] clinic 2 weeks  Avoid strenuous activity  Keep wound clean and dry        Radu Kent MD  [facility]  Department of Cardiothoracic Surgery  Date of Service: 08/03/2020 10:39 PM

## 2020-08-04 NOTE — PLAN OF CARE
08/04/20 1404   Medicare Message   Important Message from Medicare regarding Discharge Appeal Rights Given to patient/caregiver;Explained to patient/caregiver;Signed/date by patient/caregiver   Date IMM was signed 08/04/20   Time IMM was signed 0203

## 2020-08-04 NOTE — DISCHARGE SUMMARY
"08/04/2020 @ 12:19 PM     POD #4 for left CEA performed on 07/31/2020     Discharge Summary, Attending Physician  [facility]     Patient Name: Roxanne Hernandez   MRN: [id number]     Admission Date and Time: 7/28/2020  3:03 PM   Patient Class: IP- Inpatient   Hospital length of stay: 5 days    Discharge Date: 08/04/2020     Code status: Full Code   Attending Physician: Yoni Hernandez MD   Principal Problem: Bilateral carotid artery occlusion    Final Discharge Diagnoses:   1. Plethora of complaints, beginning about 2 weeks prior to admission, suggestive of either stroke occurring about 2 weeks ago, or severe new onset "first-time-in-her-life" severe vertigo, along with sensations of episodic impending loss of consciousness with palpitaitons in the recent TAVR state (since 12/17/2019) and pacemaker implantation state (since 12/18/2019).    a. Per Cardiologist Marleni 07/30/2020: "No significant arrhythmias on telemetry thus far.  Pacemaker interrogated on 07/30/2020.  No significant arrhythmias noted.  Pacemaker functioning appropriately."   b. 08/03/2020: NSR no complaints of dizziness or ataxia of gait   c. 08/04/2020: NSR with complete resolution of all admission complaints   2. Small remote infarct in the splenium of the left corpus callosum of unknown age; if this is the cause of her neurologic complaints, then it started ~2 weeks prior to admission   a. 08/04/2020: she has no residual neurological complaints or abnormal neurologic findings   3. Severe progression of her cerebrovascular disease; whether this is now "symptomatic" or "asymptomatic" is uncertain, but the severity is sufficient to warrant sequential CEA regardless to reduce her future risk of recurrent stroke.    a. 07/29/2020: Cardiologist Marleni advises sequential carotid endarterectomy   b. 07/29/2020: CT Surgeon/Vascular Surgeon Donte advises sequential carotid endarterectomy, declaring that whether her stenoses are asymptomatic " "or asymptomatic, sequential CEA is indicated if patient agrees and Cardiologist Marleni and I agree.   c. 07/29/2020: I believe the medical literature is reasonably clear that she is statistically likely to do better with sequential carotid endarterectomy than with "medical therapy alone".  I have advised the patient of my perspective, and that I advise her to proceed.   d. 07/30/2020: I reiterated to the patient and daughter Donna that sequential bilateral CEA is technically the best option for her, with a measurable future stroke risk reduction to be gained.    e. 07/31/2020: left CEA performed by CT Surgeon Donte  f. 07/31/2020 @ 17:11h: she is doing very well post-operatively from a neurologic and general standpoint   g. 08/01/2020: she is doing great POD #1 except she is still on IV clevidipine   h. 08/03/2020: neurologically doing great POD #3   i. 08/04/2020: neurologically doing perfectly POD #4   4. She is having post-carotid endarterectomy hypertension (mechanism postulated by some authors, but actual cause unknown).   a. 08/01/2020: amlodipine 5mg po "now" and qam is ordered to enable weaning her off of clevidipine   b. 08/03/2020: her blood pressure is perfect to slightly high   c. 08/04/2020: her blood pressure is perfect   5. POD #2 she began to have cough, fever, abnormal CXR, leukocytosis to 14.7 08/02/2020, suggesting hospital acquired pneumonia    a. 08/02/2020: blood and sputum cultures submitted and Zosyn started   b. 08/03/2020: today she already feels much better with leukocytosis improved to 11.47   c. 08/04/2020: WBC improved to normal @ 9.61 and her cough is minimal with no dyspnea or chest pain   6. Hypoxemia, episodic, severe, beginning 08/02/2020   a. 08/03/2020: her last vital signs record of hypoxemia was 81% on 08/02/2020 @ 10:00am; this morning 08/03/2020 her O2 saturations are 92 to 97% on nasal cannula 2lpm   b. 08/04/2020: her O2 saturations on room air the past 24 hours are " 91-95% and is thus resolved   7. DM II (present since 12/01/2000):    a. 08/04/2020: her blood sugars are reasonable though imperfect @ 194, 188, 283 today   8. 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 surgical therapy options discussion: patient advised that (in the asymptomatic state) medical treatment reduces her 5 year risk of stroke to 11%, whereas carotid endarterectomy reduces her 5 year risk of stroke to 5% with a small risk of karyn-operative stroke (see copy in her chart of the handout I gave her)she advised me 06/06/2011 that she wishes to proceed with medical treatment for now    CTA neck and brain (with contrast) 07/28/2020:   o Moderate atheromatous changes are noted in the aortic arch. There are moderate degrees of narrowing of the proximal segments of the left common carotid artery and subclavian artery at their origin.  o There is mild tortuosity of the common carotid arteries.  o There is prominent plaque formation observed bilaterally at the level of the carotid bifurcations.   o There is bilateral high-grade stenosis of the origins of the internal carotid arteries (approximately 90%). The remainder of the internal carotid arteries are tortuous in their extracranial segment but patent. There is stenosis of the origin of the right external carotid artery.  o There is significant focal stenosis of the intracranial segment of the left internal carotid artery near the level of the anterior clinoid  process.  o There is mild tortuosity of the vertebral arteries.   o There are no findings of high-grade stenosis or occlusion of the major intracranial arteries otherwise. No aneurysm or vascular malformation is noted.  o The cervical segments of the vertebral arteries are patent. There are mild-moderate stenoses of the origins of the vertebral arteries bilaterally.  o Scattered groundglass type opacities are observed within the visualized lung apices bilaterally. There is prominent multilevel cervical spondylosis.   Medical Literature Discussion re: carotid stenosis:    1.  Symptomatic patients:  Both the North American Symptomatic Carotid Endarterectomy Trial (NASCET)1 and the European Carotid Surgery Trial (ECST)2 showed a substantial benefit for surgery in patients with a symptomatic carotid artery stenosis of greater than 70 percent. In NASCET, the average cumulative ipsilateral stroke rate at 2 years was 26 percent for patients treated medically and 9 percent for those receiving the same medical treatment plus a carotid endarterectomy, corresponding to a 65 percent relative risk reduction favoring surgery. For every 100 patients treated surgically, 17 were spared an ipsilateral stroke over the next 2 years. The perioperative stroke plus death rate was 5.8 percent in the surgical group and 3.3 percent in the medical group in the same 1-month period, in spite of optimal medical therapy. Thus the excess morbidity and mortality attributable to the surgery was 2.5 percent. If minor ischemic events are excluded from the analyses, the excess major stroke and death rate for the surgical patients was 1.2 percent (2.1 less 0.9 percent) and the excess fatality rate was 0.3 percent (0.6 less 0.3 percent). The NASCET and ECST investigators concluded that their patients with a 70 to 99 percent stenosis clearly benefited from carotid endarterectomy. Results in [facility] hospitals should be similar if patients are properly  "selected and the perioperative morbidity and mortality are comparably low.  Analysis of several subgroups yielded important information. The cumulative risk of ipsilateral stroke correlated with the degree of stenosis. While the absolute risk reduction favoring the entire surgical group was 17 percent in NASCET, the risk reduction favoring those with a 90 to 99 percent stenosis was 26 percent, and this decreased to 12 percent for those with a 70 to 79 percent stenosis. The overall 26 percent cumulative risk of an ipsilateral stroke at 2 years for this group of patients with "high-grade" stenosis calculates to an annual risk of 13 percent. The operative morbidity and mortality were similar for patients with different degrees of stenosis.   2.  Asymptomatic patients.  The Asymptomatic Carotid Atherosclerosis Study (ACAS)3  demonstrated that asymptomatic people with internal carotid artery stenoses greater than 60 percent are at low risk for stroke. More than 1600 patients were randomized to treatment with the best medical therapies available versus the same medical treatment plus carotid endarterectomy. Patients in the surgical group had a risk over 5 years for ipsilateral stroke (and any perioperative stroke or death) of 5.1 percent, whereas the risk for the nonsurgical group was 11 percent. While this demonstrates a 53 percent relative risk reduction, the absolute risk reduction is only 5.9 percent over 5 years, or 1.2 percent annually. If only major strokes and death are counted, the comparable numbers are 43 percent relative risk reduction, and an absolute risk reduction of only 2.6 percent over 5 years, or 0.5 percent annually. This latter result was not statistically significant. There was no clear difference in benefit for those with moderate (60 to 80 percent) compared with severe (80 to 99 percent) stenosis.   Many of the strokes in the surgery group were caused by preoperative angiograms. If carotid artery " "ultrasonography and MR angiography replace catheter angiography, this morbidity and mortality will be obviated.   While there are benefits for carotid endarterectomy in patients with asymptomatic carotid stenosis, the benefits are very small. Medical therapy for reduction of atherosclerosis risk factors in addition to aspirin (325 mg/d) generally are recommended for patients with asymptomatic carotid stenosis.        Thrombotic Stroke Involving Left Middle Cerebral Artery 7/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 Yoni Hernandez MD     CT head (no contrast) 07/28/2020 = small focal area of probable remote infarction involves the splenium of the corpus callosum on the left, mild chronic small vessel ischemia, scattered vascular calcifications within the intracranial segments of the internal carotid and vertebral arteries      Hypertensive Urgency 7/31/2020   Pneumococcal Pneumonia 8/2/2020   Leakage of Periprosthetic Valve, Subsequent Encounter 2/11/2020    Aortic stenosis saga:   Echocardiogram ([facility]) 09/04/2019:    Aortic valve: heavily calcified with restricted leaflet motion. Moderate aortic regurgitation.    Mitral valve: thick and calcified with restricted leaflet motion. Mild mitral stenosis and regurgitation.    Tricuspid valve: is structurally normal with good leaflet excursion. Mild regurgitation.   Pulmonary valve: is structurally normal with good leaflet excursion. No significant regurgitation.   Overall LVEF appears normal.    No obvious shunt across the interatrial septum by color flow Doppler.     Cardiac catheterization 09/14/2019 (Timothy Salgado MD):   · Mid RCA lesion, 50% stenosed, nonsignificant by iFR assessment.  · Known severe aortic stenosis from echo    CTA Cardiac TAVR 09/19/2019:    Large left UVJ stone measuring at 2.6 cm with severe left-sided " hydroureteronephrosis.   Significant pneumobilia in the left hepatic lobe with air within the common bile duct.  Unclear if this is due to biliary enteric fistula, infectious process such as cholangitis, or incompetent sphincter of Oddi.  Unfortunately this is not well evaluated on this dedicated TAVR protocol examination.  Clinical correlation is advised.  Further evaluation could be performed with ERCP and dedicated abdominal CT with intravenous contrast.   TAVR measurements, as above.   Significant aortic valve and abdominal aorta atherosclerosis.   Nonspecific mesenteric haziness, possibly mesenteric panniculitis or adenitis.   Heterogeneity of the pulmonary parenchyma possibly related to elevated pulmonary vascular resistance, small airways disease, or both.   Small bilateral pleural effusions.    Cardiac catheterization 12/17/2019 (Herbert Ashley):    Successful left transfemoral 23 mm Evolute TAVR.  No paravalvular leak, mean gradient 3, peak velocity 1.5 post TAVR.    Echocardiogram (TTE) 12/26/2019   · Mild concentric left ventricular hypertrophy   · Normal left ventricular systolic function. The estimated ejection fraction is 60%.  · No wall motion abnormalities.  · Grade I (mild) left ventricular diastolic dysfunction consistent with impaired relaxation.  · There is a transcutaneously-placed aortic bioprosthesis present.  · Mild aortic regurgitation.  · Mild-to-moderate aortic valve stenosis.  · Aortic valve area is 1.23 cm2; peak velocity is 2.84 m/s; mean gradient is 18 mmHg.  · Mild left atrial enlargement.  · Mild tricuspid regurgitation.  · Normal right ventricular systolic function.  · Moderate mitral sclerosis.  · There is mild leaflet calcification of the Mitral Valve.  · Mild mitral stenosis. MVA by PHT is 2.21 sq cm.  · Normal central venous pressure (3 mmHg).   · The estimated PA systolic pressure is 32 mmHg.:     Echocardiogram (TTE) 01/22/2020:   · Normal left ventricular systolic  function. The estimated ejection fraction is 65%.  · Concentric left ventricular remodeling.  · No wall motion abnormalities.  · Grade I (mild) left ventricular diastolic dysfunction consistent with impaired relaxation.  · Normal right ventricular systolic function.  · Severe left atrial enlargement.  · There is a transcutaneously-placed aortic bioprosthesis present.   · There is paravalvular aortic insufficiency present   · Mild mitral regurgitation.  · Normal central venous pressure (3 mmHg).  · The estimated PA systolic pressure is 29 mmHg.   · Trivial posterior pericardial effusion.      Chronic Diastolic Heart Failure 12/1/2000    Echocardiogram 12/01/2000 = hyperdynamic LV, with mild to moderate amount of pericardial fat, and left ventricular diastolic dysfunction   Echocardiogram 05/20/2011 = dilated left atrial cavity, normal LV thickness and function with LVEF @ 70%, mild aortic stenosis with valve area @ 1.09cm2, peak gradient = 34 mmHg, minimal prolapse of anterior mitral leaflet, mild TI, very small pericardial effusion        Coronary Artery Disease of Native Artery of Native Heart With Stable Angina Pectoris 9/14/2019    Cardiac catheterization 09/14/2019 (Timothy Salgado MD):    Mid RCA lesion, 50% stenosed, nonsignificant by iFR assessment.   Known severe aortic stenosis from echo  02/13/2020: it is unlikely that this trivial coronary artery disease is the cause of her episodic dyspnea that has been present since prior to 09/04/2019.     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   Ultrasound 01/31/1992 = cholelithiasis with tender gallbladder   Ultrasound abdomen 06/22/1995 = normal except for hypoechoic acoustic texture of the left hepatic lobe   ERCP #1 02/01/1992 ([place]) = choledocholithiasis with cholangitis   ERCP #2 06/28/1995 ([place]) =  multiple filling defects due to stones   CT abdomen (with & without) 06/26/1995 = normal except for air in the biliary tree, fatty infiltration of the left lobe, calcified granuloma at right lung base   CT abdomen & pelvis (with & without) 12/02/1997 = normal/normal except for air in the biliary tree due to prior sphincterotomy   CTA Cardiac TAVR 09/19/2019: significant pneumobilia in the left hepatic lobe with air within the common bile duct         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 In reality, this dyspnea syndrome has been going on since prior to her TAVR and since prior to the beginning of her workup which is dated to just prior to 09/04/2019.  2. 02/12/2020: I was in the process of writing discharge orders on this lady the evening of 02/12/2020 when her nurse came to me and reported that the patient was acutely dyspneic again, without chest pain, and at that point I did not have a proper diagnosis to explain her episodic dyspnea.    3. Consultation from Pulmonologist Caden received 02/13/2020   4. Consultation from Cardiologist Marleni received 02/13/2020   5. We are attributing this to her karyn-valvular TAVR leak, with plans for tincture of time to cause this problem to settle down      Dehydration Syndrome (Resolved) 2/11/2020  "  Left Ureteral Stone (Resolved) 11/13/2019   Hydronephrosis With Urinary Obstruction Due to Ureteral Calculus (Resolved) 11/11/2019    Left ureteral stone with hydronephrosis requiring  manipulation 11/13/2019 @ [facility] (Paddy Dunham MD)    CTA Cardiac TAVR 09/19/2019: large left UVJ stone measuring at 2.6 cm with severe left-sided hydroureteronephrosis    Intervention 11/13/2019: 1.  Left ureteroscopy 2.  Cystoscopy 3.  Stone basket extraction 4.  Laser lithotripsy 5.  Left ureteral stent placement 6.  Fluoro < 1h 11/13/2019 (Paddy Dunham @ [facility])    Ultrasound kidneys 02/11/2020 = moderate left-sided hydronephrosis with no shadowing stone identified; nonobstructing right-sided renal stones; likely debris within the bladder, consider correlation with urinalysis.      Urinalysis 02/12/2020 = normal    As of 02/11-13/2020 she has been completely asymptomatic for this abnormality    02/13/2020 on discharge day I am ordering her to report back to her established Urologist Paddy Dunham for further investigation for this problem.       Course Outline:    This 80 year old female was admitted with neurologic symptoms as precisely delineated on my admission history & physical examination report.  She was subjected to the diagnostic and therapeutic maneuvers described above.   She improved daily, with a minor downturn on 08/02/2020 from which she quickly recovered.   ON discharge day she felt great in every way, with virtually no complaints.   She has no dizziness, no ataxia, no surgical wound complaints.   She has only a slight minimal residual cough.   She has no dyspnea or chest pain.   She has a good appetite, and her bowels have moved "very well" in the past 24 hours.   She has no dysuria or hematuria.     Discharge Day Physical Examination:     Temp:  [97.9 °F (36.6 °C)-98.6 °F (37 °C)]   Pulse:  []   Resp:  [16-22]   BP: (112-140)/(56-63)   SpO2:  [92 %-96 %]     Telemetry reveals NSR 08/03/2020 "   Telemetry reveals NSR 08/04/2020     A&Ox4 in NAD, bright alert   HEENT = normal, PERRL, EOMI, fundi benign  Neck = normal  Lungs = clear with mildly decreased breath sounds in both lung bases (lower 1/5)   Heart = RRR S1 & S2 with no murmurs, rubs, or gallops   Abdomen = obese benign   Ext = trace edema   Neurologic = stable non-focal exam, she moves all extremities well    I witnessed her ambulate briskly, competently, and safely to her bathroom in the 2 hour period prior to discharge.   NO DVT  NO bedsores   IV clean     Objective:     Microbiology Results (last 7 days)     Procedure Component Value Units Date/Time    Blood culture [[id number]] Collected: 08/02/20 1520    Order Status: Completed Specimen: Blood from Peripheral, Right Arm Updated: 08/03/20 1632     Blood Culture, Routine No Growth to date      No Growth to date    Blood culture [[id number]] Collected: 08/02/20 1520    Order Status: Completed Specimen: Blood from Peripheral, Left Hand Updated: 08/03/20 1632     Blood Culture, Routine No Growth to date      No Growth to date    Culture, Respiratory with Gram Stain [[id number]]     Order Status: Sent Specimen: Respiratory from Sputum, Expectorated         Recent Labs   Lab 08/02/20  1520 08/03/20  0502 08/04/20  0415   WBC 14.72* 11.47 9.61   HGB 12.6 11.4* 11.2*   HCT 39.3 35.0* 33.8*   * 130* 144*     Recent Labs   Lab 07/28/20  1635  08/01/20  0347 08/03/20  0502 08/04/20  0415      < > 138 140 139   K 4.6   < > 4.6 4.1 3.7      < > 110 106 105   CO2 23   < > 20* 26 26   BUN 30*   < > 32* 26* 34*   CREATININE 1.1   < > 1.1 1.0 1.2   CALCIUM 9.9   < > 8.1* 8.3* 8.2*   PROT 7.7  --   --  5.9* 5.8*   BILITOT 1.0  --   --  1.8* 1.2*   ALKPHOS 94  --   --  94 97   ALT 25  --   --  40 32   AST 32  --   --  51* 30    < > = values in this interval not displayed.     Recent Labs   Lab 08/02/20  1520 08/03/20  0502   * 363*     EKG history:   EKG 12/01/2000 = NSR, leftward axis,  normal   EKG 05/01/2006 = NSR, leftward axis, normal   EKG 06/01/2011 = NSR, PACs, mild left axis, normal     EKG 08/30/2019 = NSR, LAD, LVH with secondary ST-T changes (inverted Ts in I, aVL)   EKG 02/11/2020 = NSR, LAD, LVH, inverted Ts in I, aVL   EKG 07/28/2020 = NSR, LAD, normal T waves diffusely (her previously inverted Ts in I and aVL are now upright)        CXR 07/28/2020:   The heart size is within normal limits and there is no evidence of acute pulmonary vascular engorgement. A metallic stent projected over the cardiac silhouette. There is arteriosclerosis in the arch of the aorta. A dual lead right-sided cardiac device is unchanged in position.  The lungs are free of confluent parenchymal infiltrates. There are minor linear parenchymal opacities in the lung bases compatible with minimal atelectasis or scarring. There are no significant effusions.   There is unchanged apical pleural thickening. Monitoring electrodes overlie the chest.    24 hour Holter: started the morning of 07/29/2020 = benign     Plan:   1. Inpatient status permitted 07/30/2020 per Utilization Management.  2. Discontinue IV and telemetry, administer NO vaccines, discharge to home.   3. Wound care per Surgeon Groglio.  4. Activity restrictions per Surgeon Groglio   5. See me next Monday, sooner if worse.   a. bring your home blood pressures, pulses, and blood sugars to that office visit, and all office visits for life.   6. See Surgeon Groglio according to his order.  7. Her condition on discharge is good.  8. Her prognosis on discharge is good.      Roxanne Hernandez   Home Medication Instructions GISELLE:[id number]    Printed on:08/04/20 1340   Medication Information                      amLODIPine (NORVASC) 5 MG tablet  Take 1 tablet (5 mg total) by mouth every evening.             aspirin (ECOTRIN) 325 MG EC tablet  Take 1 tablet (325 mg total) by mouth every morning.             atorvastatin (LIPITOR) 10 MG tablet  Take 10 mg by mouth  once daily.             dei-[id number]-zinc--demetri-bor (CALTRATE 600-D PLUS MINERALS) 600 mg calcium- 800 unit-50 mg Tab  Take 1 each by mouth every morning.             cefUROXime (CEFTIN) 500 MG tablet  Take 1 tablet (500 mg total) by mouth 2 (two) times daily. for 5 days             cholecalciferol, vitamin D3, 50 mcg (2,000 unit) Chew  Take 2,000 Units by mouth every morning.             furosemide (LASIX) 20 MG tablet  Take 1 tablet (20 mg total) by mouth every morning.             lisinopril 10 MG tablet  Take 1 tablet (10 mg total) by mouth every morning.             metoprolol succinate (TOPROL-XL) 25 MG 24 hr tablet  Take 1 tablet (25 mg total) by mouth every morning.             nitroGLYCERIN 0.1 mg/hr TD PT24 (NITRODUR) 0.1 mg/hr PT24  Place 1 patch onto the skin once daily.               Yoni Hernandez MD

## 2020-08-04 NOTE — PLAN OF CARE
08/04/20 1132   Discharge Assessment   Assessment Type Discharge Planning Assessment   Confirmed/corrected address and phone number on facesheet? Yes   Assessment information obtained from? Patient   Communicated expected length of stay with patient/caregiver yes   Prior to hospitilization cognitive status: Alert/Oriented   Prior to hospitalization functional status: Independent   Current cognitive status: Alert/Oriented   Current Functional Status: Independent   Lives With spouse;child(homero), adult   Able to Return to Prior Arrangements yes   Is patient able to care for self after discharge? Yes   Patient's perception of discharge disposition home or selfcare   Readmission Within the Last 30 Days no previous admission in last 30 days   Patient currently being followed by outpatient case management? No   Patient currently receives any other outside agency services? No   Equipment Currently Used at Home none   Part D Coverage Medicare/aetna   Do you have any problems affording any of your prescribed medications? No   Is the patient taking medications as prescribed? yes   Does the patient have transportation home? Yes   Transportation Anticipated family or friend will provide   Dialysis Name and Scheduled days N/A   Does the patient receive services at the [facility]? No   Discharge Plan A Home   Discharge Plan B Home   DME Needed Upon Discharge  none   Patient/Family in Agreement with Plan yes

## 2020-08-04 NOTE — PROGRESS NOTES
Patient seen, chart reviewed.  Comfortable, no complaint  VSS Afebrile  Neuro at pre op baseline  Incision clean and dry  Labs reviewed  OK for discharge from my standpoint  Discussed with Dr Hernandez - plan is home tomorrow

## 2020-08-07 LAB
BACTERIA BLD CULT: NORMAL
BACTERIA BLD CULT: NORMAL

## 2020-09-01 ENCOUNTER — HOSPITAL ENCOUNTER (OUTPATIENT)
Dept: PREADMISSION TESTING | Facility: HOSPITAL | Age: 80
Discharge: HOME | End: 2020-09-01
Attending: INTERNAL MEDICINE
Payer: MEDICARE

## 2020-09-01 DIAGNOSIS — R94.31 NONSPECIFIC ABNORMAL ELECTROCARDIOGRAM (ECG) (EKG): Primary | ICD-10-CM

## 2020-09-01 DIAGNOSIS — R94.31 NONSPECIFIC ABNORMAL ELECTROCARDIOGRAM (ECG) (EKG): ICD-10-CM

## 2020-09-01 PROCEDURE — 93010 ELECTROCARDIOGRAM REPORT: CPT | Mod: ,,, | Performed by: INTERNAL MEDICINE

## 2020-09-01 PROCEDURE — 93005 ELECTROCARDIOGRAM TRACING: CPT | Performed by: INTERNAL MEDICINE

## 2020-09-01 PROCEDURE — 93010 EKG 12-LEAD: ICD-10-PCS | Mod: ,,, | Performed by: INTERNAL MEDICINE

## 2020-09-03 ENCOUNTER — LAB VISIT (OUTPATIENT)
Dept: LAB | Facility: HOSPITAL | Age: 80
End: 2020-09-03
Attending: INTERNAL MEDICINE
Payer: MEDICARE

## 2020-09-03 DIAGNOSIS — E03.4 IDIOPATHIC ATROPHIC HYPOTHYROIDISM: ICD-10-CM

## 2020-09-03 DIAGNOSIS — R10.11 ABDOMINAL PAIN, RIGHT UPPER QUADRANT: Primary | ICD-10-CM

## 2020-09-03 DIAGNOSIS — I50.42 CHRONIC COMBINED SYSTOLIC AND DIASTOLIC HEART FAILURE: ICD-10-CM

## 2020-09-03 LAB
ALBUMIN SERPL BCP-MCNC: 3.9 G/DL (ref 3.5–5.2)
ALP SERPL-CCNC: 90 U/L (ref 55–135)
ALT SERPL W/O P-5'-P-CCNC: 22 U/L (ref 10–44)
ANION GAP SERPL CALC-SCNC: 13 MMOL/L (ref 8–16)
AST SERPL-CCNC: 26 U/L (ref 10–40)
BILIRUB SERPL-MCNC: 0.6 MG/DL (ref 0.1–1)
BNP SERPL-MCNC: 150 PG/ML (ref 0–99)
BUN SERPL-MCNC: 37 MG/DL (ref 8–23)
CALCIUM SERPL-MCNC: 9.4 MG/DL (ref 8.7–10.5)
CHLORIDE SERPL-SCNC: 101 MMOL/L (ref 95–110)
CO2 SERPL-SCNC: 23 MMOL/L (ref 23–29)
CREAT SERPL-MCNC: 1.6 MG/DL (ref 0.5–1.4)
EST. GFR  (AFRICAN AMERICAN): 34.8 ML/MIN/1.73 M^2
EST. GFR  (NON AFRICAN AMERICAN): 30.2 ML/MIN/1.73 M^2
GLUCOSE SERPL-MCNC: 171 MG/DL (ref 70–110)
POTASSIUM SERPL-SCNC: 4.5 MMOL/L (ref 3.5–5.1)
PROT SERPL-MCNC: 6.9 G/DL (ref 6–8.4)
SODIUM SERPL-SCNC: 137 MMOL/L (ref 136–145)
T4 FREE SERPL-MCNC: 0.93 NG/DL (ref 0.71–1.51)
TSH SERPL DL<=0.005 MIU/L-ACNC: 2.17 UIU/ML (ref 0.34–5.6)

## 2020-09-03 PROCEDURE — 84443 ASSAY THYROID STIM HORMONE: CPT

## 2020-09-03 PROCEDURE — 36415 COLL VENOUS BLD VENIPUNCTURE: CPT

## 2020-09-03 PROCEDURE — 80053 COMPREHEN METABOLIC PANEL: CPT

## 2020-09-03 PROCEDURE — 83880 ASSAY OF NATRIURETIC PEPTIDE: CPT

## 2020-09-03 PROCEDURE — 84439 ASSAY OF FREE THYROXINE: CPT

## 2020-09-10 ENCOUNTER — LAB VISIT (OUTPATIENT)
Dept: LAB | Facility: HOSPITAL | Age: 80
End: 2020-09-10
Attending: INTERNAL MEDICINE
Payer: MEDICARE

## 2020-09-10 DIAGNOSIS — I10 ESSENTIAL HYPERTENSION, MALIGNANT: Primary | ICD-10-CM

## 2020-09-10 LAB
ANION GAP SERPL CALC-SCNC: 13 MMOL/L (ref 8–16)
BUN SERPL-MCNC: 9 MG/DL (ref 8–23)
CALCIUM SERPL-MCNC: 9.4 MG/DL (ref 8.7–10.5)
CHLORIDE SERPL-SCNC: 96 MMOL/L (ref 95–110)
CO2 SERPL-SCNC: 23 MMOL/L (ref 23–29)
CREAT SERPL-MCNC: 0.8 MG/DL (ref 0.5–1.4)
EST. GFR  (AFRICAN AMERICAN): >60 ML/MIN/1.73 M^2
EST. GFR  (NON AFRICAN AMERICAN): >60 ML/MIN/1.73 M^2
GLUCOSE SERPL-MCNC: 101 MG/DL (ref 70–110)
POTASSIUM SERPL-SCNC: 4.4 MMOL/L (ref 3.5–5.1)
SODIUM SERPL-SCNC: 132 MMOL/L (ref 136–145)

## 2020-09-10 PROCEDURE — 36415 COLL VENOUS BLD VENIPUNCTURE: CPT

## 2020-09-10 PROCEDURE — 80048 BASIC METABOLIC PNL TOTAL CA: CPT

## 2020-09-14 ENCOUNTER — CLINICAL SUPPORT (OUTPATIENT)
Dept: CARDIOLOGY | Facility: HOSPITAL | Age: 80
End: 2020-09-14
Payer: MEDICARE

## 2020-09-14 DIAGNOSIS — Z95.0 PRESENCE OF CARDIAC PACEMAKER: ICD-10-CM

## 2020-09-14 PROCEDURE — 93296 REM INTERROG EVL PM/IDS: CPT | Performed by: INTERNAL MEDICINE

## 2020-09-14 PROCEDURE — 93294 REM INTERROG EVL PM/LDLS PM: CPT | Mod: ,,, | Performed by: INTERNAL MEDICINE

## 2020-09-14 PROCEDURE — 93294 CARDIAC DEVICE CHECK - REMOTE: ICD-10-PCS | Mod: ,,, | Performed by: INTERNAL MEDICINE

## 2020-10-14 ENCOUNTER — HOSPITAL ENCOUNTER (INPATIENT)
Facility: HOSPITAL | Age: 80
LOS: 1 days | Discharge: HOME | DRG: 253 | End: 2020-10-17
Attending: EMERGENCY MEDICINE | Admitting: INTERNAL MEDICINE
Payer: MEDICARE

## 2020-10-14 DIAGNOSIS — R00.2 HEART PALPITATIONS: ICD-10-CM

## 2020-10-14 DIAGNOSIS — R42 DIZZINESS: ICD-10-CM

## 2020-10-14 DIAGNOSIS — I10 UNCONTROLLED HYPERTENSION: Primary | ICD-10-CM

## 2020-10-14 DIAGNOSIS — I10 ESSENTIAL HYPERTENSION: Chronic | ICD-10-CM

## 2020-10-14 DIAGNOSIS — E11.9 DIABETES MELLITUS WITHOUT COMPLICATION: Chronic | ICD-10-CM

## 2020-10-14 DIAGNOSIS — I65.23 BILATERAL CAROTID ARTERY OCCLUSION: Chronic | ICD-10-CM

## 2020-10-14 PROBLEM — J13 PNEUMOCOCCAL PNEUMONIA: Status: RESOLVED | Noted: 2020-08-02 | Resolved: 2020-10-14

## 2020-10-14 PROBLEM — H81.393 VESTIBULAR VERTIGO, BILATERAL: Status: ACTIVE | Noted: 2020-10-14

## 2020-10-14 PROBLEM — I16.0 HYPERTENSIVE URGENCY: Status: RESOLVED | Noted: 2020-07-31 | Resolved: 2020-10-14

## 2020-10-14 PROBLEM — I16.1 HYPERTENSIVE EMERGENCY WITHOUT CONGESTIVE HEART FAILURE: Status: ACTIVE | Noted: 2020-10-14

## 2020-10-14 PROBLEM — R55 SYNCOPE AND COLLAPSE: Status: RESOLVED | Noted: 2020-07-28 | Resolved: 2020-10-14

## 2020-10-14 PROBLEM — I63.312 THROMBOTIC STROKE INVOLVING LEFT MIDDLE CEREBRAL ARTERY: Status: RESOLVED | Noted: 2020-07-28 | Resolved: 2020-10-14

## 2020-10-14 LAB
ALBUMIN SERPL BCP-MCNC: 4 G/DL (ref 3.5–5.2)
ALP SERPL-CCNC: 96 U/L (ref 55–135)
ALT SERPL W/O P-5'-P-CCNC: 23 U/L (ref 10–44)
ANION GAP SERPL CALC-SCNC: 12 MMOL/L (ref 8–16)
AST SERPL-CCNC: 5 U/L (ref 10–40)
BASOPHILS # BLD AUTO: 0.03 K/UL (ref 0–0.2)
BASOPHILS NFR BLD: 0.5 % (ref 0–1.9)
BILIRUB SERPL-MCNC: 0.8 MG/DL (ref 0.1–1)
BUN SERPL-MCNC: 28 MG/DL (ref 8–23)
CALCIUM SERPL-MCNC: 10.2 MG/DL (ref 8.7–10.5)
CHLORIDE SERPL-SCNC: 103 MMOL/L (ref 95–110)
CO2 SERPL-SCNC: 27 MMOL/L (ref 23–29)
CREAT SERPL-MCNC: 1.2 MG/DL (ref 0.5–1.4)
CREAT SERPL-MCNC: 1.3 MG/DL (ref 0.5–1.4)
DIFFERENTIAL METHOD: NORMAL
EOSINOPHIL # BLD AUTO: 0.2 K/UL (ref 0–0.5)
EOSINOPHIL NFR BLD: 3.4 % (ref 0–8)
ERYTHROCYTE [DISTWIDTH] IN BLOOD BY AUTOMATED COUNT: 12.5 % (ref 11.5–14.5)
EST. GFR  (AFRICAN AMERICAN): 49.3 ML/MIN/1.73 M^2
EST. GFR  (NON AFRICAN AMERICAN): 42.8 ML/MIN/1.73 M^2
GLUCOSE SERPL-MCNC: 144 MG/DL (ref 70–110)
HCT VFR BLD AUTO: 43.8 % (ref 37–48.5)
HGB BLD-MCNC: 14.4 G/DL (ref 12–16)
IMM GRANULOCYTES # BLD AUTO: 0.02 K/UL (ref 0–0.04)
IMM GRANULOCYTES NFR BLD AUTO: 0.3 % (ref 0–0.5)
LYMPHOCYTES # BLD AUTO: 1.5 K/UL (ref 1–4.8)
LYMPHOCYTES NFR BLD: 22.8 % (ref 18–48)
MCH RBC QN AUTO: 29 PG (ref 27–31)
MCHC RBC AUTO-ENTMCNC: 32.9 G/DL (ref 32–36)
MCV RBC AUTO: 88 FL (ref 82–98)
MONOCYTES # BLD AUTO: 0.5 K/UL (ref 0.3–1)
MONOCYTES NFR BLD: 8.2 % (ref 4–15)
NEUTROPHILS # BLD AUTO: 4.2 K/UL (ref 1.8–7.7)
NEUTROPHILS NFR BLD: 64.8 % (ref 38–73)
NRBC BLD-RTO: 0 /100 WBC
PLATELET # BLD AUTO: 177 K/UL (ref 150–350)
PMV BLD AUTO: 11.4 FL (ref 9.2–12.9)
POTASSIUM SERPL-SCNC: 4.6 MMOL/L (ref 3.5–5.1)
PROT SERPL-MCNC: 7.1 G/DL (ref 6–8.4)
RBC # BLD AUTO: 4.96 M/UL (ref 4–5.4)
SAMPLE: NORMAL
SARS-COV-2 RDRP RESP QL NAA+PROBE: NEGATIVE
SODIUM SERPL-SCNC: 142 MMOL/L (ref 136–145)
WBC # BLD AUTO: 6.48 K/UL (ref 3.9–12.7)

## 2020-10-14 PROCEDURE — 80053 COMPREHEN METABOLIC PANEL: CPT

## 2020-10-14 PROCEDURE — 99285 EMERGENCY DEPT VISIT HI MDM: CPT | Mod: 25

## 2020-10-14 PROCEDURE — G0378 HOSPITAL OBSERVATION PER HR: HCPCS

## 2020-10-14 PROCEDURE — 85025 COMPLETE CBC W/AUTO DIFF WBC: CPT

## 2020-10-14 PROCEDURE — 25500020 PHARM REV CODE 255: Performed by: EMERGENCY MEDICINE

## 2020-10-14 PROCEDURE — 93010 EKG 12-LEAD: ICD-10-PCS | Mod: ,,, | Performed by: INTERNAL MEDICINE

## 2020-10-14 PROCEDURE — 93005 ELECTROCARDIOGRAM TRACING: CPT | Performed by: INTERNAL MEDICINE

## 2020-10-14 PROCEDURE — U0002 COVID-19 LAB TEST NON-CDC: HCPCS

## 2020-10-14 PROCEDURE — 93010 ELECTROCARDIOGRAM REPORT: CPT | Mod: ,,, | Performed by: INTERNAL MEDICINE

## 2020-10-14 RX ORDER — TALC
3 POWDER (GRAM) TOPICAL NIGHTLY PRN
COMMUNITY
End: 2023-02-08

## 2020-10-14 RX ORDER — ASPIRIN 325 MG
325 TABLET, DELAYED RELEASE (ENTERIC COATED) ORAL EVERY MORNING
Status: DISCONTINUED | OUTPATIENT
Start: 2020-10-15 | End: 2020-10-15

## 2020-10-14 RX ORDER — FUROSEMIDE 20 MG/1
20 TABLET ORAL EVERY MORNING
Status: DISCONTINUED | OUTPATIENT
Start: 2020-10-15 | End: 2020-10-17 | Stop reason: HOSPADM

## 2020-10-14 RX ORDER — TALC
3 POWDER (GRAM) TOPICAL NIGHTLY PRN
Status: DISCONTINUED | OUTPATIENT
Start: 2020-10-14 | End: 2020-10-17 | Stop reason: HOSPADM

## 2020-10-14 RX ORDER — DOCUSATE SODIUM 100 MG/1
100 CAPSULE, LIQUID FILLED ORAL
COMMUNITY
End: 2023-02-08

## 2020-10-14 RX ORDER — SODIUM CHLORIDE 0.9 % (FLUSH) 0.9 %
10 SYRINGE (ML) INJECTION
Status: DISCONTINUED | OUTPATIENT
Start: 2020-10-14 | End: 2020-10-17 | Stop reason: HOSPADM

## 2020-10-14 RX ORDER — ATORVASTATIN CALCIUM 10 MG/1
10 TABLET, FILM COATED ORAL DAILY
Status: DISCONTINUED | OUTPATIENT
Start: 2020-10-15 | End: 2020-10-15

## 2020-10-14 RX ORDER — DOCUSATE SODIUM 100 MG/1
100 CAPSULE, LIQUID FILLED ORAL
Status: DISCONTINUED | OUTPATIENT
Start: 2020-10-14 | End: 2020-10-17 | Stop reason: HOSPADM

## 2020-10-14 RX ORDER — METOPROLOL SUCCINATE 25 MG/1
25 TABLET, EXTENDED RELEASE ORAL EVERY MORNING
Status: DISCONTINUED | OUTPATIENT
Start: 2020-10-15 | End: 2020-10-17 | Stop reason: HOSPADM

## 2020-10-14 RX ORDER — NITROGLYCERIN 20 MG/1
1 PATCH TRANSDERMAL DAILY
Status: DISCONTINUED | OUTPATIENT
Start: 2020-10-15 | End: 2020-10-17 | Stop reason: HOSPADM

## 2020-10-14 RX ADMIN — IOHEXOL 100 ML: 350 INJECTION, SOLUTION INTRAVENOUS at 01:10

## 2020-10-14 NOTE — ED PROVIDER NOTES
"Encounter Date: 10/14/2020       History     Chief Complaint   Patient presents with    Dizziness     80-year-old female who has history of diabetes mellitus, hypertension, cervical cancer who also has bilateral carotid disease for which she had left carotid endarterectomy done by Dr. Kent about 5 weeks ago, now presents with complaints of having some confusion this morning and dizziness with unsteady gait.  No history of any trauma.  No fever or chills.  No complaints of any chest pain or palpitations.  She denies any focal motor weakness or sensory changes.  No visual changes.  Patient has not fallen or sustained any trauma.  She did admit to taking her routine a.m. medication today.  No history of any syncope or seizure.        Review of patient's allergies indicates:   Allergen Reactions    Azithromycin Swelling     All mycins    Statins-hmg-coa reductase inhibitors Other (See Comments)     Liver function  "It attacks my liver and makes me very sick"    Sulfa (sulfonamide antibiotics) Hives     Past Medical History:   Diagnosis Date    Cervical cancer     Diabetes mellitus     Hypertension      Past Surgical History:   Procedure Laterality Date    A-V CARDIAC PACEMAKER INSERTION N/A 12/18/2019    Procedure: INSERTION, CARDIAC PACEMAKER, DUAL CHAMBER;  Surgeon: Nnamdi Macdonald MD;  Location: [facility] EP LAB;  Service: Cardiology;  Laterality: N/A;  lbbb, dppm, SJM, anes, pr, 6077    CARDIAC CATHETERIZATION      CAROTID ENDARTERECTOMY Left 7/31/2020    Procedure: ENDARTERECTOMY-CAROTID;  Surgeon: Radu Kent MD;  Location: [facility] OR;  Service: Cardiothoracic;  Laterality: Left;    CHOLECYSTECTOMY      CORONARY ANGIOGRAPHY Left 9/4/2019    Procedure: ANGIOGRAM, CORONARY ARTERY;  Surgeon: Carmen Yepez MD;  Location: [facility] CATH/EP LAB;  Service: Cardiology;  Laterality: Left;    CYSTOSCOPY N/A 11/13/2019    Procedure: CYSTOSCOPY;  Surgeon: Paddy Dunham MD;  Location: [facility] 1ST FLR;  " Service: Urology;  Laterality: N/A;    HYSTERECTOMY      JOINT REPLACEMENT      KNEE ARTHROPLASTY Right     LASER LITHOTRIPSY Left 2019    Procedure: LITHOTRIPSY, USING LASER;  Surgeon: Paddy Dunham MD;  Location: [facility] OR [address] [place] [address];  Service: Urology;  Laterality: Left;    TRANSCATHETER AORTIC VALVE REPLACEMENT (TAVR) N/A 2019    Procedure: REPLACEMENT, AORTIC VALVE, TRANSCATHETER (TAVR);  Surgeon: Herbert Ashley MD;  Location: [facility] CATH LAB;  Service: Cardiology;  Laterality: N/A;    URETEROSCOPIC REMOVAL OF URETERIC CALCULUS Left 2019    Procedure: REMOVAL, CALCULUS, URETER, URETEROSCOPIC;  Surgeon: Paddy Dunham MD;  Location: [facility] OR [address] [place] [address];  Service: Urology;  Laterality: Left;    URETEROSCOPY Left 2019    Procedure: URETEROSCOPY;  Surgeon: Paddy Dunham MD;  Location: [facility] OR [address] [place] [address];  Service: Urology;  Laterality: Left;     Family History   Problem Relation Age of Onset    Hypertension Father     Cancer Maternal Grandfather     Cancer Paternal Grandfather      Social History     Tobacco Use    Smoking status: Former Smoker     Packs/day: 1.00     Years: 25.00     Pack years: 25.00     Types: Cigarettes     Quit date: 1980     Years since quittin.8    Smokeless tobacco: Never Used   Substance Use Topics    Alcohol use: Not Currently    Drug use: Never     Review of Systems   Constitutional: Negative for activity change, appetite change, chills, diaphoresis, fatigue and fever.   HENT: Negative for ear discharge, ear pain, rhinorrhea, sore throat and trouble swallowing.    Eyes: Negative for visual disturbance.   Respiratory: Negative for chest tightness, shortness of breath and wheezing.    Cardiovascular: Negative for chest pain and palpitations.   Gastrointestinal: Negative for abdominal pain, nausea and vomiting.   Genitourinary: Negative for difficulty urinating.   Musculoskeletal: Negative for back pain.   Skin: Negative.  Negative for rash.    Neurological: Positive for dizziness. Negative for seizures, syncope, facial asymmetry, weakness, light-headedness, numbness and headaches.   Hematological: Does not bruise/bleed easily.   All other systems reviewed and are negative.      Physical Exam     Initial Vitals [10/14/20 1040]   BP Pulse Resp Temp SpO2   (!) 190/74 85 18 98.6 °F (37 °C) 99 %      MAP       --         Physical Exam    Vitals reviewed.  Constitutional: She appears well-developed and well-nourished. She is not diaphoretic. No distress.   HENT:   Head: Normocephalic and atraumatic.   Nose: Nose normal.   Mouth/Throat: Oropharynx is clear and moist. No oropharyngeal exudate.   Left cerumen impaction.  Right TM is clear. edentulous   Eyes: Conjunctivae are normal. Pupils are equal, round, and reactive to light. Right eye exhibits no discharge. Left eye exhibits no discharge.   Neck: Normal range of motion. Neck supple. No JVD present.   Cardiovascular: Normal rate, regular rhythm, normal heart sounds and intact distal pulses. Exam reveals no gallop and no friction rub.    No murmur heard.  Pulmonary/Chest: Breath sounds normal. No respiratory distress. She has no wheezes. She has no rhonchi. She has no rales. She exhibits no tenderness.   Abdominal: Soft. Bowel sounds are normal. She exhibits no distension. There is no abdominal tenderness. There is no rebound and no guarding.   Healed midline scar   Musculoskeletal: Normal range of motion. No tenderness or edema.   Neurological: She is alert and oriented to person, place, and time. She has normal strength and normal reflexes. She displays normal reflexes. No cranial nerve deficit or sensory deficit. GCS score is 15. GCS eye subscore is 4. GCS verbal subscore is 5. GCS motor subscore is 6.   Positive Romberg.  Gait is unsteady.   Skin: Skin is warm and dry. Capillary refill takes less than 2 seconds. No rash noted. No erythema. No pallor.   Psychiatric: She has a normal mood and affect. Her  behavior is normal. Judgment and thought content normal.         ED Course   Procedures  Labs Reviewed   COMPREHENSIVE METABOLIC PANEL - Abnormal; Notable for the following components:       Result Value    Glucose 144 (*)     BUN, Bld 28 (*)     AST 5 (*)     eGFR if  49.3 (*)     eGFR if non  42.8 (*)     All other components within normal limits    Narrative:     Recoll. [id number] by [email or web address] at 10/14/2020 12:24, reason: Specimen   hemolyzed. Spoke to Susan Hansen phleb. asked for rainbow.   CBC W/ AUTO DIFFERENTIAL   ISTAT CREATININE        ECG Results          EKG 12-lead (In process)  Result time 10/14/20 11:21:12    In process by Interface, Lab In [facility] (10/14/20 11:21:12)                 Narrative:    Test Reason : R42,    Vent. Rate : 075 BPM     Atrial Rate : 075 BPM     P-R Int : 156 ms          QRS Dur : 104 ms      QT Int : 410 ms       P-R-T Axes : 066 -42 048 degrees     QTc Int : 457 ms    Normal sinus rhythm  Left axis deviation  Abnormal ECG  When compared with ECG of 01-SEP-2020 11:07,  QRS duration has decreased  Non-specific change in ST segment in Lateral leads    Referred By: [email or web address]   SELF           Confirmed By:                             Imaging Results          CTA Head and Neck (xpd) (Final result)  Result time 10/14/20 13:36:50    Final result by Simone Lujan MD (10/14/20 13:36:50)                 Narrative:    CMS MANDATED QUALITY DATA - CT RADIATION 436    All CT scans at this facility utilize dose modulation, iterative  reconstruction, and/or weight based dosing when appropriate to reduce  radiation dose to as low as reasonably achievable.    CMS MANDATED QUALITY DATA - CAROTID - 195    All measurements and percent stenosis described below were determined  using NASCET criteria or criteria similar to NASCET, as defined by the  Society of Radiologists in Ultrasound Consensus Conference, Radiology,  2003    CLINICAL HISTORY:  80 years (1940) Female  Dizziness, non-specific 80-year-old female  who has history of diabetes mellitus, hypertension, cervical cancer  who also has bilateral carotid disease for which she had left carotid  endarterectomy done by Dr. Kent about 5 weeks ago, now presents  with complaints of having; some confusion this morning and dizziness  with unsteady gait.  No history of any trauma.  No fever or chills.  No complaints of any chest pain or pal    TECHNIQUE:  CTA HEAD AND NECK (XPD). 408 images obtained. CT angiography of the  head and neck was performed using thin axial slices respectively and  reviewed in multiple planes. Two and three dimensional multiplanar  reformatted images were generated and submitted to PACS.    CONTRAST:  IV contrast was administered uneventfully.    COMPARISON:  CTA most recently from July 28, 2020.    FINDINGS:  CTA of the [place]: Heterogeneous partially calcified plaques  in the carotid terminus causing approximately 60% stenosis in the  (axial image 79), and less than 50% stenosis on the right (axial image  77). The left A1 segment is hypoplastic, and the distal left OTILIO  appears to be supplied predominantly via patent anterior communicating  artery. The distal segments of the anterior communicating arteries are  patent and symmetric. There is patency of the intracranial circulation  including the bilateral internal carotid arteries, the carotid  terminus, the A1 and M1 segments, the bilateral intracranial vertebral  arteries, the basilar artery and its distal branches. No aneurysm is  identified within the limits of the technique. Conventional  angiography is more sensitive for the detection of small aneurysms.      CTA of the Neck:  There is a three-vessel aortic arch. CTA of the neck demonstrates that  the origins of the great vessels are widely patent. No aneurysm is  seen.    RIGHT:  Common carotid artery origin: Patent.  Cervical segment: Patent.  External carotid origin characteristics:  Patent.  Carotid bifurcation: Short segment predominantly calcified plaque in  the right carotid bulb extending into the proximal internal carotid  artery causing approximately 90% stenosis over a length of 7 mm (axial  image 171).  Internal carotid origin characteristics: The remainder of the ICA is  patent with no focal stenosis.  Vertebral artery: within normal limits.    LEFT  Common carotid artery origin: Patent.  Cervical segment: Patent.  External carotid origin characteristics: Patent.  Carotid bifurcation: Postoperative changes of left-sided carotid  endarterectomy with ectasia of the left carotid bulb and adjacent  staples/clips. No high-grade stenosis is identified..  Internal carotid origin characteristics: No focal stenosis.  Vertebral artery: within normal limits.    IMPRESSION:  1. Interval carotid endarterectomy at the left carotid bulb, with no  measurable stenosis on the left.  2. Short segment high-grade stenosis at the origin of the right  internal carotid artery (90%).  3. Patent bilateral vertebral arteries.  4. No large vessel occlusion or aneurysm is identified.  5. Partially calcified plaque in the intracranial internal carotid  arteries and additional/incidental findings as above.                    .    Electronically Signed by MAGALI Hobbs on 10/14/2020 1:51 PM                             X-Ray Chest AP Portable (Final result)  Result time 10/14/20 11:38:20    Final result by Twin Szymanski MD (10/14/20 11:38:20)                 Narrative:    Chest single view    CLINICAL DATA: Dizziness    FINDINGS: AP view is compared to August 3, 2020.    Heart size is normal. Aortic arch is calcified. Transcatheter aortic  valve replacement is noted. Dual-lead pacemaker device is unchanged in  position.    Pulmonary vasculature is upper normal. No infiltrates or effusions are  identified.    IMPRESSION:  1. No acute radiographic abnormalities.    Electronically Signed by Lopez Szymanski  M.D. on 10/14/2020 11:43 AM                                            Attending Attestation:             Attending ED Notes:   This 80-year-old female who had presented with complaints of some dizziness today and who has some unsteady gait along with elevated blood pressure, has an unremarkable chest x-ray.  The patient's labs showed a blood sugar of 144 and a BUN 28 with remained otherwise been normal.  The patient was seen in consultation by Dr. Alejandra, neurology who requested the patient have a CT of the head and neck which when completed showed no evidence of any stenosis in the left carotid for as where she had a prior carotid endarterectomy.  The right side however showed a high-grade stenosis at the origin of the internal carotid.  Throughout her ED visit the patient was noted to have remained relatively hypertensive with a systolic in the 170s and diastolic in the 90s.  Throughout the visit have communicated with Dr. Hernandez who has plans on seen the patient admitting her.                    Clinical Impression:       ICD-10-CM ICD-9-CM   1. Uncontrolled hypertension  I10 401.9   2. Dizziness  R42 780.4                          ED Disposition Condition    Observation                             Mina Castro Jr., MD  10/14/20 7670

## 2020-10-14 NOTE — H&P
"10/14/2020 @ 4:42 PM & 10:17 PM     History & Physical Examination Report, Attending Physician  [facility]     Patient Name: Roxanne Hernandez   MRN: [id number]   Admission Date and Time: 10/14/2020 10:31 AM   Patient Class: OP- Observation   Hospital length of stay: 0 days    Code status: Full Code   Attending Physician: Yoni Hernandez MD   Principal Problem: Hypertensive emergency without congestive heart failure    Chief Complaint and History of Present Illness:   This 80 year old female complains of 2 weeks of feeling "wobbly" on her feet, being non-progressive. She denied visual disturbances, diplopia, dysarthria or weakness or paralysis of arms or legs.     She also complained that the evening of 10/14/2020 she felt her heart "fluttering" while she was trying to lie down to go to sleep, this being the first time for her.  During the time of my bedside she episodically stopped, looked up and said " I feel it now", with each one of these events lasting 1 to 2 seconds.  Each time I palpated her pulse during these events, I found a slightly irregular rhythm at a reasonable heart rate of about 70.  I walked around the corner to the telemetry monitor room and reviewed her strips and found that she was in NSR with occasional PAC's, with the techs reporting that they had not seen pauses or atrial fibrillation.     She noticed new dyspnea on exertion this morning 10/14/2020, without chest pain or pressure or tightness.    Monday 10/12/2020 while standing at her island in her kitchen she suddenly felt like she was going to pass out.  She "felt bad" the whole day, but despite this [place] to the [employer]. She reports that while walking she was tending to stagger a bit with NO true spinning sensations in her head.     No fever or cough or hemoptysis.    No abdominal pain or nausea.    No dysuria or hematuria.     Review of Systems: obtained comprehensively is otherwise negative " "    Review of patient's allergies indicates:   Allergen Reactions    Azithromycin Swelling     All mycins    Statins-hmg-coa reductase inhibitors Other (See Comments)     Liver function  "It attacks my liver and makes me very sick"    Sulfa (sulfonamide antibiotics) Hives      Current Facility-Administered Medications on File Prior to Encounter   Medication    0.9%  NaCl infusion    aspirin EC tablet 325 mg    diazePAM tablet 5 mg    diphenhydrAMINE capsule 25 mg     Current Outpatient Medications on File Prior to Encounter   Medication Sig    aspirin (ECOTRIN) 325 MG EC tablet Take 1 tablet (325 mg total) by mouth every morning.    atorvastatin (LIPITOR) 10 MG tablet Take 10 mg by mouth once daily.    lwr-[id number]-zinc--demetri-bor (CALTRATE 600-D PLUS MINERALS) 600 mg calcium- 800 unit-50 mg Tab Take 1 each by mouth every morning.    cholecalciferol, vitamin D3, 50 mcg (2,000 unit) Chew Take 2,000 Units by mouth every morning.    docusate sodium (COLACE) 100 MG capsule Take 100 mg by mouth as needed for Constipation.    furosemide (LASIX) 20 MG tablet Take 1 tablet (20 mg total) by mouth every morning.    melatonin (MELATIN) 3 mg tablet Take 3 mg by mouth nightly as needed for Insomnia.    metoprolol succinate (TOPROL-XL) 25 MG 24 hr tablet Take 1 tablet (25 mg total) by mouth every morning.    nitroGLYCERIN 0.1 mg/hr TD PT24 (NITRODUR) 0.1 mg/hr PT24 Place 1 patch onto the skin once daily.    [DISCONTINUED] potassium 99 mg Tab Take 99 mg by mouth once daily.    amLODIPine (NORVASC) 5 MG tablet Take 1 tablet (5 mg total) by mouth every evening. (Patient not taking: Reported on 10/14/2020)    lisinopril 10 MG tablet Take 1 tablet (10 mg total) by mouth every morning. (Patient not taking: Reported on 10/14/2020)      Past Medical History, Hospitalization History, Vaccine History, Surgical History, Social History, Family History are extensively recorded on my office PMH document, a copy of which is " copied and pasted to the end of this document.     Physical Examination:     Temp:  [97.6 °F (36.4 °C)-98.6 °F (37 °C)]   Pulse:  [70-85]   Resp:  [14-34]   BP: (131-200)/()   SpO2:  [91 %-100 %]     A&Ox4 in NAD  HEENT = normal  Neck = normal  Lungs = clear  Heart = RRR S1 & S2 with no murmurs or gallops  Breasts = not examined  Abodmen = obese benign  /GYN/rectal = not examined  Ext = trace edema, LLE more than RLE   Pulses = 2+ carotids, brachials, radials, 1+ femorals, 3+ dorsal pedals, absent posterior tibials (no bruits)   Neurologic: grossly intact except for a slight stumbling gait   NO DVT   NO bedsores   IV clean (placed in the ER)     Objective:     Admission on 10/14/2020   Component Date Value Ref Range Status    WBC 10/14/2020 6.48  3.90 - 12.70 K/uL Final    RBC 10/14/2020 4.96  4.00 - 5.40 M/uL Final    Hemoglobin 10/14/2020 14.4  12.0 - 16.0 g/dL Final    Hematocrit 10/14/2020 43.8  37.0 - 48.5 % Final    Mean Corpuscular Volume 10/14/2020 88  82 - 98 fL Final    Mean Corpuscular Hemoglobin 10/14/2020 29.0  27.0 - 31.0 pg Final    Mean Corpuscular Hemoglobin Conc 10/14/2020 32.9  32.0 - 36.0 g/dL Final    RDW 10/14/2020 12.5  11.5 - 14.5 % Final    Platelets 10/14/2020 177  150 - 350 K/uL Final    MPV 10/14/2020 11.4  9.2 - 12.9 fL Final    Immature Granulocytes 10/14/2020 0.3  0.0 - 0.5 % Final    Gran # (ANC) 10/14/2020 4.2  1.8 - 7.7 K/uL Final    Immature Grans (Abs) 10/14/2020 0.02  0.00 - 0.04 K/uL Final    Lymph # 10/14/2020 1.5  1.0 - 4.8 K/uL Final    Mono # 10/14/2020 0.5  0.3 - 1.0 K/uL Final    Eos # 10/14/2020 0.2  0.0 - 0.5 K/uL Final    Baso # 10/14/2020 0.03  0.00 - 0.20 K/uL Final    nRBC 10/14/2020 0  0 /100 WBC Final    Gran% 10/14/2020 64.8  38.0 - 73.0 % Final    Lymph% 10/14/2020 22.8  18.0 - 48.0 % Final    Mono% 10/14/2020 8.2  4.0 - 15.0 % Final    Eosinophil% 10/14/2020 3.4  0.0 - 8.0 % Final    Basophil% 10/14/2020 0.5  0.0 - 1.9 % Final     "Differential Method 10/14/2020 Automated   Final    Sodium 10/14/2020 142  136 - 145 mmol/L Final    Potassium 10/14/2020 4.6  3.5 - 5.1 mmol/L Final    Chloride 10/14/2020 103  95 - 110 mmol/L Final    CO2 10/14/2020 27  23 - 29 mmol/L Final    Glucose 10/14/2020 144* 70 - 110 mg/dL Final    BUN, Bld 10/14/2020 28* 8 - 23 mg/dL Final    Creatinine 10/14/2020 1.2  0.5 - 1.4 mg/dL Final    Calcium 10/14/2020 10.2  8.7 - 10.5 mg/dL Final    Total Protein 10/14/2020 7.1  6.0 - 8.4 g/dL Final    Albumin 10/14/2020 4.0  3.5 - 5.2 g/dL Final    Total Bilirubin 10/14/2020 0.8  0.1 - 1.0 mg/dL Final    Alkaline Phosphatase 10/14/2020 96  55 - 135 U/L Final    AST 10/14/2020 5* 10 - 40 U/L Final    ALT 10/14/2020 23  10 - 44 U/L Final    Anion Gap 10/14/2020 12  8 - 16 mmol/L Final    eGFR if  10/14/2020 49.3* >60 mL/min/1.73 m^2 Final    eGFR if non  10/14/2020 42.8* >60 mL/min/1.73 m^2 Final    POC Creatinine 10/14/2020 1.3  0.5 - 1.4 mg/dL Final    Sample 10/14/2020 VENOUS   Final    SARS-CoV-2 RNA, Amplification, Qual 10/14/2020 Negative  Negative Final     EKG 10/14/2020 = NSR, LAD, IVCD, normal T waves diffusely    CXR 10/14/2020 = stable benign     CTA neck and brain (with) 10/14/2020:    Interval carotid endarterectomy at the left carotid bulb, with no measurable stenosis on the left.   Short segment high-grade stenosis at the origin of the right internal carotid artery (90%).   Patent bilateral vertebral arteries.   No large vessel occlusion or aneurysm is identified.   Partially calcified plaque in the intracranial internal carotid arteries and additional/incidental findings as above.     Ultrasound bilateral lower extremities 10/14/2020 = NO DVT     Impression:   1. Hypertensive Urgency   2. Non-specific dizziness that is not true vertigo or orthostasis, but is mainly "episodic mild ataxia" without a focal neurologic deficit. This may be atypical "acute " "benign paroxysmal positional vertigo".   3. Palpitations, with the only documented arrhythmia since admission being benign premature atrial contractions, in this patient with:   a. a pacemaker in place since 12/17/2019   b. 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 Other problems recorded below in the Problem Overview   5. Other problems as recorded on my office [facility] document, copied and pasted to the bottom of this report.     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 surgical therapy options discussion: patient advised that (in the asymptomatic state) medical treatment reduces her 5 year risk of stroke to 11%, whereas carotid endarterectomy reduces her 5 year risk of stroke to 5% with a small risk of karyn-operative stroke (see copy in her chart of the handout I gave her)she advised me 06/06/2011 that she wishes to proceed with medical treatment for now    CTA neck and brain (with contrast) 07/28/2020:   o Moderate atheromatous changes are noted in the aortic arch. There are moderate degrees of narrowing of the proximal segments of the left common carotid artery and subclavian artery at their origin.  o There is mild tortuosity of the common carotid arteries.  o There is prominent plaque formation observed bilaterally at the level of the carotid bifurcations.   o There is bilateral high-grade stenosis of the origins of the internal carotid arteries (approximately 90%). The " remainder of the internal carotid arteries are tortuous in their extracranial segment but patent. There is stenosis of the origin of the right external carotid artery.  o There is significant focal stenosis of the intracranial segment of the left internal carotid artery near the level of the anterior clinoid process.  o There is mild tortuosity of the vertebral arteries.   o There are no findings of high-grade stenosis or occlusion of the major intracranial arteries otherwise. No aneurysm or vascular malformation is noted.  o The cervical segments of the vertebral arteries are patent. There are mild-moderate stenoses of the origins of the vertebral arteries bilaterally.  o Scattered groundglass type opacities are observed within the visualized lung apices bilaterally. There is prominent multilevel cervical spondylosis.   Medical Literature Discussion re: carotid stenosis:    1.  Symptomatic patients:  Both the North American Symptomatic Carotid Endarterectomy Trial (NASCET)1 and the European Carotid Surgery Trial (ECST)2 showed a substantial benefit for surgery in patients with a symptomatic carotid artery stenosis of greater than 70 percent. In NASCET, the average cumulative ipsilateral stroke rate at 2 years was 26 percent for patients treated medically and 9 percent for those receiving the same medical treatment plus a carotid endarterectomy, corresponding to a 65 percent relative risk reduction favoring surgery. For every 100 patients treated surgically, 17 were spared an ipsilateral stroke over the next 2 years. The perioperative stroke plus death rate was 5.8 percent in the surgical group and 3.3 percent in the medical group in the same 1-month period, in spite of optimal medical therapy. Thus the excess morbidity and mortality attributable to the surgery was 2.5 percent. If minor ischemic events are excluded from the analyses, the excess major stroke and death rate for the surgical patients was 1.2 percent  "(2.1 less 0.9 percent) and the excess fatality rate was 0.3 percent (0.6 less 0.3 percent). The NASCET and ECST investigators concluded that their patients with a 70 to 99 percent stenosis clearly benefited from carotid endarterectomy. Results in [facility] hospitals should be similar if patients are properly selected and the perioperative morbidity and mortality are comparably low.  Analysis of several subgroups yielded important information. The cumulative risk of ipsilateral stroke correlated with the degree of stenosis. While the absolute risk reduction favoring the entire surgical group was 17 percent in NASCET, the risk reduction favoring those with a 90 to 99 percent stenosis was 26 percent, and this decreased to 12 percent for those with a 70 to 79 percent stenosis. The overall 26 percent cumulative risk of an ipsilateral stroke at 2 years for this group of patients with "high-grade" stenosis calculates to an annual risk of 13 percent. The operative morbidity and mortality were similar for patients with different degrees of stenosis.   2.  Asymptomatic patients.  The Asymptomatic Carotid Atherosclerosis Study (ACAS)3  demonstrated that asymptomatic people with internal carotid artery stenoses greater than 60 percent are at low risk for stroke. More than 1600 patients were randomized to treatment with the best medical therapies available versus the same medical treatment plus carotid endarterectomy. Patients in the surgical group had a risk over 5 years for ipsilateral stroke (and any perioperative stroke or death) of 5.1 percent, whereas the risk for the nonsurgical group was 11 percent. While this demonstrates a 53 percent relative risk reduction, the absolute risk reduction is only 5.9 percent over 5 years, or 1.2 percent annually. If only major strokes and death are counted, the comparable numbers are 43 percent relative risk reduction, and an absolute risk reduction of only 2.6 percent over 5 years, or " 0.5 percent annually. This latter result was not statistically significant. There was no clear difference in benefit for those with moderate (60 to 80 percent) compared with severe (80 to 99 percent) stenosis.   Many of the strokes in the surgery group were caused by preoperative angiograms. If carotid artery ultrasonography and MR angiography replace catheter angiography, this morbidity and mortality will be obviated.   While there are benefits for carotid endarterectomy in patients with asymptomatic carotid stenosis, the benefits are very small. Medical therapy for reduction of atherosclerosis risk factors in addition to aspirin (325 mg/d) generally are recommended for patients with asymptomatic carotid stenosis.        Leakage of Periprosthetic Valve, Subsequent Encounter 2/11/2020    Aortic stenosis saga:   Echocardiogram ([facility]ICKY) 09/04/2019:    Aortic valve: heavily calcified with restricted leaflet motion. Moderate aortic regurgitation.    Mitral valve: thick and calcified with restricted leaflet motion. Mild mitral stenosis and regurgitation.    Tricuspid valve: is structurally normal with good leaflet excursion. Mild regurgitation.   Pulmonary valve: is structurally normal with good leaflet excursion. No significant regurgitation.   Overall LVEF appears normal.    No obvious shunt across the interatrial septum by color flow Doppler.     Cardiac catheterization 09/14/2019 (Timothy Salgado MD):   · Mid RCA lesion, 50% stenosed, nonsignificant by iFR assessment.  · Known severe aortic stenosis from echo    CTA Cardiac TAVR 09/19/2019:    Large left UVJ stone measuring at 2.6 cm with severe left-sided hydroureteronephrosis.   Significant pneumobilia in the left hepatic lobe with air within the common bile duct.  Unclear if this is due to biliary enteric fistula, infectious process such as cholangitis, or incompetent sphincter of Oddi.  Unfortunately this is not well evaluated on this dedicated TAVR protocol  examination.  Clinical correlation is advised.  Further evaluation could be performed with ERCP and dedicated abdominal CT with intravenous contrast.   TAVR measurements, as above.   Significant aortic valve and abdominal aorta atherosclerosis.   Nonspecific mesenteric haziness, possibly mesenteric panniculitis or adenitis.   Heterogeneity of the pulmonary parenchyma possibly related to elevated pulmonary vascular resistance, small airways disease, or both.   Small bilateral pleural effusions.    Cardiac catheterization 12/17/2019 (Herbert Ashley):    Successful left transfemoral 23 mm Evolute TAVR.  No paravalvular leak, mean gradient 3, peak velocity 1.5 post TAVR.    Echocardiogram (TTE) 12/26/2019   · Mild concentric left ventricular hypertrophy   · Normal left ventricular systolic function. The estimated ejection fraction is 60%.  · No wall motion abnormalities.  · Grade I (mild) left ventricular diastolic dysfunction consistent with impaired relaxation.  · There is a transcutaneously-placed aortic bioprosthesis present.  · Mild aortic regurgitation.  · Mild-to-moderate aortic valve stenosis.  · Aortic valve area is 1.23 cm2; peak velocity is 2.84 m/s; mean gradient is 18 mmHg.  · Mild left atrial enlargement.  · Mild tricuspid regurgitation.  · Normal right ventricular systolic function.  · Moderate mitral sclerosis.  · There is mild leaflet calcification of the Mitral Valve.  · Mild mitral stenosis. MVA by PHT is 2.21 sq cm.  · Normal central venous pressure (3 mmHg).   · The estimated PA systolic pressure is 32 mmHg.:     Echocardiogram (TTE) 01/22/2020:   · Normal left ventricular systolic function. The estimated ejection fraction is 65%.  · Concentric left ventricular remodeling.  · No wall motion abnormalities.  · Grade I (mild) left ventricular diastolic dysfunction consistent with impaired relaxation.  · Normal right ventricular systolic function.  · Severe left atrial enlargement.  · There is a  transcutaneously-placed aortic bioprosthesis present.   · There is paravalvular aortic insufficiency present   · Mild mitral regurgitation.  · Normal central venous pressure (3 mmHg).  · The estimated PA systolic pressure is 29 mmHg.   · Trivial posterior pericardial effusion.      Chronic Diastolic Heart Failure 12/1/2000    Echocardiogram 12/01/2000 = hyperdynamic LV, with mild to moderate amount of pericardial fat, and left ventricular diastolic dysfunction   Echocardiogram 05/20/2011 = dilated left atrial cavity, normal LV thickness and function with LVEF @ 70%, mild aortic stenosis with valve area @ 1.09cm2, peak gradient = 34 mmHg, minimal prolapse of anterior mitral leaflet, mild TI, very small pericardial effusion        Coronary Artery Disease of Native Artery of Native Heart With Stable Angina Pectoris 9/14/2019    Cardiac catheterization 09/14/2019 (Timothy Salgado MD):    Mid RCA lesion, 50% stenosed, nonsignificant by iFR assessment.   Known severe aortic stenosis from echo  02/13/2020: it is unlikely that this trivial coronary artery disease is the cause of her episodic dyspnea that has been present since prior to 09/04/2019.     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   Ultrasound 01/31/1992 = cholelithiasis with tender gallbladder   Ultrasound abdomen 06/22/1995 = normal except for hypoechoic acoustic texture of the left hepatic lobe   ERCP #1 02/01/1992 (Pellegrin) = choledocholithiasis with cholangitis   ERCP #2 06/28/1995 (Leonor) = multiple filling defects due to stones   CT abdomen (with & without) 06/26/1995 = normal except for air in the biliary tree, fatty infiltration of the left lobe, calcified granuloma at right lung base   CT abdomen & pelvis (with & without) 12/02/1997 = normal/normal except for air in the biliary tree due to prior  "sphincterotomy   CTA Cardiac TAVR 09/19/2019: significant pneumobilia in the left hepatic lobe with air within the common bile duct         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    Barium enema 01/12/1998 = several sessile polyps, diverticulosis coli   Colonoscopy #1 08/07/1998 (Jamie) = multiple polyps; histopathology = adenomatous polyp with mild dysplasia, hyperplastic polyps   Colonoscopy #2 08/02/2006 (Jamie) = small cecal polyp (ablated), ascending polyp removed, 8mm transverse polyp, rectosigmoid polyp (ablated); histopathology = ascending and transverse colon polyps: tubular adenomas (2-3-year repeat advised)   Colonoscopy #3: due 08/02/2009      Uncontrolled Hypertension 10/14/2020   Pneumococcal Pneumonia (Resolved) 8/2/2020   Hypertensive Urgency (Resolved) 7/31/2020   Syncope and Collapse (Resolved) 7/28/2020   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 Yoni Hernandez MD     CT head (no contrast) 07/28/2020 = small focal area of probable remote infarction involves the splenium of the corpus callosum on the left, mild chronic small vessel ischemia, scattered vascular calcifications within the intracranial segments of the internal carotid and vertebral arteries      Shortness of Breath (Resolved) 2/11/2020    1. 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 In reality, this dyspnea syndrome has been going on since prior to her TAVR and since prior to the beginning of her workup which is dated to just prior to 09/04/2019.  2. 02/12/2020: I was in the process of writing discharge orders on this lady the evening of 02/12/2020 when her nurse came to me and reported that the patient was acutely dyspneic again, without chest pain, " "and at that point I did not have a proper diagnosis to explain her episodic dyspnea.    3. Consultation from Pulmonologist Caden received 2020   4. Consultation from Cardiologist Marleni received 2020   5. We are attributing this to her karyn-valvular TAVR leak, with plans for tincture of time to cause this problem to settle down      Dehydration Syndrome (Resolved) 2020   Left Ureteral Stone (Resolved) 2019   Hydronephrosis With Urinary Obstruction Due to Ureteral Calculus (Resolved) 2019    Left ureteral stone with hydronephrosis requiring  manipulation 2019 @ [facility] (Paddy Dunham MD)    CTA Cardiac TAVR 2019: large left UVJ stone measuring at 2.6 cm with severe left-sided hydroureteronephrosis    Intervention 2019: 1.  Left ureteroscopy 2.  Cystoscopy 3.  Stone basket extraction 4.  Laser lithotripsy 5.  Left ureteral stent placement 6.  Fluoro < 1h 2019 (Paddy Dunham @ [facility])    Ultrasound kidneys 2020 = moderate left-sided hydronephrosis with no shadowing stone identified; nonobstructing right-sided renal stones; likely debris within the bladder, consider correlation with urinalysis.      Urinalysis 2020 = normal    As of - she has been completely asymptomatic for this abnormality    2020 on discharge day I am ordering her to report back to her established Urologist Paddy Dunham for further investigation for this problem.        Plan:   1. Observation status   2. BP correction   3. 24 hour Holter to begin 10/15/2020   4. Discussion with her established CT surgeon regarding the optimal timing of carotid endarterectomy #2 to be done on her right side "soon" due to 90% stenosis of the right ICA.   5. Lovenox and SCD VTE prophylaxis.     Yoni Hernandez MD     =================     Roxanne Hernandez  : 1935   Patient since: 1991 @ age 51     PAST MEDICAL HISTORY: review date: 2006, 2006, " 09/14/2006, 02/08/2007, 08/28/2007, 02/09/2009, 03/18/2009, 05/17/2011, 05/23/2011, 06/06/2011, 11/10/2011, 07/24/2012, 09/24/2012, 09/12/2013, 09/16/2013 è away è 02/11/2020, 03/19/2020, 07/28/2020, 08/10/2020, 08/18/2020, 09/01/2020, 09/03/2020, 09/28/2020, 10/14/2020,   ALLERGIES: see current computerized database listing   MEDS: see current computerized database listing     Breast status: fibrocystic changes   Family history of breast cancer? no as of 03/29/2006   BSE?   no as of 03/29/2006   Last CBE: 12/01/2000 = wnl, 03/29/2006 = wnl, 02/08/2007 = wnl, 02/09/2009 = wnl, 05/17/2011 = wnl, 07/24/2012 = wnl, 09/12/2013 = wnl è away è 02/11/2020 = wnl,   Last mammogram:  01/05/1998 = wnl, 04/17/2006 = ABNORMAL LEFT è 04/27/2006 dx mammo/us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 with contusions of the medial femoral condyle and medial tibial plateau   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  She saw orthopedic surgeon ~6 days later and he advises nothing was broken, but he is advising a knee replacement.  Since then the pain has gotten slowly better.    Sudden pop in left calf area reported  1/10/2011 as having started 11/07/2011 while walking, followed by the development of left calf swelling on the evening of 11/07/2011 (as reported to me on 11/10/2011), with no associated chest pain or shortness of breath.    11/10/2011 right and left leg vein ultrasound = no signs of DVT, though d-dimer this day was ?1600 ng/mL DDU     Dermatologic:   recurrent bilateral palmar eczematous changes consistent with callus, pompholyx or psoriasis, reported 03/29/2006 as having been present since ~1986   Rosacea of nose reported 09/14/2006 as having been present since ~2004   Cellulitis of left calf (mild) reported 08/27/2007 as having started 1 day prior   Shingles status: positive in ~2006     Ophthalmic: refractive errors, minor cataracts, otherwise normal as of 03/29/2006   Last ophth exam 09/13/2013 with Kelly (in [place], [place]) with no eye problems related to diabetes     Hospitalization history:   1. 01/31/1992 to 02/13/1992 for acute gangrenous cholecystitis with choledocholithiasis with obstruction of the common bile duct and cholelithiasis è ERCP è cholecystectomy 02/03/1992   2. 07/08-11/1994 for erysipelas of the left lower extremity   3. 06/22-23/1995 for transaminasemia, hyperbilirubinemia, cause to be determined on discharge    4. 06/26-30/1995 for acute cholangitis due to choledocholithiasis with obstructive jaundice   5. 09/14-20/1996 for acute severe RLE cellulitis   6. 12/01-02/2000 for chest fluttering and dyspnea   7. 08/28-30/2007 for erysipelas LLE, worsening with outpatient therapy   8. 12/17-19/2019 for TAVR ([facility])   9. 12/26/2019 [facility] ER visit for dyspnea on exertion and worsening leg edema, sent home from ER with negative DVT workup and slight elevation of BNP @ 188   10. 02/11-13/2020 for dyspnea thought to be post-TAVR perivalvular leak syndrome complicated by dehydration on admission    11. 07/28/2020 to 08/04/2020 for new gait disturbances, loss of memory, palpitations, mild  dyspnea, and proven severe progression of bilateral carotid vascular stenoses to 90% bilaterally in the ICA distributions and tiny CVA of left corpus callosum (not on aspirin or Plavix or any anticoagulant) è left CEA 2020 (Donte) è sent home on aspirin 325mg po qam plus added amlodipine 5mg po qhs plus cefuroxime 500mg po bid for 5 more days for presumed hospital acquired pneumonia and plans for an elective right CEA in a few weeks   12. 10/14- for acute severe non-specific dizziness or ataxia (NOT true vertigo or orthostasis) with accelerated hypertension      VACCINE HISTORY:   Td:   2005   Pneumovax:  2006   Flu Vac: she refuses to take them since prior to 2007, refuses 2013   Hepatitis B Vac: ?   Zostavax:  ç she cant take it due to severe allergy to mycins as of 2011     SURGICAL HISTORY:    S/P cholecystectomy 1992 (Dominic)    S/P hysterectomy for uterine cancer ~1998 [facility]    S/P left breast biopsy with x-ray localization 2006 (Shafor) = benign (see above)    S/P repeat left breast biopsy with x-ray localization 2006 (Tanvirfor) = benign (see above)    S/P right knee total arthroplasty 2012 (Becky @ [facility])    S/P TAVR (Jessenia Pizano) 2019    S/P pacemaker 2019 (Jessenia Pizano)    S/P left carotid endarterectomy 2020 (Donte @ [facility])     SOCIAL HISTORY:   Tobacco: past smoker, quitting ~   Alcohol:  none   Work:    Exercise:   Other:      FAMILY HISTORY:   Mother:   age 62 of unknown acute illness   Father:   in his 70s of HTN, CVA, aneurysm   Sibs:

## 2020-10-15 ENCOUNTER — CLINICAL SUPPORT (OUTPATIENT)
Dept: CARDIOLOGY | Facility: HOSPITAL | Age: 80
DRG: 253 | End: 2020-10-15
Attending: INTERNAL MEDICINE
Payer: MEDICARE

## 2020-10-15 ENCOUNTER — ANESTHESIA EVENT (OUTPATIENT)
Dept: SURGERY | Facility: HOSPITAL | Age: 80
DRG: 253 | End: 2020-10-15
Payer: MEDICARE

## 2020-10-15 PROBLEM — G45.1 CAROTID ARTERY SYNDROME: Status: ACTIVE | Noted: 2020-10-14

## 2020-10-15 LAB
ABO + RH BLD: NORMAL
BLD GP AB SCN CELLS X3 SERPL QL: NORMAL

## 2020-10-15 PROCEDURE — 36415 COLL VENOUS BLD VENIPUNCTURE: CPT

## 2020-10-15 PROCEDURE — 93226 XTRNL ECG REC<48 HR SCAN A/R: CPT

## 2020-10-15 PROCEDURE — 25000003 PHARM REV CODE 250: Performed by: INTERNAL MEDICINE

## 2020-10-15 PROCEDURE — 93227 HOLTER MONITOR - 24 HOUR (CUPID ONLY): ICD-10-PCS | Mod: ,,, | Performed by: INTERNAL MEDICINE

## 2020-10-15 PROCEDURE — 93227 XTRNL ECG REC<48 HR R&I: CPT | Mod: ,,, | Performed by: INTERNAL MEDICINE

## 2020-10-15 PROCEDURE — 86850 RBC ANTIBODY SCREEN: CPT

## 2020-10-15 PROCEDURE — G0378 HOSPITAL OBSERVATION PER HR: HCPCS

## 2020-10-15 RX ORDER — ATORVASTATIN CALCIUM 10 MG/1
10 TABLET, FILM COATED ORAL NIGHTLY
Status: DISCONTINUED | OUTPATIENT
Start: 2020-10-16 | End: 2020-10-15

## 2020-10-15 RX ORDER — ACETAMINOPHEN 500 MG
500 TABLET ORAL EVERY 4 HOURS PRN
Status: DISCONTINUED | OUTPATIENT
Start: 2020-10-15 | End: 2020-10-17 | Stop reason: HOSPADM

## 2020-10-15 RX ORDER — ATORVASTATIN CALCIUM 10 MG/1
10 TABLET, FILM COATED ORAL NIGHTLY
Status: DISCONTINUED | OUTPATIENT
Start: 2020-10-15 | End: 2020-10-17 | Stop reason: HOSPADM

## 2020-10-15 RX ADMIN — NITROGLYCERIN 1 PATCH: 0.1 PATCH TRANSDERMAL at 08:10

## 2020-10-15 RX ADMIN — ACETAMINOPHEN 500 MG: 500 TABLET, FILM COATED ORAL at 09:10

## 2020-10-15 RX ADMIN — FUROSEMIDE 20 MG: 20 TABLET ORAL at 08:10

## 2020-10-15 RX ADMIN — ASPIRIN 325 MG: 325 TABLET, COATED ORAL at 08:10

## 2020-10-15 RX ADMIN — METOPROLOL SUCCINATE 25 MG: 25 TABLET, FILM COATED, EXTENDED RELEASE ORAL at 08:10

## 2020-10-15 RX ADMIN — ATORVASTATIN CALCIUM 10 MG: 10 TABLET, FILM COATED ORAL at 09:10

## 2020-10-15 NOTE — PLAN OF CARE
Medicare Outpatient Observation Notice was signed, explained and given to patient/caregiver on 10/15/2020 at 11:22am     answered all questions

## 2020-10-15 NOTE — CONSULTS
[facility]  Neurology  Consult Note    Patient Name: Roxanne Hernandez  MRN: [id number]  Admission Date: 10/14/2020  Hospital Length of Stay: 0 days  Code Status: Full Code   Attending Provider: Yoni Hernandez MD   Consulting Provider: Dr Alejandra  Primary Care Physician: Yoni Hernandez MD  Principal Problem:Hypertensive emergency without congestive heart failure    Consults  Subjective:     Chief Complaint:     Dizziness      80-year-old female who has history of diabetes mellitus, hypertension, cervical cancer who also has bilateral carotid disease for which she had left carotid endarterectomy done by Dr. Kent about 5 weeks ago, now presents with complaints of having some confusion this morning and dizziness with unsteady gait.  No history of any trauma.  No fever or chills.  No complaints of any chest pain or palpitations.  She denies any focal motor weakness or sensory changes.  No visual changes.  Patient has not fallen or sustained any trauma.  She did admit to taking her routine a.m. medication today.  No history of any syncope or seizure.    Neurology Consult: Pt seen and examined in ED with  Dr Alejandra. Pt alert and oritend. Jaqui. She reprots left hand and  Left leg decreased sensation is chronic. Pt rpeorts her dizziness occurs with movement. SH ereprots unsteadiness with ambulation. Pt denies headaches.  LCEA 5 weeks ago. Future RCEA.     Past Medical History:   Diagnosis Date    Cervical cancer     Diabetes mellitus     Hypertension        Past Surgical History:   Procedure Laterality Date    A-V CARDIAC PACEMAKER INSERTION N/A 12/18/2019    Procedure: INSERTION, CARDIAC PACEMAKER, DUAL CHAMBER;  Surgeon: Nnamdi Macdonald MD;  Location: [facility] EP LAB;  Service: Cardiology;  Laterality: N/A;  lbbb, dppm, SJM, anes, pr, [id number]    CARDIAC CATHETERIZATION      CAROTID ENDARTERECTOMY Left 7/31/2020    Procedure: ENDARTERECTOMY-CAROTID;  Surgeon: Radu Kent MD;  Location: [facility] OR;  Service:  "Cardiothoracic;  Laterality: Left;    CHOLECYSTECTOMY      CORONARY ANGIOGRAPHY Left 9/4/2019    Procedure: ANGIOGRAM, CORONARY ARTERY;  Surgeon: Carmen Yepez MD;  Location: [facility] CATH/EP LAB;  Service: Cardiology;  Laterality: Left;    CYSTOSCOPY N/A 11/13/2019    Procedure: CYSTOSCOPY;  Surgeon: Paddy Dunham MD;  Location: [facility] OR [address] [place] [address];  Service: Urology;  Laterality: N/A;    HYSTERECTOMY      JOINT REPLACEMENT      KNEE ARTHROPLASTY Right     LASER LITHOTRIPSY Left 11/13/2019    Procedure: LITHOTRIPSY, USING LASER;  Surgeon: Paddy Dunham MD;  Location: [facility] OR [address] [place] [address];  Service: Urology;  Laterality: Left;    TRANSCATHETER AORTIC VALVE REPLACEMENT (TAVR) N/A 12/17/2019    Procedure: REPLACEMENT, AORTIC VALVE, TRANSCATHETER (TAVR);  Surgeon: Herbert Ashley MD;  Location: [facility] CATH LAB;  Service: Cardiology;  Laterality: N/A;    URETEROSCOPIC REMOVAL OF URETERIC CALCULUS Left 11/13/2019    Procedure: REMOVAL, CALCULUS, URETER, URETEROSCOPIC;  Surgeon: Paddy Dunham MD;  Location: [address];  Service: Urology;  Laterality: Left;    URETEROSCOPY Left 11/13/2019    Procedure: URETEROSCOPY;  Surgeon: Paddy Dunham MD;  Location: [address];  Service: Urology;  Laterality: Left;       Review of patient's allergies indicates:   Allergen Reactions    Azithromycin Swelling     All mycins    Statins-hmg-coa reductase inhibitors Other (See Comments)     Liver function  "It attacks my liver and makes me very sick"    Sulfa (sulfonamide antibiotics) Hives       Current Neurological Medications:     Current Facility-Administered Medications on File Prior to Encounter   Medication    0.9%  NaCl infusion    aspirin EC tablet 325 mg    diazePAM tablet 5 mg    diphenhydrAMINE capsule 25 mg     Current Outpatient Medications on File Prior to Encounter   Medication Sig    aspirin (ECOTRIN) 325 MG EC tablet Take 1 tablet (325 mg total) by mouth every morning.    atorvastatin " (LIPITOR) 10 MG tablet Take 10 mg by mouth once daily.    eng-G4-upo31-zinc--demetri-bor (CALTRATE 600-D PLUS MINERALS) 600 mg calcium- 800 unit-50 mg Tab Take 1 each by mouth every morning.    cholecalciferol, vitamin D3, 50 mcg (2,000 unit) Chew Take 2,000 Units by mouth every morning.    docusate sodium (COLACE) 100 MG capsule Take 100 mg by mouth as needed for Constipation.    furosemide (LASIX) 20 MG tablet Take 1 tablet (20 mg total) by mouth every morning.    melatonin (MELATIN) 3 mg tablet Take 3 mg by mouth nightly as needed for Insomnia.    metoprolol succinate (TOPROL-XL) 25 MG 24 hr tablet Take 1 tablet (25 mg total) by mouth every morning.    nitroGLYCERIN 0.1 mg/hr TD PT24 (NITRODUR) 0.1 mg/hr PT24 Place 1 patch onto the skin once daily.    amLODIPine (NORVASC) 5 MG tablet Take 1 tablet (5 mg total) by mouth every evening. (Patient not taking: Reported on 10/14/2020)    lisinopril 10 MG tablet Take 1 tablet (10 mg total) by mouth every morning. (Patient not taking: Reported on 10/14/2020)      Family History     Problem Relation (Age of Onset)    Cancer Maternal Grandfather, Paternal Grandfather    Hypertension Father        Tobacco Use    Smoking status: Former Smoker     Packs/day: 1.00     Years: 25.00     Pack years: 25.00     Types: Cigarettes     Quit date: 1980     Years since quittin.8    Smokeless tobacco: Never Used   Substance and Sexual Activity    Alcohol use: Not Currently    Drug use: Never    Sexual activity: Not on file     Review of Systems   Constitutional: Negative.    HENT: Negative.    Eyes: Negative.    Respiratory: Negative.    Cardiovascular: Negative.    Gastrointestinal: Negative.    Endocrine: Negative.    Genitourinary: Negative.    Allergic/Immunologic: Negative.    Neurological: Positive for dizziness and weakness.   Hematological: Negative.      Objective:     Vital Signs (Most Recent):  Temp: 98 °F (36.7 °C) (10/14/20 2331)  Pulse: 76 (10/14/20  2331)  Resp: 17 (10/14/20 2331)  BP: 125/61 (10/14/20 2331)  SpO2: 97 % (10/14/20 2331) Vital Signs (24h Range):  Temp:  [97.6 °F (36.4 °C)-98.6 °F (37 °C)] 98 °F (36.7 °C)  Pulse:  [70-85] 76  Resp:  [14-34] 17  SpO2:  [91 %-100 %] 97 %  BP: (125-200)/() 125/61     Weight: 79.4 kg (175 lb 0.7 oz)  Body mass index is 37.88 kg/m².    Physical Exam  HENT:      Nose: Nose normal.      Mouth/Throat:      Mouth: Mucous membranes are moist.   Neck:      Musculoskeletal: Normal range of motion and neck supple.   Cardiovascular:      Rate and Rhythm: Normal rate.   Pulmonary:      Effort: Pulmonary effort is normal.   Abdominal:      Palpations: Abdomen is soft.   Musculoskeletal: Normal range of motion.   Skin:     General: Skin is warm and dry.      Capillary Refill: Capillary refill takes less than 2 seconds.   Neurological:      General: No focal deficit present.      Mental Status: She is alert and oriented to person, place, and time.   Psychiatric:         Speech: Speech normal.         NEUROLOGICAL EXAMINATION:     MENTAL STATUS   Oriented to person, place, and time.   Attention: normal. Concentration: normal.   Speech: speech is normal   Level of consciousness: alert  Able to name object. Able to repeat.     CRANIAL NERVES   Cranial nerves II through XII intact.     MOTOR EXAM        MS 4/5     SENSORY EXAM   Light touch normal.     GAIT AND COORDINATION     Tremor   Resting tremor: absent      Significant Labs:  Lab Results   Component Value Date    WBC 6.48 10/14/2020    HGB 14.4 10/14/2020    HCT 43.8 10/14/2020    MCV 88 10/14/2020     10/14/2020       CMP  Sodium   Date Value Ref Range Status   10/14/2020 142 136 - 145 mmol/L Final     Potassium   Date Value Ref Range Status   10/14/2020 4.6 3.5 - 5.1 mmol/L Final     Chloride   Date Value Ref Range Status   10/14/2020 103 95 - 110 mmol/L Final     CO2   Date Value Ref Range Status   10/14/2020 27 23 - 29 mmol/L Final     Glucose   Date Value Ref  Range Status   10/14/2020 144 (H) 70 - 110 mg/dL Final     BUN, Bld   Date Value Ref Range Status   10/14/2020 28 (H) 8 - 23 mg/dL Final     Creatinine   Date Value Ref Range Status   10/14/2020 1.2 0.5 - 1.4 mg/dL Final     Calcium   Date Value Ref Range Status   10/14/2020 10.2 8.7 - 10.5 mg/dL Final     Total Protein   Date Value Ref Range Status   10/14/2020 7.1 6.0 - 8.4 g/dL Final     Albumin   Date Value Ref Range Status   10/14/2020 4.0 3.5 - 5.2 g/dL Final     Total Bilirubin   Date Value Ref Range Status   10/14/2020 0.8 0.1 - 1.0 mg/dL Final     Comment:     For infants and newborns, interpretation of results should be based  on gestational age, weight and in agreement with clinical  observations.  Premature Infant recommended reference ranges:  Up to 24 hours.............<8.0 mg/dL  Up to 48 hours............<12.0 mg/dL  3-5 days..................<15.0 mg/dL  6-29 days.................<15.0 mg/dL       Alkaline Phosphatase   Date Value Ref Range Status   10/14/2020 96 55 - 135 U/L Final     AST   Date Value Ref Range Status   10/14/2020 5 (L) 10 - 40 U/L Final     ALT   Date Value Ref Range Status   10/14/2020 23 10 - 44 U/L Final     Anion Gap   Date Value Ref Range Status   10/14/2020 12 8 - 16 mmol/L Final     eGFR if    Date Value Ref Range Status   10/14/2020 49.3 (A) >60 mL/min/1.73 m^2 Final     eGFR if non    Date Value Ref Range Status   10/14/2020 42.8 (A) >60 mL/min/1.73 m^2 Final     Comment:     Calculation used to obtain the estimated glomerular filtration  rate (eGFR) is the CKD-EPI equation.            Significant Imaging: US Lower Extremity Veins Bilateral  EXAM DESCRIPTION: US LOWER EXTREMITY VEINS BILATERAL 10/14/2020 8:59 PM CDT    CLINICAL HISTORY: 80 years, Female, possible DVT shortness of breath.    COMPARISON: None.    FINDINGS:    Multiple grayscale images as well as duplex Doppler ultrasound of both lower extremities were performed.  Both common  femoral veins, superficial femoral veins, popliteal veins, anterior, posterior tibial and peroneal veins at the level at the level the calves were imaged.  Spectral waveform demonstrate normal compressibility, phasicity and augmentation.  No intraluminal defects were seen.    IMPRESSION:    NO EVIDENCE FOR DEEP VEIN THROMBOSIS BILATERAL LOWER EXTREMITIES.    Electronically signed by:  Sergio Cardenas MD  10/14/2020 8:59 PM CDT Workstation: [id number]  CTA Head and Neck (xpd)  CMS MANDATED QUALITY DATA - CT RADIATION 436    All CT scans at this facility utilize dose modulation, iterative  reconstruction, and/or weight based dosing when appropriate to reduce  radiation dose to as low as reasonably achievable.    CMS MANDATED QUALITY DATA - CAROTID - 195    All measurements and percent stenosis described below were determined  using NASCET criteria or criteria similar to NASCET, as defined by the  Society of Radiologists in Ultrasound Consensus Conference, Radiology,  2003    CLINICAL HISTORY:  80 years (1940) Female Dizziness, non-specific 80-year-old female  who has history of diabetes mellitus, hypertension, cervical cancer  who also has bilateral carotid disease for which she had left carotid  endarterectomy done by Dr. Kent about 5 weeks ago, now presents  with complaints of having; some confusion this morning and dizziness  with unsteady gait.  No history of any trauma.  No fever or chills.  No complaints of any chest pain or pal    TECHNIQUE:  CTA HEAD AND NECK (XPD). 408 images obtained. CT angiography of the  head and neck was performed using thin axial slices respectively and  reviewed in multiple planes. Two and three dimensional multiplanar  reformatted images were generated and submitted to PACS.    CONTRAST:  IV contrast was administered uneventfully.    COMPARISON:  CTA most recently from July 28, 2020.    FINDINGS:  CTA of the [place]: Heterogeneous partially calcified plaques  in the  carotid terminus causing approximately 60% stenosis in the  (axial image 79), and less than 50% stenosis on the right (axial image  77). The left A1 segment is hypoplastic, and the distal left OTILIO  appears to be supplied predominantly via patent anterior communicating  artery. The distal segments of the anterior communicating arteries are  patent and symmetric. There is patency of the intracranial circulation  including the bilateral internal carotid arteries, the carotid  terminus, the A1 and M1 segments, the bilateral intracranial vertebral  arteries, the basilar artery and its distal branches. No aneurysm is  identified within the limits of the technique. Conventional  angiography is more sensitive for the detection of small aneurysms.    CTA of the Neck:  There is a three-vessel aortic arch. CTA of the neck demonstrates that  the origins of the great vessels are widely patent. No aneurysm is  seen.    RIGHT:  Common carotid artery origin: Patent.  Cervical segment: Patent.  External carotid origin characteristics: Patent.  Carotid bifurcation: Short segment predominantly calcified plaque in  the right carotid bulb extending into the proximal internal carotid  artery causing approximately 90% stenosis over a length of 7 mm (axial  image 171).  Internal carotid origin characteristics: The remainder of the ICA is  patent with no focal stenosis.  Vertebral artery: within normal limits.    LEFT  Common carotid artery origin: Patent.  Cervical segment: Patent.  External carotid origin characteristics: Patent.  Carotid bifurcation: Postoperative changes of left-sided carotid  endarterectomy with ectasia of the left carotid bulb and adjacent  staples/clips. No high-grade stenosis is identified..  Internal carotid origin characteristics: No focal stenosis.  Vertebral artery: within normal limits.    IMPRESSION:  1. Interval carotid endarterectomy at the left carotid bulb, with no  measurable stenosis on the left.  2.  Short segment high-grade stenosis at the origin of the right  internal carotid artery (90%).  3. Patent bilateral vertebral arteries.  4. No large vessel occlusion or aneurysm is identified.  5. Partially calcified plaque in the intracranial internal carotid  arteries and additional/incidental findings as above.    .    Electronically Signed by MAGALI Hobbs on 10/14/2020 1:51 PM  X-Ray Chest AP Portable  Chest single view    CLINICAL DATA: Dizziness    FINDINGS: AP view is compared to August 3, 2020.    Heart size is normal. Aortic arch is calcified. Transcatheter aortic  valve replacement is noted. Dual-lead pacemaker device is unchanged in  position.    Pulmonary vasculature is upper normal. No infiltrates or effusions are  identified.    IMPRESSION:  1. No acute radiographic abnormalities.    Electronically Signed by Lopez Szymanski M.D. on 10/14/2020 11:43 AM        Assessment and Plan:    Dizziness  Rule out TIA  No MRI: pt with pacemaker  -CTA head and neck: left CEA: no measurable stenosis, 90% right known stenosis, no LVO or aneurysm identified  -check orthostatics  -ECHO Jan 2020: severe LAE, EF 65%    BL Carotid Stenosis  -LCEA 5 weeks ago  -RCEA in the future      Consider BPPV. Rec outpatient f/u with ENT to evaluate for inner ear abnormalities.           HTN     Active Diagnoses:    Diagnosis Date Noted POA    PRINCIPAL PROBLEM:  Hypertensive emergency without congestive heart failure [I16.1] 10/14/2020 Yes    Vestibular vertigo, bilateral [H81.393] 10/14/2020 Yes    Dizziness and giddiness [R42] 10/14/2020 Yes    Uncontrolled hypertension [I10] 10/14/2020 Yes      Problems Resolved During this Admission:       VTE Risk Mitigation (From admission, onward)         Ordered     IP VTE HIGH RISK PATIENT  Once      10/14/20 1657     Place sequential compression device  Until discontinued      10/14/20 1657              Critical Care:  Greater than 30 minutes of critical care time has been spent  evaluating with this patient. Time includes chart review not limited to diagnostic imaging, labs, and vitals, pt assessment, discussion with family and nursing, current order evals, and new order entries.     Thank you for your consult.    Nica Betancourt NP  Neurology  [facility]

## 2020-10-15 NOTE — ANESTHESIA PREPROCEDURE EVALUATION
10/15/2020  Roxanne Hernandez is a 80 y.o., female.    Patient Active Problem List   Diagnosis    Nonrheumatic aortic valve stenosis    Essential hypertension    Diabetes mellitus without complication    Pneumobilia    Nonrheumatic mitral valve stenosis    Nonrheumatic aortic valve insufficiency    Nodular calcific aortic valve stenosis    Acute on chronic diastolic heart failure    S/P TAVR (transcatheter aortic valve replacement)    Left bundle branch block    Presence of permanent cardiac pacemaker    Leakage of periprosthetic valve, subsequent encounter    Chronic diastolic heart failure    Benign neoplasm of transverse colon    Coronary artery disease of native artery of native heart with stable angina pectoris    Bilateral carotid artery occlusion    Syncope    Hypertensive emergency without congestive heart failure    Vestibular vertigo, bilateral    Dizziness and giddiness    Uncontrolled hypertension       Past Surgical History:   Procedure Laterality Date    A-V CARDIAC PACEMAKER INSERTION N/A 12/18/2019    Procedure: INSERTION, CARDIAC PACEMAKER, DUAL CHAMBER;  Surgeon: Nnamdi Macdonald MD;  Location: [facility] EP LAB;  Service: Cardiology;  Laterality: N/A;  lbbb, dppm, SJM, anes, pr, [id number]    CARDIAC CATHETERIZATION      CAROTID ENDARTERECTOMY Left 7/31/2020    Procedure: ENDARTERECTOMY-CAROTID;  Surgeon: Radu Kent MD;  Location: [facility] OR;  Service: Cardiothoracic;  Laterality: Left;    CHOLECYSTECTOMY      CORONARY ANGIOGRAPHY Left 9/4/2019    Procedure: ANGIOGRAM, CORONARY ARTERY;  Surgeon: Carmen Yepez MD;  Location: [facility] CATH/EP LAB;  Service: Cardiology;  Laterality: Left;    CYSTOSCOPY N/A 11/13/2019    Procedure: CYSTOSCOPY;  Surgeon: Paddy Dunham MD;  [address]  Service: Urology;  Laterality: N/A;    HYSTERECTOMY      JOINT REPLACEMENT       KNEE ARTHROPLASTY Right     LASER LITHOTRIPSY Left 11/13/2019    Procedure: LITHOTRIPSY, USING LASER;  Surgeon: Paddy Dunham MD;  [facility];  Service: Urology;  Laterality: Left;    TRANSCATHETER AORTIC VALVE REPLACEMENT (TAVR) N/A 12/17/2019    Procedure: REPLACEMENT, AORTIC VALVE, TRANSCATHETER (TAVR);  Surgeon: Herbert Ashley MD;  Location: [facility] CATH LAB;  Service: Cardiology;  Laterality: N/A;    URETEROSCOPIC REMOVAL OF URETERIC CALCULUS Left 11/13/2019    Procedure: REMOVAL, CALCULUS, URETER, URETEROSCOPIC;  Surgeon: Paddy Dunham MD;  Location: [facility] OR [facility] FLR;  Service: Urology;  Laterality: Left;    URETEROSCOPY Left 11/13/2019    Procedure: URETEROSCOPY;  Surgeon: Paddy Dunham MD;  [facility];  Service: Urology;  Laterality: Left;        Tobacco Use:  The patient  reports that she quit smoking about 40 years ago. Her smoking use included cigarettes. She has a 25.00 pack-year smoking history. She has never used smokeless tobacco.     Results for orders placed or performed during the hospital encounter of 10/14/20   EKG 12-lead    Collection Time: 10/14/20 11:01 AM    Narrative    Test Reason : R42,    Vent. Rate : 075 BPM     Atrial Rate : 075 BPM     P-R Int : 156 ms          QRS Dur : 104 ms      QT Int : 410 ms       P-R-T Axes : 066 -42 048 degrees     QTc Int : 457 ms    Normal sinus rhythm  Left axis deviation  Abnormal ECG  When compared with ECG of 01-SEP-2020 11:07,  QRS duration has decreased  Non-specific change in ST segment in Lateral leads  Confirmed by Reynaldo Carr MD ([id number]) on 10/15/2020 6:58:22 AM    Referred By: [email or web address]   SELF           Confirmed By:Reynaldo Carr MD             Lab Results   Component Value Date    WBC 6.48 10/14/2020    HGB 14.4 10/14/2020    HCT 43.8 10/14/2020    MCV 88 10/14/2020     10/14/2020     BMP  Lab Results   Component Value Date     10/14/2020    K 4.6 10/14/2020     10/14/2020    CO2 27  10/14/2020    BUN 28 (H) 10/14/2020    CREATININE 1.2 10/14/2020    CALCIUM 10.2 10/14/2020    ANIONGAP 12 10/14/2020     (H) 10/14/2020     09/10/2020     (H) 09/03/2020       Results for orders placed during the hospital encounter of 01/22/20   Echo Color Flow Doppler? Yes    Narrative · Normal left ventricular systolic function. The estimated ejection   fraction is 65%.  · Concentric left ventricular remodeling.  · No wall motion abnormalities.  · Grade I (mild) left ventricular diastolic dysfunction consistent with   impaired relaxation.  · Normal right ventricular systolic function.  · Severe left atrial enlargement.  · There is a transcutaneously-placed aortic bioprosthesis present. There   is paravalvular aortic insufficiency present.  · Mild mitral regurgitation.  · Normal central venous pressure (3 mmHg).  · The estimated PA systolic pressure is 29 mmHg.  · Trivial posterior pericardial effusion.              Pre-op Assessment    I have reviewed the Patient Summary Reports.     I have reviewed the Nursing Notes. I have reviewed the NPO Status.   I have reviewed the Medications.     Review of Systems  Anesthesia Hx:  No problems with previous Anesthesia  History of prior surgery of interest to airway management or planning: heart surgery. Denies Family Hx of Anesthesia complications.   Denies Personal Hx of Anesthesia complications.   Social:  Former Smoker, No Alcohol Use    Hematology/Oncology:  Hematology Normal       -- Cancer in past history (cervical s/p dung):  surgery  Oncology Comments: Benign neoplasm of transverse colon  Cervical cancer     EENT/Dental:EENT/Dental Normal   Cardiovascular:   Pacemaker ([employer]) Hypertension Valvular problems/Murmurs, AI CAD  CABG/stent Dysrhythmias  Angina PVD ECG has been reviewed. [employer] PM inserted 12/2019  S/P TAVR (transcatheter aortic valve replacement)  Chronic diastolic heart failure  Coronary artery disease of native artery of native  heart with stable angina pectoris  Nonrheumatic mitral valve stenosis   Pulmonary:   Shortness of breath (related to Valve Dz)    Renal/:   renal calculi    Hepatic/GI:   GERD, well controlled    Musculoskeletal:  Musculoskeletal Normal    Neurological:   Presyncopal episodes  Vestibular vertigo, bilateral  Dizziness and giddiness     Endocrine:   Diabetes (Blood Glucose this am 116), poorly controlled, type 2    Dermatological:  Skin Normal    Psych:  Psychiatric Normal           Physical Exam  General:  Well nourished, Obesity    Airway/Jaw/Neck:  Airway Findings: Mouth Opening: Normal Tongue: Normal  General Airway Assessment: Adult  Mallampati: III  Improves to II with phonation.  TM Distance: Normal, at least 6 cm  Jaw/Neck Findings:     Neck ROM: Normal ROM      Dental:  Dental Findings: Edentulous   Chest/Lungs:  Chest/Lungs Findings: Clear to auscultation, Normal Respiratory Rate     Heart/Vascular:  Heart Findings: Rate: Normal  Rhythm: Regular Rhythm  Sounds: Normal  Heart Murmur  Systolic, Diastolic        Mental Status:  Mental Status Findings:  Cooperative, Alert and Oriented         Anesthesia Plan  Type of Anesthesia, risks & benefits discussed:  Anesthesia Type:  general  Patient's Preference:   Intra-op Monitoring Plan: standard ASA monitors and arterial line  Intra-op Monitoring Plan Comments:   Post Op Pain Control Plan: per primary service following discharge from PACU  Post Op Pain Control Plan Comments:   Induction:   IV  Beta Blocker:  Patient is on a Beta-Blocker and has received one dose within the past 24 hours (No further documentation required).       Informed Consent: Patient understands risks and agrees with Anesthesia plan.  Questions answered. Anesthesia consent signed with patient.  ASA Score: 4     Day of Surgery Review of History & Physical:        Anesthesia Plan Notes: GETA  Zofran 4 mg iv  Pepcid 20 mg iv   Ofirmev 1000 mg iv  Sugammadex         Ready For Surgery From  Anesthesia Perspective.

## 2020-10-15 NOTE — CONSULTS
"[facility]  Department of Cardiothoracic Surgery  Consult Note      PATIENT NAME: Roxanne Hernandez  MRN: [id number]  TODAY'S DATE: 10/15/2020  ADMIT DATE: 10/14/2020                                                                 CONSULT REQUESTED BY: Yoni Hernandez MD      PRINCIPAL PROBLEM: Hypertensive emergency without congestive heart failure      REASON FOR CONSULT: Carotid disease s/p L CEA        HPI:  Patient known to me from prior hospitalization when she underwent L CEA. Admitted now with symptom complex similar to symptoms she had when hospitalized in July prior to having L CEA.       Review of patient's allergies indicates:   Allergen Reactions    Azithromycin Swelling     All mycins    Statins-hmg-coa reductase inhibitors Other (See Comments)     Liver function  "It attacks my liver and makes me very sick"    Sulfa (sulfonamide antibiotics) Hives       REVIEW OF SYSTEMS  CARDIOVASCULAR: No recent chest pain, palpitations, arm, neck, or jaw pain  RESPIRATORY: No recent fever, cough chills, SOB or congestion  : No blood in the urine  GI: No Nausea, vomiting, constipation, diarrhea, blood, or reflux  MUSCULOSKELETAL: No myalgias  NEURO: No lightheadedness or dizziness  EYES: No Double vision, blurry, vision or headache       VITAL SIGNS (Most Recent)  Temp: 98.1 °F (36.7 °C) (10/15/20 1126)  Pulse: 79 (10/15/20 1126)  Resp: 16 (10/15/20 1126)  BP: (!) 153/75 (10/15/20 1126)  SpO2: 97 % (10/15/20 1126)    VENTILATION STATUS  Resp: 16 (10/15/20 1126)  SpO2: 97 % (10/15/20 1126)       I & O (Last 24H):    Intake/Output Summary (Last 24 hours) at 10/15/2020 1521  Last data filed at 10/15/2020 1100  Gross per 24 hour   Intake 120 ml   Output 700 ml   Net -580 ml       WEIGHTS  Wt Readings from Last 1 Encounters:   10/14/20 1844 79.4 kg (175 lb 0.7 oz)   10/14/20 1532 68 kg (150 lb)   10/14/20 1040 77.1 kg (170 lb)       PHYSICAL EXAM  CONSTITUTIONAL: Well built, well nourished in no apparent " distress  NECK: no carotid bruit, no JVD  LUNGS: CTA  CHEST WALL: No tenderness  HEART: regular rate and rhythm, S1, S2 normal, no murmur, click, rub or gallop   ABDOMEN: soft, non-tender; bowel sounds normal; no masses, no organomegaly  EXTREMITIES: Extremities normal, no edema  NEURO: AAO X 3    MEDICATIONS   SCHEDULED MEDS:   aspirin  325 mg Oral QAM    [START ON 10/16/2020] atorvastatin  10 mg Oral QHS    furosemide  20 mg Oral QAM    metoprolol succinate  25 mg Oral QAM    nitroGLYCERIN 0.1 mg/hr TD PT24  1 patch Transdermal Daily       CONTINUOUS INFUSIONS:    PRN MEDS:docusate sodium, melatonin, sodium chloride 0.9%    LABS AND DIAGNOSTICS   CBC LAST 3 DAYS  Recent Labs   Lab 10/14/20  1200   WBC 6.48   RBC 4.96   HGB 14.4   HCT 43.8   MCV 88   MCH 29.0   MCHC 32.9   RDW 12.5      MPV 11.4   GRAN 64.8  4.2   LYMPH 22.8  1.5   MONO 8.2  0.5   BASO 0.03   NRBC 0       COAGULATION LAST 3 DAYS  No results for input(s): LABPT, INR, APTT in the last 168 hours.    CHEMISTRY LAST 3 DAYS  Recent Labs   Lab 10/14/20  1233      K 4.6      CO2 27   ANIONGAP 12   BUN 28*   CREATININE 1.2   *   CALCIUM 10.2   ALBUMIN 4.0   PROT 7.1   ALKPHOS 96   ALT 23   AST 5*   BILITOT 0.8       CARDIAC PROFILE LAST 3 DAYS  No results for input(s): BNP, CPK, CPKMB, LDH, TROPONINI in the last 168 hours.    ENDOCRINE LAST 3 DAYS  No results for input(s): TSH, PROCAL in the last 168 hours.    LAST ARTERIAL BLOOD GAS  ABG  No results for input(s): PH, PO2, PCO2, HCO3, BE in the last 168 hours.    LAST 7 DAYS MICROBIOLOGY   Microbiology Results (last 7 days)     ** No results found for the last 168 hours. **          MOST RECENT IMAGING  US Lower Extremity Veins Bilateral  EXAM DESCRIPTION: US LOWER EXTREMITY VEINS BILATERAL 10/14/2020 8:59 PM CDT    CLINICAL HISTORY: 80 years, Female, possible DVT shortness of breath.    COMPARISON: None.    FINDINGS:    Multiple grayscale images as well as duplex Doppler  ultrasound of both lower extremities were performed.  Both common femoral veins, superficial femoral veins, popliteal veins, anterior, posterior tibial and peroneal veins at the level at the level the calves were imaged.  Spectral waveform demonstrate normal compressibility, phasicity and augmentation.  No intraluminal defects were seen.    IMPRESSION:    NO EVIDENCE FOR DEEP VEIN THROMBOSIS BILATERAL LOWER EXTREMITIES.    Electronically signed by:  Sergio Cardenas MD  10/14/2020 8:59 PM CDT Workstation: [id number]  CTA Head and Neck (xpd)  CMS MANDATED QUALITY DATA - CT RADIATION 436    All CT scans at this facility utilize dose modulation, iterative  reconstruction, and/or weight based dosing when appropriate to reduce  radiation dose to as low as reasonably achievable.    CMS MANDATED QUALITY DATA - CAROTID - 195    All measurements and percent stenosis described below were determined  using NASCET criteria or criteria similar to NASCET, as defined by the  Society of Radiologists in Ultrasound Consensus Conference, Radiology,  2003    CLINICAL HISTORY:  80 years (1940) Female Dizziness, non-specific 80-year-old female  who has history of diabetes mellitus, hypertension, cervical cancer  who also has bilateral carotid disease for which she had left carotid  endarterectomy done by Dr. Kent about 5 weeks ago, now presents  with complaints of having; some confusion this morning and dizziness  with unsteady gait.  No history of any trauma.  No fever or chills.  No complaints of any chest pain or pal    TECHNIQUE:  CTA HEAD AND NECK (XPD). 408 images obtained. CT angiography of the  head and neck was performed using thin axial slices respectively and  reviewed in multiple planes. Two and three dimensional multiplanar  reformatted images were generated and submitted to PACS.    CONTRAST:  IV contrast was administered uneventfully.    COMPARISON:  CTA most recently from July 28, 2020.    FINDINGS:  CTA of the [place]: Heterogeneous partially calcified plaques  in the carotid terminus causing approximately 60% stenosis in the  (axial image 79), and less than 50% stenosis on the right (axial image  77). The left A1 segment is hypoplastic, and the distal left OTILIO  appears to be supplied predominantly via patent anterior communicating  artery. The distal segments of the anterior communicating arteries are  patent and symmetric. There is patency of the intracranial circulation  including the bilateral internal carotid arteries, the carotid  terminus, the A1 and M1 segments, the bilateral intracranial vertebral  arteries, the basilar artery and its distal branches. No aneurysm is  identified within the limits of the technique. Conventional  angiography is more sensitive for the detection of small aneurysms.    CTA of the Neck:  There is a three-vessel aortic arch. CTA of the neck demonstrates that  the origins of the great vessels are widely patent. No aneurysm is  seen.    RIGHT:  Common carotid artery origin: Patent.  Cervical segment: Patent.  External carotid origin characteristics: Patent.  Carotid bifurcation: Short segment predominantly calcified plaque in  the right carotid bulb extending into the proximal internal carotid  artery causing approximately 90% stenosis over a length of 7 mm (axial  image 171).  Internal carotid origin characteristics: The remainder of the ICA is  patent with no focal stenosis.  Vertebral artery: within normal limits.    LEFT  Common carotid artery origin: Patent.  Cervical segment: Patent.  External carotid origin characteristics: Patent.  Carotid bifurcation: Postoperative changes of left-sided carotid  endarterectomy with ectasia of the left carotid bulb and adjacent  staples/clips. No high-grade stenosis is identified..  Internal carotid origin characteristics: No focal stenosis.  Vertebral artery: within normal limits.    IMPRESSION:  1. Interval carotid endarterectomy at the left  carotid bulb, with no  measurable stenosis on the left.  2. Short segment high-grade stenosis at the origin of the right  internal carotid artery (90%).  3. Patent bilateral vertebral arteries.  4. No large vessel occlusion or aneurysm is identified.  5. Partially calcified plaque in the intracranial internal carotid  arteries and additional/incidental findings as above.    .    Electronically Signed by MAGALI Hobbs on 10/14/2020 1:51 PM  X-Ray Chest AP Portable  Chest single view    CLINICAL DATA: Dizziness    FINDINGS: AP view is compared to August 3, 2020.    Heart size is normal. Aortic arch is calcified. Transcatheter aortic  valve replacement is noted. Dual-lead pacemaker device is unchanged in  position.    Pulmonary vasculature is upper normal. No infiltrates or effusions are  identified.    IMPRESSION:  1. No acute radiographic abnormalities.    Electronically Signed by Lopez Szymanski M.D. on 10/14/2020 11:43 AM      [facility]  Results for orders placed during the hospital encounter of 01/22/20   Echo Color Flow Doppler? Yes    Narrative · Normal left ventricular systolic function. The estimated ejection   fraction is 65%.  · Concentric left ventricular remodeling.  · No wall motion abnormalities.  · Grade I (mild) left ventricular diastolic dysfunction consistent with   impaired relaxation.  · Normal right ventricular systolic function.  · Severe left atrial enlargement.  · There is a transcutaneously-placed aortic bioprosthesis present. There   is paravalvular aortic insufficiency present.  · Mild mitral regurgitation.  · Normal central venous pressure (3 mmHg).  · The estimated PA systolic pressure is 29 mmHg.  · Trivial posterior pericardial effusion.          CURRENT/PREVIOUS VISIT EKG  Results for orders placed or performed during the hospital encounter of 10/14/20   EKG 12-lead    Collection Time: 10/14/20 11:01 AM    Narrative    Test Reason : R42,    Vent. Rate : 075 BPM     Atrial Rate :  075 BPM     P-R Int : 156 ms          QRS Dur : 104 ms      QT Int : 410 ms       P-R-T Axes : 066 -42 048 degrees     QTc Int : 457 ms    Normal sinus rhythm  Left axis deviation  Abnormal ECG  When compared with ECG of 01-SEP-2020 11:07,  QRS duration has decreased  Non-specific change in ST segment in Lateral leads  Confirmed by Reynaldo Carr MD ([id number]) on 10/15/2020 6:58:22 AM    Referred By: [email or web address]   SELF           Confirmed By:Reynaldo Carr MD         HOSPITAL PROBLEMS WITH ASSESSMENT & PLAN:     Active Hospital Problems    Diagnosis    *Hypertensive emergency without congestive heart failure    Vestibular vertigo, bilateral    Dizziness and giddiness    Uncontrolled hypertension       ASSESSMENT & PLAN:  Bilateral carotid stenosis s/p L CEA      RECOMMENDATIONS:  Discussed with Dr Hernandez  If OR can accommodate will proceed with R CEA tomorrow otherwise can discharge tonight and return early next week for elective operation    Thank you for the consult.     Radu Kent MD  [facility]  Department of Cardiothoracic Surgery  Date of Service: 10/15/2020  3:21 PM

## 2020-10-15 NOTE — PROGRESS NOTES
[facility]  Neurology  Progress Note    Patient Name: Roxanne Hernandez  MRN: [id number]  Admission Date: 10/14/2020  Hospital Length of Stay: 0 days  Code Status: Full Code   Attending Provider: Yoni Hernandez MD  Primary Care Physician: Yoni Hernandez MD   Principal Problem:Hypertensive emergency without congestive heart failure    Subjective:     Interval History: Patient seen and examined this afternoon. She is lying in bed resting. Patient states she is feeling better today. Blood pressure appears to be better controlled.    Current Neurological Medications:     Current Facility-Administered Medications   Medication Dose Route Frequency Provider Last Rate Last Dose    aspirin EC tablet 325 mg  325 mg Oral BETTY Hernandez MD   325 mg at 10/15/20 0852    atorvastatin tablet 10 mg  10 mg Oral Daily Yoni Hernandez MD        docusate sodium capsule 100 mg  100 mg Oral PRN Yoni Hernandez MD        furosemide tablet 20 mg  20 mg Oral BETTY Hernandez MD   20 mg at 10/15/20 0851    melatonin tablet 3 mg  3 mg Oral Nightly PRN Yoin Hernandez MD        metoprolol succinate (TOPROL-XL) 24 hr tablet 25 mg  25 mg Oral BETTY Hernandez MD   25 mg at 10/15/20 0851    nitroGLYCERIN 0.1 mg/hr TD PT24 1 patch  1 patch Transdermal Daily Yoni Hernandez MD   1 patch at 10/15/20 0852    sodium chloride 0.9% flush 10 mL  10 mL Intravenous PRN Yoni Hernandez MD         Facility-Administered Medications Ordered in Other Encounters   Medication Dose Route Frequency Provider Last Rate Last Dose    0.9%  NaCl infusion   Intravenous Continuous Carmen Yepez MD 0 mL/hr at 09/04/19 1141      aspirin EC tablet 325 mg  325 mg Oral Once Carmen Yepez MD        diazePAM tablet 5 mg  5 mg Oral On Call Procedure Carmen Yepez MD        diphenhydrAMINE capsule 25 mg  25 mg Oral Once Carmen Yepez MD           Review of Systems   As per HPI   Objective:     Vital Signs (Most  Recent):  Temp: 98.1 °F (36.7 °C) (10/15/20 1126)  Pulse: 79 (10/15/20 1126)  Resp: 16 (10/15/20 1126)  BP: (!) 153/75 (10/15/20 1126)  SpO2: 97 % (10/15/20 1126) Vital Signs (24h Range):  Temp:  [97.2 °F (36.2 °C)-98.2 °F (36.8 °C)] 98.1 °F (36.7 °C)  Pulse:  [69-81] 79  Resp:  [14-34] 16  SpO2:  [91 %-100 %] 97 %  BP: (111-191)/(49-85) 153/75     Weight: 79.4 kg (175 lb 0.7 oz)  Body mass index is 37.88 kg/m².    Physical Exam  HENT:      Nose: Nose normal.      Mouth/Throat:      Mouth: Mucous membranes are moist.   Neck:      Musculoskeletal: Normal range of motion and neck supple.   Cardiovascular:      Rate and Rhythm: Normal rate.   Pulmonary:      Effort: Pulmonary effort is normal.   Abdominal:      Palpations: Abdomen is soft.   Musculoskeletal: Normal range of motion.   Skin:     General: Skin is warm and dry.      Capillary Refill: Capillary refill takes less than 2 seconds.   Neurological:      General: No focal deficit present.      Mental Status: She is alert and oriented to person, place, and time.   Psychiatric:         Speech: Speech normal.            NEUROLOGICAL EXAMINATION:      MENTAL STATUS   Oriented to person, place, and time.   Attention: normal. Concentration: normal.   Speech: speech is normal   Level of consciousness: alert  Able to name object. Able to repeat.      CRANIAL NERVES   Cranial nerves II through XII intact.      MOTOR EXAM        MS 4/5      SENSORY EXAM   Light touch normal.      GAIT AND COORDINATION      Tremor   Resting tremor: absent             Significant Labs:   Hemoglobin A1c: No results for input(s): HGBA1C in the last 720 hours.  BMP:   Recent Labs   Lab 10/14/20  1233   *      K 4.6      CO2 27   BUN 28*   CREATININE 1.2   CALCIUM 10.2     CBC:   Recent Labs   Lab 10/14/20  1200   WBC 6.48   HGB 14.4   HCT 43.8        CMP:   Recent Labs   Lab 10/14/20  1233   *      K 4.6      CO2 27   BUN 28*   CREATININE 1.2    CALCIUM 10.2   PROT 7.1   ALBUMIN 4.0   BILITOT 0.8   ALKPHOS 96   AST 5*   ALT 23   ANIONGAP 12   EGFRNONAA 42.8*     All pertinent lab results from the past 24 hours have been reviewed.    Significant Imaging: I have reviewed all pertinent imaging results/findings within the past 24 hours.    Assessment and Plan:  Dizziness  Rule out TIA  No MRI: pt with pacemaker  -CTA head and neck: left CEA: no measurable stenosis, 90% right known stenosis, no LVO or aneurysm identified  -check orthostatics  -ECHO Jan 2020: severe LAE, EF 65%     BL Carotid Stenosis  -LCEA 5 weeks ago  -RCEA in the future        Consider BPPV. Rec outpatient f/u with ENT to evaluate for inner ear abnormalities.            Active Diagnoses:    Diagnosis Date Noted POA    PRINCIPAL PROBLEM:  Hypertensive emergency without congestive heart failure [I16.1] 10/14/2020 Yes    Vestibular vertigo, bilateral [H81.393] 10/14/2020 Yes    Dizziness and giddiness [R42] 10/14/2020 Yes    Uncontrolled hypertension [I10] 10/14/2020 Yes      Problems Resolved During this Admission:       VTE Risk Mitigation (From admission, onward)         Ordered     IP VTE HIGH RISK PATIENT  Once      10/14/20 1657     Place sequential compression device  Until discontinued      10/14/20 1657                Sana Benites DNP  Neurology  [facility]

## 2020-10-16 ENCOUNTER — ANESTHESIA (OUTPATIENT)
Dept: SURGERY | Facility: HOSPITAL | Age: 80
DRG: 253 | End: 2020-10-16
Payer: MEDICARE

## 2020-10-16 LAB
ANION GAP SERPL CALC-SCNC: 11 MMOL/L (ref 8–16)
APTT PPP: 28 SEC (ref 23.6–33.3)
BASOPHILS # BLD AUTO: 0.02 K/UL (ref 0–0.2)
BASOPHILS NFR BLD: 0.3 % (ref 0–1.9)
BUN SERPL-MCNC: 31 MG/DL (ref 8–23)
CALCIUM SERPL-MCNC: 9 MG/DL (ref 8.7–10.5)
CHLORIDE SERPL-SCNC: 98 MMOL/L (ref 95–110)
CO2 SERPL-SCNC: 27 MMOL/L (ref 23–29)
CREAT SERPL-MCNC: 1.4 MG/DL (ref 0.5–1.4)
DIFFERENTIAL METHOD: NORMAL
EOSINOPHIL # BLD AUTO: 0.2 K/UL (ref 0–0.5)
EOSINOPHIL NFR BLD: 2.8 % (ref 0–8)
ERYTHROCYTE [DISTWIDTH] IN BLOOD BY AUTOMATED COUNT: 12.3 % (ref 11.5–14.5)
EST. GFR  (AFRICAN AMERICAN): 40.9 ML/MIN/1.73 M^2
EST. GFR  (NON AFRICAN AMERICAN): 35.5 ML/MIN/1.73 M^2
GLUCOSE SERPL-MCNC: 173 MG/DL (ref 70–110)
HCT VFR BLD AUTO: 43 % (ref 37–48.5)
HGB BLD-MCNC: 13.9 G/DL (ref 12–16)
IMM GRANULOCYTES # BLD AUTO: 0.02 K/UL (ref 0–0.04)
IMM GRANULOCYTES NFR BLD AUTO: 0.3 % (ref 0–0.5)
INR PPP: 1.1
LYMPHOCYTES # BLD AUTO: 1.3 K/UL (ref 1–4.8)
LYMPHOCYTES NFR BLD: 19.8 % (ref 18–48)
MCH RBC QN AUTO: 29.3 PG (ref 27–31)
MCHC RBC AUTO-ENTMCNC: 32.3 G/DL (ref 32–36)
MCV RBC AUTO: 91 FL (ref 82–98)
MONOCYTES # BLD AUTO: 0.7 K/UL (ref 0.3–1)
MONOCYTES NFR BLD: 10.1 % (ref 4–15)
NEUTROPHILS # BLD AUTO: 4.4 K/UL (ref 1.8–7.7)
NEUTROPHILS NFR BLD: 66.7 % (ref 38–73)
NRBC BLD-RTO: 0 /100 WBC
PLATELET # BLD AUTO: 166 K/UL (ref 150–350)
PMV BLD AUTO: 10.1 FL (ref 9.2–12.9)
POC ACTIVATED CLOTTING TIME K: 120 SEC (ref 74–137)
POC ACTIVATED CLOTTING TIME K: 158 SEC (ref 74–137)
POC ACTIVATED CLOTTING TIME K: 301 SEC (ref 74–137)
POC ACTIVATED CLOTTING TIME K: 373 SEC (ref 74–137)
POTASSIUM SERPL-SCNC: 3.9 MMOL/L (ref 3.5–5.1)
PROTHROMBIN TIME: 13.6 SEC (ref 10.6–14.8)
RBC # BLD AUTO: 4.75 M/UL (ref 4–5.4)
SAMPLE: ABNORMAL
SAMPLE: NORMAL
SODIUM SERPL-SCNC: 136 MMOL/L (ref 136–145)
WBC # BLD AUTO: 6.53 K/UL (ref 3.9–12.7)

## 2020-10-16 PROCEDURE — 27202107 HC XP QUATRO SENSOR: Performed by: ANESTHESIOLOGY

## 2020-10-16 PROCEDURE — 27000654 HC CATH IV JELCO: Performed by: ANESTHESIOLOGY

## 2020-10-16 PROCEDURE — 63600175 PHARM REV CODE 636 W HCPCS

## 2020-10-16 PROCEDURE — C1763 CONN TISS, NON-HUMAN: HCPCS | Performed by: THORACIC SURGERY (CARDIOTHORACIC VASCULAR SURGERY)

## 2020-10-16 PROCEDURE — 25000003 PHARM REV CODE 250: Performed by: INTERNAL MEDICINE

## 2020-10-16 PROCEDURE — 27000656 HC EYE GOGGLES: Performed by: ANESTHESIOLOGY

## 2020-10-16 PROCEDURE — 88304 TISSUE EXAM BY PATHOLOGIST: CPT | Mod: TC

## 2020-10-16 PROCEDURE — 25000003 PHARM REV CODE 250: Performed by: NURSE ANESTHETIST, CERTIFIED REGISTERED

## 2020-10-16 PROCEDURE — 37000008 HC ANESTHESIA 1ST 15 MINUTES: Performed by: THORACIC SURGERY (CARDIOTHORACIC VASCULAR SURGERY)

## 2020-10-16 PROCEDURE — 37000009 HC ANESTHESIA EA ADD 15 MINS: Performed by: THORACIC SURGERY (CARDIOTHORACIC VASCULAR SURGERY)

## 2020-10-16 PROCEDURE — 27000675 HC TUBING MICRODRIP: Performed by: ANESTHESIOLOGY

## 2020-10-16 PROCEDURE — 27100025 HC TUBING, SET FLUID WARMER: Performed by: ANESTHESIOLOGY

## 2020-10-16 PROCEDURE — 27000676 HC TUBING PRIMARY PLUMSET: Performed by: ANESTHESIOLOGY

## 2020-10-16 PROCEDURE — 25000003 PHARM REV CODE 250: Performed by: THORACIC SURGERY (CARDIOTHORACIC VASCULAR SURGERY)

## 2020-10-16 PROCEDURE — 36415 COLL VENOUS BLD VENIPUNCTURE: CPT

## 2020-10-16 PROCEDURE — 80048 BASIC METABOLIC PNL TOTAL CA: CPT

## 2020-10-16 PROCEDURE — 27201423 OPTIME MED/SURG SUP & DEVICES STERILE SUPPLY: Performed by: THORACIC SURGERY (CARDIOTHORACIC VASCULAR SURGERY)

## 2020-10-16 PROCEDURE — 27000284 HC CANNULA NASAL: Performed by: ANESTHESIOLOGY

## 2020-10-16 PROCEDURE — 27200677 HC TRANSDUCER MONITOR KIT SINGLE: Performed by: ANESTHESIOLOGY

## 2020-10-16 PROCEDURE — 63600175 PHARM REV CODE 636 W HCPCS: Performed by: NURSE ANESTHETIST, CERTIFIED REGISTERED

## 2020-10-16 PROCEDURE — 85610 PROTHROMBIN TIME: CPT

## 2020-10-16 PROCEDURE — 36000706: Performed by: THORACIC SURGERY (CARDIOTHORACIC VASCULAR SURGERY)

## 2020-10-16 PROCEDURE — 27201107 HC STYLET, STANDARD: Performed by: ANESTHESIOLOGY

## 2020-10-16 PROCEDURE — 27000657 HC HEADREST, PRONE: Performed by: ANESTHESIOLOGY

## 2020-10-16 PROCEDURE — 36000707: Performed by: THORACIC SURGERY (CARDIOTHORACIC VASCULAR SURGERY)

## 2020-10-16 PROCEDURE — 27000658 HC ARTERIAL LINE ALL: Performed by: ANESTHESIOLOGY

## 2020-10-16 PROCEDURE — 20000000 HC ICU ROOM

## 2020-10-16 PROCEDURE — 63600175 PHARM REV CODE 636 W HCPCS: Performed by: THORACIC SURGERY (CARDIOTHORACIC VASCULAR SURGERY)

## 2020-10-16 PROCEDURE — 85025 COMPLETE CBC W/AUTO DIFF WBC: CPT

## 2020-10-16 PROCEDURE — 27000671 HC TUBING MICROBORE EXT: Performed by: ANESTHESIOLOGY

## 2020-10-16 PROCEDURE — 27000673 HC TUBING BLOOD Y: Performed by: ANESTHESIOLOGY

## 2020-10-16 PROCEDURE — 85730 THROMBOPLASTIN TIME PARTIAL: CPT

## 2020-10-16 PROCEDURE — C9248 INJ, CLEVIDIPINE BUTYRATE: HCPCS | Performed by: NURSE ANESTHETIST, CERTIFIED REGISTERED

## 2020-10-16 PROCEDURE — C9248 INJ, CLEVIDIPINE BUTYRATE: HCPCS

## 2020-10-16 PROCEDURE — 27000666 HC INFUSOR PRESSURE BAG: Performed by: ANESTHESIOLOGY

## 2020-10-16 PROCEDURE — 27800903 OPTIME MED/SURG SUP & DEVICES OTHER IMPLANTS: Performed by: THORACIC SURGERY (CARDIOTHORACIC VASCULAR SURGERY)

## 2020-10-16 DEVICE — PATCH VASCULAR .8CMX8CM: Type: IMPLANTABLE DEVICE | Site: CAROTID | Status: FUNCTIONAL

## 2020-10-16 RX ORDER — PHENYLEPHRINE HYDROCHLORIDE 10 MG/ML
INJECTION INTRAVENOUS
Status: DISCONTINUED | OUTPATIENT
Start: 2020-10-16 | End: 2020-10-16

## 2020-10-16 RX ORDER — HEPARIN SODIUM 5000 [USP'U]/ML
INJECTION, SOLUTION INTRAVENOUS; SUBCUTANEOUS
Status: DISCONTINUED | OUTPATIENT
Start: 2020-10-16 | End: 2020-10-16 | Stop reason: HOSPADM

## 2020-10-16 RX ORDER — PROTAMINE SULFATE 10 MG/ML
INJECTION, SOLUTION INTRAVENOUS
Status: DISCONTINUED | OUTPATIENT
Start: 2020-10-16 | End: 2020-10-16

## 2020-10-16 RX ORDER — ONDANSETRON HYDROCHLORIDE 2 MG/ML
INJECTION, SOLUTION INTRAMUSCULAR; INTRAVENOUS
Status: DISCONTINUED | OUTPATIENT
Start: 2020-10-16 | End: 2020-10-16

## 2020-10-16 RX ORDER — PROPOFOL 10 MG/ML
VIAL (ML) INTRAVENOUS
Status: DISCONTINUED | OUTPATIENT
Start: 2020-10-16 | End: 2020-10-16

## 2020-10-16 RX ORDER — LIDOCAINE HYDROCHLORIDE 20 MG/ML
INJECTION, SOLUTION EPIDURAL; INFILTRATION; INTRACAUDAL; PERINEURAL
Status: DISCONTINUED | OUTPATIENT
Start: 2020-10-16 | End: 2020-10-16

## 2020-10-16 RX ORDER — HYDROCODONE BITARTRATE AND ACETAMINOPHEN 5; 325 MG/1; MG/1
1 TABLET ORAL EVERY 4 HOURS PRN
Status: DISCONTINUED | OUTPATIENT
Start: 2020-10-16 | End: 2020-10-17 | Stop reason: HOSPADM

## 2020-10-16 RX ORDER — ONDANSETRON 2 MG/ML
4 INJECTION INTRAMUSCULAR; INTRAVENOUS EVERY 12 HOURS PRN
Status: DISCONTINUED | OUTPATIENT
Start: 2020-10-16 | End: 2020-10-17 | Stop reason: HOSPADM

## 2020-10-16 RX ORDER — FENTANYL CITRATE 50 UG/ML
INJECTION, SOLUTION INTRAMUSCULAR; INTRAVENOUS
Status: DISCONTINUED | OUTPATIENT
Start: 2020-10-16 | End: 2020-10-16

## 2020-10-16 RX ORDER — FAMOTIDINE 10 MG/ML
INJECTION INTRAVENOUS
Status: DISCONTINUED | OUTPATIENT
Start: 2020-10-16 | End: 2020-10-16

## 2020-10-16 RX ORDER — DEXAMETHASONE SODIUM PHOSPHATE 4 MG/ML
INJECTION, SOLUTION INTRA-ARTICULAR; INTRALESIONAL; INTRAMUSCULAR; INTRAVENOUS; SOFT TISSUE
Status: DISCONTINUED | OUTPATIENT
Start: 2020-10-16 | End: 2020-10-16

## 2020-10-16 RX ORDER — ONDANSETRON 2 MG/ML
INJECTION INTRAMUSCULAR; INTRAVENOUS
Status: COMPLETED
Start: 2020-10-16 | End: 2020-10-16

## 2020-10-16 RX ORDER — CEFAZOLIN SODIUM 2 G/50ML
2 SOLUTION INTRAVENOUS
Status: COMPLETED | OUTPATIENT
Start: 2020-10-16 | End: 2020-10-17

## 2020-10-16 RX ORDER — ACETAMINOPHEN 325 MG/1
650 TABLET ORAL EVERY 6 HOURS PRN
Status: DISCONTINUED | OUTPATIENT
Start: 2020-10-16 | End: 2020-10-17 | Stop reason: HOSPADM

## 2020-10-16 RX ORDER — SODIUM CHLORIDE, SODIUM LACTATE, POTASSIUM CHLORIDE, CALCIUM CHLORIDE 600; 310; 30; 20 MG/100ML; MG/100ML; MG/100ML; MG/100ML
INJECTION, SOLUTION INTRAVENOUS CONTINUOUS PRN
Status: DISCONTINUED | OUTPATIENT
Start: 2020-10-16 | End: 2020-10-16

## 2020-10-16 RX ORDER — CEFAZOLIN SODIUM 1 G/3ML
INJECTION, POWDER, FOR SOLUTION INTRAMUSCULAR; INTRAVENOUS
Status: DISCONTINUED | OUTPATIENT
Start: 2020-10-16 | End: 2020-10-16 | Stop reason: HOSPADM

## 2020-10-16 RX ORDER — HEPARIN SODIUM 10000 [USP'U]/ML
INJECTION, SOLUTION INTRAVENOUS; SUBCUTANEOUS
Status: DISCONTINUED | OUTPATIENT
Start: 2020-10-16 | End: 2020-10-16

## 2020-10-16 RX ORDER — SODIUM CHLORIDE 9 MG/ML
INJECTION, SOLUTION INTRAVENOUS CONTINUOUS
Status: DISCONTINUED | OUTPATIENT
Start: 2020-10-16 | End: 2020-10-17 | Stop reason: HOSPADM

## 2020-10-16 RX ORDER — ROCURONIUM BROMIDE 10 MG/ML
INJECTION, SOLUTION INTRAVENOUS
Status: DISCONTINUED | OUTPATIENT
Start: 2020-10-16 | End: 2020-10-16

## 2020-10-16 RX ORDER — MUPIROCIN 20 MG/G
1 OINTMENT TOPICAL 2 TIMES DAILY
Status: DISCONTINUED | OUTPATIENT
Start: 2020-10-16 | End: 2020-10-17 | Stop reason: HOSPADM

## 2020-10-16 RX ORDER — SUCCINYLCHOLINE CHLORIDE 20 MG/ML
INJECTION INTRAMUSCULAR; INTRAVENOUS
Status: DISCONTINUED | OUTPATIENT
Start: 2020-10-16 | End: 2020-10-16

## 2020-10-16 RX ORDER — ETOMIDATE 2 MG/ML
INJECTION INTRAVENOUS
Status: DISCONTINUED | OUTPATIENT
Start: 2020-10-16 | End: 2020-10-16

## 2020-10-16 RX ADMIN — FENTANYL CITRATE 100 MCG: 50 INJECTION, SOLUTION INTRAMUSCULAR; INTRAVENOUS at 09:10

## 2020-10-16 RX ADMIN — MUPIROCIN 1 G: 20 OINTMENT TOPICAL at 08:10

## 2020-10-16 RX ADMIN — SODIUM CHLORIDE: 0.9 INJECTION, SOLUTION INTRAVENOUS at 01:10

## 2020-10-16 RX ADMIN — METOPROLOL SUCCINATE 25 MG: 25 TABLET, FILM COATED, EXTENDED RELEASE ORAL at 08:10

## 2020-10-16 RX ADMIN — HEPARIN SODIUM 10000 UNITS: 10000 INJECTION, SOLUTION INTRAVENOUS; SUBCUTANEOUS at 10:10

## 2020-10-16 RX ADMIN — ROCURONIUM BROMIDE 10 MG: 10 INJECTION, SOLUTION INTRAVENOUS at 09:10

## 2020-10-16 RX ADMIN — PHENYLEPHRINE HYDROCHLORIDE 120 MCG: 10 INJECTION INTRAVENOUS at 11:10

## 2020-10-16 RX ADMIN — SODIUM CHLORIDE, SODIUM LACTATE, POTASSIUM CHLORIDE, AND CALCIUM CHLORIDE: .6; .31; .03; .02 INJECTION, SOLUTION INTRAVENOUS at 09:10

## 2020-10-16 RX ADMIN — ONDANSETRON 4 MG: 2 INJECTION, SOLUTION INTRAMUSCULAR; INTRAVENOUS at 09:10

## 2020-10-16 RX ADMIN — ATORVASTATIN CALCIUM 10 MG: 10 TABLET, FILM COATED ORAL at 08:10

## 2020-10-16 RX ADMIN — DEXAMETHASONE SODIUM PHOSPHATE 8 MG: 4 INJECTION, SOLUTION INTRAMUSCULAR; INTRAVENOUS at 09:10

## 2020-10-16 RX ADMIN — ONDANSETRON HYDROCHLORIDE 4 MG: 2 SOLUTION INTRAMUSCULAR; INTRAVENOUS at 01:10

## 2020-10-16 RX ADMIN — PHENYLEPHRINE HYDROCHLORIDE 80 MCG: 10 INJECTION INTRAVENOUS at 10:10

## 2020-10-16 RX ADMIN — PROPOFOL 100 MG: 10 INJECTION, EMULSION INTRAVENOUS at 09:10

## 2020-10-16 RX ADMIN — PHENYLEPHRINE HYDROCHLORIDE 60 MCG/MIN: 10 INJECTION INTRAVENOUS at 09:10

## 2020-10-16 RX ADMIN — PROTAMINE SULFATE 25 MG: 10 INJECTION, SOLUTION INTRAVENOUS at 11:10

## 2020-10-16 RX ADMIN — CLEVIPIDINE 2 MG/HR: 0.5 EMULSION INTRAVENOUS at 08:10

## 2020-10-16 RX ADMIN — FAMOTIDINE 20 MG: 10 INJECTION, SOLUTION INTRAVENOUS at 09:10

## 2020-10-16 RX ADMIN — PHENYLEPHRINE HYDROCHLORIDE 40 MCG: 10 INJECTION INTRAVENOUS at 10:10

## 2020-10-16 RX ADMIN — Medication 160 MG: at 09:10

## 2020-10-16 RX ADMIN — SODIUM CHLORIDE: 0.9 INJECTION, SOLUTION INTRAVENOUS at 09:10

## 2020-10-16 RX ADMIN — CLEVIPIDINE 2 MG/HR: 0.5 EMULSION INTRAVENOUS at 10:10

## 2020-10-16 RX ADMIN — ETOMIDATE 20 MG: 2 INJECTION, SOLUTION INTRAVENOUS at 09:10

## 2020-10-16 RX ADMIN — ACETAMINOPHEN 500 MG: 500 TABLET, FILM COATED ORAL at 05:10

## 2020-10-16 RX ADMIN — ONDANSETRON 4 MG: 2 INJECTION INTRAMUSCULAR; INTRAVENOUS at 01:10

## 2020-10-16 RX ADMIN — PHENYLEPHRINE HYDROCHLORIDE 100 MCG: 10 INJECTION INTRAVENOUS at 09:10

## 2020-10-16 RX ADMIN — CEFAZOLIN SODIUM 2 G: 2 SOLUTION INTRAVENOUS at 08:10

## 2020-10-16 RX ADMIN — ROCURONIUM BROMIDE 40 MG: 10 INJECTION, SOLUTION INTRAVENOUS at 09:10

## 2020-10-16 RX ADMIN — NITROGLYCERIN 1 PATCH: 0.1 PATCH TRANSDERMAL at 08:10

## 2020-10-16 RX ADMIN — PHENYLEPHRINE HYDROCHLORIDE 200 MCG: 10 INJECTION INTRAVENOUS at 09:10

## 2020-10-16 RX ADMIN — LIDOCAINE HYDROCHLORIDE 100 MG: 20 INJECTION, SOLUTION EPIDURAL; INFILTRATION; INTRACAUDAL; PERINEURAL at 09:10

## 2020-10-16 NOTE — TRANSFER OF CARE
"Anesthesia Transfer of Care Note    Patient: Roxanne Hernandez    Procedure(s) Performed: Procedure(s) (LRB):  ENDARTERECTOMY, CAROTID (Right)    Patient location: ICU    Anesthesia Type: general    Transport from OR: Transported from OR on 100% O2 by closed face mask with adequate spontaneous ventilation. Continuous ECG monitoring in transport. Continuous SpO2 monitoring in transport. Continuos invasive BP monitoring in transport    Post pain: adequate analgesia    Post assessment: no apparent anesthetic complications    Post vital signs: stable    Nausea/Vomiting: no nausea/vomiting    Complications: none    Transfer of care protocol was followed      Last vitals:   Visit Vitals  /80 (BP Location: Right arm, Patient Position: Lying)   Pulse 80   Temp 36.4 °C (97.6 °F)   Resp 18   Ht 4' 9" (1.448 m)   Wt 78.7 kg (173 lb 8 oz)   SpO2 97%   BMI 37.55 kg/m²     "

## 2020-10-16 NOTE — PLAN OF CARE
Pt should have no cm needs. Uses [facility] in [place]. Dr Yoni Hernandez is PCP. Cm to follow until discharge from hospital.   10/16/20 8672   Discharge Assessment   Assessment Type Discharge Planning Assessment   Confirmed/corrected address and phone number on facesheet? Yes   Assessment information obtained from? Patient   Prior to hospitilization cognitive status: Alert/Oriented   Prior to hospitalization functional status: Independent   Current cognitive status: Alert/Oriented   Current Functional Status: Needs Assistance   Lives With spouse;child(homero), adult   Able to Return to Prior Arrangements yes   Is patient able to care for self after discharge? Unable to determine at this time (comments)   Who are your caregiver(s) and their phone number(s)? [facility] [phone]  Donna Hernandez  [facility] [phone]   Patient's perception of discharge disposition home or selfcare   Readmission Within the Last 30 Days no previous admission in last 30 days   Patient currently being followed by outpatient case management? No   Patient currently receives any other outside agency services? No   Equipment Currently Used at Home walker, rolling   Part D Coverage    Do you have any problems affording any of your prescribed medications? No   Is the patient taking medications as prescribed? yes   Does the patient have transportation home? Yes   Transportation Anticipated family or friend will provide   Dialysis Name and Scheduled days N/a   Discharge Plan A Home with family   Discharge Plan B Home with family   DME Needed Upon Discharge  none   Patient/Family in Agreement with Plan yes

## 2020-10-16 NOTE — OP NOTE
[facility]  Surgery Department  Operative Note    SUMMARY     Date of Procedure: 10/16/2020     Procedure: Procedure(s) (LRB):  ENDARTERECTOMY, CAROTID (Right)     Surgeon(s) and Role:     * Radu Kent MD - Primary    Assisting Surgeon: None    Pre-Operative Diagnosis: Bilateral carotid artery stenosis [I65.23]    Post-Operative Diagnosis: Post-Op Diagnosis Codes:     * Bilateral carotid artery stenosis [I65.23]    Anesthesia: General    Description of the Procedure: Patient taken operating room placed in supine position after adequate induction of general anesthesia was prepped and draped in usual sterile fashion.  Oblique incision was made in the neck taken down to subcutaneous tissue hemostasis maintained electrocautery.  Dissection was continued over the anterior border sternocleidomastoid muscle down to the level of the carotid artery.  The common carotid internal carotid external carotid vessels were individually identified mobilized isolated with a right angle clamp and encircled with either a vessel loop umbilical tape.  Superior thyroid artery was also isolated with a right angle clamp and encircled with a heavy silk suture.  With all vessels controlled the patient is systemically anticoagulated after the heparin was allowed to circulate for several minutes we proceeded.  The vessels were occluded and using 11 blade incision was made in the common carotid artery and then with the Luna scissor this was extended across the lesion into the internal carotid artery.  Once past the lesion we then went ahead and proceeded to place the shunt in the usual fashion and once the Sundt shunt was in place and secure we proceeded with the endarterectomy.  Starting proximally the plaque was mobilized and transected and then using the [place] we continued to mobilize the plaque in a cephalad direction working 1st up into the external carotid artery where the plaque was pulled free and then the continuing  into the internal carotid artery until the plaque feathered out quite nicely.  The intimal edge was then tacked down using interrupted 7 0 Prolene sutures.  We then irrigated with heparinized saline and meticulously removed any loose debris that was identified from the vessel.  Once this was done to a satisfactory degree the patch was brought up onto the field and using a 6 0 Prolene suture the arteriotomy was closed using the patch.  Prior to completing the patch suture line the shunt was removed in usual fashion without any difficulty backbleeding and flushing was done and then the common carotid internal carotid vessels were occluded again leaving the external carotid to back bleed as we tied the suture line.  The common carotid was then opened into the external carotid artery and then the internal carotid vessel was released.  After good hemostasis was assured the wound was copiously irrigated with antibiotic solution and we proceeded to close 1st the subcutaneous layer using 2 0 Vicryl running suture and then the skin edges reapproximated using a 4 0 Monocryl subcuticular stitch.  The wound was clean sterile dressing was applied the patient tolerated the procedure well was transported to the intensive care unit in stable condition    Complications: No    Estimated Blood Loss (EBL): 75 mL           Implants:   Implant Name Type Inv. Item Serial No.  Lot No. LRB No. Used Action   PATCH VASCULAR .8CMX.8CM [id number] - [id number]  PATCH VASCULAR .8CMX.8CM [id number]   [id number] Right 1 Implanted       Specimens:   Specimen (12h ago, onward)     Start     Ordered    10/16/20 1055  Specimen to Pathology - Surgery  Once     Comments: Pre-op Diagnosis: Bilateral carotid artery stenosis [I65.23]Procedure(s):ENDARTERECTOMY, CAROTID Number of specimens: 1Name of specimens: RIGHT CAROTID PLAQUE      10/16/20 1055                        Condition: Good    Disposition: ICU - extubated and stable.

## 2020-10-16 NOTE — PLAN OF CARE
10/16/20 1441   Medicare Message   Important Message from Medicare regarding Discharge Appeal Rights Given to patient/caregiver;Explained to patient/caregiver;Signed/date by patient/caregiver   Date IMM was signed 10/16/20   Time IMM was signed 0542

## 2020-10-16 NOTE — PROGRESS NOTES
"10/15/2020 @ mid-morning, early afternoon & 9:25 PM     Progress Note, Attending Physician  [facility]     Patient Name: Roxanne Hernandez   MRN: [id number]   Admission Date and Time: 10/14/2020 10:31 AM   Patient Class: OP- Observation   Hospital length of stay: 0 days    Code status: Full Code   Attending Physician: Yoni Hernandez MD   Principal Problem: Hypertensive emergency without congestive heart failure      Current Facility-Administered Medications:     acetaminophen tablet 500 mg, 500 mg, Oral, Q4H PRN, Yoni Hernandez MD, 500 mg at 10/15/20 2107    atorvastatin tablet 10 mg, 10 mg, Oral, QHS, Yoni Hernandez MD    docusate sodium capsule 100 mg, 100 mg, Oral, PRN, Yoni Hernandez MD    furosemide tablet 20 mg, 20 mg, Oral, QAM, Yoni Hernandez MD, 20 mg at 10/15/20 0851    melatonin tablet 3 mg, 3 mg, Oral, Nightly PRN, Yoni Hernandez MD    metoprolol succinate (TOPROL-XL) 24 hr tablet 25 mg, 25 mg, Oral, QAM, Yoni Hernandez MD, 25 mg at 10/15/20 0851    nitroGLYCERIN 0.1 mg/hr TD PT24 1 patch, 1 patch, Transdermal, Daily, Yoni Hernandez MD, 1 patch at 10/15/20 0852    sodium chloride 0.9% flush 10 mL, 10 mL, Intravenous, PRN, Yoni Hernandez MD    Facility-Administered Medications Ordered in Other Encounters:     0.9%  NaCl infusion, , Intravenous, Continuous, Carmen Yepez MD, Last Rate: 0 mL/hr at 09/04/19 1141    aspirin EC tablet 325 mg, 325 mg, Oral, Once, Carmen Yepez MD    diazePAM tablet 5 mg, 5 mg, Oral, On Call Procedure, Carmen Yepez MD    diphenhydrAMINE capsule 25 mg, 25 mg, Oral, Once, Carmen Yepez MD     Subjective:     Since admission, she has felt better. Throughout the day of 10/15/2020 she reports she had only two brief "unsteady spells" each time when going back and forth to the bathroom.     I had nursing staff ambulate her around the nurses' station, and she did well with no symptoms.     She has no new complaints    " "  Physical Examination:     Temp:  [97.2 °F (36.2 °C)-98.1 °F (36.7 °C)]   Pulse:  [69-79]   Resp:  [16-19]   BP: (111-153)/(49-75)   SpO2:  [95 %-98 %]     A&Ox4 in NAD  HEENT = normal  Neck = normal  Lungs = clear  Heart = RRR S1 & S2 with no murmurs or gallops  Abodmen = obese benign  Ext = no edema   Neurologic: grossly intact   NO DVT   NO bedsores   IV clean (placed in the ER)     Objective:     EKG 10/14/2020 = NSR, LAD, IVCD, normal T waves diffusely    24 hour Holter 10/15-16/2020 = in process     Telemetry: she has had NSR throughout with periodic PAC's      CXR 10/14/2020 = stable benign     CTA neck and brain (with) 10/14/2020:    Interval carotid endarterectomy at the left carotid bulb, with no measurable stenosis on the left.   Short segment high-grade stenosis at the origin of the right internal carotid artery (90%).   Patent bilateral vertebral arteries.   No large vessel occlusion or aneurysm is identified.   Partially calcified plaque in the intracranial internal carotid arteries and additional/incidental findings as above.     Ultrasound bilateral lower extremities 10/14/2020 = NO DVT     Impression:   1. Hypertensive Urgency [id number]   a. 10/15/2020: resolved with perfect blood pressures today   2. Non-specific dizziness ([id number]) that is not true vertigo or orthostasis, but is mainly "episodic mild ataxia" without a focal neurologic deficit. This may be atypical "acute benign paroxysmal positional vertigo" or it may be an atypical manifestation of "'atypical TIA's due to her residual right carotid stenosis of 90%".  I discussed this in detail with CT/Vascular Surgeon Donte who agrees that her symptoms for this hospitalization are not clearly due to this carotid stenosis. Nevertheless, right carotid endarterectomy needs to be done to reduce her future risk of atheroembolic stroke, so he has agreed to proceed with right carotid endarterectomy on Friday 10/16/2020. I " have explained this rationale to the patient. She understands all that I say above, and she nevertheless agrees to undergo right carotid endarterectomy 10/16/2020.   3. Palpitations, with the only documented arrhythmia since admission being benign premature atrial contractions, in this patient with:   a. a pacemaker in place since 12/17/2019   b. 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 10/15/2020: she still displays only NSR and PAC's with no significant arrhythmias to date (on telemetry monitoring).   d. 10/15-16/2020 Holter is in process with no results available yet.  4. Other problems recorded below in the Problem Overview   5. Other problems as recorded on my office [facility] document, copied and pasted to the bottom of this report.     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 surgical therapy options discussion: patient advised that (in the asymptomatic state) medical treatment reduces her 5 year risk of stroke to 11%, whereas carotid endarterectomy reduces her 5 year risk of stroke to 5% with a small risk of karyn-operative stroke (see copy in her chart of the handout I gave her)she advised me 06/06/2011 that she wishes to proceed with medical treatment for now    CTA neck and brain (with contrast) 07/28/2020:   o Moderate atheromatous changes are noted in the aortic arch. There are moderate degrees of narrowing of the proximal segments  of the left common carotid artery and subclavian artery at their origin.  o There is mild tortuosity of the common carotid arteries.  o There is prominent plaque formation observed bilaterally at the level of the carotid bifurcations.   o There is bilateral high-grade stenosis of the origins of the internal carotid arteries (approximately 90%). The remainder of the internal carotid arteries are tortuous in their extracranial segment but patent. There is stenosis of the origin of the right external carotid artery.  o There is significant focal stenosis of the intracranial segment of the left internal carotid artery near the level of the anterior clinoid process.  o There is mild tortuosity of the vertebral arteries.   o There are no findings of high-grade stenosis or occlusion of the major intracranial arteries otherwise. No aneurysm or vascular malformation is noted.  o The cervical segments of the vertebral arteries are patent. There are mild-moderate stenoses of the origins of the vertebral arteries bilaterally.  o Scattered groundglass type opacities are observed within the visualized lung apices bilaterally. There is prominent multilevel cervical spondylosis.   Medical Literature Discussion re: carotid stenosis:    1.  Symptomatic patients:  Both the North American Symptomatic Carotid Endarterectomy Trial (NASCET)1 and the European Carotid Surgery Trial (ECST)2 showed a substantial benefit for surgery in patients with a symptomatic carotid artery stenosis of greater than 70 percent. In NASCET, the average cumulative ipsilateral stroke rate at 2 years was 26 percent for patients treated medically and 9 percent for those receiving the same medical treatment plus a carotid endarterectomy, corresponding to a 65 percent relative risk reduction favoring surgery. For every 100 patients treated surgically, 17 were spared an ipsilateral stroke over the next 2 years. The perioperative stroke plus death rate was 5.8  "percent in the surgical group and 3.3 percent in the medical group in the same 1-month period, in spite of optimal medical therapy. Thus the excess morbidity and mortality attributable to the surgery was 2.5 percent. If minor ischemic events are excluded from the analyses, the excess major stroke and death rate for the surgical patients was 1.2 percent (2.1 less 0.9 percent) and the excess fatality rate was 0.3 percent (0.6 less 0.3 percent). The NASCET and ECST investigators concluded that their patients with a 70 to 99 percent stenosis clearly benefited from carotid endarterectomy. Results in [facility] should be similar if patients are properly selected and the perioperative morbidity and mortality are comparably low.  Analysis of several subgroups yielded important information. The cumulative risk of ipsilateral stroke correlated with the degree of stenosis. While the absolute risk reduction favoring the entire surgical group was 17 percent in NASCET, the risk reduction favoring those with a 90 to 99 percent stenosis was 26 percent, and this decreased to 12 percent for those with a 70 to 79 percent stenosis. The overall 26 percent cumulative risk of an ipsilateral stroke at 2 years for this group of patients with "high-grade" stenosis calculates to an annual risk of 13 percent. The operative morbidity and mortality were similar for patients with different degrees of stenosis.   2.  Asymptomatic patients.  The Asymptomatic Carotid Atherosclerosis Study (ACAS)3  demonstrated that asymptomatic people with internal carotid artery stenoses greater than 60 percent are at low risk for stroke. More than 1600 patients were randomized to treatment with the best medical therapies available versus the same medical treatment plus carotid endarterectomy. Patients in the surgical group had a risk over 5 years for ipsilateral stroke (and any perioperative stroke or death) of 5.1 percent, whereas the risk for the " nonsurgical group was 11 percent. While this demonstrates a 53 percent relative risk reduction, the absolute risk reduction is only 5.9 percent over 5 years, or 1.2 percent annually. If only major strokes and death are counted, the comparable numbers are 43 percent relative risk reduction, and an absolute risk reduction of only 2.6 percent over 5 years, or 0.5 percent annually. This latter result was not statistically significant. There was no clear difference in benefit for those with moderate (60 to 80 percent) compared with severe (80 to 99 percent) stenosis.   Many of the strokes in the surgery group were caused by preoperative angiograms. If carotid artery ultrasonography and MR angiography replace catheter angiography, this morbidity and mortality will be obviated.   While there are benefits for carotid endarterectomy in patients with asymptomatic carotid stenosis, the benefits are very small. Medical therapy for reduction of atherosclerosis risk factors in addition to aspirin (325 mg/d) generally are recommended for patients with asymptomatic carotid stenosis.        Leakage of Periprosthetic Valve, Subsequent Encounter 2/11/2020    Aortic stenosis saga:   Echocardiogram ([facility]ICKY) 09/04/2019:    Aortic valve: heavily calcified with restricted leaflet motion. Moderate aortic regurgitation.    Mitral valve: thick and calcified with restricted leaflet motion. Mild mitral stenosis and regurgitation.    Tricuspid valve: is structurally normal with good leaflet excursion. Mild regurgitation.   Pulmonary valve: is structurally normal with good leaflet excursion. No significant regurgitation.   Overall LVEF appears normal.    No obvious shunt across the interatrial septum by color flow Doppler.     Cardiac catheterization 09/14/2019 (Timothy Salgado MD):   · Mid RCA lesion, 50% stenosed, nonsignificant by iFR assessment.  · Known severe aortic stenosis from echo    CTA Cardiac TAVR 09/19/2019:    Large left UVJ  stone measuring at 2.6 cm with severe left-sided hydroureteronephrosis.   Significant pneumobilia in the left hepatic lobe with air within the common bile duct.  Unclear if this is due to biliary enteric fistula, infectious process such as cholangitis, or incompetent sphincter of Oddi.  Unfortunately this is not well evaluated on this dedicated TAVR protocol examination.  Clinical correlation is advised.  Further evaluation could be performed with ERCP and dedicated abdominal CT with intravenous contrast.   TAVR measurements, as above.   Significant aortic valve and abdominal aorta atherosclerosis.   Nonspecific mesenteric haziness, possibly mesenteric panniculitis or adenitis.   Heterogeneity of the pulmonary parenchyma possibly related to elevated pulmonary vascular resistance, small airways disease, or both.   Small bilateral pleural effusions.    Cardiac catheterization 12/17/2019 (Herbert Ashley):    Successful left transfemoral 23 mm Evolute TAVR.  No paravalvular leak, mean gradient 3, peak velocity 1.5 post TAVR.    Echocardiogram (TTE) 12/26/2019   · Mild concentric left ventricular hypertrophy   · Normal left ventricular systolic function. The estimated ejection fraction is 60%.  · No wall motion abnormalities.  · Grade I (mild) left ventricular diastolic dysfunction consistent with impaired relaxation.  · There is a transcutaneously-placed aortic bioprosthesis present.  · Mild aortic regurgitation.  · Mild-to-moderate aortic valve stenosis.  · Aortic valve area is 1.23 cm2; peak velocity is 2.84 m/s; mean gradient is 18 mmHg.  · Mild left atrial enlargement.  · Mild tricuspid regurgitation.  · Normal right ventricular systolic function.  · Moderate mitral sclerosis.  · There is mild leaflet calcification of the Mitral Valve.  · Mild mitral stenosis. MVA by PHT is 2.21 sq cm.  · Normal central venous pressure (3 mmHg).   · The estimated PA systolic pressure is 32 mmHg.:     Echocardiogram (TTE)  01/22/2020:   · Normal left ventricular systolic function. The estimated ejection fraction is 65%.  · Concentric left ventricular remodeling.  · No wall motion abnormalities.  · Grade I (mild) left ventricular diastolic dysfunction consistent with impaired relaxation.  · Normal right ventricular systolic function.  · Severe left atrial enlargement.  · There is a transcutaneously-placed aortic bioprosthesis present.   · There is paravalvular aortic insufficiency present   · Mild mitral regurgitation.  · Normal central venous pressure (3 mmHg).  · The estimated PA systolic pressure is 29 mmHg.   · Trivial posterior pericardial effusion.      Chronic Diastolic Heart Failure 12/1/2000    Echocardiogram 12/01/2000 = hyperdynamic LV, with mild to moderate amount of pericardial fat, and left ventricular diastolic dysfunction   Echocardiogram 05/20/2011 = dilated left atrial cavity, normal LV thickness and function with LVEF @ 70%, mild aortic stenosis with valve area @ 1.09cm2, peak gradient = 34 mmHg, minimal prolapse of anterior mitral leaflet, mild TI, very small pericardial effusion        Coronary Artery Disease of Native Artery of Native Heart With Stable Angina Pectoris 9/14/2019    Cardiac catheterization 09/14/2019 (Timothy Salgado MD):    Mid RCA lesion, 50% stenosed, nonsignificant by iFR assessment.   Known severe aortic stenosis from echo  02/13/2020: it is unlikely that this trivial coronary artery disease is the cause of her episodic dyspnea that has been present since prior to 09/04/2019.     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   Ultrasound 01/31/1992 = cholelithiasis with tender gallbladder   Ultrasound abdomen 06/22/1995 = normal except for hypoechoic acoustic texture of the left hepatic lobe   ERCP #1 02/01/1992 ([place]) = choledocholithiasis  "with cholangitis   ERCP #2 06/28/1995 ([place]) = multiple filling defects due to stones   CT abdomen (with & without) 06/26/1995 = normal except for air in the biliary tree, fatty infiltration of the left lobe, calcified granuloma at right lung base   CT abdomen & pelvis (with & without) 12/02/1997 = normal/normal except for air in the biliary tree due to prior sphincterotomy   CTA Cardiac TAVR 09/19/2019: significant pneumobilia in the left hepatic lobe with air within the common bile duct         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    Barium enema 01/12/1998 = several sessile polyps, diverticulosis coli   Colonoscopy #1 08/07/1998 (Jamie) = multiple polyps; histopathology = adenomatous polyp with mild dysplasia, hyperplastic polyps   Colonoscopy #2 08/02/2006 (Jamie) = small cecal polyp (ablated), ascending polyp removed, 8mm transverse polyp, rectosigmoid polyp (ablated); histopathology = ascending and transverse colon polyps: tubular adenomas (2-3-year repeat advised)   Colonoscopy #3: due 08/02/2009      Uncontrolled Hypertension 10/14/2020   Pneumococcal Pneumonia (Resolved) 8/2/2020   Hypertensive Urgency (Resolved) 7/31/2020   Syncope and Collapse (Resolved) 7/28/2020   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 Yoni Hernandez MD     CT head (no contrast) 07/28/2020 = small focal area of probable remote infarction involves the splenium of the corpus callosum on the left, mild chronic small vessel ischemia, scattered vascular calcifications within the intracranial segments of the internal carotid and vertebral arteries      Shortness of Breath (Resolved) 2/11/2020    1. 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 In " "reality, this dyspnea syndrome has been going on since prior to her TAVR and since prior to the beginning of her workup which is dated to just prior to 09/04/2019.  2. 02/12/2020: I was in the process of writing discharge orders on this lady the evening of 02/12/2020 when her nurse came to me and reported that the patient was acutely dyspneic again, without chest pain, and at that point I did not have a proper diagnosis to explain her episodic dyspnea.    3. Consultation from Pulmonologist Caden received 02/13/2020   4. Consultation from Cardiologist Marleni received 02/13/2020   5. We are attributing this to her karyn-valvular TAVR leak, with plans for tincture of time to cause this problem to settle down      Dehydration Syndrome (Resolved) 2/11/2020   Left Ureteral Stone (Resolved) 11/13/2019   Hydronephrosis With Urinary Obstruction Due to Ureteral Calculus (Resolved) 11/11/2019    Left ureteral stone with hydronephrosis requiring  manipulation 11/13/2019 @ [facility] (Paddy Dunham MD)    CTA Cardiac TAVR 09/19/2019: large left UVJ stone measuring at 2.6 cm with severe left-sided hydroureteronephrosis    Intervention 11/13/2019: 1.  Left ureteroscopy 2.  Cystoscopy 3.  Stone basket extraction 4.  Laser lithotripsy 5.  Left ureteral stent placement 6.  Fluoro < 1h 11/13/2019 (Paddy Dunham @ [facility])    Ultrasound kidneys 02/11/2020 = moderate left-sided hydronephrosis with no shadowing stone identified; nonobstructing right-sided renal stones; likely debris within the bladder, consider correlation with urinalysis.      Urinalysis 02/12/2020 = normal    As of 02/11-13/2020 she has been completely asymptomatic for this abnormality    02/13/2020 on discharge day I am ordering her to report back to her established Urologist Paddy Dunham for further investigation for this problem.        Plan:   1. She is "stable and cleared for general anesthesia and vascular surgery".   2. Observation status continues " on Medicare requirement   3. BP correction has occurred   4. 24 hour Holter is in progress since the morning of 10/15/2020   5. SCD VTE prophylaxis since admission, with Lovenox being held in anticipation of tomorrow's CEA   6. Aspirin 325mg po qam has been discontinued by me this day 10/15/2020 in preparation for tomorrow's CEA.  She is on NO OTHER anti-platelet agent and she is on no anticoagulants other than     Yoni Hernandez MD

## 2020-10-16 NOTE — PROGRESS NOTES
10/16/2020 @ 12:23 PM     Progress Note, Attending Physician  [facility]     Patient Name: Roxanne Hernandez   MRN: [id number]   Admission Date and Time: 10/14/2020 10:31 AM   Patient Class: IP- Inpatient   Hospital length of stay: 0 days    Code status: Full Code   Attending Physician: Yoni Hernandez MD   Principal Problem: Hypertensive emergency without congestive heart failure      Current Facility-Administered Medications:     acetaminophen tablet 500 mg, 500 mg, Oral, Q4H PRN, Yoni Hernandez MD, 500 mg at 10/15/20 2107    atorvastatin tablet 10 mg, 10 mg, Oral, QHS, Yoni Hernandez MD, 10 mg at 10/15/20 2145    docusate sodium capsule 100 mg, 100 mg, Oral, PRN, Yoni Hernandez MD    furosemide tablet 20 mg, 20 mg, Oral, QAM, Yoni Hernandez MD, 20 mg at 10/15/20 0851    heparin (porcine) injection, , , PRN, Radu Kent MD, 5,000 Units at 10/16/20 1014    melatonin tablet 3 mg, 3 mg, Oral, Nightly PRN, Yoni Hernandez MD    metoprolol succinate (TOPROL-XL) 24 hr tablet 25 mg, 25 mg, Oral, QAM, Yoni Hernandez MD, 25 mg at 10/16/20 0811    nitroGLYCERIN 0.1 mg/hr TD PT24 1 patch, 1 patch, Transdermal, Daily, Yoni Hernandez MD, 1 patch at 10/16/20 0811    sodium chloride 0.9% flush 10 mL, 10 mL, Intravenous, PRN, Yoni Hernandez MD    Facility-Administered Medications Ordered in Other Encounters:     0.9%  NaCl infusion, , Intravenous, Continuous, Carmen Yepez MD, Last Rate: 0 mL/hr at 09/04/19 1141    aspirin EC tablet 325 mg, 325 mg, Oral, Once, Carmen Yepez MD    clevidipine (CLEVIPREX) infusion 0.5 mg/mL, , , Continuous PRN, Jose M Martin CRNA, Stopped at 10/16/20 1005    dexamethasone injection, , , PRN, Jose M Martin CRNA, 8 mg at 10/16/20 0921    diazePAM tablet 5 mg, 5 mg, Oral, On Call Procedure, Carmen Yepez MD    diphenhydrAMINE capsule 25 mg, 25 mg, Oral, Once, Carmen Yepez MD    etomidate injection, , , PRN, Jose M Martin,  "CRNA, 20 mg at 10/16/20 0919    famotidine (PF) injection, , , PRN, Jose M Martin CRNA, 20 mg at 10/16/20 0929    fentaNYL injection, , , PRN, Jose M Martin CRNA, 100 mcg at 10/16/20 0919    heparin (porcine) injection, , , PRN, Jose M Martin CRNA, 10,000 Units at 10/16/20 1026    lactated ringers infusion, , , Continuous PRNJose M CRNA    lidocaine (PF) 20 mg/ml (2%) injection, , Intravenous, PRN, Jose M Martin CRNA, 100 mg at 10/16/20 0919    ondansetron HCl (PF) injection, , , PRN, Jose M Martin CRNA, 4 mg at 10/16/20 0907    phenylephrine (AVA-SYNEPHRINE) 20 mg in sodium chloride 0.9% 250 mL infusion, , , Continuous PRN, Jose M Martin CRNA, Last Rate: 45 mL/hr at 10/16/20 0926, 60 mcg/min at 10/16/20 0926    phenylephrine injection, , , PRN, Jose M Martin CRNA, 200 mcg at 10/16/20 0946    propofol (DIPRIVAN) 10 mg/mL infusion, , , PRNJose M CRNA, 100 mg at 10/16/20 0929    rocuronium injection, , , PRN, Jose M Martin CRNA, 40 mg at 10/16/20 0925    succinylcholine injection, , , PRN, Jose M Martin CRNA, 160 mg at 10/16/20 0919     Subjective:     She has just returned from surgery to SICU.  She is arousable, and moves all extremities to command.   Her vital signs are perfect.      Physical Examination:     Temp:  [97.5 °F (36.4 °C)-98.1 °F (36.7 °C)]   Pulse:  [73-80]   Resp:  [16-19]   BP: (111-153)/(60-80)   SpO2:  [95 %-98 %]     Sleepy, arousable to "half-awake" alertness, obeying commands   HEENT = normal  Neck = normal  Lungs = clear  Heart = RRR S1 & S2 with no murmurs or gallops  Abodmen = obese benign  Ext = no edema   Neurologic: grossly intact   NO DVT   NO bedsores   IV clean (placed in the ER)     Objective:     EKG 10/14/2020 = NSR, LAD, IVCD, normal T waves diffusely    24 hour Holter 10/15-16/2020 = in process     Telemetry: she has had NSR throughout with periodic PAC's      CXR 10/14/2020 = stable benign     CTA neck and brain (with) " "10/14/2020:    Interval carotid endarterectomy at the left carotid bulb, with no measurable stenosis on the left.   Short segment high-grade stenosis at the origin of the right internal carotid artery (90%).   Patent bilateral vertebral arteries.   No large vessel occlusion or aneurysm is identified.   Partially calcified plaque in the intracranial internal carotid arteries and additional/incidental findings as above.     Ultrasound bilateral lower extremities 10/14/2020 = NO DVT     Impression:   1. Hypertensive Urgency [id number]   a. 10/15/2020: resolved with perfect blood pressures today   b. 10/16/2020: perfect blood pressures   2. Non-specific dizziness (pfllzrd-[id number]) that is not true vertigo or orthostasis, but is mainly "episodic mild ataxia" without a focal neurologic deficit. This may be atypical "acute benign paroxysmal positional vertigo" or it may be an atypical manifestation of "'atypical TIA's due to her residual right carotid stenosis of 90%".  I discussed this in detail with CT/Vascular Surgeon Donte who agrees that her symptoms for this hospitalization are not clearly due to this carotid stenosis. Nevertheless, right carotid endarterectomy needs to be done to reduce her future risk of atheroembolic stroke, so he has agreed to proceed with right carotid endarterectomy on Friday 10/16/2020. I have explained this rationale to the patient. She understands all that I say above, and she nevertheless agrees to undergo right carotid endarterectomy 10/16/2020.   a. 10/16/2020: S/P right CEA this morning, without immediate complications.   3. Palpitations, with the only documented arrhythmia since admission being benign premature atrial contractions, in this patient with:   a. a pacemaker in place since 12/17/2019   b. 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 10/15/2020: she still displays only NSR and PAC's with no significant arrhythmias to date (on telemetry monitoring). "   d. 10/15-16/2020 Holter is in process with no results available yet.  4. Other problems recorded below in the Problem Overview   5. Other problems as recorded on my office [facility] document, copied and pasted to the bottom of this report.     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 surgical therapy options discussion: patient advised that (in the asymptomatic state) medical treatment reduces her 5 year risk of stroke to 11%, whereas carotid endarterectomy reduces her 5 year risk of stroke to 5% with a small risk of karyn-operative stroke (see copy in her chart of the handout I gave her)she advised me 06/06/2011 that she wishes to proceed with medical treatment for now    CTA neck and brain (with contrast) 07/28/2020:   o Moderate atheromatous changes are noted in the aortic arch. There are moderate degrees of narrowing of the proximal segments of the left common carotid artery and subclavian artery at their origin.  o There is mild tortuosity of the common carotid arteries.  o There is prominent plaque formation observed bilaterally at the level of the carotid bifurcations.   o There is bilateral high-grade stenosis of the origins of the internal carotid arteries (approximately 90%). The remainder of the internal carotid arteries are tortuous in their extracranial segment but patent. There is stenosis of the origin of the right external  carotid artery.  o There is significant focal stenosis of the intracranial segment of the left internal carotid artery near the level of the anterior clinoid process.  o There is mild tortuosity of the vertebral arteries.   o There are no findings of high-grade stenosis or occlusion of the major intracranial arteries otherwise. No aneurysm or vascular malformation is noted.  o The cervical segments of the vertebral arteries are patent. There are mild-moderate stenoses of the origins of the vertebral arteries bilaterally.  o Scattered groundglass type opacities are observed within the visualized lung apices bilaterally. There is prominent multilevel cervical spondylosis.   Medical Literature Discussion re: carotid stenosis:    1.  Symptomatic patients:  Both the North American Symptomatic Carotid Endarterectomy Trial (NASCET)1 and the European Carotid Surgery Trial (ECST)2 showed a substantial benefit for surgery in patients with a symptomatic carotid artery stenosis of greater than 70 percent. In NASCET, the average cumulative ipsilateral stroke rate at 2 years was 26 percent for patients treated medically and 9 percent for those receiving the same medical treatment plus a carotid endarterectomy, corresponding to a 65 percent relative risk reduction favoring surgery. For every 100 patients treated surgically, 17 were spared an ipsilateral stroke over the next 2 years. The perioperative stroke plus death rate was 5.8 percent in the surgical group and 3.3 percent in the medical group in the same 1-month period, in spite of optimal medical therapy. Thus the excess morbidity and mortality attributable to the surgery was 2.5 percent. If minor ischemic events are excluded from the analyses, the excess major stroke and death rate for the surgical patients was 1.2 percent (2.1 less 0.9 percent) and the excess fatality rate was 0.3 percent (0.6 less 0.3 percent). The NASCET and ECST investigators concluded that their  "patients with a 70 to 99 percent stenosis clearly benefited from carotid endarterectomy. Results in [facility] hospitals should be similar if patients are properly selected and the perioperative morbidity and mortality are comparably low.  Analysis of several subgroups yielded important information. The cumulative risk of ipsilateral stroke correlated with the degree of stenosis. While the absolute risk reduction favoring the entire surgical group was 17 percent in NASCET, the risk reduction favoring those with a 90 to 99 percent stenosis was 26 percent, and this decreased to 12 percent for those with a 70 to 79 percent stenosis. The overall 26 percent cumulative risk of an ipsilateral stroke at 2 years for this group of patients with "high-grade" stenosis calculates to an annual risk of 13 percent. The operative morbidity and mortality were similar for patients with different degrees of stenosis.   2.  Asymptomatic patients.  The Asymptomatic Carotid Atherosclerosis Study (ACAS)3  demonstrated that asymptomatic people with internal carotid artery stenoses greater than 60 percent are at low risk for stroke. More than 1600 patients were randomized to treatment with the best medical therapies available versus the same medical treatment plus carotid endarterectomy. Patients in the surgical group had a risk over 5 years for ipsilateral stroke (and any perioperative stroke or death) of 5.1 percent, whereas the risk for the nonsurgical group was 11 percent. While this demonstrates a 53 percent relative risk reduction, the absolute risk reduction is only 5.9 percent over 5 years, or 1.2 percent annually. If only major strokes and death are counted, the comparable numbers are 43 percent relative risk reduction, and an absolute risk reduction of only 2.6 percent over 5 years, or 0.5 percent annually. This latter result was not statistically significant. There was no clear difference in benefit for those with moderate (60 to " 80 percent) compared with severe (80 to 99 percent) stenosis.   Many of the strokes in the surgery group were caused by preoperative angiograms. If carotid artery ultrasonography and MR angiography replace catheter angiography, this morbidity and mortality will be obviated.   While there are benefits for carotid endarterectomy in patients with asymptomatic carotid stenosis, the benefits are very small. Medical therapy for reduction of atherosclerosis risk factors in addition to aspirin (325 mg/d) generally are recommended for patients with asymptomatic carotid stenosis.        Leakage of Periprosthetic Valve, Subsequent Encounter 2/11/2020    Aortic stenosis saga:   Echocardiogram ([facility]) 09/04/2019:    Aortic valve: heavily calcified with restricted leaflet motion. Moderate aortic regurgitation.    Mitral valve: thick and calcified with restricted leaflet motion. Mild mitral stenosis and regurgitation.    Tricuspid valve: is structurally normal with good leaflet excursion. Mild regurgitation.   Pulmonary valve: is structurally normal with good leaflet excursion. No significant regurgitation.   Overall LVEF appears normal.    No obvious shunt across the interatrial septum by color flow Doppler.     Cardiac catheterization 09/14/2019 (Timothy Salgado MD):   · Mid RCA lesion, 50% stenosed, nonsignificant by iFR assessment.  · Known severe aortic stenosis from echo    CTA Cardiac TAVR 09/19/2019:    Large left UVJ stone measuring at 2.6 cm with severe left-sided hydroureteronephrosis.   Significant pneumobilia in the left hepatic lobe with air within the common bile duct.  Unclear if this is due to biliary enteric fistula, infectious process such as cholangitis, or incompetent sphincter of Oddi.  Unfortunately this is not well evaluated on this dedicated TAVR protocol examination.  Clinical correlation is advised.  Further evaluation could be performed with ERCP and dedicated abdominal CT with intravenous  contrast.   TAVR measurements, as above.   Significant aortic valve and abdominal aorta atherosclerosis.   Nonspecific mesenteric haziness, possibly mesenteric panniculitis or adenitis.   Heterogeneity of the pulmonary parenchyma possibly related to elevated pulmonary vascular resistance, small airways disease, or both.   Small bilateral pleural effusions.    Cardiac catheterization 12/17/2019 (Herbert Ashley):    Successful left transfemoral 23 mm Evolute TAVR.  No paravalvular leak, mean gradient 3, peak velocity 1.5 post TAVR.    Echocardiogram (TTE) 12/26/2019   · Mild concentric left ventricular hypertrophy   · Normal left ventricular systolic function. The estimated ejection fraction is 60%.  · No wall motion abnormalities.  · Grade I (mild) left ventricular diastolic dysfunction consistent with impaired relaxation.  · There is a transcutaneously-placed aortic bioprosthesis present.  · Mild aortic regurgitation.  · Mild-to-moderate aortic valve stenosis.  · Aortic valve area is 1.23 cm2; peak velocity is 2.84 m/s; mean gradient is 18 mmHg.  · Mild left atrial enlargement.  · Mild tricuspid regurgitation.  · Normal right ventricular systolic function.  · Moderate mitral sclerosis.  · There is mild leaflet calcification of the Mitral Valve.  · Mild mitral stenosis. MVA by PHT is 2.21 sq cm.  · Normal central venous pressure (3 mmHg).   · The estimated PA systolic pressure is 32 mmHg.:     Echocardiogram (TTE) 01/22/2020:   · Normal left ventricular systolic function. The estimated ejection fraction is 65%.  · Concentric left ventricular remodeling.  · No wall motion abnormalities.  · Grade I (mild) left ventricular diastolic dysfunction consistent with impaired relaxation.  · Normal right ventricular systolic function.  · Severe left atrial enlargement.  · There is a transcutaneously-placed aortic bioprosthesis present.   · There is paravalvular aortic insufficiency present   · Mild mitral  regurgitation.  · Normal central venous pressure (3 mmHg).  · The estimated PA systolic pressure is 29 mmHg.   · Trivial posterior pericardial effusion.      Chronic Diastolic Heart Failure 12/1/2000    Echocardiogram 12/01/2000 = hyperdynamic LV, with mild to moderate amount of pericardial fat, and left ventricular diastolic dysfunction   Echocardiogram 05/20/2011 = dilated left atrial cavity, normal LV thickness and function with LVEF @ 70%, mild aortic stenosis with valve area @ 1.09cm2, peak gradient = 34 mmHg, minimal prolapse of anterior mitral leaflet, mild TI, very small pericardial effusion        Coronary Artery Disease of Native Artery of Native Heart With Stable Angina Pectoris 9/14/2019    Cardiac catheterization 09/14/2019 (Timothy Salgado MD):    Mid RCA lesion, 50% stenosed, nonsignificant by iFR assessment.   Known severe aortic stenosis from echo  02/13/2020: it is unlikely that this trivial coronary artery disease is the cause of her episodic dyspnea that has been present since prior to 09/04/2019.     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   Ultrasound 01/31/1992 = cholelithiasis with tender gallbladder   Ultrasound abdomen 06/22/1995 = normal except for hypoechoic acoustic texture of the left hepatic lobe   ERCP #1 02/01/1992 ([place]) = choledocholithiasis with cholangitis   ERCP #2 06/28/1995 ([place]) = multiple filling defects due to stones   CT abdomen (with & without) 06/26/1995 = normal except for air in the biliary tree, fatty infiltration of the left lobe, calcified granuloma at right lung base   CT abdomen & pelvis (with & without) 12/02/1997 = normal/normal except for air in the biliary tree due to prior sphincterotomy   CTA Cardiac TAVR 09/19/2019: significant pneumobilia in the left hepatic lobe with air within the common  "bile duct         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    Barium enema 01/12/1998 = several sessile polyps, diverticulosis coli   Colonoscopy #1 08/07/1998 (Jamie) = multiple polyps; histopathology = adenomatous polyp with mild dysplasia, hyperplastic polyps   Colonoscopy #2 08/02/2006 (Jamie) = small cecal polyp (ablated), ascending polyp removed, 8mm transverse polyp, rectosigmoid polyp (ablated); histopathology = ascending and transverse colon polyps: tubular adenomas (2-3-year repeat advised)   Colonoscopy #3: due 08/02/2009      Uncontrolled Hypertension 10/14/2020   Pneumococcal Pneumonia (Resolved) 8/2/2020   Hypertensive Urgency (Resolved) 7/31/2020   Syncope and Collapse (Resolved) 7/28/2020   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 Yoni Hernandez MD     CT head (no contrast) 07/28/2020 = small focal area of probable remote infarction involves the splenium of the corpus callosum on the left, mild chronic small vessel ischemia, scattered vascular calcifications within the intracranial segments of the internal carotid and vertebral arteries      Shortness of Breath (Resolved) 2/11/2020    1. 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 In reality, this dyspnea syndrome has been going on since prior to her TAVR and since prior to the beginning of her workup which is dated to just prior to 09/04/2019.  2. 02/12/2020: I was in the process of writing discharge orders on this lady the evening of 02/12/2020 when her nurse came to me and reported that the patient was acutely dyspneic again, without chest pain, and at that point I did not have a proper diagnosis to explain her episodic dyspnea.    3. Consultation from Pulmonologist " Caden received 02/13/2020   4. Consultation from Cardiologist Marleni received 02/13/2020   5. We are attributing this to her karyn-valvular TAVR leak, with plans for tincture of time to cause this problem to settle down      Dehydration Syndrome (Resolved) 2/11/2020   Left Ureteral Stone (Resolved) 11/13/2019   Hydronephrosis With Urinary Obstruction Due to Ureteral Calculus (Resolved) 11/11/2019    Left ureteral stone with hydronephrosis requiring  manipulation 11/13/2019 @ [facility] (Paddy Dunham MD)    CTA Cardiac TAVR 09/19/2019: large left UVJ stone measuring at 2.6 cm with severe left-sided hydroureteronephrosis    Intervention 11/13/2019: 1.  Left ureteroscopy 2.  Cystoscopy 3.  Stone basket extraction 4.  Laser lithotripsy 5.  Left ureteral stent placement 6.  Fluoro < 1h 11/13/2019 (Paddy Dunham @ [facility])    Ultrasound kidneys 02/11/2020 = moderate left-sided hydronephrosis with no shadowing stone identified; nonobstructing right-sided renal stones; likely debris within the bladder, consider correlation with urinalysis.      Urinalysis 02/12/2020 = normal    As of 02/11-13/2020 she has been completely asymptomatic for this abnormality    02/13/2020 on discharge day I am ordering her to report back to her established Urologist Paddy Dunham for further investigation for this problem.        Plan:   1. Inpatient status permitted beginning today 10/16/2020   2. Stable post-operative day of surgery for right carotid endarterectomy   3. BP correction has occurred   4. 24 hour Holter is in progress since the morning of 10/15/2020   5. SCD VTE prophylaxis since admission, with Lovenox being held in anticipation of tomorrow's CEA   6. Aspirin 325mg po qam has been discontinued by me this day 10/15/2020 in preparation for CEA of 10/16/2020. She is on NO OTHER anti-platelet agent and she is on no anticoagulants.  7. Lovenox VTE prophylaxis will begin when OK with Vascular Surgeon Donte.   8. Resume  an antiplatelet agent when OK with [place].      Yoni Hernandez MD

## 2020-10-16 NOTE — ANESTHESIA POSTPROCEDURE EVALUATION
Anesthesia Post Evaluation    Patient: Roxanne Hernandez    Procedure(s) Performed: Procedure(s) (LRB):  ENDARTERECTOMY, CAROTID (Right)    Final Anesthesia Type: general    Patient location during evaluation: ICU  Patient participation: Yes- Able to Participate  Level of consciousness: awake and alert  Post-procedure vital signs: reviewed and stable  Pain management: adequate  Airway patency: patent  RODRIGUEZ mitigation strategies: Extubation while patient is awake and Extubation and recovery carried out in lateral, semiupright, or other nonsupine position  PONV status at discharge: No PONV  Anesthetic complications: no      Cardiovascular status: blood pressure returned to baseline  Respiratory status: unassisted  Hydration status: euvolemic  Follow-up not needed.          Vitals Value Taken Time   /73 10/16/20 1216   Temp 36.3 10/16/20 1230   Pulse 76 10/16/20 1228   Resp 18 10/16/20 1228   SpO2 96 % 10/16/20 1228   Vitals shown include unvalidated device data.      No case tracking events are documented in the log.      Pain/Paula Score: Pain Rating Prior to Med Admin: 6 (10/15/2020  9:07 PM)  Pain Rating Post Med Admin: 2 (10/15/2020 11:00 PM)

## 2020-10-16 NOTE — NURSING
Pt received  From or  Sx  S/p right cea  Pt  somulent  Though awakes  Easily with soft voice  Yepez  Strongly  Follow  Commands   Facial symmetry noted   3 nc  In use  Sat 94 %  cleviprex 2mg/hr   Infusing to contol blood  pressure  abp 120/45 nibp 109/52   sr 76

## 2020-10-16 NOTE — PLAN OF CARE
Problem: Fall Injury Risk  Goal: Absence of Fall and Fall-Related Injury  Outcome: Ongoing, Progressing  Intervention: Identify and Manage Contributors to Fall Injury Risk  Flowsheets (Taken 10/15/2020 1906)  Self-Care Promotion:   independence encouraged   BADL personal objects within reach  Medication Review/Management: medications reviewed  Intervention: Promote Injury-Free Environment  Flowsheets (Taken 10/15/2020 1906)  Safety Promotion/Fall Prevention:   assistive device/personal item within reach   bed alarm set   Fall Risk reviewed with patient/family   nonskid shoes/socks when out of bed   side rails raised x 2   instructed to call staff for mobility  Environmental Safety Modification:   assistive device/personal items within reach   clutter free environment maintained   room organization consistent   room near unit station   lighting adjusted     Problem: Adult Inpatient Plan of Care  Goal: Plan of Care Review  Outcome: Ongoing, Progressing  Goal: Patient-Specific Goal (Individualization)  Outcome: Ongoing, Progressing  Goal: Absence of Hospital-Acquired Illness or Injury  Outcome: Ongoing, Progressing  Intervention: Identify and Manage Fall Risk  Flowsheets (Taken 10/15/2020 1906)  Safety Promotion/Fall Prevention:   assistive device/personal item within reach   bed alarm set   Fall Risk reviewed with patient/family   nonskid shoes/socks when out of bed   side rails raised x 2   instructed to call staff for mobility  Intervention: Prevent VTE (venous thromboembolism)  Flowsheets (Taken 10/15/2020 1906)  VTE Prevention/Management: remove, assess skin and reapply sequential compression device  Goal: Optimal Comfort and Wellbeing  Outcome: Ongoing, Progressing  Goal: Readiness for Transition of Care  Outcome: Ongoing, Progressing  Goal: Rounds/Family Conference  Outcome: Ongoing, Progressing     Problem: Diabetes Comorbidity  Goal: Blood Glucose Level Within Desired Range  Outcome: Ongoing,  Progressing  Intervention: Maintain Glycemic Control  Flowsheets (Taken 10/15/2020 1906)  Glycemic Management: blood glucose monitoring     Problem: Skin Injury Risk Increased  Goal: Skin Health and Integrity  Outcome: Ongoing, Progressing  Intervention: Optimize Skin Protection  Flowsheets (Taken 10/15/2020 1906)  Head of Bed (HOB): HOB elevated  Intervention: Promote and Optimize Oral Intake  Flowsheets (Taken 10/15/2020 1906)  Oral Nutrition Promotion: calorie dense foods provided

## 2020-10-17 VITALS
WEIGHT: 173.5 LBS | SYSTOLIC BLOOD PRESSURE: 128 MMHG | RESPIRATION RATE: 20 BRPM | HEART RATE: 79 BPM | DIASTOLIC BLOOD PRESSURE: 65 MMHG | OXYGEN SATURATION: 96 % | BODY MASS INDEX: 37.43 KG/M2 | HEIGHT: 57 IN | TEMPERATURE: 99 F

## 2020-10-17 LAB
ANION GAP SERPL CALC-SCNC: 9 MMOL/L (ref 8–16)
BASOPHILS # BLD AUTO: 0.01 K/UL (ref 0–0.2)
BASOPHILS NFR BLD: 0.1 % (ref 0–1.9)
BUN SERPL-MCNC: 33 MG/DL (ref 8–23)
CALCIUM SERPL-MCNC: 8.1 MG/DL (ref 8.7–10.5)
CHLORIDE SERPL-SCNC: 106 MMOL/L (ref 95–110)
CO2 SERPL-SCNC: 20 MMOL/L (ref 23–29)
CREAT SERPL-MCNC: 1.1 MG/DL (ref 0.5–1.4)
DIFFERENTIAL METHOD: ABNORMAL
EOSINOPHIL # BLD AUTO: 0 K/UL (ref 0–0.5)
EOSINOPHIL NFR BLD: 0.1 % (ref 0–8)
ERYTHROCYTE [DISTWIDTH] IN BLOOD BY AUTOMATED COUNT: 12.2 % (ref 11.5–14.5)
EST. GFR  (AFRICAN AMERICAN): 54.8 ML/MIN/1.73 M^2
EST. GFR  (NON AFRICAN AMERICAN): 47.5 ML/MIN/1.73 M^2
GLUCOSE SERPL-MCNC: 262 MG/DL (ref 70–110)
HCT VFR BLD AUTO: 36.7 % (ref 37–48.5)
HGB BLD-MCNC: 12.3 G/DL (ref 12–16)
IMM GRANULOCYTES # BLD AUTO: 0.06 K/UL (ref 0–0.04)
IMM GRANULOCYTES NFR BLD AUTO: 0.5 % (ref 0–0.5)
LYMPHOCYTES # BLD AUTO: 0.7 K/UL (ref 1–4.8)
LYMPHOCYTES NFR BLD: 5.8 % (ref 18–48)
MCH RBC QN AUTO: 29.7 PG (ref 27–31)
MCHC RBC AUTO-ENTMCNC: 33.5 G/DL (ref 32–36)
MCV RBC AUTO: 89 FL (ref 82–98)
MONOCYTES # BLD AUTO: 1 K/UL (ref 0.3–1)
MONOCYTES NFR BLD: 8 % (ref 4–15)
NEUTROPHILS # BLD AUTO: 10.7 K/UL (ref 1.8–7.7)
NEUTROPHILS NFR BLD: 85.5 % (ref 38–73)
NRBC BLD-RTO: 0 /100 WBC
OHS CV EVENT MONITOR DAY: 0
OHS CV HOLTER LENGTH DECIMAL HOURS: 30
OHS CV HOLTER LENGTH HOURS: 30
OHS CV HOLTER LENGTH MINUTES: 0
PLATELET # BLD AUTO: 152 K/UL (ref 150–350)
PMV BLD AUTO: 10.5 FL (ref 9.2–12.9)
POTASSIUM SERPL-SCNC: 4.6 MMOL/L (ref 3.5–5.1)
RBC # BLD AUTO: 4.14 M/UL (ref 4–5.4)
SODIUM SERPL-SCNC: 135 MMOL/L (ref 136–145)
WBC # BLD AUTO: 12.49 K/UL (ref 3.9–12.7)

## 2020-10-17 PROCEDURE — 36415 COLL VENOUS BLD VENIPUNCTURE: CPT

## 2020-10-17 PROCEDURE — 25000003 PHARM REV CODE 250: Performed by: THORACIC SURGERY (CARDIOTHORACIC VASCULAR SURGERY)

## 2020-10-17 PROCEDURE — 25000003 PHARM REV CODE 250: Performed by: INTERNAL MEDICINE

## 2020-10-17 PROCEDURE — 94761 N-INVAS EAR/PLS OXIMETRY MLT: CPT

## 2020-10-17 PROCEDURE — 63600175 PHARM REV CODE 636 W HCPCS: Performed by: THORACIC SURGERY (CARDIOTHORACIC VASCULAR SURGERY)

## 2020-10-17 PROCEDURE — 85025 COMPLETE CBC W/AUTO DIFF WBC: CPT

## 2020-10-17 PROCEDURE — 80048 BASIC METABOLIC PNL TOTAL CA: CPT

## 2020-10-17 RX ORDER — CHLORHEXIDINE GLUCONATE ORAL RINSE 1.2 MG/ML
15 SOLUTION DENTAL 2 TIMES DAILY
Status: DISCONTINUED | OUTPATIENT
Start: 2020-10-17 | End: 2020-10-17 | Stop reason: HOSPADM

## 2020-10-17 RX ORDER — ATORVASTATIN CALCIUM 10 MG/1
10 TABLET, FILM COATED ORAL NIGHTLY
Status: ON HOLD
Start: 2020-10-17 | End: 2023-12-01 | Stop reason: SDUPTHER

## 2020-10-17 RX ADMIN — FUROSEMIDE 20 MG: 20 TABLET ORAL at 07:10

## 2020-10-17 RX ADMIN — METOPROLOL SUCCINATE 25 MG: 25 TABLET, FILM COATED, EXTENDED RELEASE ORAL at 07:10

## 2020-10-17 RX ADMIN — MUPIROCIN 1 G: 20 OINTMENT TOPICAL at 08:10

## 2020-10-17 RX ADMIN — CEFAZOLIN SODIUM 2 G: 2 SOLUTION INTRAVENOUS at 05:10

## 2020-10-17 RX ADMIN — NITROGLYCERIN 1 PATCH: 0.1 PATCH TRANSDERMAL at 08:10

## 2020-10-17 NOTE — NURSING
Pt ambulated around  Unit tolerated  Well  Gait steady   Sitting  Presently  At  Nurses  Station with staff  conversing

## 2020-10-17 NOTE — DISCHARGE SUMMARY
"10/17/2020 @ 12:23 PM     Discharge Summary, Attending Physician  [facility]     Patient Name: Roxanne Hernandez   MRN: [id number]     Admission Date and Time: 10/14/2020 10:31 AM   Patient Class: IP- Inpatient   Hospital length of stay: 1 days    Discharge Date: 10/17/2020     Code status: Full Code   Attending Physician: Yoni Hernandez MD   Principal Problem: Bilateral carotid artery occlusion    Impression:   1. Hypertensive Urgency [id number]   a. 10/15/2020: resolved with perfect blood pressures today   b. 10/16/2020: perfect blood pressures   c. 10/17/2020: perfect blood pressures   2. Non-specific dizziness ([id number]-[id number]) that is not true vertigo or orthostasis, but is mainly "episodic mild ataxia" without a focal neurologic deficit. This may be atypical "acute benign paroxysmal positional vertigo" or it may be an atypical manifestation of "'atypical TIA's due to her residual right carotid stenosis of 90%".  I discussed this in detail with CT/Vascular Surgeon Donte who agrees that her symptoms for this hospitalization are not clearly due to this carotid stenosis. Nevertheless, right carotid endarterectomy needs to be done to reduce her future risk of atheroembolic stroke, so he has agreed to proceed with right carotid endarterectomy on Friday 10/16/2020. I have explained this rationale to the patient. She understands all that I say above, and she nevertheless agrees to undergo right carotid endarterectomy 10/16/2020.   a. 10/16/2020: S/P right carotid endarterectomy this morning (CT Surgeon Donte), without immediate complications.   b. 10/17/2020: doing remarkably well post-operative-day #1 with complete resolution of all of the symptoms she had prior to admission, feeling well.   3. Palpitations, with the only documented arrhythmia since admission being benign premature atrial contractions, in this patient with:   a. a pacemaker in place since 12/17/2019   b. 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 10/15/2020 telemetry monitoring: she displays only NSR and PAC's with no significant arrhythmias to date.   d. 10/15-16/2020 Holter showed benign findings (as recorded below) not requiring medication changes or other intervention.   4. Other problems recorded below in the Problem Overview   5. Other problems as recorded on my office PMH document, copied and pasted to the bottom of the admission history & physical examination report.    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 surgical therapy options discussion: patient advised that (in the asymptomatic state) medical treatment reduces her 5 year risk of stroke to 11%, whereas carotid endarterectomy reduces her 5 year risk of stroke to 5% with a small risk of karyn-operative stroke (see copy in her chart of the handout I gave her)she advised me 06/06/2011 that she wishes to proceed with medical treatment for now    CTA neck and brain (with contrast) 07/28/2020:   o Moderate atheromatous changes are noted in the aortic arch. There are moderate degrees of narrowing of the proximal segments of the left common carotid artery and subclavian artery at their origin.  o There is mild tortuosity of the common carotid arteries.  o There is prominent plaque formation observed bilaterally at the level of the carotid bifurcations.   o There is bilateral high-grade stenosis of  the origins of the internal carotid arteries (approximately 90%). The remainder of the internal carotid arteries are tortuous in their extracranial segment but patent. There is stenosis of the origin of the right external carotid artery.  o There is significant focal stenosis of the intracranial segment of the left internal carotid artery near the level of the anterior clinoid process.  o There is mild tortuosity of the vertebral arteries.   o There are no findings of high-grade stenosis or occlusion of the major intracranial arteries otherwise. No aneurysm or vascular malformation is noted.  o The cervical segments of the vertebral arteries are patent. There are mild-moderate stenoses of the origins of the vertebral arteries bilaterally.  o Scattered groundglass type opacities are observed within the visualized lung apices bilaterally. There is prominent multilevel cervical spondylosis.   Medical Literature Discussion with patient and consultants on-or-about 07/30/2020 re: carotid stenosis:    1.  Symptomatic patients:  Both the North American Symptomatic Carotid Endarterectomy Trial (NASCET)1 and the European Carotid Surgery Trial (ECST)2 showed a substantial benefit for surgery in patients with a symptomatic carotid artery stenosis of greater than 70 percent. In NASCET, the average cumulative ipsilateral stroke rate at 2 years was 26 percent for patients treated medically and 9 percent for those receiving the same medical treatment plus a carotid endarterectomy, corresponding to a 65 percent relative risk reduction favoring surgery. For every 100 patients treated surgically, 17 were spared an ipsilateral stroke over the next 2 years. The perioperative stroke plus death rate was 5.8 percent in the surgical group and 3.3 percent in the medical group in the same 1-month period, in spite of optimal medical therapy. Thus the excess morbidity and mortality attributable to the surgery was 2.5 percent. If minor  "ischemic events are excluded from the analyses, the excess major stroke and death rate for the surgical patients was 1.2 percent (2.1 less 0.9 percent) and the excess fatality rate was 0.3 percent (0.6 less 0.3 percent). The NASCET and ECST investigators concluded that their patients with a 70 to 99 percent stenosis clearly benefited from carotid endarterectomy. Results in [facility] hospitals should be similar if patients are properly selected and the perioperative morbidity and mortality are comparably low.  Analysis of several subgroups yielded important information. The cumulative risk of ipsilateral stroke correlated with the degree of stenosis. While the absolute risk reduction favoring the entire surgical group was 17 percent in NASCET, the risk reduction favoring those with a 90 to 99 percent stenosis was 26 percent, and this decreased to 12 percent for those with a 70 to 79 percent stenosis. The overall 26 percent cumulative risk of an ipsilateral stroke at 2 years for this group of patients with "high-grade" stenosis calculates to an annual risk of 13 percent. The operative morbidity and mortality were similar for patients with different degrees of stenosis.   2.  Asymptomatic patients.  The Asymptomatic Carotid Atherosclerosis Study (ACAS)3  demonstrated that asymptomatic people with internal carotid artery stenoses greater than 60 percent are at low risk for stroke. More than 1600 patients were randomized to treatment with the best medical therapies available versus the same medical treatment plus carotid endarterectomy. Patients in the surgical group had a risk over 5 years for ipsilateral stroke (and any perioperative stroke or death) of 5.1 percent, whereas the risk for the nonsurgical group was 11 percent. While this demonstrates a 53 percent relative risk reduction, the absolute risk reduction is only 5.9 percent over 5 years, or 1.2 percent annually. If only major strokes and death are counted, the " comparable numbers are 43 percent relative risk reduction, and an absolute risk reduction of only 2.6 percent over 5 years, or 0.5 percent annually. This latter result was not statistically significant. There was no clear difference in benefit for those with moderate (60 to 80 percent) compared with severe (80 to 99 percent) stenosis.   Many of the strokes in the surgery group were caused by preoperative angiograms. If carotid artery ultrasonography and MR angiography replace catheter angiography, this morbidity and mortality will be obviated.   While there are benefits for carotid endarterectomy in patients with asymptomatic carotid stenosis, the benefits are very small. Medical therapy for reduction of atherosclerosis risk factors in addition to aspirin (325 mg/d) generally are recommended for patients with asymptomatic carotid stenosis.    07/29/2020 Cardiologist Marleni: advises sequential carotid endarterectomy    07/29/2020 CT/Vascular Surgeon Donte referral: given severity of disease bilateral carotid artery repair reasonable even if this is considered asymptomatic; can proceed this hospitalization if all agree; since there is tandem stenosis on left side, would consider addressing that side first     S/P left carotid endarterectomy 07/31/2020 ([place] Surgeon Donte [place] [facility] [place]     CTA neck and brain (with) 10/14/2020:   o Interval carotid endarterectomy at the left carotid bulb, with no measurable stenosis on the left.  o Short segment high-grade stenosis at the origin of the right internal carotid artery (90%).  o Patent bilateral vertebral arteries.  o No large vessel occlusion or aneurysm is identified.  o Partially calcified plaque in the intracranial internal carotid arteries and additional/incidental findings as above.   S/P right carotid endarterectomy 10/16/2020 (Donte @ [facility] [place])    Discharged 10/17/2020 to remain on aspirin EC 325mg po qam        Leakage of Periprosthetic Valve, Subsequent  Encounter 2/11/2020    Aortic stenosis saga:   Echocardiogram (VICKY) 09/04/2019:    Aortic valve: heavily calcified with restricted leaflet motion. Moderate aortic regurgitation.    Mitral valve: thick and calcified with restricted leaflet motion. Mild mitral stenosis and regurgitation.    Tricuspid valve: is structurally normal with good leaflet excursion. Mild regurgitation.   Pulmonary valve: is structurally normal with good leaflet excursion. No significant regurgitation.   Overall LVEF appears normal.    No obvious shunt across the interatrial septum by color flow Doppler.     Cardiac catheterization 09/14/2019 (Timothy Salgado MD):   · Mid RCA lesion, 50% stenosed, nonsignificant by iFR assessment.  · Known severe aortic stenosis from echo    CTA Cardiac TAVR 09/19/2019:    Large left UVJ stone measuring at 2.6 cm with severe left-sided hydroureteronephrosis.   Significant pneumobilia in the left hepatic lobe with air within the common bile duct.  Unclear if this is due to biliary enteric fistula, infectious process such as cholangitis, or incompetent sphincter of Oddi.  Unfortunately this is not well evaluated on this dedicated TAVR protocol examination.  Clinical correlation is advised.  Further evaluation could be performed with ERCP and dedicated abdominal CT with intravenous contrast.   TAVR measurements, as above.   Significant aortic valve and abdominal aorta atherosclerosis.   Nonspecific mesenteric haziness, possibly mesenteric panniculitis or adenitis.   Heterogeneity of the pulmonary parenchyma possibly related to elevated pulmonary vascular resistance, small airways disease, or both.   Small bilateral pleural effusions.    Cardiac catheterization 12/17/2019 (Herbert Ashley):    Successful left transfemoral 23 mm Evolute TAVR.  No paravalvular leak, mean gradient 3, peak velocity 1.5 post TAVR.    Echocardiogram (TTE) 12/26/2019   · Mild concentric left ventricular hypertrophy    · Normal left ventricular systolic function. The estimated ejection fraction is 60%.  · No wall motion abnormalities.  · Grade I (mild) left ventricular diastolic dysfunction consistent with impaired relaxation.  · There is a transcutaneously-placed aortic bioprosthesis present.  · Mild aortic regurgitation.  · Mild-to-moderate aortic valve stenosis.  · Aortic valve area is 1.23 cm2; peak velocity is 2.84 m/s; mean gradient is 18 mmHg.  · Mild left atrial enlargement.  · Mild tricuspid regurgitation.  · Normal right ventricular systolic function.  · Moderate mitral sclerosis.  · There is mild leaflet calcification of the Mitral Valve.  · Mild mitral stenosis. MVA by PHT is 2.21 sq cm.  · Normal central venous pressure (3 mmHg).   · The estimated PA systolic pressure is 32 mmHg.:     Echocardiogram (TTE) 01/22/2020:   · Normal left ventricular systolic function. The estimated ejection fraction is 65%.  · Concentric left ventricular remodeling.  · No wall motion abnormalities.  · Grade I (mild) left ventricular diastolic dysfunction consistent with impaired relaxation.  · Normal right ventricular systolic function.  · Severe left atrial enlargement.  · There is a transcutaneously-placed aortic bioprosthesis present.   · There is paravalvular aortic insufficiency present   · Mild mitral regurgitation.  · Normal central venous pressure (3 mmHg).  · The estimated PA systolic pressure is 29 mmHg.   · Trivial posterior pericardial effusion.      Chronic Diastolic Heart Failure 12/1/2000    Echocardiogram 12/01/2000 = hyperdynamic LV, with mild to moderate amount of pericardial fat, and left ventricular diastolic dysfunction   Echocardiogram 05/20/2011 = dilated left atrial cavity, normal LV thickness and function with LVEF @ 70%, mild aortic stenosis with valve area @ 1.09cm2, peak gradient = 34 mmHg, minimal prolapse of anterior mitral leaflet, mild TI, very small pericardial effusion        Coronary Artery Disease of  Native Artery of Native Heart With Stable Angina Pectoris 9/14/2019    Cardiac catheterization 09/14/2019 (Timothy Salgado MD):    Mid RCA lesion, 50% stenosed, nonsignificant by iFR assessment.   Known severe aortic stenosis from echo  02/13/2020: it is unlikely that this trivial coronary artery disease is the cause of her episodic dyspnea that has been present since prior to 09/04/2019.     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   Ultrasound 01/31/1992 = cholelithiasis with tender gallbladder   Ultrasound abdomen 06/22/1995 = normal except for hypoechoic acoustic texture of the left hepatic lobe   ERCP #1 02/01/1992 (P[place]grin) = choledocholithiasis with cholangitis   ERCP #2 06/28/1995 (Leonor) = multiple filling defects due to stones   CT abdomen (with & without) 06/26/1995 = normal except for air in the biliary tree, fatty infiltration of the left lobe, calcified granuloma at right lung base   CT abdomen & pelvis (with & without) 12/02/1997 = normal/normal except for air in the biliary tree due to prior sphincterotomy   CTA Cardiac TAVR 09/19/2019: significant pneumobilia in the left hepatic lobe with air within the common bile duct         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    Barium enema 01/12/1998 = several sessile polyps, diverticulosis coli   Colonoscopy #1 08/07/1998 (Jamie) = multiple polyps; histopathology = adenomatous polyp with mild dysplasia, hyperplastic polyps   Colonoscopy #2 08/02/2006 (Jamie) = small cecal polyp (ablated), ascending polyp removed, 8mm transverse polyp, rectosigmoid polyp (ablated); histopathology = ascending and transverse colon polyps: tubular adenomas (2-3-year repeat advised)   Colonoscopy #3: due 08/02/2009      Uncontrolled Hypertension 10/14/2020   Pneumococcal Pneumonia (Resolved) 8/2/2020  "  Hypertensive Urgency (Resolved) 7/31/2020   Syncope and Collapse (Resolved) 7/28/2020   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 Yoni Hernandez MD     CT head (no contrast) 07/28/2020 = small focal area of probable remote infarction involves the splenium of the corpus callosum on the left, mild chronic small vessel ischemia, scattered vascular calcifications within the intracranial segments of the internal carotid and vertebral arteries      Shortness of Breath (Resolved) 2/11/2020    1. 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 In reality, this dyspnea syndrome has been going on since prior to her TAVR and since prior to the beginning of her workup which is dated to just prior to 09/04/2019.  2. 02/12/2020: I was in the process of writing discharge orders on this lady the evening of 02/12/2020 when her nurse came to me and reported that the patient was acutely dyspneic again, without chest pain, and at that point I did not have a proper diagnosis to explain her episodic dyspnea.    3. Consultation from Pulmonologist Caden received 02/13/2020   4. Consultation from Cardiologist Marleni received 02/13/2020   5. We are attributing this to her karyn-valvular TAVR leak, with plans for tincture of time to cause this problem to settle down      Dehydration Syndrome (Resolved) 2/11/2020   Left Ureteral Stone (Resolved) 11/13/2019   Hydronephrosis With Urinary Obstruction Due to Ureteral Calculus (Resolved) 11/11/2019    Left ureteral stone with hydronephrosis requiring  manipulation 11/13/2019 @ [facility] (Paddy Dunham MD)    CTA Cardiac TAVR 09/19/2019: large left UVJ stone measuring at 2.6 cm with severe left-sided hydroureteronephrosis "    Intervention 11/13/2019: 1.  Left ureteroscopy 2.  Cystoscopy 3.  Stone basket extraction 4.  Laser lithotripsy 5.  Left ureteral stent placement 6.  Fluoro < 1h 11/13/2019 (Paddy Dunham @ [facility])    Ultrasound kidneys 02/11/2020 = moderate left-sided hydronephrosis with no shadowing stone identified; nonobstructing right-sided renal stones; likely debris within the bladder, consider correlation with urinalysis.      Urinalysis 02/12/2020 = normal    As of 02/11-13/2020 she has been completely asymptomatic for this abnormality    02/13/2020 on discharge day I am ordering her to report back to her established Urologist Paddy Dunham for further investigation for this problem.        Course Outline:     This 80 year old female presented to the emergency room on my order, with complaints as delineated on my admission history & physical examination report.   After admission her blood pressure came under control, and the decision was made to proceed with her already-planned right carotid endarterectomy for two purposes:   · Primarily to reduce her future risk of atheroembolic stroke  · Secondarily hoping to resolve her presentation symptoms   She was subjected to right carotid endarterectomy on 10/16/2020 without immediate complication.  On discharge day she felt remarkably well.   She needed virtually NO pain medications post-operatively except for plain tylenol the post-operative day of surgery, and not even Tylenol on post-operative day #1 which was discharge day.    On discharge day she and CT Surgeon Donte agreed with me that she was stable for discharge to home.     Discharge Day Physical Examination:     Temp:  [97.6 °F (36.4 °C)-98.9 °F (37.2 °C)]   Pulse:  []   Resp:  [7-38]   BP: (109-145)/(53-90)   SpO2:  [77 %-100 %]   Arterial Line BP: ()/(43-66)     Awake, alert, oriented x 4 in no distress, conversive and cheerful.   HEENT = normal  Neck = normal  Lungs = clear  Heart = RRR S1 &  S2 with no murmurs or gallops  Abodmen = obese benign  Ext = no edema   Neurologic: grossly intact   She and I ambulated briskly around the nurses' station, and she felt perfectly well without dizziness or ataxia, dyspnea or chest pain.   NO DVT   NO bedsores   IV sites all clean     Objective:     EKG 10/14/2020 = NSR, LAD, IVCD, normal T waves diffusely    Holter, 24 hour, 10/15-16/2020: NSR @ 63 to 121 bpm with a mean of 77 BPM; only 101 PACs and 7 PVCs during this 13-hour recording. one 3-beat run of SVT; no pauses; no symptoms reported.    Telemetry: she has had NSR throughout with periodic PAC's      CXR 10/14/2020 = stable benign     CTA neck and brain (with) 10/14/2020:    Interval carotid endarterectomy at the left carotid bulb, with no measurable stenosis on the left.   Short segment high-grade stenosis at the origin of the right internal carotid artery (90%).   Patent bilateral vertebral arteries.   No large vessel occlusion or aneurysm is identified.   Partially calcified plaque in the intracranial internal carotid arteries and additional/incidental findings as above.     Ultrasound bilateral lower extremities 10/14/2020 = NO DVT      Plan:   1. Discontinue IV and telemetry.  2. Discharge to home.    3. The patient was offered the influenza virus vaccine on discharge day, but she declined.    4. She is to resume aspirin EC 325mg once by mouth each morning.   5. There are actually NO medication changes on discharge.   6. Wound care and activity restrictions per CT Surgeon Donte.  7. She is to see CT Surgeon Groglio per his instructions.   8. She is to see me next week, sooner if worse.   9. She is to see her Cardiologist periodically for life.  10. Her condition on discharge is good.   11. Her prognosis on discharge is good.      Roxanne Hernandez   Home Medication Instructions GISELLE:[id number]    Printed on:10/17/20 [id number]   Medication Information                      aspirin (ECOTRIN) 325 MG EC  tablet  Take 1 tablet (325 mg total) by mouth every morning.             atorvastatin (LIPITOR) 10 MG tablet  Take 1 tablet (10 mg total) by mouth every evening.             oes-[id number]-zinc--demetri-bor (CALTRATE 600-D PLUS MINERALS) 600 mg calcium- 800 unit-50 mg Tab  Take 1 each by mouth every morning.             cholecalciferol, vitamin D3, 50 mcg (2,000 unit) Chew  Take 2,000 Units by mouth every morning.             docusate sodium (COLACE) 100 MG capsule  Take 100 mg by mouth as needed for Constipation.             furosemide (LASIX) 20 MG tablet  Take 1 tablet (20 mg total) by mouth every morning.             melatonin (MELATIN) 3 mg tablet  Take 3 mg by mouth nightly as needed for Insomnia.             metoprolol succinate (TOPROL-XL) 25 MG 24 hr tablet  Take 1 tablet (25 mg total) by mouth every morning.             nitroGLYCERIN 0.1 mg/hr TD PT24 (NITRODUR) 0.1 mg/hr PT24  Place 1 patch onto the skin once daily.               Yoni Hernandez MD

## 2020-10-17 NOTE — PROGRESS NOTES
"[facility]  Department of Cardiothoracic Surgery  Progress Note      PATIENT NAME: Roxanne Hernandez  MRN: [id number]  TODAY'S DATE: 10/17/2020  ADMIT DATE: 10/14/2020      PRINCIPLE PROBLEM: Bilateral carotid artery occlusion    PROCEDURES:  Procedure(s) (LRB):  ENDARTERECTOMY, CAROTID (Right)    INTERVAL HISTORY:  Uneventful course since surgery. Currently comfortable without specific complaint. Ambulated with her in the unit as we talked.       Review of patient's allergies indicates:   Allergen Reactions    Azithromycin Swelling     All mycins    Statins-hmg-coa reductase inhibitors Other (See Comments)     Liver function  "It attacks my liver and makes me very sick"    Sulfa (sulfonamide antibiotics) Hives       PHYSICAL EXAM  CONSTITUTIONAL: Well built, well nourished in no apparent distress  NECK: Mild soft tissue edema, incision clean and dry  LUNGS: CTA  HEART: regular rate and rhythm, S1, S2 normal, no murmur, click, rub or gallop   ABDOMEN: soft, non-tender; bowel sounds normal; no masses, no organomegaly  EXTREMITIES: Extremities no edema  NEURO: AAO X 3, no focal deficit. At pre op baseline    VITAL SIGNS (Most Recent)  Temp: 98 °F (36.7 °C) (10/17/20 1101)  Pulse: 86 (10/17/20 1101)  Resp: (!) 21 (10/17/20 1101)  BP: (!) 116/53 (10/17/20 1101)  SpO2: 100 % (10/17/20 1101)    VENTILATION STATUS  Resp: (!) 21 (10/17/20 1101)  SpO2: 100 % (10/17/20 1101)       I & O (Last 24H):    Intake/Output Summary (Last 24 hours) at 10/17/2020 1144  Last data filed at 10/17/2020 1101  Gross per 24 hour   Intake 344 ml   Output 400 ml   Net -56 ml       WEIGHTS  Wt Readings from Last 1 Encounters:   10/16/20 0457 78.7 kg (173 lb 8 oz)   10/14/20 1844 79.4 kg (175 lb 0.7 oz)   10/14/20 1532 68 kg (150 lb)   10/14/20 1040 77.1 kg (170 lb)         MEDICATIONS   SCHEDULED MEDS:   atorvastatin  10 mg Oral QHS    furosemide  20 mg Oral QAM    metoprolol succinate  25 mg Oral QAM    mupirocin  1 g Nasal BID    " nitroGLYCERIN 0.1 mg/hr TD PT24  1 patch Transdermal Daily       CONTINUOUS INFUSIONS:   sodium chloride 0.9% 75 mL/hr at 10/16/20 1309    clevidipine Stopped (10/17/20 0901)       PRN MEDS:acetaminophen, acetaminophen, docusate sodium, HYDROcodone-acetaminophen, melatonin, ondansetron, sodium chloride 0.9%    LABS AND DIAGNOSTICS   CBC LAST 3 DAYS  Recent Labs   Lab 10/14/20  1200 10/16/20  0408 10/17/20  0445   WBC 6.48 6.53 12.49   RBC 4.96 4.75 4.14   HGB 14.4 13.9 12.3   HCT 43.8 43.0 36.7*   MCV 88 91 89   MCH 29.0 29.3 29.7   MCHC 32.9 32.3 33.5   RDW 12.5 12.3 12.2    166 152   MPV 11.4 10.1 10.5   GRAN 64.8  4.2 66.7  4.4 85.5*  10.7*   LYMPH 22.8  1.5 19.8  1.3 5.8*  0.7*   MONO 8.2  0.5 10.1  0.7 8.0  1.0   BASO 0.03 0.02 0.01   NRBC 0 0 0       COAGULATION LAST 3 DAYS  Recent Labs   Lab 10/16/20  0408   LABPT 13.6   INR 1.1   APTT 28.0       CHEMISTRY LAST 3 DAYS  Recent Labs   Lab 10/14/20  1233 10/16/20  0408 10/17/20  0445    136 135*   K 4.6 3.9 4.6    98 106   CO2 27 27 20*   ANIONGAP 12 11 9   BUN 28* 31* 33*   CREATININE 1.2 1.4 1.1   * 173* 262*   CALCIUM 10.2 9.0 8.1*   ALBUMIN 4.0  --   --    PROT 7.1  --   --    ALKPHOS 96  --   --    ALT 23  --   --    AST 5*  --   --    BILITOT 0.8  --   --        CARDIAC PROFILE LAST 3 DAYS  No results for input(s): BNP, CPK, CPKMB, LDH, TROPONINI in the last 168 hours.    ENDOCRINE LAST 3 DAYS  No results for input(s): TSH, PROCAL in the last 168 hours.    LAST ARTERIAL BLOOD GAS  ABG  No results for input(s): PH, PO2, PCO2, HCO3, BE in the last 168 hours.    LAST 7 DAYS MICROBIOLOGY   Microbiology Results (last 7 days)     ** No results found for the last 168 hours. **          MOST RECENT IMAGING  US Lower Extremity Veins Bilateral  EXAM DESCRIPTION: US LOWER EXTREMITY VEINS BILATERAL 10/14/2020 8:59 PM CDT    CLINICAL HISTORY: 80 years, Female, possible DVT shortness of breath.    COMPARISON:  None.    FINDINGS:    Multiple grayscale images as well as duplex Doppler ultrasound of both lower extremities were performed.  Both common femoral veins, superficial femoral veins, popliteal veins, anterior, posterior tibial and peroneal veins at the level at the level the calves were imaged.  Spectral waveform demonstrate normal compressibility, phasicity and augmentation.  No intraluminal defects were seen.    IMPRESSION:    NO EVIDENCE FOR DEEP VEIN THROMBOSIS BILATERAL LOWER EXTREMITIES.    Electronically signed by:  Sergio Cardenas MD  10/14/2020 8:59 PM CDT Workstation: [id number]  CTA Head and Neck (xpd)  CMS MANDATED QUALITY DATA - CT RADIATION 436    All CT scans at this facility utilize dose modulation, iterative  reconstruction, and/or weight based dosing when appropriate to reduce  radiation dose to as low as reasonably achievable.    CMS MANDATED QUALITY DATA - CAROTID - 195    All measurements and percent stenosis described below were determined  using NASCET criteria or criteria similar to NASCET, as defined by the  Society of Radiologists in Ultrasound Consensus Conference, Radiology,  2003    CLINICAL HISTORY:  80 years (1940) Female Dizziness, non-specific 80-year-old female  who has history of diabetes mellitus, hypertension, cervical cancer  who also has bilateral carotid disease for which she had left carotid  endarterectomy done by Dr. Kent about 5 weeks ago, now presents  with complaints of having; some confusion this morning and dizziness  with unsteady gait.  No history of any trauma.  No fever or chills.  No complaints of any chest pain or pal    TECHNIQUE:  CTA HEAD AND NECK (XPD). 408 images obtained. CT angiography of the  head and neck was performed using thin axial slices respectively and  reviewed in multiple planes. Two and three dimensional multiplanar  reformatted images were generated and submitted to PACS.    CONTRAST:  IV contrast was administered  uneventfully.    COMPARISON:  CTA most recently from July 28, 2020.    FINDINGS:  CTA of the [place]: Heterogeneous partially calcified plaques  in the carotid terminus causing approximately 60% stenosis in the  (axial image 79), and less than 50% stenosis on the right (axial image  77). The left A1 segment is hypoplastic, and the distal left OTILIO  appears to be supplied predominantly via patent anterior communicating  artery. The distal segments of the anterior communicating arteries are  patent and symmetric. There is patency of the intracranial circulation  including the bilateral internal carotid arteries, the carotid  terminus, the A1 and M1 segments, the bilateral intracranial vertebral  arteries, the basilar artery and its distal branches. No aneurysm is  identified within the limits of the technique. Conventional  angiography is more sensitive for the detection of small aneurysms.    CTA of the Neck:  There is a three-vessel aortic arch. CTA of the neck demonstrates that  the origins of the great vessels are widely patent. No aneurysm is  seen.    RIGHT:  Common carotid artery origin: Patent.  Cervical segment: Patent.  External carotid origin characteristics: Patent.  Carotid bifurcation: Short segment predominantly calcified plaque in  the right carotid bulb extending into the proximal internal carotid  artery causing approximately 90% stenosis over a length of 7 mm (axial  image 171).  Internal carotid origin characteristics: The remainder of the ICA is  patent with no focal stenosis.  Vertebral artery: within normal limits.    LEFT  Common carotid artery origin: Patent.  Cervical segment: Patent.  External carotid origin characteristics: Patent.  Carotid bifurcation: Postoperative changes of left-sided carotid  endarterectomy with ectasia of the left carotid bulb and adjacent  staples/clips. No high-grade stenosis is identified..  Internal carotid origin characteristics: No focal  stenosis.  Vertebral artery: within normal limits.    IMPRESSION:  1. Interval carotid endarterectomy at the left carotid bulb, with no  measurable stenosis on the left.  2. Short segment high-grade stenosis at the origin of the right  internal carotid artery (90%).  3. Patent bilateral vertebral arteries.  4. No large vessel occlusion or aneurysm is identified.  5. Partially calcified plaque in the intracranial internal carotid  arteries and additional/incidental findings as above.    .    Electronically Signed by MAGALI Hobbs on 10/14/2020 1:51 PM  X-Ray Chest AP Portable  Chest single view    CLINICAL DATA: Dizziness    FINDINGS: AP view is compared to August 3, 2020.    Heart size is normal. Aortic arch is calcified. Transcatheter aortic  valve replacement is noted. Dual-lead pacemaker device is unchanged in  position.    Pulmonary vasculature is upper normal. No infiltrates or effusions are  identified.    IMPRESSION:  1. No acute radiographic abnormalities.    Electronically Signed by Lopez Szymanski M.D. on 10/14/2020 11:43 AM      [facility]  Results for orders placed during the hospital encounter of 01/22/20   Echo Color Flow Doppler? Yes    Narrative · Normal left ventricular systolic function. The estimated ejection   fraction is 65%.  · Concentric left ventricular remodeling.  · No wall motion abnormalities.  · Grade I (mild) left ventricular diastolic dysfunction consistent with   impaired relaxation.  · Normal right ventricular systolic function.  · Severe left atrial enlargement.  · There is a transcutaneously-placed aortic bioprosthesis present. There   is paravalvular aortic insufficiency present.  · Mild mitral regurgitation.  · Normal central venous pressure (3 mmHg).  · The estimated PA systolic pressure is 29 mmHg.  · Trivial posterior pericardial effusion.          CURRENT/PREVIOUS VISIT EKG  Results for orders placed or performed during the hospital encounter of 10/14/20   EKG 12-lead     Collection Time: 10/14/20 11:01 AM    Narrative    Test Reason : R42,    Vent. Rate : 075 BPM     Atrial Rate : 075 BPM     P-R Int : 156 ms          QRS Dur : 104 ms      QT Int : 410 ms       P-R-T Axes : 066 -42 048 degrees     QTc Int : 457 ms    Normal sinus rhythm  Left axis deviation  Abnormal ECG  When compared with ECG of 01-SEP-2020 11:07,  QRS duration has decreased  Non-specific change in ST segment in Lateral leads  Confirmed by Reynaldo Carr MD ([id number]) on 10/15/2020 6:58:22 AM    Referred By: JENNIFER   SELF           Confirmed By:Reynaldo Carr MD         HOSPITAL PROBLEMS WITH ASSESSMENT & PLAN:     Active Hospital Problems    Diagnosis    *Bilateral carotid artery occlusion      Carotid ultrasound 05/20/2011 = right 50-69% stenosis, left >70% stenosis, normal vertebrals    CTA neck and brain 06/01/2011:   o 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 surgical therapy options discussion: patient advised that (in the asymptomatic state) medical treatment reduces her 5 year risk of stroke to 11%, whereas carotid endarterectomy reduces her 5 year risk of stroke to 5% with a small risk of karyn-operative stroke (see copy in her chart of the handout I gave her)she advised me 06/06/2011 that she wishes to proceed with medical treatment for now    CTA neck and brain (with contrast) 07/28/2020:   o Moderate atheromatous changes are noted in the aortic arch. There are moderate degrees of narrowing of the proximal segments of the left common carotid artery and subclavian artery at their origin.  o There is mild tortuosity of the common carotid arteries.  o There is prominent plaque formation observed bilaterally at the level of the carotid bifurcations.   o There is bilateral high-grade stenosis of the  origins of the internal carotid arteries (approximately 90%). The remainder of the internal carotid arteries are tortuous in their extracranial segment but patent. There is stenosis of the origin of the right external carotid artery.  o There is significant focal stenosis of the intracranial segment of the left internal carotid artery near the level of the anterior clinoid process.  o There is mild tortuosity of the vertebral arteries.   o There are no findings of high-grade stenosis or occlusion of the major intracranial arteries otherwise. No aneurysm or vascular malformation is noted.  o The cervical segments of the vertebral arteries are patent. There are mild-moderate stenoses of the origins of the vertebral arteries bilaterally.  o Scattered groundglass type opacities are observed within the visualized lung apices bilaterally. There is prominent multilevel cervical spondylosis.   Medical Literature Discussion re: carotid stenosis:    1.  Symptomatic patients:  Both the North American Symptomatic Carotid Endarterectomy Trial (NASCET)1 and the European Carotid Surgery Trial (ECST)2 showed a substantial benefit for surgery in patients with a symptomatic carotid artery stenosis of greater than 70 percent. In NASCET, the average cumulative ipsilateral stroke rate at 2 years was 26 percent for patients treated medically and 9 percent for those receiving the same medical treatment plus a carotid endarterectomy, corresponding to a 65 percent relative risk reduction favoring surgery. For every 100 patients treated surgically, 17 were spared an ipsilateral stroke over the next 2 years. The perioperative stroke plus death rate was 5.8 percent in the surgical group and 3.3 percent in the medical group in the same 1-month period, in spite of optimal medical therapy. Thus the excess morbidity and mortality attributable to the surgery was 2.5 percent. If minor ischemic events are excluded from the analyses, the excess  "major stroke and death rate for the surgical patients was 1.2 percent (2.1 less 0.9 percent) and the excess fatality rate was 0.3 percent (0.6 less 0.3 percent). The NASCET and ECST investigators concluded that their patients with a 70 to 99 percent stenosis clearly benefited from carotid endarterectomy. Results in [facility] hospitals should be similar if patients are properly selected and the perioperative morbidity and mortality are comparably low.  Analysis of several subgroups yielded important information. The cumulative risk of ipsilateral stroke correlated with the degree of stenosis. While the absolute risk reduction favoring the entire surgical group was 17 percent in NASCET, the risk reduction favoring those with a 90 to 99 percent stenosis was 26 percent, and this decreased to 12 percent for those with a 70 to 79 percent stenosis. The overall 26 percent cumulative risk of an ipsilateral stroke at 2 years for this group of patients with "high-grade" stenosis calculates to an annual risk of 13 percent. The operative morbidity and mortality were similar for patients with different degrees of stenosis.   2.  Asymptomatic patients.  The Asymptomatic Carotid Atherosclerosis Study (ACAS)3  demonstrated that asymptomatic people with internal carotid artery stenoses greater than 60 percent are at low risk for stroke. More than 1600 patients were randomized to treatment with the best medical therapies available versus the same medical treatment plus carotid endarterectomy. Patients in the surgical group had a risk over 5 years for ipsilateral stroke (and any perioperative stroke or death) of 5.1 percent, whereas the risk for the nonsurgical group was 11 percent. While this demonstrates a 53 percent relative risk reduction, the absolute risk reduction is only 5.9 percent over 5 years, or 1.2 percent annually. If only major strokes and death are counted, the comparable numbers are 43 percent relative risk reduction, " and an absolute risk reduction of only 2.6 percent over 5 years, or 0.5 percent annually. This latter result was not statistically significant. There was no clear difference in benefit for those with moderate (60 to 80 percent) compared with severe (80 to 99 percent) stenosis.   Many of the strokes in the surgery group were caused by preoperative angiograms. If carotid artery ultrasonography and MR angiography replace catheter angiography, this morbidity and mortality will be obviated.   While there are benefits for carotid endarterectomy in patients with asymptomatic carotid stenosis, the benefits are very small. Medical therapy for reduction of atherosclerosis risk factors in addition to aspirin (325 mg/d) generally are recommended for patients with asymptomatic carotid stenosis.         Hypertensive emergency without congestive heart failure    Vestibular vertigo, bilateral    Dizziness and giddiness    Uncontrolled hypertension    Carotid artery syndrome       ASSESSMENT & PLAN:  S/P R CEA - doing very well      RECOMMENDATIONS:  OK for discharge from surgery standpoint  OK to resume ASA  Wound care and activity instructions discussed with patient at bedside  Follow up in clinic 2-3 weeks        Radu Kent MD  [facility]  Department of Cardiothoracic Surgery  Date of Service: 10/17/2020 11:44 AM

## 2020-10-17 NOTE — NURSING
Dr richards notified  Pt off  cleviprex   Orders  To d/c [place] ; ivf  Mobilize pt  D/c johan cardiac diet; pt  Up to bed side chair  Little  Assistance  Awaiting   Food  tray

## 2020-10-20 ENCOUNTER — HOSPITAL ENCOUNTER (EMERGENCY)
Facility: HOSPITAL | Age: 80
Discharge: HOME | End: 2020-10-20
Attending: EMERGENCY MEDICINE
Payer: MEDICARE

## 2020-10-20 VITALS
RESPIRATION RATE: 18 BRPM | DIASTOLIC BLOOD PRESSURE: 80 MMHG | SYSTOLIC BLOOD PRESSURE: 162 MMHG | HEIGHT: 57 IN | BODY MASS INDEX: 37.76 KG/M2 | HEART RATE: 86 BPM | OXYGEN SATURATION: 100 % | TEMPERATURE: 99 F | WEIGHT: 175 LBS

## 2020-10-20 DIAGNOSIS — R22.1 NECK SWELLING: Primary | ICD-10-CM

## 2020-10-20 DIAGNOSIS — S10.93XA HEMATOMA OF NECK, INITIAL ENCOUNTER: ICD-10-CM

## 2020-10-20 DIAGNOSIS — R60.9 SWELLING: ICD-10-CM

## 2020-10-20 LAB
ALBUMIN SERPL BCP-MCNC: 3.6 G/DL (ref 3.5–5.2)
ALP SERPL-CCNC: 98 U/L (ref 55–135)
ALT SERPL W/O P-5'-P-CCNC: 22 U/L (ref 10–44)
ANION GAP SERPL CALC-SCNC: 11 MMOL/L (ref 8–16)
AST SERPL-CCNC: 30 U/L (ref 10–40)
BASOPHILS # BLD AUTO: 0.04 K/UL (ref 0–0.2)
BASOPHILS NFR BLD: 0.5 % (ref 0–1.9)
BILIRUB SERPL-MCNC: 0.9 MG/DL (ref 0.1–1)
BUN SERPL-MCNC: 30 MG/DL (ref 8–23)
CALCIUM SERPL-MCNC: 9.2 MG/DL (ref 8.7–10.5)
CHLORIDE SERPL-SCNC: 100 MMOL/L (ref 95–110)
CO2 SERPL-SCNC: 27 MMOL/L (ref 23–29)
CREAT SERPL-MCNC: 1.2 MG/DL (ref 0.5–1.4)
DIFFERENTIAL METHOD: NORMAL
EOSINOPHIL # BLD AUTO: 0.3 K/UL (ref 0–0.5)
EOSINOPHIL NFR BLD: 3.6 % (ref 0–8)
ERYTHROCYTE [DISTWIDTH] IN BLOOD BY AUTOMATED COUNT: 12.5 % (ref 11.5–14.5)
EST. GFR  (AFRICAN AMERICAN): 49.3 ML/MIN/1.73 M^2
EST. GFR  (NON AFRICAN AMERICAN): 42.8 ML/MIN/1.73 M^2
GLUCOSE SERPL-MCNC: 193 MG/DL (ref 70–110)
HCT VFR BLD AUTO: 37.9 % (ref 37–48.5)
HGB BLD-MCNC: 12.2 G/DL (ref 12–16)
IMM GRANULOCYTES # BLD AUTO: 0.03 K/UL (ref 0–0.04)
IMM GRANULOCYTES NFR BLD AUTO: 0.4 % (ref 0–0.5)
LYMPHOCYTES # BLD AUTO: 1.5 K/UL (ref 1–4.8)
LYMPHOCYTES NFR BLD: 19.1 % (ref 18–48)
MAGNESIUM SERPL-MCNC: 2 MG/DL (ref 1.6–2.6)
MCH RBC QN AUTO: 29 PG (ref 27–31)
MCHC RBC AUTO-ENTMCNC: 32.2 G/DL (ref 32–36)
MCV RBC AUTO: 90 FL (ref 82–98)
MONOCYTES # BLD AUTO: 0.7 K/UL (ref 0.3–1)
MONOCYTES NFR BLD: 9 % (ref 4–15)
NEUTROPHILS # BLD AUTO: 5.3 K/UL (ref 1.8–7.7)
NEUTROPHILS NFR BLD: 67.4 % (ref 38–73)
NRBC BLD-RTO: 0 /100 WBC
PLATELET # BLD AUTO: 159 K/UL (ref 150–350)
PMV BLD AUTO: 10 FL (ref 9.2–12.9)
POTASSIUM SERPL-SCNC: 4.6 MMOL/L (ref 3.5–5.1)
PROT SERPL-MCNC: 6.7 G/DL (ref 6–8.4)
RBC # BLD AUTO: 4.2 M/UL (ref 4–5.4)
SODIUM SERPL-SCNC: 138 MMOL/L (ref 136–145)
WBC # BLD AUTO: 7.87 K/UL (ref 3.9–12.7)

## 2020-10-20 PROCEDURE — 85025 COMPLETE CBC W/AUTO DIFF WBC: CPT

## 2020-10-20 PROCEDURE — 99284 EMERGENCY DEPT VISIT MOD MDM: CPT | Mod: 25

## 2020-10-20 PROCEDURE — 85610 PROTHROMBIN TIME: CPT

## 2020-10-20 PROCEDURE — 85730 THROMBOPLASTIN TIME PARTIAL: CPT

## 2020-10-20 PROCEDURE — 80053 COMPREHEN METABOLIC PANEL: CPT

## 2020-10-20 PROCEDURE — 83735 ASSAY OF MAGNESIUM: CPT

## 2020-10-20 NOTE — ED NOTES
"Pt given discharge instructions with emphasis on follow up and "get prompt medical attention if", pt verbalized understanding. VSS, ABCs intact, A&Ox3. Wheelchair offered for discharge, pt refused.    "

## 2020-10-20 NOTE — ED NOTES
First encounter with pt. Pt AxOx3. Pt cc of neck swelling. Pt had a rt sided endarterectomy last Friday and today went for a routine checkup. Here PCP noted increased swelling and hoarseness and sent her here for further eval. denies any difficulty breathing or swallowing.  Awaiting further orders. Safety precautions in place. Call light within reach. Will continue to provide care accordingly.

## 2020-10-20 NOTE — ED NOTES
Lab notified that APTT was not filled with enough blood, ECP notified, states we do not need to redraw at this time.

## 2020-10-20 NOTE — ED NOTES
Pt resting comfortably in stretcher, denies further needs at this time. Safety precautions in place. Call light within reach. Will continue to provide care accordingly.

## 2020-10-20 NOTE — PLAN OF CARE
10/20/20 0803   Final Note   Assessment Type Final Discharge Note   Anticipated Discharge Disposition Home   Cm reviewed chart and pt discharged home with no cm needs.

## 2020-10-20 NOTE — DISCHARGE INSTRUCTIONS
YOU HAVE A POSTOPERATIVE HEMATOMA OF THE NECK.  IF YOU HAVE ANY DIFFICULTY SWALLOWING OR EATING OR BREATHING YOU MUST RETURN TO EMERGENCY DEPARTMENT.  RETURN TO EMERGENCY DEPARTMENT FOR WORSENING SYMPTOMS OR ANY PROBLEMS.  YOU MUST FOLLOW UP WITH DR. MORATAYA TOMORROW FOR RE-EVALUATION

## 2020-10-20 NOTE — ED PROVIDER NOTES
"Encounter Date: 10/20/2020       History     Chief Complaint   Patient presents with    Neck Swelling     S/P NECK SURGERY, ENDARTORECTOMY ON RT SIDE DONE ON FRIDAY     [age] year Old female presented emergency department with right-sided neck swelling.  Patient had carotid endarterectomy about a week ago and today went for her routine appointment with primary care provider who notice swelling on the right side of the neck and sent her here for further evaluation.  Patient said this is more swollen than normal however believes that secondary to surgery.  Patient denies any difficulty breathing or swallowing.  Denies fever or chills or nausea vomiting or chest pain or shortness of breath.  Denies fever.  Denies any worsening pain but has mild discomfort in that area.        Review of patient's allergies indicates:   Allergen Reactions    Azithromycin Swelling     All mycins    Statins-hmg-coa reductase inhibitors Other (See Comments)     Liver function  "It attacks my liver and makes me very sick"    Sulfa (sulfonamide antibiotics) Hives     Past Medical History:   Diagnosis Date    Cervical cancer     Coronary artery disease     Diabetes mellitus     Hypertension      Past Surgical History:   Procedure Laterality Date    A-V CARDIAC PACEMAKER INSERTION N/A 12/18/2019    Procedure: INSERTION, CARDIAC PACEMAKER, DUAL CHAMBER;  Surgeon: Nnamdi Macdonald MD;  Location: [facility] EP LAB;  Service: Cardiology;  Laterality: N/A;  lbbb, dppm, SJM, anes, pr, 6077    CARDIAC CATHETERIZATION      CAROTID ENDARTERECTOMY Left 7/31/2020    Procedure: ENDARTERECTOMY-CAROTID;  Surgeon: Radu Kent MD;  Location: [facility] OR;  Service: Cardiothoracic;  Laterality: Left;    CAROTID ENDARTERECTOMY Right 10/16/2020    Procedure: ENDARTERECTOMY, CAROTID;  Surgeon: Radu Kent MD;  Location: [facility] OR;  Service: Cardiothoracic;  Laterality: Right;    CHOLECYSTECTOMY      CORONARY ANGIOGRAPHY Left 9/4/2019    Procedure: " ANGIOGRAM, CORONARY ARTERY;  Surgeon: Carmen Yepez MD;  Location: [facility] CATH/EP LAB;  Service: Cardiology;  Laterality: Left;    CYSTOSCOPY N/A 2019    Procedure: CYSTOSCOPY;  Surgeon: Paddy Dunham MD;  [address];  Service: Urology;  Laterality: N/A;    HYSTERECTOMY      JOINT REPLACEMENT      KNEE ARTHROPLASTY Right     LASER LITHOTRIPSY Left 2019    Procedure: LITHOTRIPSY, USING LASER;  Surgeon: Paddy Dunham MD;  Location: [address];  Service: Urology;  Laterality: Left;    TRANSCATHETER AORTIC VALVE REPLACEMENT (TAVR) N/A 2019    Procedure: REPLACEMENT, AORTIC VALVE, TRANSCATHETER (TAVR);  Surgeon: Herbert Ashley MD;  Location: [facility] CATH LAB;  Service: Cardiology;  Laterality: N/A;    URETEROSCOPIC REMOVAL OF URETERIC CALCULUS Left 2019    Procedure: REMOVAL, CALCULUS, URETER, URETEROSCOPIC;  Surgeon: Paddy Dunham MD;  Location: [address];  Service: Urology;  Laterality: Left;    URETEROSCOPY Left 2019    Procedure: URETEROSCOPY;  Surgeon: Paddy Dunham MD;  Location: [address];  Service: Urology;  Laterality: Left;     Family History   Problem Relation Age of Onset    Hypertension Father     Cancer Maternal Grandfather     Cancer Paternal Grandfather      Social History     Tobacco Use    Smoking status: Former Smoker     Packs/day: 1.00     Years: 25.00     Pack years: 25.00     Types: Cigarettes     Quit date: 1980     Years since quittin.8    Smokeless tobacco: Never Used   Substance Use Topics    Alcohol use: Not Currently    Drug use: Never     Review of Systems   Constitutional: Negative.    HENT: Negative.    Eyes: Negative.    Respiratory: Negative.    Cardiovascular: Negative.  Negative for chest pain.   Gastrointestinal: Negative.    Endocrine: Negative.    Genitourinary: Negative.    Musculoskeletal: Negative.    Skin: Negative.         Right-sided neck swelling at the post surgical site    Allergic/Immunologic: Negative.    Neurological: Negative.    Hematological: Negative.    Psychiatric/Behavioral: Negative.    All other systems reviewed and are negative.      Physical Exam     Initial Vitals [10/20/20 1129]   BP Pulse Resp Temp SpO2   (!) 143/77 88 20 98.5 °F (36.9 °C) 100 %      MAP       --         Physical Exam    Nursing note and vitals reviewed.  Constitutional: She appears well-developed and well-nourished.   HENT:   Head: Normocephalic and atraumatic.   Nose: Nose normal.   Mouth/Throat: Oropharynx is clear and moist.   Eyes: Conjunctivae and EOM are normal. Pupils are equal, round, and reactive to light.   Neck: Normal range of motion. Neck supple. No thyromegaly present. No tracheal deviation present. No JVD present.   Cardiovascular: Normal rate, regular rhythm, normal heart sounds and intact distal pulses.   No murmur heard.  Pulmonary/Chest: Breath sounds normal. No stridor. No respiratory distress. She has no wheezes. She has no rales.   Abdominal: Soft. Bowel sounds are normal. She exhibits no distension. There is no abdominal tenderness.   Musculoskeletal: Normal range of motion. No tenderness or edema.   Neurological: She is alert and oriented to person, place, and time. She has normal strength. No cranial nerve deficit or sensory deficit. GCS score is 15. GCS eye subscore is 4. GCS verbal subscore is 5. GCS motor subscore is 6.   Skin: Skin is warm. Capillary refill takes less than 2 seconds.   Swelling and edema noted on the right side of the neck in the area of the right carotid artery at the postsurgical location.  No redness.  No signs of infection   Psychiatric: She has a normal mood and affect. Thought content normal.         ED Course   Procedures  Labs Reviewed   COMPREHENSIVE METABOLIC PANEL - Abnormal; Notable for the following components:       Result Value    Glucose 193 (*)     BUN, Bld 30 (*)     eGFR if  49.3 (*)     eGFR if non   42.8 (*)     All other components within normal limits   CBC W/ AUTO DIFFERENTIAL   MAGNESIUM   APTT   PROTIME-INR          Imaging Results          US Soft Tissue Head Neck Thyroid (Final result)  Result time 10/20/20 13:14:23    Final result by Twin Szymanski MD (10/20/20 13:14:23)                 Narrative:      Neck soft tissues ultrasound    CLINICAL DATA: Right-sided neck swelling. Endarterectomy one week  prior    FINDINGS: Sonographic assessment targeted to the neck soft tissues on  the right, in the region of reported swelling, was performed.    Corresponding with the region of swelling, there is a 3.4 x 2.6 x 6.0  cm fluid collection adjacent to the common carotid artery. In light of  the provided clinical history, this most likely reflects a seroma or  hematoma. Abscess is within the differential but felt less likely.  There is no identifiable arterial flow within this collection. The  right common carotid artery and internal jugular vein are patent.    IMPRESSION:  1. 2.6 x 3.4 x 6.0 cm slightly complex fluid collection in the region  of reported swelling in the neck soft tissues on the right. In light  of recent postoperative status, this is likely a seroma or hematoma,  with abscess felt less likely. Follow-up CT could be utilized as  clinically warranted.    Electronically Signed by Lopez Szymanski M.D. on 10/20/2020 1:33 PM                               Medical Decision Making:   Differential Diagnosis:   80-YEAR-OLD FEMALE PRESENTED EMERGENCY DEPARTMENT WITH SWELLING OF THE RIGHT SIDE OF THE NECK.  PATIENT HAD CAROTID ENDARTERECTOMY ABOUT A WEEK AGO.  PATIENT HAS SWELLING HOWEVER PATIENT STATES THE SWELLING IS ACTUALLY IMPROVING OVER THE PAST FEW DAYS.  PATIENT WAS SEEN BY PCP AND WAS SENT HERE FOR FURTHER EVALUATION.  ULTRASOUND SHOWED NO EVIDENCE OF PSEUDOANEURYSM HOWEVER THERE IS A HEMATOMA VERSUS SEROMA OF THAT AREA.  PATIENT HEMODYNAMICALLY STABLE AND DOES NOT HAVE ANY DIFFICULTY BREATHING  OR SWALLOWING.  THIS CASE WAS DISCUSSED WITH DR. HERNANDEZ AND DR. KENT, PATIENT'S SURGEON WHO AGREED TO SEE THE PATIENT TOMORROW AND RECOMMENDED THAT PATIENT BE DISCHARGED HOME.  AS PATIENT FEELING WELL DISCHARGED WITH INSTRUCTIONS AND FOLLOW-UP  Clinical Tests:   Lab Tests: Reviewed  Radiological Study: Reviewed                             Clinical Impression:       ICD-10-CM ICD-9-CM   1. Neck swelling  R22.1 784.2   2. Swelling  R60.9 782.3   3. Hematoma of neck, initial encounter  S10.93XA 920                          ED Disposition Condition    Discharge Stable        ED Prescriptions     None        Follow-up Information     Follow up With Specialties Details Why Contact Info    Yoni Hernandez MD Internal Medicine In 3 days  [address]  [address]  [place] [address]  [phone]      Radu Kent MD Cardiothoracic Surgery In 1 day  [address]  [place] [address]  [phone]                                         Elen Carrera MD  10/20/20 4947

## 2020-11-02 ENCOUNTER — HOSPITAL ENCOUNTER (EMERGENCY)
Facility: HOSPITAL | Age: 80
Discharge: HOME | End: 2020-11-02
Attending: EMERGENCY MEDICINE
Payer: MEDICARE

## 2020-11-02 VITALS
OXYGEN SATURATION: 97 % | TEMPERATURE: 98 F | SYSTOLIC BLOOD PRESSURE: 165 MMHG | WEIGHT: 170 LBS | RESPIRATION RATE: 18 BRPM | BODY MASS INDEX: 36.68 KG/M2 | HEART RATE: 85 BPM | DIASTOLIC BLOOD PRESSURE: 77 MMHG | HEIGHT: 57 IN

## 2020-11-02 DIAGNOSIS — R60.9 SWELLING: ICD-10-CM

## 2020-11-02 DIAGNOSIS — R06.09 DYSPNEA ON EXERTION: Primary | ICD-10-CM

## 2020-11-02 DIAGNOSIS — R06.02 SHORTNESS OF BREATH: ICD-10-CM

## 2020-11-02 LAB
ALBUMIN SERPL BCP-MCNC: 3.9 G/DL (ref 3.5–5.2)
ALP SERPL-CCNC: 89 U/L (ref 55–135)
ALT SERPL W/O P-5'-P-CCNC: 18 U/L (ref 10–44)
ANION GAP SERPL CALC-SCNC: 11 MMOL/L (ref 8–16)
AST SERPL-CCNC: 23 U/L (ref 10–40)
BASOPHILS # BLD AUTO: 0.02 K/UL (ref 0–0.2)
BASOPHILS NFR BLD: 0.3 % (ref 0–1.9)
BILIRUB SERPL-MCNC: 0.9 MG/DL (ref 0.1–1)
BNP SERPL-MCNC: 235 PG/ML (ref 0–99)
BUN SERPL-MCNC: 22 MG/DL (ref 8–23)
CALCIUM SERPL-MCNC: 9.1 MG/DL (ref 8.7–10.5)
CHLORIDE SERPL-SCNC: 105 MMOL/L (ref 95–110)
CO2 SERPL-SCNC: 24 MMOL/L (ref 23–29)
CREAT SERPL-MCNC: 1 MG/DL (ref 0.5–1.4)
DIFFERENTIAL METHOD: NORMAL
EOSINOPHIL # BLD AUTO: 0.3 K/UL (ref 0–0.5)
EOSINOPHIL NFR BLD: 3.7 % (ref 0–8)
ERYTHROCYTE [DISTWIDTH] IN BLOOD BY AUTOMATED COUNT: 12.7 % (ref 11.5–14.5)
EST. GFR  (AFRICAN AMERICAN): >60 ML/MIN/1.73 M^2
EST. GFR  (NON AFRICAN AMERICAN): 53.3 ML/MIN/1.73 M^2
GLUCOSE SERPL-MCNC: 135 MG/DL (ref 70–110)
HCT VFR BLD AUTO: 40.4 % (ref 37–48.5)
HGB BLD-MCNC: 13.2 G/DL (ref 12–16)
IMM GRANULOCYTES # BLD AUTO: 0.01 K/UL (ref 0–0.04)
IMM GRANULOCYTES NFR BLD AUTO: 0.1 % (ref 0–0.5)
INR PPP: 1
LYMPHOCYTES # BLD AUTO: 1.3 K/UL (ref 1–4.8)
LYMPHOCYTES NFR BLD: 19.3 % (ref 18–48)
MAGNESIUM SERPL-MCNC: 2.1 MG/DL (ref 1.6–2.6)
MCH RBC QN AUTO: 28.8 PG (ref 27–31)
MCHC RBC AUTO-ENTMCNC: 32.7 G/DL (ref 32–36)
MCV RBC AUTO: 88 FL (ref 82–98)
MONOCYTES # BLD AUTO: 0.6 K/UL (ref 0.3–1)
MONOCYTES NFR BLD: 9 % (ref 4–15)
NEUTROPHILS # BLD AUTO: 4.6 K/UL (ref 1.8–7.7)
NEUTROPHILS NFR BLD: 67.6 % (ref 38–73)
NRBC BLD-RTO: 0 /100 WBC
PLATELET # BLD AUTO: 170 K/UL (ref 150–350)
PMV BLD AUTO: 9.9 FL (ref 9.2–12.9)
POTASSIUM SERPL-SCNC: 4.1 MMOL/L (ref 3.5–5.1)
PROT SERPL-MCNC: 7 G/DL (ref 6–8.4)
PROTHROMBIN TIME: 13 SEC (ref 10.6–14.8)
RBC # BLD AUTO: 4.58 M/UL (ref 4–5.4)
SODIUM SERPL-SCNC: 140 MMOL/L (ref 136–145)
TROPONIN I SERPL DL<=0.01 NG/ML-MCNC: <0.03 NG/ML
WBC # BLD AUTO: 6.75 K/UL (ref 3.9–12.7)

## 2020-11-02 PROCEDURE — 85025 COMPLETE CBC W/AUTO DIFF WBC: CPT

## 2020-11-02 PROCEDURE — 93005 ELECTROCARDIOGRAM TRACING: CPT | Performed by: INTERNAL MEDICINE

## 2020-11-02 PROCEDURE — 99285 EMERGENCY DEPT VISIT HI MDM: CPT | Mod: 25

## 2020-11-02 PROCEDURE — 83735 ASSAY OF MAGNESIUM: CPT

## 2020-11-02 PROCEDURE — 93010 EKG 12-LEAD: ICD-10-PCS | Mod: ,,, | Performed by: INTERNAL MEDICINE

## 2020-11-02 PROCEDURE — 93010 ELECTROCARDIOGRAM REPORT: CPT | Mod: ,,, | Performed by: INTERNAL MEDICINE

## 2020-11-02 PROCEDURE — 84484 ASSAY OF TROPONIN QUANT: CPT

## 2020-11-02 PROCEDURE — 80053 COMPREHEN METABOLIC PANEL: CPT

## 2020-11-02 PROCEDURE — 83880 ASSAY OF NATRIURETIC PEPTIDE: CPT

## 2020-11-02 PROCEDURE — 96374 THER/PROPH/DIAG INJ IV PUSH: CPT

## 2020-11-02 PROCEDURE — 63600175 PHARM REV CODE 636 W HCPCS: Performed by: EMERGENCY MEDICINE

## 2020-11-02 PROCEDURE — 85610 PROTHROMBIN TIME: CPT

## 2020-11-02 RX ORDER — FUROSEMIDE 10 MG/ML
20 INJECTION INTRAMUSCULAR; INTRAVENOUS
Status: COMPLETED | OUTPATIENT
Start: 2020-11-02 | End: 2020-11-02

## 2020-11-02 RX ORDER — LISINOPRIL 10 MG/1
TABLET ORAL
COMMUNITY
Start: 2020-10-30 | End: 2023-02-08

## 2020-11-02 RX ADMIN — FUROSEMIDE 20 MG: 10 INJECTION, SOLUTION INTRAMUSCULAR; INTRAVENOUS at 05:11

## 2020-11-02 NOTE — ED NOTES
Pt resting in bed. rr even and unlabored on ra. Nadn. Pt updated on plan of care. Pt verbalized understanding.

## 2020-11-02 NOTE — ED NOTES
Pt presents to the ED with c/o SOB. Pt report SOB on exertion. Pt with incisions noted to bilateral neck, c/d/i.

## 2020-11-02 NOTE — ED PROVIDER NOTES
"Encounter Date: 11/2/2020       History     Chief Complaint   Patient presents with    Shortness of Breath     80-year-old female with previous history of cardiac valve problem and history of recent carotid endarterectomy presented emergency department with complaints of shortness of breath.  Patient said she has always been short of breath however lately over the past 2 months feeling slightly more short of breath and since she had this neck surgery and 8 healed now she is noting the shortness of breath more than usual.  Patient denies any chest pain or abdominal pain or fever or chills or weakness or numbness.  Patient does have some edema of the legs.  Patient has history of leaky valve and coronary artery disease and takes Lasix.  Patient call her doctor who sent her here for evaluation.        Review of patient's allergies indicates:   Allergen Reactions    Azithromycin Swelling     All mycins    Statins-hmg-coa reductase inhibitors Other (See Comments)     Liver function  "It attacks my liver and makes me very sick"    Sulfa (sulfonamide antibiotics) Hives     Past Medical History:   Diagnosis Date    Cervical cancer     Coronary artery disease     Diabetes mellitus     Hypertension      Past Surgical History:   Procedure Laterality Date    A-V CARDIAC PACEMAKER INSERTION N/A 12/18/2019    Procedure: INSERTION, CARDIAC PACEMAKER, DUAL CHAMBER;  Surgeon: Nnamdi Macdonald MD;  Location: [facility] EP LAB;  Service: Cardiology;  Laterality: N/A;  lbbb, dppm, SJM, anes, pr, 6077    CARDIAC CATHETERIZATION      CAROTID ENDARTERECTOMY Left 7/31/2020    Procedure: ENDARTERECTOMY-CAROTID;  Surgeon: Radu Kent MD;  Location: [facility] OR;  Service: Cardiothoracic;  Laterality: Left;    CAROTID ENDARTERECTOMY Right 10/16/2020    Procedure: ENDARTERECTOMY, CAROTID;  Surgeon: Radu Kent MD;  Location: [facility] OR;  Service: Cardiothoracic;  Laterality: Right;    CHOLECYSTECTOMY      CORONARY ANGIOGRAPHY Left " 2019    Procedure: ANGIOGRAM, CORONARY ARTERY;  Surgeon: Carmen Yepez MD;  Location: [facility] CATH[facility]EP LAB;  Service: Cardiology;  Laterality: Left;    CYSTOSCOPY N/A 2019    Procedure: CYSTOSCOPY;  Surgeon: Paddy Dunham MD;  [facility];  Service: Urology;  Laterality: N/A;    HYSTERECTOMY      JOINT REPLACEMENT      KNEE ARTHROPLASTY Right     LASER LITHOTRIPSY Left 2019    Procedure: LITHOTRIPSY, USING LASER;  Surgeon: Paddy Dunham MD;  [facility];  Service: Urology;  Laterality: Left;    TRANSCATHETER AORTIC VALVE REPLACEMENT (TAVR) N/A 2019    Procedure: REPLACEMENT, AORTIC VALVE, TRANSCATHETER (TAVR);  Surgeon: Herbert Ashley MD;  Location: [facility] CATH LAB;  Service: Cardiology;  Laterality: N/A;    URETEROSCOPIC REMOVAL OF URETERIC CALCULUS Left 2019    Procedure: REMOVAL, CALCULUS, URETER, URETEROSCOPIC;  Surgeon: Paddy Dunham MD;  Location: [address];  Service: Urology;  Laterality: Left;    URETEROSCOPY Left 2019    Procedure: URETEROSCOPY;  Surgeon: Paddy Dunham MD;  Location: [facility] OR [address] [place] [address];  Service: Urology;  Laterality: Left;     Family History   Problem Relation Age of Onset    Hypertension Father     Cancer Maternal Grandfather     Cancer Paternal Grandfather      Social History     Tobacco Use    Smoking status: Former Smoker     Packs/day: 1.00     Years: 25.00     Pack years: 25.00     Types: Cigarettes     Quit date: 1980     Years since quittin.8    Smokeless tobacco: Never Used   Substance Use Topics    Alcohol use: Not Currently    Drug use: Never     Review of Systems   Constitutional: Negative.    HENT: Negative.    Eyes: Negative.    Respiratory: Positive for shortness of breath.    Cardiovascular: Positive for palpitations and leg swelling. Negative for chest pain.   Gastrointestinal: Negative.    Endocrine: Negative.    Genitourinary: Negative.    Musculoskeletal: Negative.     Skin: Negative.    Allergic/Immunologic: Negative.    Neurological: Negative.    Hematological: Negative.    Psychiatric/Behavioral: Negative.    All other systems reviewed and are negative.      Physical Exam     Initial Vitals [11/02/20 1154]   BP Pulse Resp Temp SpO2   (!) 184/81 85 20 97.5 °F (36.4 °C) 96 %      MAP       --         Physical Exam    Nursing note and vitals reviewed.  Constitutional: She appears well-developed and well-nourished.   HENT:   Head: Normocephalic and atraumatic.   Nose: Nose normal.   Mouth/Throat: Oropharynx is clear and moist.   Eyes: Conjunctivae and EOM are normal. Pupils are equal, round, and reactive to light.   Neck: Normal range of motion. Neck supple. No thyromegaly present. No tracheal deviation present. No JVD present.   Cardiovascular: Normal rate, regular rhythm and intact distal pulses.   Murmur heard.  Pulmonary/Chest: Breath sounds normal. No stridor. No respiratory distress. She has no wheezes. She has no rales.   Occasional crackles in the lung bases   Abdominal: Soft. Bowel sounds are normal. She exhibits no distension. There is no abdominal tenderness.   Musculoskeletal: Normal range of motion. Edema present. No tenderness.   Neurological: She is alert and oriented to person, place, and time. She has normal strength. No sensory deficit. GCS score is 15. GCS eye subscore is 4. GCS verbal subscore is 5. GCS motor subscore is 6.   Skin: Skin is warm. Capillary refill takes less than 2 seconds. No pallor.   Psychiatric: She has a normal mood and affect. Thought content normal.         ED Course   Procedures  Labs Reviewed   COMPREHENSIVE METABOLIC PANEL - Abnormal; Notable for the following components:       Result Value    Glucose 135 (*)     eGFR if non  53.3 (*)     All other components within normal limits   B-TYPE NATRIURETIC PEPTIDE - Abnormal; Notable for the following components:     (*)     All other components within normal limits    CBC W/ AUTO DIFFERENTIAL   TROPONIN I   PROTIME-INR   MAGNESIUM     EKG Readings: (Independently Interpreted)   Initial Reading: No STEMI. Rhythm: Normal Sinus Rhythm. Ectopy: No Ectopy. Conduction: Normal.     ECG Results          EKG 12-lead (In process)  Result time 11/02/20 14:01:43    In process by Interface, Lab In [facility] (11/02/20 14:01:43)                 Narrative:    Test Reason : R06.02,    Vent. Rate : 091 BPM     Atrial Rate : 091 BPM     P-R Int : 150 ms          QRS Dur : 106 ms      QT Int : 380 ms       P-R-T Axes : 055 -43 081 degrees     QTc Int : 467 ms    Normal sinus rhythm  Possible Left atrial enlargement  Left axis deviation  LVH with repolarization abnormality  Abnormal ECG  When compared with ECG of 14-OCT-2020 11:01,  No significant change was found    Referred By: [email or web address]   SELF           Confirmed By:                             Imaging Results          US Lower Extremity Veins Bilateral (Final result)  Result time 11/02/20 14:57:21    Final result by Faith Rai MD (11/02/20 14:57:21)                 Impression:      No evidence of bilateral lower extremity DVT      Electronically signed by: Faith Rai MD  Date:    11/02/2020  Time:    14:57             Narrative:    EXAMINATION:  US LOWER EXTREMITY VEINS BILATERAL    CLINICAL HISTORY:  Edema, unspecified    COMPARISON:  None    FINDINGS:  Grayscale, color Doppler, and spectral Doppler analysis was performed.    Bilateral common femoral, femoral, popliteal, and proximal great saphenous veins show normal compressibility, augmentation, and color Doppler flow. Bilateral posterior tibial, anterior tibial, and peroneal veins are unremarkable.                               X-Ray Chest AP Portable (Final result)  Result time 11/02/20 13:37:33    Final result by Natan Suarez MD (11/02/20 13:37:33)                 Narrative:    Reason: CHF dyspnea    FINDINGS:  Portable chest at 1318 compared with 10/14/2020 shows  unchanged  cardiomediastinal silhouette. Postsurgical change of transarterial  aortic valve replacement again evident. Dual-lead right transvenous  pacer unchanged.    Lungs are clear. Pulmonary vasculature is normal. Bilateral  glenohumeral joint osteoarthrosis unchanged.    IMPRESSION:  No acute cardiopulmonary abnormality.    Electronically Signed by Natan Suarez M.D. on 11/2/2020 1:40 PM                               Medical Decision Making:   Differential Diagnosis:   Patient with the history of congestive heart failure and shortness of breath.  Screening labs and workup reviewed and patient's symptoms at baseline and workup at baseline as well.  Case discussed with Dr. Yepez and Dr. Hernandez.  Patient will be following up with her cardiologist as outpatient for echocardiogram and further evaluation.  Patient felt better while in the emergency department and at baseline so will discharge with instructions and follow-up.  CTA of the chest done recently so not repeated.  Patient's symptoms are ongoing for several months per patient's history.  Clinical Tests:   Lab Tests: Reviewed  Radiological Study: Reviewed  Medical Tests: Reviewed                             Clinical Impression:       ICD-10-CM ICD-9-CM   1. Dyspnea on exertion  R06.00 786.09   2. Shortness of breath  R06.02 786.05   3. Swelling  R60.9 782.3                          ED Disposition Condition    Discharge Stable        ED Prescriptions     None        Follow-up Information     Follow up With Specialties Details Why Contact Info    Yoni Hernandez MD Internal Medicine In 2 days  [address]  [address]  [place] [address]  [phone]      Carmen Yepez MD INTERVENTIONAL CARDIOLOGY, Cardiology, Cardiovascular Disease In 2 days  [address]  [address]  [place] [address]  [phone]                                         Elen Carrera MD  11/02/20 1703

## 2020-11-02 NOTE — DISCHARGE INSTRUCTIONS
Continue home medication.  Return to emergency department for worsening symptoms or any problems.  You must follow up with Dr. Yepez for further evaluation.  Return to emergency department for any problems

## 2020-11-03 ENCOUNTER — TELEPHONE (OUTPATIENT)
Dept: CARDIOLOGY | Facility: CLINIC | Age: 80
End: 2020-11-03

## 2020-11-03 NOTE — TELEPHONE ENCOUNTER
----- Message from Princess WAYLON De Los Santos sent at 11/3/2020  9:46 AM CST -----  Contact: pt  Type:  Sooner Apoointment Request    Caller is requesting a sooner appointment.  Caller declined first available appointment listed below.  Caller will not accept being placed on the waitlist and is requesting a message be sent to doctor.    Name of Caller:  patient   When is the first available appointment?  03/21  Symptoms:  ED f/u   Best Call Back Number:  [phone] (home)     Additional Information:

## 2020-11-09 ENCOUNTER — OFFICE VISIT (OUTPATIENT)
Dept: CARDIOLOGY | Facility: CLINIC | Age: 80
End: 2020-11-09
Payer: MEDICARE

## 2020-11-09 VITALS
HEIGHT: 57 IN | WEIGHT: 177 LBS | SYSTOLIC BLOOD PRESSURE: 122 MMHG | OXYGEN SATURATION: 98 % | DIASTOLIC BLOOD PRESSURE: 70 MMHG | BODY MASS INDEX: 38.19 KG/M2 | RESPIRATION RATE: 16 BRPM | HEART RATE: 84 BPM

## 2020-11-09 DIAGNOSIS — Z95.2 S/P TAVR (TRANSCATHETER AORTIC VALVE REPLACEMENT): Primary | ICD-10-CM

## 2020-11-09 DIAGNOSIS — I35.0 NONRHEUMATIC AORTIC VALVE STENOSIS: ICD-10-CM

## 2020-11-09 DIAGNOSIS — I65.23 BILATERAL CAROTID ARTERY STENOSIS: ICD-10-CM

## 2020-11-09 DIAGNOSIS — I35.1 NONRHEUMATIC AORTIC VALVE INSUFFICIENCY: ICD-10-CM

## 2020-11-09 DIAGNOSIS — I50.32 CHRONIC DIASTOLIC HEART FAILURE: Chronic | ICD-10-CM

## 2020-11-09 PROCEDURE — 99214 PR OFFICE/OUTPT VISIT, EST, LEVL IV, 30-39 MIN: ICD-10-PCS | Mod: S$GLB,,, | Performed by: INTERNAL MEDICINE

## 2020-11-09 PROCEDURE — 99214 OFFICE O/P EST MOD 30 MIN: CPT | Mod: S$GLB,,, | Performed by: INTERNAL MEDICINE

## 2020-11-09 NOTE — PROGRESS NOTES
"Subjective:    Patient ID:  Roxanne Hernandez is a 80 y.o. female     HPI  Patient presents to the clinic today for an unscheduled visit.  She reportedly has been having increased shortness of breath since she had the surgery and even before.  She reports shortness of breath on walking 20 ft.  She reports lower extremity edema.  She denies any chest pain.  She reports intermittent dizziness.  She denies any palpitations.  She denies any syncopal episodes.  Since last clinic visit she had a hospitalization after she had a carotid endarterectomy and also emergency room visits secondary to the shortness of breath.  Evaluation in the emergency room did not reveal anything significant.    Current Outpatient Medications   Medication Instructions    aspirin (ECOTRIN) 325 mg, Oral, Every morning    atorvastatin (LIPITOR) 10 mg, Oral, Nightly    czm-[id number]-[id number]-zinc--demetri-bor (CALTRATE 600-D PLUS MINERALS) 600 mg calcium- 800 unit-50 mg Tab 1 each, Oral, Every morning    cholecalciferol (vitamin D3) 2,000 Units, Oral, Every morning    docusate sodium (COLACE) 100 mg, Oral, As needed (PRN)    furosemide (LASIX) 20 mg, Oral, Every morning    lisinopriL 10 MG tablet No dose, route, or frequency recorded.    melatonin (MELATIN) 3 mg, Oral, Nightly PRN    metoprolol succinate (TOPROL-XL) 25 mg, Oral, Every morning    nitroGLYCERIN 0.1 mg/hr TD PT24 (NITRODUR) 0.1 mg/hr PT24 1 patch, Transdermal, Daily        ROS     Objective:     Vitals:    11/09/20 1224   BP: 122/70   BP Location: Left arm   Patient Position: Sitting   BP Method: Medium (Manual)   Pulse: 84   Resp: 16   SpO2: 98%   Weight: 80.3 kg (177 lb)   Height: 4' 9" (1.448 m)       Physical Exam     Results for orders placed during the hospital encounter of 01/22/20   Echo Color Flow Doppler? Yes    Narrative · Normal left ventricular systolic function. The estimated ejection   fraction is 65%.  · Concentric left ventricular remodeling.  · No wall motion " abnormalities.  · Grade I (mild) left ventricular diastolic dysfunction consistent with   impaired relaxation.  · Normal right ventricular systolic function.  · Severe left atrial enlargement.  · There is a transcutaneously-placed aortic bioprosthesis present. There   is paravalvular aortic insufficiency present.  · Mild mitral regurgitation.  · Normal central venous pressure (3 mmHg).  · The estimated PA systolic pressure is 29 mmHg.  · Trivial posterior pericardial effusion.         Results for orders placed during the hospital encounter of 12/17/19   Cardiac catheterization    Narrative Indication     79 y.o. female referred by Dr. Frausto for severe symptomatic aortic   stenosis at high risk for surgery.  She is scheduled for TAVR.       The patient understands the risks, benefits, and alternatives of   angiography and intervention and gives informed consent.     Description of procedure     The patient was brought to the cath lab in the fasting state.  Access was   obtained in both common femoral arteries with micropuncture kit.  Because   of the patient's body habitus, a long sheath was placed in the right   common femoral artery and a TAVR sheath 14 Sami in the left femoral   artery.  The 2 wire technique was used to deploy the valve after doing   balloon valvuloplasty with a 16 mm Z-Med balloon.  A 23 mm evolute was   deployed successfully.  Post deployment the mean gradient was 3 the peak   velocity 1.5 and there was no paravalvular leak.          Complications:  None  Estimated blood loss:  100 cc  Contrast used:  5 cc     Findings:     1.  Successful left transfemoral 23 mm evolute TAVR.  No paravalvular   leak, mean gradient 3, peak velocity 1.5 post TAVR.      Summary:     1.  Successful TAVR as above     Recommendations:     1.  Postop care  2.  EP consult  3.  SBE prophylaxis for cause  4.  Dual anti-platelet therapy for 6 months  5.  Follow-up with me at 1 month and 1 year      Herbert Ashley  MD [facility]  [facility] [address] [address] [phone]    The clinically important portion of this report has been dictated in the   section above. Our Epic reporting system does not allow us to provide a   clinically meaningful report and is designed for billing purposes. Please   beware that there may be errors and discrepancies in all the clicks   required to finalize the report.              I certify that I was present for catheter insertion, catheter   manipulation, angiography, and angiographic interpretation of this   patient.    Procedure Log documented by Documenter: RT Ashlee and verified   by Herbert Ashley MD.    Date: 12/17/2019  Time: 12:15 PM          Assessment:       Problem List Items Addressed This Visit        Cardiac/Vascular    Chronic diastolic heart failure (Chronic)    Overview     Echocardiogram 12/01/2000 = hyperdynamic LV, with mild to moderate amount of pericardial fat, and left ventricular diastolic dysfunction   Echocardiogram 05/20/2011 = dilated left atrial cavity, normal LV thickness and function with LVEF @ 70%, mild aortic stenosis with valve area @ 1.09cm2, peak gradient = 34 mmHg, minimal prolapse of anterior mitral leaflet, mild TI, very small pericardial effusion            Nonrheumatic aortic valve stenosis    Nonrheumatic aortic valve insufficiency    S/P TAVR (transcatheter aortic valve replacement) - Primary    Bilateral carotid artery stenosis    Overview     S/p B/L CEA. Dr Kent.                  Plan:       1.  Obtain an echocardiogram to evaluate her valvular heart disease as well as to evaluate her LV systolic function.  2.  Continue current medical regimen including Lasix.  3.  Return to the clinic in about 2 weeks or sooner if needed.  4.  Discussed with Dr. Marcus Ashley

## 2020-11-18 ENCOUNTER — TELEPHONE (OUTPATIENT)
Dept: CARDIOLOGY | Facility: HOSPITAL | Age: 80
End: 2020-11-18

## 2020-11-19 ENCOUNTER — CLINICAL SUPPORT (OUTPATIENT)
Dept: CARDIOLOGY | Facility: HOSPITAL | Age: 80
End: 2020-11-19
Attending: INTERNAL MEDICINE
Payer: MEDICARE

## 2020-11-19 VITALS — HEIGHT: 57 IN | BODY MASS INDEX: 38.19 KG/M2 | WEIGHT: 177 LBS

## 2020-11-19 DIAGNOSIS — Z95.2 S/P TAVR (TRANSCATHETER AORTIC VALVE REPLACEMENT): ICD-10-CM

## 2020-11-19 DIAGNOSIS — I35.1 NONRHEUMATIC AORTIC VALVE INSUFFICIENCY: ICD-10-CM

## 2020-11-19 DIAGNOSIS — I50.32 CHRONIC DIASTOLIC HEART FAILURE: ICD-10-CM

## 2020-11-19 DIAGNOSIS — I35.0 NONRHEUMATIC AORTIC VALVE STENOSIS: ICD-10-CM

## 2020-11-19 PROCEDURE — 93306 ECHO (CUPID ONLY): ICD-10-PCS | Mod: 26,,, | Performed by: INTERNAL MEDICINE

## 2020-11-19 PROCEDURE — 93306 TTE W/DOPPLER COMPLETE: CPT

## 2020-11-19 PROCEDURE — 93306 TTE W/DOPPLER COMPLETE: CPT | Mod: 26,,, | Performed by: INTERNAL MEDICINE

## 2020-11-22 LAB
AORTIC ROOT ANNULUS: 2.4 CM
AORTIC VALVE CUSP SEPERATION: 0.67 CM
AV INDEX (PROSTH): 0.44
AV MEAN GRADIENT: 18 MMHG
AV PEAK GRADIENT: 30 MMHG
AV VALVE AREA: 1.26 CM2
AV VELOCITY RATIO: 39.91
BSA FOR ECHO PROCEDURE: 1.8 M2
CV ECHO LV RWT: 0.66 CM
DOP CALC AO PEAK VEL: 2.76 M/S
DOP CALC AO VTI: 60.45 CM
DOP CALC LVOT AREA: 2.8 CM2
DOP CALC LVOT DIAMETER: 1.9 CM
DOP CALC LVOT PEAK VEL: 110.16 M/S
DOP CALC LVOT STROKE VOLUME: 76.09 CM3
DOP CALCLVOT PEAK VEL VTI: 26.85 CM
E WAVE DECELERATION TIME: 265.21 MSEC
E/A RATIO: 0.93
E/E' RATIO: 49.75 M/S
ECHO LV POSTERIOR WALL: 1.3 CM (ref 0.6–1.1)
FRACTIONAL SHORTENING: 28 % (ref 28–44)
INTERVENTRICULAR SEPTUM: 1.29 CM (ref 0.6–1.1)
IVRT: 88.4 MSEC
LEFT ATRIUM SIZE: 4.3 CM
LEFT INTERNAL DIMENSION IN SYSTOLE: 2.82 CM (ref 2.1–4)
LEFT VENTRICLE MASS INDEX: 105 G/M2
LEFT VENTRICULAR INTERNAL DIMENSION IN DIASTOLE: 3.92 CM (ref 3.5–6)
LEFT VENTRICULAR MASS: 180.03 G
LV LATERAL E/E' RATIO: 49.75 M/S
LV SEPTAL E/E' RATIO: 49.75 M/S
MV PEAK A VEL: 2.13 M/S
MV PEAK E VEL: 1.99 M/S
PISA TR MAX VEL: 3.59 M/S
PV PEAK VELOCITY: 99 CM/S
RA PRESSURE: 3 MMHG
RIGHT VENTRICULAR END-DIASTOLIC DIMENSION: 177 CM
TDI LATERAL: 0.04 M/S
TDI SEPTAL: 0.04 M/S
TDI: 0.04 M/S
TR MAX PG: 52 MMHG
TV REST PULMONARY ARTERY PRESSURE: 55 MMHG

## 2020-11-24 DIAGNOSIS — R13.10 DIFFICULTY IN SWALLOWING: ICD-10-CM

## 2020-11-24 DIAGNOSIS — R05.9 COUGH: Primary | ICD-10-CM

## 2020-11-24 DIAGNOSIS — R13.14 DYSPHAGIA, PHARYNGOESOPHAGEAL PHASE: Primary | ICD-10-CM
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
## 2020-11-24 DIAGNOSIS — R13.14 DYSPHAGIA, PHARYNGOESOPHAGEAL PHASE: ICD-10-CM

## 2020-12-13 ENCOUNTER — CLINICAL SUPPORT (OUTPATIENT)
Dept: CARDIOLOGY | Facility: HOSPITAL | Age: 80
End: 2020-12-13
Payer: MEDICARE

## 2020-12-13 DIAGNOSIS — Z95.0 PRESENCE OF CARDIAC PACEMAKER: ICD-10-CM

## 2020-12-13 PROCEDURE — 93294 REM INTERROG EVL PM/LDLS PM: CPT | Mod: ,,, | Performed by: INTERNAL MEDICINE

## 2020-12-13 PROCEDURE — 93294 CARDIAC DEVICE CHECK - REMOTE: ICD-10-PCS | Mod: ,,, | Performed by: INTERNAL MEDICINE

## 2020-12-15 ENCOUNTER — CLINICAL SUPPORT (OUTPATIENT)
Dept: REHABILITATION | Facility: HOSPITAL | Age: 80
End: 2020-12-15
Attending: UROLOGY
Payer: MEDICARE

## 2020-12-15 ENCOUNTER — HOSPITAL ENCOUNTER (OUTPATIENT)
Dept: RADIOLOGY | Facility: HOSPITAL | Age: 80
Discharge: HOME | End: 2020-12-15
Attending: UROLOGY
Payer: MEDICARE

## 2020-12-15 ENCOUNTER — HOSPITAL ENCOUNTER (OUTPATIENT)
Dept: RADIOLOGY | Facility: HOSPITAL | Age: 80
Discharge: HOME | End: 2020-12-15
Attending: INTERNAL MEDICINE
Payer: MEDICARE
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
## 2020-12-15 DIAGNOSIS — R13.14 DYSPHAGIA, PHARYNGOESOPHAGEAL PHASE: ICD-10-CM

## 2020-12-15 DIAGNOSIS — N13.30 HYDRONEPHROSIS OF LEFT KIDNEY: ICD-10-CM

## 2020-12-15 DIAGNOSIS — R05.9 COUGH: ICD-10-CM

## 2020-12-15 DIAGNOSIS — R13.10 DIFFICULTY IN SWALLOWING: ICD-10-CM

## 2020-12-15 DIAGNOSIS — N20.0 KIDNEY STONES: ICD-10-CM

## 2020-12-15 DIAGNOSIS — R13.10 DYSPHAGIA, UNSPECIFIED TYPE: ICD-10-CM

## 2020-12-15 PROCEDURE — 92610 EVALUATE SWALLOWING FUNCTION: CPT | Mod: PN,59

## 2020-12-15 PROCEDURE — 74230 X-RAY XM SWLNG FUNCJ C+: CPT | Mod: 26,,, | Performed by: RADIOLOGY

## 2020-12-15 PROCEDURE — 92611 MOTION FLUOROSCOPY/SWALLOW: CPT | Mod: PN

## 2020-12-15 PROCEDURE — 92611 MOTION FLUOROSCOPY/SWALLOW: CPT

## 2020-12-15 PROCEDURE — 74230 FL MODIFIED BARIUM SWALLOW SPEECH STUDY: ICD-10-PCS | Mod: 26,,, | Performed by: RADIOLOGY

## 2020-12-15 PROCEDURE — 74018 RADEX ABDOMEN 1 VIEW: CPT | Mod: 26,,, | Performed by: RADIOLOGY

## 2020-12-15 PROCEDURE — 74230 X-RAY XM SWLNG FUNCJ C+: CPT | Mod: TC

## 2020-12-15 PROCEDURE — 74018 RADEX ABDOMEN 1 VIEW: CPT | Mod: TC,FY

## 2020-12-15 PROCEDURE — 74018 XR ABDOMEN AP 1 VIEW: ICD-10-PCS | Mod: 26,,, | Performed by: RADIOLOGY

## 2020-12-15 NOTE — PROGRESS NOTES
See Modified Barium Swallow Study results in Plan of Care.    TRISTON Zacarias, CCC-SLP, CBIS  Speech-Language Pathology  12/15/2020

## 2020-12-17 ENCOUNTER — OFFICE VISIT (OUTPATIENT)
Dept: CARDIOLOGY | Facility: CLINIC | Age: 80
End: 2020-12-17
Payer: MEDICARE

## 2020-12-17 VITALS
OXYGEN SATURATION: 99 % | SYSTOLIC BLOOD PRESSURE: 136 MMHG | WEIGHT: 177 LBS | BODY MASS INDEX: 38.19 KG/M2 | HEART RATE: 95 BPM | DIASTOLIC BLOOD PRESSURE: 60 MMHG | HEIGHT: 57 IN

## 2020-12-17 DIAGNOSIS — I35.0 NONRHEUMATIC AORTIC VALVE STENOSIS: ICD-10-CM

## 2020-12-17 DIAGNOSIS — I10 ESSENTIAL HYPERTENSION: Chronic | ICD-10-CM

## 2020-12-17 DIAGNOSIS — Z95.2 S/P TAVR (TRANSCATHETER AORTIC VALVE REPLACEMENT): ICD-10-CM

## 2020-12-17 DIAGNOSIS — I50.33 ACUTE ON CHRONIC DIASTOLIC HEART FAILURE: ICD-10-CM

## 2020-12-17 DIAGNOSIS — Z95.0 PRESENCE OF PERMANENT CARDIAC PACEMAKER: ICD-10-CM

## 2020-12-17 PROCEDURE — 99214 PR OFFICE/OUTPT VISIT, EST, LEVL IV, 30-39 MIN: ICD-10-PCS | Mod: S$PBB,,, | Performed by: INTERNAL MEDICINE

## 2020-12-17 PROCEDURE — 99214 OFFICE O/P EST MOD 30 MIN: CPT | Mod: S$PBB,,, | Performed by: INTERNAL MEDICINE

## 2020-12-17 PROCEDURE — 99999 PR PBB SHADOW E&M-EST. PATIENT-LVL III: ICD-10-PCS | Mod: PBBFAC,,,

## 2020-12-17 PROCEDURE — 99213 OFFICE O/P EST LOW 20 MIN: CPT | Mod: PBBFAC

## 2020-12-17 PROCEDURE — 99999 PR PBB SHADOW E&M-EST. PATIENT-LVL III: CPT | Mod: PBBFAC,,,

## 2020-12-17 NOTE — ASSESSMENT & PLAN NOTE
S/p left transfemoral 23 mm evolute TAVR. TTE was done at [facility]. We do not have the disc to review currently, but by report there is mild PVL. She had mild PVL at her 1 month follow up as expected given heavily calcified LVOT. I suspect this PVL is stable and not contributory to her past symptoms. Will get the disc to review.

## 2020-12-17 NOTE — PROGRESS NOTES
Subjective:    Patient ID:  Roxanne Hernandez is a 80 y.o. female who presents for follow-up of TAVR.    Referring Physician: Dr Yepez    HPI  Roxanne Hernandez is a 80 y.o. 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 Macdonald. Patient reported intermittent episodes of SOB over the past year. She is s/p bilateral carotid endarterectomy and states that since then she has had no trouble with SOB. She is able to do everything that she wants to do without complaints. She continues to follow closely with Dr. Yepez. Continues on ASA.     TTE reports over the past year at [facility] show mild PVL. Patient presented with mild PVL at her 1 month follow up which was expected given heavily calcified LVOT.       NYHA: I CCS: 0    Review of Systems   Constitution: Negative for chills and fever.   HENT: Negative for sore throat.    Eyes: Negative for blurred vision.   Cardiovascular: Negative for chest pain, claudication, cyanosis, dyspnea on exertion, irregular heartbeat, leg swelling, near-syncope, orthopnea, palpitations, paroxysmal nocturnal dyspnea and syncope.   Respiratory: Negative for cough and sputum production.    Hematologic/Lymphatic: Does not bruise/bleed easily.   Skin: Negative for itching, rash and suspicious lesions.   Musculoskeletal: Negative for falls.   Gastrointestinal: Negative for abdominal pain and change in bowel habit.   Genitourinary: Negative for dysuria.   Neurological: Negative for disturbances in coordination, dizziness and loss of balance.   Psychiatric/Behavioral: Negative for altered mental status.          Past Medical History:   Diagnosis Date    Cervical cancer     Coronary artery disease     Diabetes mellitus     Hypertension        Current Outpatient Medications:     aspirin (ECOTRIN) 325 MG EC tablet, Take 1 tablet (325 mg total) by mouth every morning., Disp: , Rfl: 0    atorvastatin  (LIPITOR) 10 MG tablet, Take 1 tablet (10 mg total) by mouth every evening., Disp: , Rfl:     reb-F8-css22-zinc--demetri-bor (CALTRATE 600-D PLUS MINERALS) 600 mg calcium- 800 unit-50 mg Tab, Take 1 each by mouth every morning., Disp: , Rfl:     cholecalciferol, vitamin D3, 50 mcg (2,000 unit) Chew, Take 2,000 Units by mouth every morning., Disp: , Rfl:     docusate sodium (COLACE) 100 MG capsule, Take 100 mg by mouth as needed for Constipation., Disp: , Rfl:     furosemide (LASIX) 20 MG tablet, Take 1 tablet (20 mg total) by mouth every morning., Disp: 30 tablet, Rfl: 11    melatonin (MELATIN) 3 mg tablet, Take 3 mg by mouth nightly as needed for Insomnia., Disp: , Rfl:     metoprolol succinate (TOPROL-XL) 25 MG 24 hr tablet, Take 1 tablet (25 mg total) by mouth every morning., Disp: 30 tablet, Rfl: 11    nitroGLYCERIN 0.1 mg/hr TD PT24 (NITRODUR) 0.1 mg/hr PT24, Place 1 patch onto the skin once daily., Disp: 30 patch, Rfl: 11    potassium 99 mg Tab, Take 1 tablet by mouth once daily., Disp: , Rfl:     lisinopriL 10 MG tablet, , Disp: , Rfl:   No current facility-administered medications for this visit.     Facility-Administered Medications Ordered in Other Visits:     0.9%  NaCl infusion, , Intravenous, Continuous, Carmen Yepez MD, Stopped at 10/16/20 1151    aspirin EC tablet 325 mg, 325 mg, Oral, Once, Carmen Yepez MD    diazePAM tablet 5 mg, 5 mg, Oral, On Call Procedure, Carmen Yepez MD    diphenhydrAMINE capsule 25 mg, 25 mg, Oral, Once, Carmen Yepez MD    Objective:    Physical Exam   Constitutional: She is oriented to person, place, and time. She appears well-developed and well-nourished. No distress.   HENT:   Head: Normocephalic and atraumatic.   Eyes: EOM are normal.   Neck: Normal range of motion. No JVD present.   Cardiovascular: Normal rate, regular rhythm and intact distal pulses.   Murmur heard.   Harsh midsystolic murmur is present with a  "grade of 2/6 at the upper right sternal border radiating to the neck.  Pulmonary/Chest: Effort normal and breath sounds normal. No respiratory distress.   Abdominal: Soft. She exhibits no distension.   Musculoskeletal:         General: No edema.   Neurological: She is alert and oriented to person, place, and time.   Skin: Skin is warm and dry. She is not diaphoretic.   Vitals reviewed.          Vitals:    12/17/20 1350 12/17/20 1356   BP: 130/72 136/60   BP Location: Right arm Left arm   Patient Position: Sitting Sitting   BP Method: Large (Manual) Large (Manual)   Pulse: 95 95   SpO2: 99%    Weight: 80.3 kg (177 lb 0.5 oz)    Height: 4' 9" (1.448 m)      Body mass index is 38.31 kg/m².    Test(s) Reviewed  I have reviewed the following in detail:  [] Stress test   [] Angiography   [] Echocardiogram   [] Labs:   [] Other:     TTE 11/19/20  · There is mild left ventricular concentric hypertrophy.  · The left ventricle is normal in size with normal systolic function. The estimated ejection fraction is 60%.  · Grade II diastolic dysfunction.  · Normal right ventricular size with normal right ventricular systolic function.  · Mild left atrial enlargement.  · There is a transcutaneously-placed aortic bioprosthesis present. There is mild paravalvular aortic insufficiency present.  · Mild aortic regurgitation.  · Moderate aortic valve stenosis.  · Aortic valve area is 1.26 cm2; peak velocity is 2.76 m/s; mean gradient is 18 mmHg.  · The mitral valve is mildly sclerotic.  · Mild mitral regurgitation.  · There is mild mitral stenosis.  · Mild to moderate tricuspid regurgitation.  · Normal central venous pressure (3 mmHg).  · The estimated PA systolic pressure is 55 mmHg.    Assessment:   S/P TAVR (transcatheter aortic valve replacement)  S/p left transfemoral 23 mm evolute TAVR. TTE was done at [facility]. We do not have the disc to review currently, but by report there is mild PVL. She had mild PVL at her 1 month " follow up as expected given heavily calcified LVOT. I suspect this PVL is stable and not contributory to her past symptoms. Will get the disc to review.     Nonrheumatic aortic valve stenosis  Resolved post TAVR. NYHA now I. Mean gradient 18 mmHg, peak velocity 2.76 m/s.     Presence of permanent cardiac pacemaker  Dual chamber PPM (12/18/20) post TAVR with Dr. Macdonald.     Acute on chronic diastolic heart failure  Compensated on exam today.     Essential hypertension  Controlled on current regimen. Followed by Dr. Yepez.     Plan:     1. Records request sent to [facility] for TTE disc for review.   2. Regarding reported PVL, she is currently asymptomatic. If she begins to develop symptoms in the future concerning for hemolytic anemia, we will be happy to see her back in clinic with hemolysis labs and a repeat TTE.   3. Follow up with [facility]ve [facility] PRN.   4. Continue ASA indefinitely. 6 month Plavix therapy completed.   5. SBE prophylaxis for life.   6. Continue to follow up with Cardiology, Dr. Yepez.        Arline Mcintyre PA-C  Valve and Structural Heart Disease  [facility]

## 2021-01-05 ENCOUNTER — HOSPITAL ENCOUNTER (OUTPATIENT)
Dept: RADIOLOGY | Facility: HOSPITAL | Age: 81
Discharge: HOME | End: 2021-01-05
Attending: UROLOGY
Payer: MEDICARE

## 2021-01-05 DIAGNOSIS — N13.30 HYDRONEPHROSIS OF LEFT KIDNEY: ICD-10-CM

## 2021-01-05 DIAGNOSIS — N20.0 KIDNEY STONES: ICD-10-CM

## 2021-01-05 PROCEDURE — 76770 US EXAM ABDO BACK WALL COMP: CPT | Mod: 26,,, | Performed by: RADIOLOGY

## 2021-01-05 PROCEDURE — 76770 US RETROPERITONEAL COMPLETE: ICD-10-PCS | Mod: 26,,, | Performed by: RADIOLOGY

## 2021-01-05 PROCEDURE — 76770 US EXAM ABDO BACK WALL COMP: CPT | Mod: TC

## 2021-01-21 ENCOUNTER — LAB VISIT (OUTPATIENT)
Dept: LAB | Facility: HOSPITAL | Age: 81
End: 2021-01-21
Attending: INTERNAL MEDICINE
Payer: MEDICARE

## 2021-01-21 DIAGNOSIS — Z79.899 ENCOUNTER FOR LONG-TERM (CURRENT) USE OF OTHER MEDICATIONS: ICD-10-CM

## 2021-01-21 DIAGNOSIS — D50.0 IRON DEFICIENCY ANEMIA SECONDARY TO BLOOD LOSS (CHRONIC): ICD-10-CM

## 2021-01-21 DIAGNOSIS — E78.2 MIXED HYPERLIPIDEMIA: ICD-10-CM

## 2021-01-21 DIAGNOSIS — I50.42 CHRONIC COMBINED SYSTOLIC AND DIASTOLIC HEART FAILURE: ICD-10-CM

## 2021-01-21 DIAGNOSIS — I10 ESSENTIAL HYPERTENSION, MALIGNANT: Primary | ICD-10-CM

## 2021-01-21 DIAGNOSIS — E11.9 DIABETES MELLITUS WITHOUT COMPLICATION: ICD-10-CM

## 2021-01-21 LAB
ALBUMIN SERPL BCP-MCNC: 3.9 G/DL (ref 3.5–5.2)
ALBUMIN/CREAT UR: 7 UG/MG (ref 0–30)
ALP SERPL-CCNC: 79 U/L (ref 55–135)
ALT SERPL W/O P-5'-P-CCNC: 17 U/L (ref 10–44)
ANION GAP SERPL CALC-SCNC: 11 MMOL/L (ref 8–16)
AST SERPL-CCNC: 22 U/L (ref 10–40)
BASOPHILS # BLD AUTO: 0.03 K/UL (ref 0–0.2)
BASOPHILS NFR BLD: 0.5 % (ref 0–1.9)
BILIRUB DIRECT SERPL-MCNC: 0.2 MG/DL (ref 0.1–0.3)
BILIRUB SERPL-MCNC: 0.9 MG/DL (ref 0.1–1)
BNP SERPL-MCNC: 141 PG/ML (ref 0–99)
BUN SERPL-MCNC: 29 MG/DL (ref 8–23)
CALCIUM SERPL-MCNC: 9.3 MG/DL (ref 8.7–10.5)
CHLORIDE SERPL-SCNC: 105 MMOL/L (ref 95–110)
CHOLEST SERPL-MCNC: 163 MG/DL (ref 120–199)
CHOLEST/HDLC SERPL: 2.9 {RATIO} (ref 2–5)
CO2 SERPL-SCNC: 24 MMOL/L (ref 23–29)
CREAT SERPL-MCNC: 1.2 MG/DL (ref 0.5–1.4)
CREAT UR-MCNC: 86 MG/DL (ref 15–325)
DIFFERENTIAL METHOD: ABNORMAL
EOSINOPHIL # BLD AUTO: 0.3 K/UL (ref 0–0.5)
EOSINOPHIL NFR BLD: 4.1 % (ref 0–8)
ERYTHROCYTE [DISTWIDTH] IN BLOOD BY AUTOMATED COUNT: 13.2 % (ref 11.5–14.5)
EST. GFR  (AFRICAN AMERICAN): 49.3 ML/MIN/1.73 M^2
EST. GFR  (NON AFRICAN AMERICAN): 42.8 ML/MIN/1.73 M^2
ESTIMATED AVG GLUCOSE: 146 MG/DL (ref 68–131)
GLUCOSE SERPL-MCNC: 145 MG/DL (ref 70–110)
HBA1C MFR BLD HPLC: 6.7 % (ref 4.5–6.2)
HCT VFR BLD AUTO: 43.2 % (ref 37–48.5)
HDLC SERPL-MCNC: 56 MG/DL (ref 40–75)
HDLC SERPL: 34.4 % (ref 20–50)
HGB BLD-MCNC: 13.9 G/DL (ref 12–16)
IMM GRANULOCYTES # BLD AUTO: 0.01 K/UL (ref 0–0.04)
IMM GRANULOCYTES NFR BLD AUTO: 0.2 % (ref 0–0.5)
LDLC SERPL CALC-MCNC: 85.8 MG/DL (ref 63–159)
LYMPHOCYTES # BLD AUTO: 1.7 K/UL (ref 1–4.8)
LYMPHOCYTES NFR BLD: 27.3 % (ref 18–48)
MCH RBC QN AUTO: 28.9 PG (ref 27–31)
MCHC RBC AUTO-ENTMCNC: 32.2 G/DL (ref 32–36)
MCV RBC AUTO: 90 FL (ref 82–98)
MICROALBUMIN UR DL<=1MG/L-MCNC: 6 UG/ML
MONOCYTES # BLD AUTO: 0.6 K/UL (ref 0.3–1)
MONOCYTES NFR BLD: 9 % (ref 4–15)
NEUTROPHILS # BLD AUTO: 3.7 K/UL (ref 1.8–7.7)
NEUTROPHILS NFR BLD: 58.9 % (ref 38–73)
NONHDLC SERPL-MCNC: 107 MG/DL
NRBC BLD-RTO: 0 /100 WBC
PLATELET # BLD AUTO: 143 K/UL (ref 150–350)
PMV BLD AUTO: 10.5 FL (ref 9.2–12.9)
POTASSIUM SERPL-SCNC: 4.3 MMOL/L (ref 3.5–5.1)
PROT SERPL-MCNC: 7 G/DL (ref 6–8.4)
RBC # BLD AUTO: 4.81 M/UL (ref 4–5.4)
SODIUM SERPL-SCNC: 140 MMOL/L (ref 136–145)
TRIGL SERPL-MCNC: 106 MG/DL (ref 30–150)
WBC # BLD AUTO: 6.31 K/UL (ref 3.9–12.7)

## 2021-01-21 PROCEDURE — 82043 UR ALBUMIN QUANTITATIVE: CPT

## 2021-01-21 PROCEDURE — 82570 ASSAY OF URINE CREATININE: CPT

## 2021-01-21 PROCEDURE — 80061 LIPID PANEL: CPT

## 2021-01-21 PROCEDURE — 36415 COLL VENOUS BLD VENIPUNCTURE: CPT

## 2021-01-21 PROCEDURE — 80048 BASIC METABOLIC PNL TOTAL CA: CPT

## 2021-01-21 PROCEDURE — 83036 HEMOGLOBIN GLYCOSYLATED A1C: CPT

## 2021-01-21 PROCEDURE — 83880 ASSAY OF NATRIURETIC PEPTIDE: CPT

## 2021-01-21 PROCEDURE — 85025 COMPLETE CBC W/AUTO DIFF WBC: CPT

## 2021-01-21 PROCEDURE — 80076 HEPATIC FUNCTION PANEL: CPT

## 2021-01-27 DIAGNOSIS — Z12.31 ENCOUNTER FOR SCREENING MAMMOGRAM FOR MALIGNANT NEOPLASM OF BREAST: Primary | ICD-10-CM

## 2021-03-13 ENCOUNTER — CLINICAL SUPPORT (OUTPATIENT)
Dept: CARDIOLOGY | Facility: HOSPITAL | Age: 81
End: 2021-03-13
Payer: MEDICARE

## 2021-03-13 DIAGNOSIS — I50.9 HEART FAILURE, UNSPECIFIED: ICD-10-CM

## 2021-03-13 DIAGNOSIS — Z95.0 PRESENCE OF CARDIAC PACEMAKER: ICD-10-CM

## 2021-03-13 PROCEDURE — 93294 REM INTERROG EVL PM/LDLS PM: CPT | Mod: ,,, | Performed by: INTERNAL MEDICINE

## 2021-03-13 PROCEDURE — 93294 CARDIAC DEVICE CHECK - REMOTE: ICD-10-PCS | Mod: ,,, | Performed by: INTERNAL MEDICINE

## 2021-04-13 ENCOUNTER — HOSPITAL ENCOUNTER (OUTPATIENT)
Dept: RADIOLOGY | Facility: HOSPITAL | Age: 81
Discharge: HOME | End: 2021-04-13
Attending: INTERNAL MEDICINE
Payer: MEDICARE

## 2021-04-13 DIAGNOSIS — Z12.31 ENCOUNTER FOR SCREENING MAMMOGRAM FOR MALIGNANT NEOPLASM OF BREAST: ICD-10-CM

## 2021-04-13 PROCEDURE — 77067 SCR MAMMO BI INCL CAD: CPT | Mod: TC,PO

## 2021-04-21 ENCOUNTER — HOSPITAL ENCOUNTER (EMERGENCY)
Facility: HOSPITAL | Age: 81
Discharge: HOME | End: 2021-04-21
Attending: EMERGENCY MEDICINE
Payer: MEDICARE

## 2021-04-21 VITALS
HEIGHT: 57 IN | WEIGHT: 170 LBS | DIASTOLIC BLOOD PRESSURE: 80 MMHG | HEART RATE: 89 BPM | OXYGEN SATURATION: 97 % | RESPIRATION RATE: 20 BRPM | TEMPERATURE: 98 F | BODY MASS INDEX: 36.68 KG/M2 | SYSTOLIC BLOOD PRESSURE: 162 MMHG

## 2021-04-21 DIAGNOSIS — U07.1 PNEUMONIA DUE TO COVID-19 VIRUS: Primary | ICD-10-CM

## 2021-04-21 DIAGNOSIS — J12.82 PNEUMONIA DUE TO COVID-19 VIRUS: Primary | ICD-10-CM

## 2021-04-21 DIAGNOSIS — Z20.822 SUSPECTED COVID-19 VIRUS INFECTION: ICD-10-CM

## 2021-04-21 LAB
ALBUMIN SERPL BCP-MCNC: 3.8 G/DL (ref 3.5–5.2)
ALP SERPL-CCNC: 160 U/L (ref 55–135)
ALT SERPL W/O P-5'-P-CCNC: 24 U/L (ref 10–44)
ANION GAP SERPL CALC-SCNC: 10 MMOL/L (ref 8–16)
AST SERPL-CCNC: 38 U/L (ref 10–40)
BASOPHILS # BLD AUTO: 0.01 K/UL (ref 0–0.2)
BASOPHILS NFR BLD: 0.4 % (ref 0–1.9)
BILIRUB SERPL-MCNC: 1 MG/DL (ref 0.1–1)
BNP SERPL-MCNC: 202 PG/ML (ref 0–99)
BUN SERPL-MCNC: 19 MG/DL (ref 8–23)
CALCIUM SERPL-MCNC: 8.5 MG/DL (ref 8.7–10.5)
CHLORIDE SERPL-SCNC: 101 MMOL/L (ref 95–110)
CK SERPL-CCNC: 45 U/L (ref 20–180)
CO2 SERPL-SCNC: 23 MMOL/L (ref 23–29)
CREAT SERPL-MCNC: 1.1 MG/DL (ref 0.5–1.4)
CRP SERPL-MCNC: 8.91 MG/DL
DIFFERENTIAL METHOD: ABNORMAL
EOSINOPHIL # BLD AUTO: 0 K/UL (ref 0–0.5)
EOSINOPHIL NFR BLD: 1.5 % (ref 0–8)
ERYTHROCYTE [DISTWIDTH] IN BLOOD BY AUTOMATED COUNT: 12.8 % (ref 11.5–14.5)
EST. GFR  (AFRICAN AMERICAN): 54.8 ML/MIN/1.73 M^2
EST. GFR  (NON AFRICAN AMERICAN): 47.5 ML/MIN/1.73 M^2
FERRITIN SERPL-MCNC: 372 NG/ML (ref 20–300)
GLUCOSE SERPL-MCNC: 112 MG/DL (ref 70–110)
HCT VFR BLD AUTO: 41 % (ref 37–48.5)
HGB BLD-MCNC: 13.7 G/DL (ref 12–16)
IMM GRANULOCYTES # BLD AUTO: 0.02 K/UL (ref 0–0.04)
IMM GRANULOCYTES NFR BLD AUTO: 0.8 % (ref 0–0.5)
LACTATE SERPL-SCNC: 1 MMOL/L (ref 0.5–1.9)
LDH SERPL L TO P-CCNC: 333 U/L (ref 110–260)
LYMPHOCYTES # BLD AUTO: 0.6 K/UL (ref 1–4.8)
LYMPHOCYTES NFR BLD: 22.7 % (ref 18–48)
MCH RBC QN AUTO: 29.7 PG (ref 27–31)
MCHC RBC AUTO-ENTMCNC: 33.4 G/DL (ref 32–36)
MCV RBC AUTO: 89 FL (ref 82–98)
MONOCYTES # BLD AUTO: 0.2 K/UL (ref 0.3–1)
MONOCYTES NFR BLD: 6.4 % (ref 4–15)
NEUTROPHILS # BLD AUTO: 1.8 K/UL (ref 1.8–7.7)
NEUTROPHILS NFR BLD: 68.2 % (ref 38–73)
NRBC BLD-RTO: 0 /100 WBC
PLATELET # BLD AUTO: 136 K/UL (ref 150–450)
PMV BLD AUTO: 9.6 FL (ref 9.2–12.9)
POTASSIUM SERPL-SCNC: 4.2 MMOL/L (ref 3.5–5.1)
PROCALCITONIN SERPL IA-MCNC: 0.05 NG/ML (ref 0–0.5)
PROT SERPL-MCNC: 7.4 G/DL (ref 6–8.4)
RBC # BLD AUTO: 4.62 M/UL (ref 4–5.4)
SARS-COV-2 RDRP RESP QL NAA+PROBE: POSITIVE
SODIUM SERPL-SCNC: 134 MMOL/L (ref 136–145)
TROPONIN I SERPL DL<=0.01 NG/ML-MCNC: 0.03 NG/ML
WBC # BLD AUTO: 2.64 K/UL (ref 3.9–12.7)

## 2021-04-21 PROCEDURE — U0002 COVID-19 LAB TEST NON-CDC: HCPCS | Performed by: EMERGENCY MEDICINE

## 2021-04-21 PROCEDURE — 25000242 PHARM REV CODE 250 ALT 637 W/ HCPCS: Performed by: EMERGENCY MEDICINE

## 2021-04-21 PROCEDURE — 83615 LACTATE (LD) (LDH) ENZYME: CPT | Performed by: EMERGENCY MEDICINE

## 2021-04-21 PROCEDURE — 99285 EMERGENCY DEPT VISIT HI MDM: CPT | Mod: 25

## 2021-04-21 PROCEDURE — 84484 ASSAY OF TROPONIN QUANT: CPT | Performed by: EMERGENCY MEDICINE

## 2021-04-21 PROCEDURE — 87040 BLOOD CULTURE FOR BACTERIA: CPT | Performed by: EMERGENCY MEDICINE

## 2021-04-21 PROCEDURE — 85025 COMPLETE CBC W/AUTO DIFF WBC: CPT | Performed by: EMERGENCY MEDICINE

## 2021-04-21 PROCEDURE — 83880 ASSAY OF NATRIURETIC PEPTIDE: CPT | Performed by: EMERGENCY MEDICINE

## 2021-04-21 PROCEDURE — 80053 COMPREHEN METABOLIC PANEL: CPT | Performed by: EMERGENCY MEDICINE

## 2021-04-21 PROCEDURE — 93010 ELECTROCARDIOGRAM REPORT: CPT | Mod: ,,, | Performed by: SPECIALIST

## 2021-04-21 PROCEDURE — 82550 ASSAY OF CK (CPK): CPT | Performed by: EMERGENCY MEDICINE

## 2021-04-21 PROCEDURE — 86140 C-REACTIVE PROTEIN: CPT | Performed by: EMERGENCY MEDICINE

## 2021-04-21 PROCEDURE — 94761 N-INVAS EAR/PLS OXIMETRY MLT: CPT

## 2021-04-21 PROCEDURE — 82728 ASSAY OF FERRITIN: CPT | Performed by: EMERGENCY MEDICINE

## 2021-04-21 PROCEDURE — 36415 COLL VENOUS BLD VENIPUNCTURE: CPT | Performed by: EMERGENCY MEDICINE

## 2021-04-21 PROCEDURE — 83605 ASSAY OF LACTIC ACID: CPT | Performed by: EMERGENCY MEDICINE

## 2021-04-21 PROCEDURE — 84145 PROCALCITONIN (PCT): CPT | Performed by: EMERGENCY MEDICINE

## 2021-04-21 PROCEDURE — 93005 ELECTROCARDIOGRAM TRACING: CPT | Performed by: SPECIALIST

## 2021-04-21 PROCEDURE — 94640 AIRWAY INHALATION TREATMENT: CPT

## 2021-04-21 PROCEDURE — 93010 EKG 12-LEAD: ICD-10-PCS | Mod: ,,, | Performed by: SPECIALIST

## 2021-04-21 PROCEDURE — 99900031 HC PATIENT EDUCATION (STAT)

## 2021-04-21 RX ORDER — ALBUTEROL SULFATE 90 UG/1
1-2 AEROSOL, METERED RESPIRATORY (INHALATION) EVERY 6 HOURS PRN
Qty: 1 G | Refills: 1 | OUTPATIENT
Start: 2021-04-21 | End: 2021-04-27

## 2021-04-21 RX ORDER — IPRATROPIUM BROMIDE AND ALBUTEROL SULFATE 2.5; .5 MG/3ML; MG/3ML
3 SOLUTION RESPIRATORY (INHALATION)
Status: COMPLETED | OUTPATIENT
Start: 2021-04-21 | End: 2021-04-21

## 2021-04-21 RX ADMIN — IPRATROPIUM BROMIDE AND ALBUTEROL SULFATE 3 ML: .5; 3 SOLUTION RESPIRATORY (INHALATION) at 08:04

## 2021-04-23 ENCOUNTER — INFUSION (OUTPATIENT)
Dept: INFECTIOUS DISEASES | Facility: HOSPITAL | Age: 81
End: 2021-04-23
Attending: EMERGENCY MEDICINE
Payer: MEDICARE

## 2021-04-23 VITALS
TEMPERATURE: 98 F | RESPIRATION RATE: 18 BRPM | OXYGEN SATURATION: 96 % | HEIGHT: 57 IN | BODY MASS INDEX: 35.6 KG/M2 | DIASTOLIC BLOOD PRESSURE: 72 MMHG | WEIGHT: 165 LBS | HEART RATE: 89 BPM | SYSTOLIC BLOOD PRESSURE: 163 MMHG

## 2021-04-23 DIAGNOSIS — Z20.822 SUSPECTED COVID-19 VIRUS INFECTION: ICD-10-CM

## 2021-04-23 PROCEDURE — 25000003 PHARM REV CODE 250: Performed by: EMERGENCY MEDICINE

## 2021-04-23 PROCEDURE — 63600175 PHARM REV CODE 636 W HCPCS: Performed by: EMERGENCY MEDICINE

## 2021-04-23 PROCEDURE — M0243 CASIRIVI AND IMDEVI INFUSION: HCPCS | Performed by: EMERGENCY MEDICINE

## 2021-04-23 RX ORDER — ALBUTEROL SULFATE 90 UG/1
2 AEROSOL, METERED RESPIRATORY (INHALATION)
Status: DISCONTINUED | OUTPATIENT
Start: 2021-04-23 | End: 2021-04-27

## 2021-04-23 RX ORDER — SODIUM CHLORIDE 0.9 % (FLUSH) 0.9 %
10 SYRINGE (ML) INJECTION
Status: DISCONTINUED | OUTPATIENT
Start: 2021-04-23 | End: 2023-02-08

## 2021-04-23 RX ORDER — ONDANSETRON 4 MG/1
4 TABLET, ORALLY DISINTEGRATING ORAL ONCE AS NEEDED
Status: DISCONTINUED | OUTPATIENT
Start: 2021-04-23 | End: 2023-02-08

## 2021-04-23 RX ORDER — EPINEPHRINE 0.3 MG/.3ML
0.3 INJECTION SUBCUTANEOUS
Status: DISCONTINUED | OUTPATIENT
Start: 2021-04-23 | End: 2023-02-08

## 2021-04-23 RX ORDER — ACETAMINOPHEN 325 MG/1
650 TABLET ORAL ONCE AS NEEDED
Status: DISCONTINUED | OUTPATIENT
Start: 2021-04-23 | End: 2023-02-13 | Stop reason: HOSPADM

## 2021-04-23 RX ORDER — DIPHENHYDRAMINE HYDROCHLORIDE 50 MG/ML
25 INJECTION INTRAMUSCULAR; INTRAVENOUS ONCE AS NEEDED
Status: DISCONTINUED | OUTPATIENT
Start: 2021-04-23 | End: 2023-02-08

## 2021-04-23 RX ADMIN — CASIRIVIMAB 1200 MG: 1332 INJECTION, SOLUTION, CONCENTRATE INTRAVENOUS at 09:04

## 2021-04-23 RX ADMIN — SODIUM CHLORIDE: 0.9 INJECTION, SOLUTION INTRAVENOUS at 09:04

## 2021-04-24 LAB
BACTERIA BLD CULT: ABNORMAL

## 2021-04-26 LAB — BACTERIA BLD CULT: NORMAL

## 2021-04-27 ENCOUNTER — HOSPITAL ENCOUNTER (EMERGENCY)
Facility: HOSPITAL | Age: 81
Discharge: HOME | End: 2021-04-27
Attending: EMERGENCY MEDICINE
Payer: MEDICARE

## 2021-04-27 VITALS
RESPIRATION RATE: 20 BRPM | WEIGHT: 165 LBS | OXYGEN SATURATION: 100 % | TEMPERATURE: 99 F | SYSTOLIC BLOOD PRESSURE: 148 MMHG | BODY MASS INDEX: 35.6 KG/M2 | HEIGHT: 57 IN | DIASTOLIC BLOOD PRESSURE: 71 MMHG | HEART RATE: 89 BPM

## 2021-04-27 DIAGNOSIS — Z20.822 SUSPECTED COVID-19 VIRUS INFECTION: ICD-10-CM

## 2021-04-27 DIAGNOSIS — J12.82 PNEUMONIA DUE TO COVID-19 VIRUS: Primary | ICD-10-CM

## 2021-04-27 DIAGNOSIS — U07.1 PNEUMONIA DUE TO COVID-19 VIRUS: Primary | ICD-10-CM

## 2021-04-27 LAB
ALBUMIN SERPL BCP-MCNC: 3.4 G/DL (ref 3.5–5.2)
ALP SERPL-CCNC: 110 U/L (ref 55–135)
ALT SERPL W/O P-5'-P-CCNC: 19 U/L (ref 10–44)
ANION GAP SERPL CALC-SCNC: 13 MMOL/L (ref 8–16)
AST SERPL-CCNC: 24 U/L (ref 10–40)
BASOPHILS # BLD AUTO: 0.02 K/UL (ref 0–0.2)
BASOPHILS NFR BLD: 0.3 % (ref 0–1.9)
BILIRUB SERPL-MCNC: 1.2 MG/DL (ref 0.1–1)
BNP SERPL-MCNC: 262 PG/ML (ref 0–99)
BUN SERPL-MCNC: 16 MG/DL (ref 8–23)
CALCIUM SERPL-MCNC: 8.9 MG/DL (ref 8.7–10.5)
CHLORIDE SERPL-SCNC: 105 MMOL/L (ref 95–110)
CK SERPL-CCNC: 26 U/L (ref 20–180)
CO2 SERPL-SCNC: 21 MMOL/L (ref 23–29)
CREAT SERPL-MCNC: 1.1 MG/DL (ref 0.5–1.4)
CRP SERPL-MCNC: 4.06 MG/DL
DIFFERENTIAL METHOD: ABNORMAL
EOSINOPHIL # BLD AUTO: 0.2 K/UL (ref 0–0.5)
EOSINOPHIL NFR BLD: 2.5 % (ref 0–8)
ERYTHROCYTE [DISTWIDTH] IN BLOOD BY AUTOMATED COUNT: 12.5 % (ref 11.5–14.5)
EST. GFR  (AFRICAN AMERICAN): 54.8 ML/MIN/1.73 M^2
EST. GFR  (NON AFRICAN AMERICAN): 47.5 ML/MIN/1.73 M^2
FERRITIN SERPL-MCNC: 310 NG/ML (ref 20–300)
GLUCOSE SERPL-MCNC: 169 MG/DL (ref 70–110)
HCT VFR BLD AUTO: 39.4 % (ref 37–48.5)
HGB BLD-MCNC: 13 G/DL (ref 12–16)
IMM GRANULOCYTES # BLD AUTO: 0.06 K/UL (ref 0–0.04)
IMM GRANULOCYTES NFR BLD AUTO: 1 % (ref 0–0.5)
LACTATE SERPL-SCNC: 1.1 MMOL/L (ref 0.5–1.9)
LDH SERPL L TO P-CCNC: 263 U/L (ref 110–260)
LYMPHOCYTES # BLD AUTO: 0.7 K/UL (ref 1–4.8)
LYMPHOCYTES NFR BLD: 11.6 % (ref 18–48)
MCH RBC QN AUTO: 29 PG (ref 27–31)
MCHC RBC AUTO-ENTMCNC: 33 G/DL (ref 32–36)
MCV RBC AUTO: 88 FL (ref 82–98)
MONOCYTES # BLD AUTO: 0.6 K/UL (ref 0.3–1)
MONOCYTES NFR BLD: 9.1 % (ref 4–15)
NEUTROPHILS # BLD AUTO: 4.6 K/UL (ref 1.8–7.7)
NEUTROPHILS NFR BLD: 75.5 % (ref 38–73)
NRBC BLD-RTO: 0 /100 WBC
PLATELET # BLD AUTO: 250 K/UL (ref 150–450)
PLATELET BLD QL SMEAR: ABNORMAL
PMV BLD AUTO: 9.2 FL (ref 9.2–12.9)
POTASSIUM SERPL-SCNC: 4.5 MMOL/L (ref 3.5–5.1)
PROCALCITONIN SERPL IA-MCNC: <0.05 NG/ML (ref 0–0.5)
PROT SERPL-MCNC: 7.2 G/DL (ref 6–8.4)
RBC # BLD AUTO: 4.48 M/UL (ref 4–5.4)
SODIUM SERPL-SCNC: 139 MMOL/L (ref 136–145)
TROPONIN I SERPL DL<=0.01 NG/ML-MCNC: <0.03 NG/ML
WBC # BLD AUTO: 6.03 K/UL (ref 3.9–12.7)

## 2021-04-27 PROCEDURE — 94640 AIRWAY INHALATION TREATMENT: CPT

## 2021-04-27 PROCEDURE — 96365 THER/PROPH/DIAG IV INF INIT: CPT | Mod: 59

## 2021-04-27 PROCEDURE — 80053 COMPREHEN METABOLIC PANEL: CPT | Performed by: EMERGENCY MEDICINE

## 2021-04-27 PROCEDURE — 36415 COLL VENOUS BLD VENIPUNCTURE: CPT | Performed by: EMERGENCY MEDICINE

## 2021-04-27 PROCEDURE — 93005 ELECTROCARDIOGRAM TRACING: CPT | Performed by: GENERAL PRACTICE

## 2021-04-27 PROCEDURE — 96375 TX/PRO/DX INJ NEW DRUG ADDON: CPT

## 2021-04-27 PROCEDURE — 85025 COMPLETE CBC W/AUTO DIFF WBC: CPT | Performed by: EMERGENCY MEDICINE

## 2021-04-27 PROCEDURE — 82728 ASSAY OF FERRITIN: CPT | Performed by: EMERGENCY MEDICINE

## 2021-04-27 PROCEDURE — 86140 C-REACTIVE PROTEIN: CPT | Performed by: EMERGENCY MEDICINE

## 2021-04-27 PROCEDURE — 99285 EMERGENCY DEPT VISIT HI MDM: CPT | Mod: 25

## 2021-04-27 PROCEDURE — 82550 ASSAY OF CK (CPK): CPT | Performed by: EMERGENCY MEDICINE

## 2021-04-27 PROCEDURE — 84145 PROCALCITONIN (PCT): CPT | Performed by: EMERGENCY MEDICINE

## 2021-04-27 PROCEDURE — 83615 LACTATE (LD) (LDH) ENZYME: CPT | Performed by: EMERGENCY MEDICINE

## 2021-04-27 PROCEDURE — 87040 BLOOD CULTURE FOR BACTERIA: CPT | Performed by: EMERGENCY MEDICINE

## 2021-04-27 PROCEDURE — 25500020 PHARM REV CODE 255: Performed by: EMERGENCY MEDICINE

## 2021-04-27 PROCEDURE — 25000242 PHARM REV CODE 250 ALT 637 W/ HCPCS: Performed by: EMERGENCY MEDICINE

## 2021-04-27 PROCEDURE — 84484 ASSAY OF TROPONIN QUANT: CPT | Performed by: EMERGENCY MEDICINE

## 2021-04-27 PROCEDURE — 25000003 PHARM REV CODE 250: Performed by: EMERGENCY MEDICINE

## 2021-04-27 PROCEDURE — 63600175 PHARM REV CODE 636 W HCPCS: Performed by: EMERGENCY MEDICINE

## 2021-04-27 PROCEDURE — 83880 ASSAY OF NATRIURETIC PEPTIDE: CPT | Performed by: EMERGENCY MEDICINE

## 2021-04-27 PROCEDURE — 83605 ASSAY OF LACTIC ACID: CPT | Performed by: EMERGENCY MEDICINE

## 2021-04-27 RX ORDER — PREDNISONE 20 MG/1
20 TABLET ORAL 2 TIMES DAILY
Qty: 10 TABLET | Refills: 0 | Status: SHIPPED | OUTPATIENT
Start: 2021-04-27 | End: 2021-05-02

## 2021-04-27 RX ORDER — IPRATROPIUM BROMIDE AND ALBUTEROL SULFATE 2.5; .5 MG/3ML; MG/3ML
3 SOLUTION RESPIRATORY (INHALATION)
Status: COMPLETED | OUTPATIENT
Start: 2021-04-27 | End: 2021-04-27

## 2021-04-27 RX ORDER — DEXAMETHASONE SODIUM PHOSPHATE 4 MG/ML
8 INJECTION, SOLUTION INTRA-ARTICULAR; INTRALESIONAL; INTRAMUSCULAR; INTRAVENOUS; SOFT TISSUE
Status: COMPLETED | OUTPATIENT
Start: 2021-04-27 | End: 2021-04-27

## 2021-04-27 RX ORDER — ALBUTEROL SULFATE 0.83 MG/ML
2.5 SOLUTION RESPIRATORY (INHALATION) EVERY 6 HOURS PRN
Qty: 1 BOX | Refills: 0 | Status: SHIPPED | OUTPATIENT
Start: 2021-04-27 | End: 2023-02-08

## 2021-04-27 RX ORDER — DOXYCYCLINE 100 MG/1
100 CAPSULE ORAL 2 TIMES DAILY
Qty: 14 CAPSULE | Refills: 0 | Status: SHIPPED | OUTPATIENT
Start: 2021-04-27 | End: 2021-05-04

## 2021-04-27 RX ORDER — ACETAMINOPHEN 500 MG
1000 TABLET ORAL
Status: COMPLETED | OUTPATIENT
Start: 2021-04-27 | End: 2021-04-27

## 2021-04-27 RX ORDER — AMOXICILLIN 875 MG/1
875 TABLET, FILM COATED ORAL 2 TIMES DAILY
Qty: 14 TABLET | Refills: 0 | Status: SHIPPED | OUTPATIENT
Start: 2021-04-27 | End: 2023-02-08

## 2021-04-27 RX ADMIN — IOHEXOL 100 ML: 350 INJECTION, SOLUTION INTRAVENOUS at 12:04

## 2021-04-27 RX ADMIN — CEFTRIAXONE 2 G: 2 INJECTION, SOLUTION INTRAVENOUS at 02:04

## 2021-04-27 RX ADMIN — DEXAMETHASONE SODIUM PHOSPHATE 8 MG: 4 INJECTION, SOLUTION INTRA-ARTICULAR; INTRALESIONAL; INTRAMUSCULAR; INTRAVENOUS; SOFT TISSUE at 01:04

## 2021-04-27 RX ADMIN — DOXYCYCLINE 100 MG: 100 INJECTION, POWDER, LYOPHILIZED, FOR SOLUTION INTRAVENOUS at 11:04

## 2021-04-27 RX ADMIN — IPRATROPIUM BROMIDE AND ALBUTEROL SULFATE 3 ML: .5; 3 SOLUTION RESPIRATORY (INHALATION) at 11:04

## 2021-04-27 RX ADMIN — ACETAMINOPHEN 1000 MG: 500 TABLET ORAL at 01:04

## 2021-05-02 LAB
BACTERIA BLD CULT: NORMAL
BACTERIA BLD CULT: NORMAL

## 2021-05-26 DIAGNOSIS — I49.8 OTHER SPECIFIED CARDIAC ARRHYTHMIAS: Primary | ICD-10-CM

## 2021-06-11 ENCOUNTER — CLINICAL SUPPORT (OUTPATIENT)
Dept: CARDIOLOGY | Facility: HOSPITAL | Age: 81
End: 2021-06-11
Payer: MEDICARE

## 2021-06-11 DIAGNOSIS — Z95.0 PRESENCE OF CARDIAC PACEMAKER: ICD-10-CM

## 2021-06-11 DIAGNOSIS — I50.42 CHRONIC COMBINED SYSTOLIC (CONGESTIVE) AND DIASTOLIC (CONGESTIVE) HEART FAILURE: ICD-10-CM

## 2021-06-11 PROCEDURE — 93294 REM INTERROG EVL PM/LDLS PM: CPT | Mod: ,,, | Performed by: INTERNAL MEDICINE

## 2021-06-11 PROCEDURE — 93294 CARDIAC DEVICE CHECK - REMOTE: ICD-10-PCS | Mod: ,,, | Performed by: INTERNAL MEDICINE

## 2021-07-06 ENCOUNTER — LAB VISIT (OUTPATIENT)
Dept: LAB | Facility: HOSPITAL | Age: 81
End: 2021-07-06
Attending: INTERNAL MEDICINE
Payer: MEDICARE

## 2021-07-06 DIAGNOSIS — I10 ESSENTIAL HYPERTENSION, MALIGNANT: Primary | ICD-10-CM

## 2021-07-06 LAB
ANION GAP SERPL CALC-SCNC: 10 MMOL/L (ref 8–16)
BUN SERPL-MCNC: 34 MG/DL (ref 8–23)
CALCIUM SERPL-MCNC: 9.6 MG/DL (ref 8.7–10.5)
CHLORIDE SERPL-SCNC: 105 MMOL/L (ref 95–110)
CO2 SERPL-SCNC: 22 MMOL/L (ref 23–29)
CREAT SERPL-MCNC: 1.2 MG/DL (ref 0.5–1.4)
EST. GFR  (AFRICAN AMERICAN): 49 ML/MIN/1.73 M^2
EST. GFR  (NON AFRICAN AMERICAN): 42.5 ML/MIN/1.73 M^2
GLUCOSE SERPL-MCNC: 189 MG/DL (ref 70–110)
POTASSIUM SERPL-SCNC: 4.5 MMOL/L (ref 3.5–5.1)
SODIUM SERPL-SCNC: 137 MMOL/L (ref 136–145)

## 2021-07-06 PROCEDURE — 36415 COLL VENOUS BLD VENIPUNCTURE: CPT | Performed by: INTERNAL MEDICINE

## 2021-07-06 PROCEDURE — 80048 BASIC METABOLIC PNL TOTAL CA: CPT | Performed by: INTERNAL MEDICINE

## 2021-08-02 ENCOUNTER — OFFICE VISIT (OUTPATIENT)
Dept: ELECTROPHYSIOLOGY | Facility: CLINIC | Age: 81
End: 2021-08-02
Payer: MEDICARE

## 2021-08-02 ENCOUNTER — CLINICAL SUPPORT (OUTPATIENT)
Dept: CARDIOLOGY | Facility: HOSPITAL | Age: 81
End: 2021-08-02
Attending: INTERNAL MEDICINE
Payer: MEDICARE

## 2021-08-02 VITALS
SYSTOLIC BLOOD PRESSURE: 180 MMHG | DIASTOLIC BLOOD PRESSURE: 85 MMHG | HEIGHT: 57 IN | BODY MASS INDEX: 38.05 KG/M2 | WEIGHT: 176.38 LBS | HEART RATE: 83 BPM

## 2021-08-02 DIAGNOSIS — I44.7 LEFT BUNDLE BRANCH BLOCK: ICD-10-CM

## 2021-08-02 DIAGNOSIS — I50.32 CHRONIC DIASTOLIC HEART FAILURE: Chronic | ICD-10-CM

## 2021-08-02 DIAGNOSIS — I10 ESSENTIAL HYPERTENSION: Chronic | ICD-10-CM

## 2021-08-02 DIAGNOSIS — I49.8 OTHER SPECIFIED CARDIAC ARRHYTHMIAS: ICD-10-CM

## 2021-08-02 DIAGNOSIS — I10 UNCONTROLLED HYPERTENSION: ICD-10-CM

## 2021-08-02 DIAGNOSIS — Z95.0 PRESENCE OF PERMANENT CARDIAC PACEMAKER: Primary | ICD-10-CM

## 2021-08-02 DIAGNOSIS — R55 SYNCOPE, UNSPECIFIED SYNCOPE TYPE: ICD-10-CM

## 2021-08-02 PROCEDURE — 99999 PR PBB SHADOW E&M-EST. PATIENT-LVL IV: CPT | Mod: PBBFAC,,, | Performed by: NURSE PRACTITIONER

## 2021-08-02 PROCEDURE — 99214 OFFICE O/P EST MOD 30 MIN: CPT | Mod: PBBFAC | Performed by: NURSE PRACTITIONER

## 2021-08-02 PROCEDURE — 99999 PR PBB SHADOW E&M-EST. PATIENT-LVL IV: ICD-10-PCS | Mod: PBBFAC,,, | Performed by: NURSE PRACTITIONER

## 2021-08-02 PROCEDURE — 99214 OFFICE O/P EST MOD 30 MIN: CPT | Mod: S$PBB,,, | Performed by: NURSE PRACTITIONER

## 2021-08-02 PROCEDURE — 93280 PM DEVICE PROGR EVAL DUAL: CPT

## 2021-08-02 PROCEDURE — 99214 PR OFFICE/OUTPT VISIT, EST, LEVL IV, 30-39 MIN: ICD-10-PCS | Mod: S$PBB,,, | Performed by: NURSE PRACTITIONER

## 2021-08-02 PROCEDURE — 93280 PM DEVICE PROGR EVAL DUAL: CPT | Mod: 26,,, | Performed by: INTERNAL MEDICINE

## 2021-08-02 PROCEDURE — 93280 CARDIAC DEVICE CHECK - IN CLINIC & HOSPITAL: ICD-10-PCS | Mod: 26,,, | Performed by: INTERNAL MEDICINE

## 2021-08-02 RX ORDER — ASPIRIN 81 MG/1
81 TABLET ORAL EVERY MORNING
COMMUNITY

## 2021-09-09 ENCOUNTER — CLINICAL SUPPORT (OUTPATIENT)
Dept: CARDIOLOGY | Facility: HOSPITAL | Age: 81
End: 2021-09-09
Payer: MEDICARE

## 2021-09-09 DIAGNOSIS — R00.1 BRADYCARDIA, UNSPECIFIED: ICD-10-CM

## 2021-09-09 DIAGNOSIS — R55 SYNCOPE AND COLLAPSE: ICD-10-CM

## 2021-09-09 DIAGNOSIS — Z95.0 PRESENCE OF CARDIAC PACEMAKER: ICD-10-CM

## 2021-09-09 PROCEDURE — 93294 CARDIAC DEVICE CHECK - REMOTE: ICD-10-PCS | Mod: ,,, | Performed by: INTERNAL MEDICINE

## 2021-09-09 PROCEDURE — 93294 REM INTERROG EVL PM/LDLS PM: CPT | Mod: ,,, | Performed by: INTERNAL MEDICINE

## 2021-12-08 ENCOUNTER — CLINICAL SUPPORT (OUTPATIENT)
Dept: CARDIOLOGY | Facility: HOSPITAL | Age: 81
End: 2021-12-08
Payer: MEDICARE

## 2021-12-08 DIAGNOSIS — Z95.0 PRESENCE OF CARDIAC PACEMAKER: ICD-10-CM

## 2021-12-08 DIAGNOSIS — I50.9 HEART FAILURE, UNSPECIFIED: ICD-10-CM

## 2021-12-08 PROCEDURE — 93294 REM INTERROG EVL PM/LDLS PM: CPT | Mod: ,,, | Performed by: INTERNAL MEDICINE

## 2021-12-08 PROCEDURE — 93294 CARDIAC DEVICE CHECK - REMOTE: ICD-10-PCS | Mod: ,,, | Performed by: INTERNAL MEDICINE

## 2022-01-08 ENCOUNTER — LAB VISIT (OUTPATIENT)
Dept: LAB | Facility: HOSPITAL | Age: 82
End: 2022-01-08
Attending: INTERNAL MEDICINE
Payer: MEDICARE

## 2022-01-08 DIAGNOSIS — E11.9 DIABETES MELLITUS WITHOUT COMPLICATION: ICD-10-CM

## 2022-01-08 DIAGNOSIS — E78.2 MIXED HYPERLIPIDEMIA: ICD-10-CM

## 2022-01-08 DIAGNOSIS — Z79.899 ENCOUNTER FOR LONG-TERM (CURRENT) USE OF OTHER MEDICATIONS: ICD-10-CM

## 2022-01-08 DIAGNOSIS — I10 ESSENTIAL HYPERTENSION, MALIGNANT: Primary | ICD-10-CM

## 2022-01-08 LAB
ALBUMIN SERPL BCP-MCNC: 4.1 G/DL (ref 3.5–5.2)
ALBUMIN/CREAT UR: 10.8 UG/MG (ref 0–30)
ALP SERPL-CCNC: 106 U/L (ref 55–135)
ALT SERPL W/O P-5'-P-CCNC: 21 U/L (ref 10–44)
ANION GAP SERPL CALC-SCNC: 10 MMOL/L (ref 8–16)
AST SERPL-CCNC: 18 U/L (ref 10–40)
BILIRUB DIRECT SERPL-MCNC: 0.1 MG/DL (ref 0.1–0.3)
BILIRUB SERPL-MCNC: 1.1 MG/DL (ref 0.1–1)
BUN SERPL-MCNC: 29 MG/DL (ref 8–23)
CALCIUM SERPL-MCNC: 9.2 MG/DL (ref 8.7–10.5)
CHLORIDE SERPL-SCNC: 102 MMOL/L (ref 95–110)
CHOLEST SERPL-MCNC: 240 MG/DL (ref 120–199)
CHOLEST/HDLC SERPL: 4.4 {RATIO} (ref 2–5)
CO2 SERPL-SCNC: 26 MMOL/L (ref 23–29)
CREAT SERPL-MCNC: 1.1 MG/DL (ref 0.5–1.4)
CREAT UR-MCNC: 51 MG/DL (ref 15–325)
EST. GFR  (AFRICAN AMERICAN): 54.4 ML/MIN/1.73 M^2
EST. GFR  (NON AFRICAN AMERICAN): 47.2 ML/MIN/1.73 M^2
ESTIMATED AVG GLUCOSE: 166 MG/DL (ref 68–131)
GLUCOSE SERPL-MCNC: 187 MG/DL (ref 70–110)
HBA1C MFR BLD: 7.4 % (ref 4.5–6.2)
HDLC SERPL-MCNC: 55 MG/DL (ref 40–75)
HDLC SERPL: 22.9 % (ref 20–50)
LDLC SERPL CALC-MCNC: 158.2 MG/DL (ref 63–159)
MICROALBUMIN UR DL<=1MG/L-MCNC: 5.5 UG/ML
NONHDLC SERPL-MCNC: 185 MG/DL
POTASSIUM SERPL-SCNC: 4.1 MMOL/L (ref 3.5–5.1)
PROT SERPL-MCNC: 7.1 G/DL (ref 6–8.4)
SODIUM SERPL-SCNC: 138 MMOL/L (ref 136–145)
TRIGL SERPL-MCNC: 134 MG/DL (ref 30–150)

## 2022-01-08 PROCEDURE — 36415 COLL VENOUS BLD VENIPUNCTURE: CPT | Performed by: INTERNAL MEDICINE

## 2022-01-08 PROCEDURE — 83036 HEMOGLOBIN GLYCOSYLATED A1C: CPT | Performed by: INTERNAL MEDICINE

## 2022-01-08 PROCEDURE — 80048 BASIC METABOLIC PNL TOTAL CA: CPT | Performed by: INTERNAL MEDICINE

## 2022-01-08 PROCEDURE — 80061 LIPID PANEL: CPT | Performed by: INTERNAL MEDICINE

## 2022-01-08 PROCEDURE — 82570 ASSAY OF URINE CREATININE: CPT | Performed by: INTERNAL MEDICINE

## 2022-01-08 PROCEDURE — 80076 HEPATIC FUNCTION PANEL: CPT | Performed by: INTERNAL MEDICINE

## 2022-01-18 DIAGNOSIS — Z12.31 ENCOUNTER FOR SCREENING MAMMOGRAM FOR BREAST CANCER: Primary | ICD-10-CM

## 2022-02-24 NOTE — ANESTHESIA PROCEDURE NOTES
Arterial    Diagnosis: Carotid artery stenosis    Patient location during procedure: done in OR  Procedure start time: 10/16/2020 9:22 AM  Timeout: 10/16/2020 9:21 AM  Procedure end time: 10/16/2020 9:24 AM    Staffing  Authorizing Provider: Sd Connors Jr., MD  Performing Provider: Sd Connors Jr., MD    Anesthesiologist was present at the time of the procedure.    Preanesthetic Checklist  Completed: patient identified, site marked, surgical consent, pre-op evaluation, timeout performed, IV checked, risks and benefits discussed, monitors and equipment checked and anesthesia consent givenArterial  Skin Prep: chlorhexidine gluconate  Local Infiltration: lidocaine  Orientation: right  Location: radial  Catheter Size: 20 G  Assessment  Dressing: secured with tape and tegaderm and sutured in place and taped  Patient: Tolerated well             From: Valentina ALDRICH  To: Gloria Kelly  Sent: 2/23/2022 7:55 AM CST  Subject: Lab result    Hi Shantell,  This is EVELYN Montgomery from  office,  I just wanted to let you know about lab result,    Per ,  Thyroid Stimulating Immunoglobulin antibody negative.      Thanks.    -EVELYN Montgomery

## 2022-03-08 ENCOUNTER — CLINICAL SUPPORT (OUTPATIENT)
Dept: CARDIOLOGY | Facility: HOSPITAL | Age: 82
End: 2022-03-08
Payer: MEDICARE

## 2022-03-08 DIAGNOSIS — R00.1 BRADYCARDIA, UNSPECIFIED: ICD-10-CM

## 2022-03-08 DIAGNOSIS — Z95.0 PRESENCE OF CARDIAC PACEMAKER: ICD-10-CM

## 2022-03-08 DIAGNOSIS — R55 SYNCOPE AND COLLAPSE: ICD-10-CM

## 2022-04-18 ENCOUNTER — HOSPITAL ENCOUNTER (OUTPATIENT)
Dept: RADIOLOGY | Facility: HOSPITAL | Age: 82
Discharge: HOME | End: 2022-04-18
Attending: INTERNAL MEDICINE
Payer: MEDICARE

## 2022-04-18 VITALS — BODY MASS INDEX: 38.05 KG/M2 | HEIGHT: 57 IN | WEIGHT: 176.38 LBS

## 2022-04-18 DIAGNOSIS — Z12.31 ENCOUNTER FOR SCREENING MAMMOGRAM FOR BREAST CANCER: ICD-10-CM

## 2022-04-18 PROCEDURE — 77063 BREAST TOMOSYNTHESIS BI: CPT | Mod: TC,PO

## 2022-06-06 ENCOUNTER — CLINICAL SUPPORT (OUTPATIENT)
Dept: CARDIOLOGY | Facility: HOSPITAL | Age: 82
End: 2022-06-06
Payer: MEDICARE

## 2022-06-06 DIAGNOSIS — Z95.0 PRESENCE OF CARDIAC PACEMAKER: ICD-10-CM

## 2022-06-06 DIAGNOSIS — R55 SYNCOPE AND COLLAPSE: ICD-10-CM

## 2022-06-06 PROCEDURE — 93294 REM INTERROG EVL PM/LDLS PM: CPT | Mod: ,,, | Performed by: INTERNAL MEDICINE

## 2022-06-06 PROCEDURE — 93294 CARDIAC DEVICE CHECK - REMOTE: ICD-10-PCS | Mod: ,,, | Performed by: INTERNAL MEDICINE

## 2022-07-16 ENCOUNTER — LAB VISIT (OUTPATIENT)
Dept: LAB | Facility: HOSPITAL | Age: 82
End: 2022-07-16
Attending: INTERNAL MEDICINE
Payer: MEDICARE

## 2022-07-16 DIAGNOSIS — E11.9 DIABETES MELLITUS WITHOUT COMPLICATION: ICD-10-CM

## 2022-07-16 DIAGNOSIS — I10 ESSENTIAL HYPERTENSION, MALIGNANT: Primary | ICD-10-CM

## 2022-07-16 DIAGNOSIS — E78.2 MIXED HYPERLIPIDEMIA: ICD-10-CM

## 2022-07-16 DIAGNOSIS — Z79.899 ENCOUNTER FOR LONG-TERM (CURRENT) USE OF OTHER MEDICATIONS: ICD-10-CM

## 2022-07-16 LAB
ALBUMIN SERPL BCP-MCNC: 4.3 G/DL (ref 3.5–5.2)
ALBUMIN/CREAT UR: 27.3 UG/MG (ref 0–30)
ALP SERPL-CCNC: 96 U/L (ref 55–135)
ALT SERPL W/O P-5'-P-CCNC: 25 U/L (ref 10–44)
ANION GAP SERPL CALC-SCNC: 9 MMOL/L (ref 8–16)
AST SERPL-CCNC: 27 U/L (ref 10–40)
BILIRUB DIRECT SERPL-MCNC: 0.2 MG/DL (ref 0.1–0.3)
BILIRUB SERPL-MCNC: 1.1 MG/DL (ref 0.1–1)
BUN SERPL-MCNC: 30 MG/DL (ref 8–23)
CALCIUM SERPL-MCNC: 9.1 MG/DL (ref 8.7–10.5)
CHLORIDE SERPL-SCNC: 103 MMOL/L (ref 95–110)
CHOLEST SERPL-MCNC: 155 MG/DL (ref 120–199)
CHOLEST/HDLC SERPL: 2.8 {RATIO} (ref 2–5)
CO2 SERPL-SCNC: 23 MMOL/L (ref 23–29)
CREAT SERPL-MCNC: 1.2 MG/DL (ref 0.5–1.4)
CREAT UR-MCNC: 40 MG/DL (ref 15–325)
EST. GFR  (AFRICAN AMERICAN): 48.6 ML/MIN/1.73 M^2
EST. GFR  (NON AFRICAN AMERICAN): 42.2 ML/MIN/1.73 M^2
ESTIMATED AVG GLUCOSE: 140 MG/DL (ref 68–131)
GLUCOSE SERPL-MCNC: 148 MG/DL (ref 70–110)
HBA1C MFR BLD: 6.5 % (ref 4.5–6.2)
HDLC SERPL-MCNC: 55 MG/DL (ref 40–75)
HDLC SERPL: 35.5 % (ref 20–50)
LDLC SERPL CALC-MCNC: 82.6 MG/DL (ref 63–159)
MICROALBUMIN UR DL<=1MG/L-MCNC: 10.9 UG/ML
NONHDLC SERPL-MCNC: 100 MG/DL
POTASSIUM SERPL-SCNC: 4.3 MMOL/L (ref 3.5–5.1)
PROT SERPL-MCNC: 7.4 G/DL (ref 6–8.4)
SODIUM SERPL-SCNC: 135 MMOL/L (ref 136–145)
TRIGL SERPL-MCNC: 87 MG/DL (ref 30–150)

## 2022-07-16 PROCEDURE — 83036 HEMOGLOBIN GLYCOSYLATED A1C: CPT | Performed by: INTERNAL MEDICINE

## 2022-07-16 PROCEDURE — 80076 HEPATIC FUNCTION PANEL: CPT | Performed by: INTERNAL MEDICINE

## 2022-07-16 PROCEDURE — 80061 LIPID PANEL: CPT | Performed by: INTERNAL MEDICINE

## 2022-07-16 PROCEDURE — 82043 UR ALBUMIN QUANTITATIVE: CPT | Performed by: INTERNAL MEDICINE

## 2022-07-16 PROCEDURE — 82570 ASSAY OF URINE CREATININE: CPT | Performed by: INTERNAL MEDICINE

## 2022-07-16 PROCEDURE — 36415 COLL VENOUS BLD VENIPUNCTURE: CPT | Performed by: INTERNAL MEDICINE

## 2022-07-16 PROCEDURE — 80048 BASIC METABOLIC PNL TOTAL CA: CPT | Performed by: INTERNAL MEDICINE

## 2022-09-04 ENCOUNTER — CLINICAL SUPPORT (OUTPATIENT)
Dept: CARDIOLOGY | Facility: HOSPITAL | Age: 82
End: 2022-09-04
Payer: MEDICARE

## 2022-09-04 DIAGNOSIS — Z95.0 PRESENCE OF CARDIAC PACEMAKER: ICD-10-CM

## 2022-09-04 DIAGNOSIS — I44.1 ATRIOVENTRICULAR BLOCK, SECOND DEGREE: ICD-10-CM

## 2022-12-03 ENCOUNTER — CLINICAL SUPPORT (OUTPATIENT)
Dept: CARDIOLOGY | Facility: HOSPITAL | Age: 82
End: 2022-12-03
Payer: MEDICARE

## 2022-12-03 DIAGNOSIS — I44.1 ATRIOVENTRICULAR BLOCK, SECOND DEGREE: ICD-10-CM

## 2022-12-03 DIAGNOSIS — Z95.0 PRESENCE OF CARDIAC PACEMAKER: ICD-10-CM

## 2022-12-03 PROCEDURE — 93294 REM INTERROG EVL PM/LDLS PM: CPT | Mod: ,,, | Performed by: INTERNAL MEDICINE

## 2022-12-03 PROCEDURE — 93294 CARDIAC DEVICE CHECK - REMOTE: ICD-10-PCS | Mod: ,,, | Performed by: INTERNAL MEDICINE

## 2022-12-29 ENCOUNTER — LAB VISIT (OUTPATIENT)
Dept: LAB | Facility: HOSPITAL | Age: 82
End: 2022-12-29
Attending: INTERNAL MEDICINE
Payer: MEDICARE

## 2022-12-29 DIAGNOSIS — I10 ESSENTIAL HYPERTENSION, MALIGNANT: Primary | ICD-10-CM

## 2022-12-29 DIAGNOSIS — E03.4 IDIOPATHIC ATROPHIC HYPOTHYROIDISM: ICD-10-CM

## 2022-12-29 DIAGNOSIS — E11.9 DIABETES MELLITUS WITHOUT COMPLICATION: ICD-10-CM

## 2022-12-29 DIAGNOSIS — E78.2 MIXED HYPERLIPIDEMIA: ICD-10-CM

## 2022-12-29 DIAGNOSIS — D50.0 IRON DEFICIENCY ANEMIA SECONDARY TO BLOOD LOSS (CHRONIC): ICD-10-CM

## 2022-12-29 LAB
ALBUMIN SERPL BCP-MCNC: 4.2 G/DL (ref 3.5–5.2)
ALBUMIN/CREAT UR: 19.3 UG/MG (ref 0–30)
ALP SERPL-CCNC: 107 U/L (ref 55–135)
ALT SERPL W/O P-5'-P-CCNC: 18 U/L (ref 10–44)
ANION GAP SERPL CALC-SCNC: 11 MMOL/L (ref 8–16)
AST SERPL-CCNC: 20 U/L (ref 10–40)
BASOPHILS # BLD AUTO: 0.03 K/UL (ref 0–0.2)
BASOPHILS NFR BLD: 0.5 % (ref 0–1.9)
BILIRUB DIRECT SERPL-MCNC: 0.1 MG/DL (ref 0.1–0.3)
BILIRUB SERPL-MCNC: 1.1 MG/DL (ref 0.1–1)
BUN SERPL-MCNC: 26 MG/DL (ref 8–23)
CALCIUM SERPL-MCNC: 9.7 MG/DL (ref 8.7–10.5)
CHLORIDE SERPL-SCNC: 106 MMOL/L (ref 95–110)
CHOLEST SERPL-MCNC: 189 MG/DL (ref 120–199)
CHOLEST/HDLC SERPL: 3.3 {RATIO} (ref 2–5)
CO2 SERPL-SCNC: 24 MMOL/L (ref 23–29)
CREAT SERPL-MCNC: 1.1 MG/DL (ref 0.5–1.4)
CREAT UR-MCNC: 44 MG/DL (ref 15–325)
DIFFERENTIAL METHOD: ABNORMAL
EOSINOPHIL # BLD AUTO: 0.2 K/UL (ref 0–0.5)
EOSINOPHIL NFR BLD: 3.2 % (ref 0–8)
ERYTHROCYTE [DISTWIDTH] IN BLOOD BY AUTOMATED COUNT: 12.5 % (ref 11.5–14.5)
EST. GFR  (NO RACE VARIABLE): 50.2 ML/MIN/1.73 M^2
ESTIMATED AVG GLUCOSE: 114 MG/DL (ref 68–131)
FERRITIN SERPL-MCNC: 165 NG/ML (ref 20–300)
GLUCOSE SERPL-MCNC: 124 MG/DL (ref 70–110)
HBA1C MFR BLD: 5.6 % (ref 4.5–6.2)
HCT VFR BLD AUTO: 45.8 % (ref 37–48.5)
HDLC SERPL-MCNC: 57 MG/DL (ref 40–75)
HDLC SERPL: 30.2 % (ref 20–50)
HGB BLD-MCNC: 15.1 G/DL (ref 12–16)
IMM GRANULOCYTES # BLD AUTO: 0.02 K/UL (ref 0–0.04)
IMM GRANULOCYTES NFR BLD AUTO: 0.3 % (ref 0–0.5)
IRON SERPL-MCNC: 76 UG/DL (ref 30–160)
LDLC SERPL CALC-MCNC: 115 MG/DL (ref 63–159)
LYMPHOCYTES # BLD AUTO: 1.6 K/UL (ref 1–4.8)
LYMPHOCYTES NFR BLD: 24.2 % (ref 18–48)
MCH RBC QN AUTO: 30.3 PG (ref 27–31)
MCHC RBC AUTO-ENTMCNC: 33 G/DL (ref 32–36)
MCV RBC AUTO: 92 FL (ref 82–98)
MICROALBUMIN UR DL<=1MG/L-MCNC: 8.5 UG/ML
MONOCYTES # BLD AUTO: 0.5 K/UL (ref 0.3–1)
MONOCYTES NFR BLD: 8.3 % (ref 4–15)
NEUTROPHILS # BLD AUTO: 4.1 K/UL (ref 1.8–7.7)
NEUTROPHILS NFR BLD: 63.5 % (ref 38–73)
NONHDLC SERPL-MCNC: 132 MG/DL
NRBC BLD-RTO: 0 /100 WBC
PLATELET # BLD AUTO: 145 K/UL (ref 150–450)
PMV BLD AUTO: 9.9 FL (ref 9.2–12.9)
POTASSIUM SERPL-SCNC: 4.6 MMOL/L (ref 3.5–5.1)
PROT SERPL-MCNC: 7.4 G/DL (ref 6–8.4)
RBC # BLD AUTO: 4.99 M/UL (ref 4–5.4)
SATURATED IRON: 21 % (ref 20–50)
SODIUM SERPL-SCNC: 141 MMOL/L (ref 136–145)
T4 FREE SERPL-MCNC: 0.89 NG/DL (ref 0.71–1.51)
TOTAL IRON BINDING CAPACITY: 360 UG/DL (ref 250–450)
TRANSFERRIN SERPL-MCNC: 257 MG/DL (ref 200–375)
TRIGL SERPL-MCNC: 85 MG/DL (ref 30–150)
TSH SERPL DL<=0.005 MIU/L-ACNC: 4.36 UIU/ML (ref 0.34–5.6)
WBC # BLD AUTO: 6.49 K/UL (ref 3.9–12.7)

## 2022-12-29 PROCEDURE — 84443 ASSAY THYROID STIM HORMONE: CPT | Performed by: INTERNAL MEDICINE

## 2022-12-29 PROCEDURE — 80061 LIPID PANEL: CPT | Performed by: INTERNAL MEDICINE

## 2022-12-29 PROCEDURE — 82728 ASSAY OF FERRITIN: CPT | Performed by: INTERNAL MEDICINE

## 2022-12-29 PROCEDURE — 84466 ASSAY OF TRANSFERRIN: CPT | Performed by: INTERNAL MEDICINE

## 2022-12-29 PROCEDURE — 80076 HEPATIC FUNCTION PANEL: CPT | Performed by: INTERNAL MEDICINE

## 2022-12-29 PROCEDURE — 85025 COMPLETE CBC W/AUTO DIFF WBC: CPT | Performed by: INTERNAL MEDICINE

## 2022-12-29 PROCEDURE — 83036 HEMOGLOBIN GLYCOSYLATED A1C: CPT | Performed by: INTERNAL MEDICINE

## 2022-12-29 PROCEDURE — 82570 ASSAY OF URINE CREATININE: CPT | Performed by: INTERNAL MEDICINE

## 2022-12-29 PROCEDURE — 80048 BASIC METABOLIC PNL TOTAL CA: CPT | Performed by: INTERNAL MEDICINE

## 2022-12-29 PROCEDURE — 84439 ASSAY OF FREE THYROXINE: CPT | Performed by: INTERNAL MEDICINE

## 2022-12-29 PROCEDURE — 36415 COLL VENOUS BLD VENIPUNCTURE: CPT | Performed by: INTERNAL MEDICINE

## 2023-01-03 DIAGNOSIS — I50.32 CHRONIC DIASTOLIC HEART FAILURE: ICD-10-CM

## 2023-01-03 DIAGNOSIS — L02.211 ABSCESS OF ABDOMINAL WALL: Primary | ICD-10-CM

## 2023-01-03 DIAGNOSIS — I50.32 HEART FAILURE, DIASTOLIC, CHRONIC: ICD-10-CM

## 2023-01-03 DIAGNOSIS — R10.2 PERINEAL NEURALGIA: ICD-10-CM

## 2023-01-03 DIAGNOSIS — R94.31 NONSPECIFIC ABNORMAL ELECTROCARDIOGRAM (ECG) (EKG): ICD-10-CM

## 2023-01-03 DIAGNOSIS — L02.216 ABSCESS OF UMBILICUS: ICD-10-CM

## 2023-01-06 ENCOUNTER — HOSPITAL ENCOUNTER (OUTPATIENT)
Dept: PREADMISSION TESTING | Facility: HOSPITAL | Age: 83
Discharge: HOME | End: 2023-01-06
Attending: INTERNAL MEDICINE
Payer: MEDICARE

## 2023-01-06 ENCOUNTER — HOSPITAL ENCOUNTER (OUTPATIENT)
Dept: RADIOLOGY | Facility: HOSPITAL | Age: 83
Discharge: HOME | End: 2023-01-06
Attending: INTERNAL MEDICINE
Payer: MEDICARE

## 2023-01-06 DIAGNOSIS — L02.211 ABSCESS OF ABDOMINAL WALL: ICD-10-CM

## 2023-01-06 DIAGNOSIS — R94.31 NONSPECIFIC ABNORMAL ELECTROCARDIOGRAM (ECG) (EKG): ICD-10-CM

## 2023-01-06 DIAGNOSIS — I50.32 CHRONIC DIASTOLIC HEART FAILURE: ICD-10-CM

## 2023-01-06 DIAGNOSIS — R10.2 PERINEAL NEURALGIA: ICD-10-CM

## 2023-01-06 DIAGNOSIS — I50.32 HEART FAILURE, DIASTOLIC, CHRONIC: ICD-10-CM

## 2023-01-06 DIAGNOSIS — L02.216 ABSCESS OF UMBILICUS: ICD-10-CM

## 2023-01-06 PROCEDURE — 74177 CT ABD & PELVIS W/CONTRAST: CPT | Mod: TC

## 2023-01-06 PROCEDURE — 93005 ELECTROCARDIOGRAM TRACING: CPT | Performed by: INTERNAL MEDICINE

## 2023-01-06 PROCEDURE — 93010 EKG 12-LEAD: ICD-10-PCS | Mod: ,,, | Performed by: INTERNAL MEDICINE

## 2023-01-06 PROCEDURE — 25500020 PHARM REV CODE 255: Performed by: INTERNAL MEDICINE

## 2023-01-06 PROCEDURE — 71046 X-RAY EXAM CHEST 2 VIEWS: CPT | Mod: TC

## 2023-01-06 PROCEDURE — 93010 ELECTROCARDIOGRAM REPORT: CPT | Mod: ,,, | Performed by: INTERNAL MEDICINE

## 2023-01-06 RX ADMIN — IOHEXOL 100 ML: 350 INJECTION, SOLUTION INTRAVENOUS at 09:01

## 2023-01-23 DIAGNOSIS — Z12.31 ENCOUNTER FOR SCREENING MAMMOGRAM FOR MALIGNANT NEOPLASM OF BREAST: Primary | ICD-10-CM

## 2023-02-07 ENCOUNTER — OFFICE VISIT (OUTPATIENT)
Dept: SURGERY | Facility: CLINIC | Age: 83
End: 2023-02-07
Payer: MEDICARE

## 2023-02-07 VITALS
HEART RATE: 83 BPM | TEMPERATURE: 98 F | BODY MASS INDEX: 38.05 KG/M2 | RESPIRATION RATE: 17 BRPM | HEIGHT: 57 IN | DIASTOLIC BLOOD PRESSURE: 84 MMHG | SYSTOLIC BLOOD PRESSURE: 160 MMHG | WEIGHT: 176.38 LBS

## 2023-02-07 DIAGNOSIS — L72.9 SUBCUTANEOUS CYST: Primary | ICD-10-CM

## 2023-02-07 PROCEDURE — 99204 PR OFFICE/OUTPT VISIT, NEW, LEVL IV, 45-59 MIN: ICD-10-PCS | Mod: S$GLB,,, | Performed by: SURGERY

## 2023-02-07 PROCEDURE — 99204 OFFICE O/P NEW MOD 45 MIN: CPT | Mod: S$GLB,,, | Performed by: SURGERY

## 2023-02-07 NOTE — PROGRESS NOTES
Subjective:       Patient ID: Roxanne Hernandez is a 82 y.o. female.    Chief Complaint:     HPI:  82 year old female referred to the office with drainage form a previous hysterectomy incision. She had a hysterectomy via lower midline incision 25 years ago. She had a post operative infection that was treated with packing until healed. She now has noticed some drainage from the mid incision over the past year.  It will drain for a small sinus, stop, then drain again. The drainage is a purulent drainage. Usually a small amount. Usually foul odor. Non fever or chills. She has had two rounds of antibiotics over the past few months.  CT showed no evidence of fistula. No mention of subcutaneous issue.     Past Medical History:   Diagnosis Date    Cervical cancer     Coronary artery disease     COVID-19     Diabetes mellitus     Hypertension     Obese      Past Surgical History:   Procedure Laterality Date    A-V CARDIAC PACEMAKER INSERTION N/A 12/18/2019    Procedure: INSERTION, CARDIAC PACEMAKER, DUAL CHAMBER;  Surgeon: Nnamdi Macdonald MD;  Location: [facility] EP LAB;  Service: Cardiology;  Laterality: N/A;  lbbb, dppm, SJM, anes, pr, [id number]    BREAST BIOPSY      CARDIAC CATHETERIZATION      CAROTID ENDARTERECTOMY Left 7/31/2020    Procedure: ENDARTERECTOMY-CAROTID;  Surgeon: Radu Kent MD;  Location: [facility] OR;  Service: Cardiothoracic;  Laterality: Left;    CAROTID ENDARTERECTOMY Right 10/16/2020    Procedure: ENDARTERECTOMY, CAROTID;  Surgeon: Radu Kent MD;  Location: [facility] OR;  Service: Cardiothoracic;  Laterality: Right;    CHOLECYSTECTOMY      CORONARY ANGIOGRAPHY Left 9/4/2019    Procedure: ANGIOGRAM, CORONARY ARTERY;  Surgeon: Carmen Yepez MD;  Location: [facility] CATH/EP LAB;  Service: Cardiology;  Laterality: Left;    CYSTOSCOPY N/A 11/13/2019    Procedure: CYSTOSCOPY;  Surgeon: Paddy Dunham MD;  Location: [address] FLR;  Service: Urology;  Laterality: N/A;    HYSTERECTOMY      JOINT REPLACEMENT      KNEE  "ARTHROPLASTY Right     LASER LITHOTRIPSY Left 11/13/2019    Procedure: LITHOTRIPSY, USING LASER;  Surgeon: Paddy Dunham MD;  Location: [facility] OR [address] [place] [address];  Service: Urology;  Laterality: Left;    TRANSCATHETER AORTIC VALVE REPLACEMENT (TAVR) N/A 12/17/2019    Procedure: REPLACEMENT, AORTIC VALVE, TRANSCATHETER (TAVR);  Surgeon: Herbert Ashley MD;  Location: [facility] CATH LAB;  Service: Cardiology;  Laterality: N/A;    URETEROSCOPIC REMOVAL OF URETERIC CALCULUS Left 11/13/2019    Procedure: REMOVAL, CALCULUS, URETER, URETEROSCOPIC;  Surgeon: Paddy Dunham MD;  [facility];  Service: Urology;  Laterality: Left;    URETEROSCOPY Left 11/13/2019    Procedure: URETEROSCOPY;  Surgeon: Paddy Dunham MD;  Location: [facility] OR [address] [place] [address];  Service: Urology;  Laterality: Left;     Review of patient's allergies indicates:   Allergen Reactions    Azithromycin Swelling     All mycins    Statins-hmg-coa reductase inhibitors Other (See Comments)     Liver function  "It attacks my liver and makes me very sick"    Sulfa (sulfonamide antibiotics) Hives     Medication List with Changes/Refills   Current Medications    ALBUTEROL (PROVENTIL) 2.5 MG /3 ML (0.083 %) NEBULIZER SOLUTION    Take 3 mLs (2.5 mg total) by nebulization every 6 (six) hours as needed for Wheezing. Rescue    AMOXICILLIN (AMOXIL) 875 MG TABLET    Take 1 tablet (875 mg total) by mouth 2 (two) times daily.    ASPIRIN (ECOTRIN) 81 MG EC TABLET    Take 81 mg by mouth once daily.    ATORVASTATIN (LIPITOR) 10 MG TABLET    Take 1 tablet (10 mg total) by mouth every evening.    TQP-O6-QOY31-ZINC--ZHANE-BOR (CALTRATE 600-D PLUS MINERALS) 600 MG CALCIUM- 800 UNIT-50 MG TAB    Take 1 each by mouth every morning.    CHOLECALCIFEROL, VITAMIN D3, 50 MCG (2,000 UNIT) CHEW    Take 2,000 Units by mouth every morning.    DOCUSATE SODIUM (COLACE) 100 MG CAPSULE    Take 100 mg by mouth as needed for Constipation.    FUROSEMIDE (LASIX) 20 MG TABLET    Take 1 tablet (20 mg " total) by mouth every morning.    LISINOPRIL 10 MG TABLET        MELATONIN (MELATIN) 3 MG TABLET    Take 3 mg by mouth nightly as needed for Insomnia.    METOPROLOL SUCCINATE (TOPROL-XL) 25 MG 24 HR TABLET    Take 1 tablet (25 mg total) by mouth every morning.    NITROGLYCERIN 0.1 MG/HR TD PT24 (NITRODUR) 0.1 MG/HR PT24    Place 1 patch onto the skin once daily.    POTASSIUM 99 MG TAB    Take 1 tablet by mouth once daily.     Family History   Problem Relation Age of Onset    Hypertension Father     Cancer Maternal Grandfather     Cancer Paternal Grandfather      Social History     Socioeconomic History    Marital status:    Tobacco Use    Smoking status: Former     Packs/day: 1.00     Years: 25.00     Pack years: 25.00     Types: Cigarettes     Quit date: 1980     Years since quittin.1    Smokeless tobacco: Never   Substance and Sexual Activity    Alcohol use: Not Currently    Drug use: Never         Review of Systems   Constitutional:  Negative for appetite change, chills, fever and unexpected weight change.   HENT:  Negative for hearing loss, rhinorrhea, sore throat and voice change.    Eyes:  Negative for photophobia and visual disturbance.   Respiratory:  Negative for cough, choking and shortness of breath.    Cardiovascular:  Negative for chest pain, palpitations and leg swelling.   Gastrointestinal:  Negative for abdominal pain, blood in stool, constipation, diarrhea, nausea and vomiting.   Endocrine: Negative for cold intolerance, heat intolerance, polydipsia and polyuria.   Musculoskeletal:  Negative for arthralgias, back pain, joint swelling and neck stiffness.   Skin:  Positive for wound. Negative for color change, pallor and rash.   Neurological:  Negative for dizziness, seizures, syncope and headaches.   Hematological:  Negative for adenopathy. Does not bruise/bleed easily.   Psychiatric/Behavioral:  Negative for agitation, behavioral problems and confusion.      Objective:      Physical  Exam  Constitutional:       General: She is awake.      Appearance: She is morbidly obese. She is not toxic-appearing.   HENT:      Head: Normocephalic and atraumatic.   Pulmonary:      Effort: Pulmonary effort is normal. No tachypnea, bradypnea, accessory muscle usage or respiratory distress.   Abdominal:      General: A surgical scar is present. There is no distension.      Palpations: Abdomen is soft.      Tenderness: There is no abdominal tenderness.          Comments: Small draining sinus mid incision.  There is no cellulitis.  Nontender.  There is no induration.  I could express a tiny amount of cloudy fluid.    There is a visible suture at the umbilicus.   Musculoskeletal:      Cervical back: Neck supple.   Neurological:      Mental Status: She is alert and oriented to person, place, and time.   Psychiatric:         Behavior: Behavior is cooperative.       Assessment/Plan:   Diagnoses and all orders for this visit:    Subcutaneous cyst  -     Case Request Operating Room: EXCISION, CYST  -     Basic metabolic panel; Future  -     EKG 12-lead; Future  -     X-Ray Chest PA And Lateral; Future    Other orders  -     Full code; Standing  -     Vital signs; Standing  -     Insert peripheral IV; Standing  -     Diet NPO; Standing  -     IP VTE LOW RISK PATIENT; Standing  -     Place sequential compression device; Standing  -     ceFAZolin (ANCEF) 2 g in dextrose 5 % (D5W) 50 mL IVPB  -     Pulse Oximetry Q4H; Standing  -     Place in Outpatient; Standing  -     Full code  -     Insert peripheral IV      Patient has small draining sinus in the mid incision.  Suspect it may be area of stitch granuloma or fat necrosis or subcutaneous cyst.  Will plan on exploration in the operating room Monday February 13th.  Will also remove the stitch at the umbilicus at time of surgery.      I discussed the proposed procedures with the patient including risks, benefits, indications, alternatives and special concerns.  The patient  appears to understand and agrees to go ahead with surgery.  I have made no promises, warranties or verbal agreements beyond what was discussed above.    No follow-ups on file.

## 2023-02-07 NOTE — H&P (VIEW-ONLY)
Subjective:       Patient ID: Roxanne Hernandez is a 82 y.o. female.    Chief Complaint:     HPI:  82 year old female referred to the office with drainage form a previous hysterectomy incision. She had a hysterectomy via lower midline incision 25 years ago. She had a post operative infection that was treated with packing until healed. She now has noticed some drainage from the mid incision over the past year.  It will drain for a small sinus, stop, then drain again. The drainage is a purulent drainage. Usually a small amount. Usually foul odor. Non fever or chills. She has had two rounds of antibiotics over the past few months.  CT showed no evidence of fistula. No mention of subcutaneous issue.     Past Medical History:   Diagnosis Date    Cervical cancer     Coronary artery disease     COVID-19     Diabetes mellitus     Hypertension     Obese      Past Surgical History:   Procedure Laterality Date    A-V CARDIAC PACEMAKER INSERTION N/A 12/18/2019    Procedure: INSERTION, CARDIAC PACEMAKER, DUAL CHAMBER;  Surgeon: Nnamdi Macdonald MD;  Location: [facility] EP LAB;  Service: Cardiology;  Laterality: N/A;  lbbb, dppm, SJM, anes, pr, 6077    BREAST BIOPSY      CARDIAC CATHETERIZATION      CAROTID ENDARTERECTOMY Left 7/31/2020    Procedure: ENDARTERECTOMY-CAROTID;  Surgeon: Radu Kent MD;  Location: [facility] OR;  Service: Cardiothoracic;  Laterality: Left;    CAROTID ENDARTERECTOMY Right 10/16/2020    Procedure: ENDARTERECTOMY, CAROTID;  Surgeon: Radu Kent MD;  Location: [facility] OR;  Service: Cardiothoracic;  Laterality: Right;    CHOLECYSTECTOMY      CORONARY ANGIOGRAPHY Left 9/4/2019    Procedure: ANGIOGRAM, CORONARY ARTERY;  Surgeon: Carmen Yepez MD;  Location: [facility] CATH/EP LAB;  Service: Cardiology;  Laterality: Left;    CYSTOSCOPY N/A 11/13/2019    Procedure: CYSTOSCOPY;  Surgeon: Paddy Dunham MD;  Location: [address] FLR;  Service: Urology;  Laterality: N/A;    HYSTERECTOMY      JOINT REPLACEMENT      KNEE  "ARTHROPLASTY Right     LASER LITHOTRIPSY Left 11/13/2019    Procedure: LITHOTRIPSY, USING LASER;  Surgeon: Paddy Dunham MD;  Location: [facility] OR [address] [place] [address];  Service: Urology;  Laterality: Left;    TRANSCATHETER AORTIC VALVE REPLACEMENT (TAVR) N/A 12/17/2019    Procedure: REPLACEMENT, AORTIC VALVE, TRANSCATHETER (TAVR);  Surgeon: Herbert Ashley MD;  Location: [facility] CATH LAB;  Service: Cardiology;  Laterality: N/A;    URETEROSCOPIC REMOVAL OF URETERIC CALCULUS Left 11/13/2019    Procedure: REMOVAL, CALCULUS, URETER, URETEROSCOPIC;  Surgeon: Paddy Dunham MD;  [facility];  Service: Urology;  Laterality: Left;    URETEROSCOPY Left 11/13/2019    Procedure: URETEROSCOPY;  Surgeon: Paddy Dunham MD;  Location: [facility] OR [address] [place] [address];  Service: Urology;  Laterality: Left;     Review of patient's allergies indicates:   Allergen Reactions    Azithromycin Swelling     All mycins    Statins-hmg-coa reductase inhibitors Other (See Comments)     Liver function  "It attacks my liver and makes me very sick"    Sulfa (sulfonamide antibiotics) Hives     Medication List with Changes/Refills   Current Medications    ALBUTEROL (PROVENTIL) 2.5 MG /3 ML (0.083 %) NEBULIZER SOLUTION    Take 3 mLs (2.5 mg total) by nebulization every 6 (six) hours as needed for Wheezing. Rescue    AMOXICILLIN (AMOXIL) 875 MG TABLET    Take 1 tablet (875 mg total) by mouth 2 (two) times daily.    ASPIRIN (ECOTRIN) 81 MG EC TABLET    Take 81 mg by mouth once daily.    ATORVASTATIN (LIPITOR) 10 MG TABLET    Take 1 tablet (10 mg total) by mouth every evening.    SWK-R0-EPO78-ZINC--ZHANE-BOR (CALTRATE 600-D PLUS MINERALS) 600 MG CALCIUM- 800 UNIT-50 MG TAB    Take 1 each by mouth every morning.    CHOLECALCIFEROL, VITAMIN D3, 50 MCG (2,000 UNIT) CHEW    Take 2,000 Units by mouth every morning.    DOCUSATE SODIUM (COLACE) 100 MG CAPSULE    Take 100 mg by mouth as needed for Constipation.    FUROSEMIDE (LASIX) 20 MG TABLET    Take 1 tablet (20 mg " total) by mouth every morning.    LISINOPRIL 10 MG TABLET        MELATONIN (MELATIN) 3 MG TABLET    Take 3 mg by mouth nightly as needed for Insomnia.    METOPROLOL SUCCINATE (TOPROL-XL) 25 MG 24 HR TABLET    Take 1 tablet (25 mg total) by mouth every morning.    NITROGLYCERIN 0.1 MG/HR TD PT24 (NITRODUR) 0.1 MG/HR PT24    Place 1 patch onto the skin once daily.    POTASSIUM 99 MG TAB    Take 1 tablet by mouth once daily.     Family History   Problem Relation Age of Onset    Hypertension Father     Cancer Maternal Grandfather     Cancer Paternal Grandfather      Social History     Socioeconomic History    Marital status:    Tobacco Use    Smoking status: Former     Packs/day: 1.00     Years: 25.00     Pack years: 25.00     Types: Cigarettes     Quit date: 1980     Years since quittin.1    Smokeless tobacco: Never   Substance and Sexual Activity    Alcohol use: Not Currently    Drug use: Never         Review of Systems   Constitutional:  Negative for appetite change, chills, fever and unexpected weight change.   HENT:  Negative for hearing loss, rhinorrhea, sore throat and voice change.    Eyes:  Negative for photophobia and visual disturbance.   Respiratory:  Negative for cough, choking and shortness of breath.    Cardiovascular:  Negative for chest pain, palpitations and leg swelling.   Gastrointestinal:  Negative for abdominal pain, blood in stool, constipation, diarrhea, nausea and vomiting.   Endocrine: Negative for cold intolerance, heat intolerance, polydipsia and polyuria.   Musculoskeletal:  Negative for arthralgias, back pain, joint swelling and neck stiffness.   Skin:  Positive for wound. Negative for color change, pallor and rash.   Neurological:  Negative for dizziness, seizures, syncope and headaches.   Hematological:  Negative for adenopathy. Does not bruise/bleed easily.   Psychiatric/Behavioral:  Negative for agitation, behavioral problems and confusion.      Objective:      Physical  Exam  Constitutional:       General: She is awake.      Appearance: She is morbidly obese. She is not toxic-appearing.   HENT:      Head: Normocephalic and atraumatic.   Pulmonary:      Effort: Pulmonary effort is normal. No tachypnea, bradypnea, accessory muscle usage or respiratory distress.   Abdominal:      General: A surgical scar is present. There is no distension.      Palpations: Abdomen is soft.      Tenderness: There is no abdominal tenderness.          Comments: Small draining sinus mid incision.  There is no cellulitis.  Nontender.  There is no induration.  I could express a tiny amount of cloudy fluid.    There is a visible suture at the umbilicus.   Musculoskeletal:      Cervical back: Neck supple.   Neurological:      Mental Status: She is alert and oriented to person, place, and time.   Psychiatric:         Behavior: Behavior is cooperative.       Assessment/Plan:   Diagnoses and all orders for this visit:    Subcutaneous cyst  -     Case Request Operating Room: EXCISION, CYST  -     Basic metabolic panel; Future  -     EKG 12-lead; Future  -     X-Ray Chest PA And Lateral; Future    Other orders  -     Full code; Standing  -     Vital signs; Standing  -     Insert peripheral IV; Standing  -     Diet NPO; Standing  -     IP VTE LOW RISK PATIENT; Standing  -     Place sequential compression device; Standing  -     ceFAZolin (ANCEF) 2 g in dextrose 5 % (D5W) 50 mL IVPB  -     Pulse Oximetry Q4H; Standing  -     Place in Outpatient; Standing  -     Full code  -     Insert peripheral IV      Patient has small draining sinus in the mid incision.  Suspect it may be area of stitch granuloma or fat necrosis or subcutaneous cyst.  Will plan on exploration in the operating room Monday February 13th.  Will also remove the stitch at the umbilicus at time of surgery.      I discussed the proposed procedures with the patient including risks, benefits, indications, alternatives and special concerns.  The patient  appears to understand and agrees to go ahead with surgery.  I have made no promises, warranties or verbal agreements beyond what was discussed above.    No follow-ups on file.

## 2023-02-08 ENCOUNTER — HOSPITAL ENCOUNTER (OUTPATIENT)
Dept: PREADMISSION TESTING | Facility: HOSPITAL | Age: 83
Discharge: HOME | End: 2023-02-08
Attending: SURGERY
Payer: MEDICARE

## 2023-02-08 ENCOUNTER — HOSPITAL ENCOUNTER (OUTPATIENT)
Dept: RADIOLOGY | Facility: HOSPITAL | Age: 83
Discharge: HOME | End: 2023-02-08
Attending: SURGERY
Payer: MEDICARE

## 2023-02-08 VITALS
HEART RATE: 103 BPM | SYSTOLIC BLOOD PRESSURE: 163 MMHG | OXYGEN SATURATION: 99 % | TEMPERATURE: 99 F | HEIGHT: 57 IN | DIASTOLIC BLOOD PRESSURE: 84 MMHG | RESPIRATION RATE: 18 BRPM | WEIGHT: 156.19 LBS | BODY MASS INDEX: 33.7 KG/M2

## 2023-02-08 DIAGNOSIS — L72.9 SUBCUTANEOUS CYST: ICD-10-CM

## 2023-02-08 DIAGNOSIS — Z01.818 PREOP TESTING: Primary | ICD-10-CM

## 2023-02-08 PROCEDURE — 93005 ELECTROCARDIOGRAM TRACING: CPT | Performed by: SPECIALIST

## 2023-02-08 PROCEDURE — 93010 EKG 12-LEAD: ICD-10-PCS | Mod: ,,, | Performed by: SPECIALIST

## 2023-02-08 PROCEDURE — 93010 ELECTROCARDIOGRAM REPORT: CPT | Mod: ,,, | Performed by: SPECIALIST

## 2023-02-08 NOTE — PRE ADMISSION SCREENING
Preadmit assessment complete. Review of pt health history and home medications. Questions answered. Pt/daughter voiced understanding see AVS for preop education

## 2023-02-08 NOTE — DISCHARGE INSTRUCTIONS
To confirm, Your doctor has instructed you that surgery is scheduled for: Monday 2/13/23    Pre-Op will call the afternoon prior to surgery between 4:00 and 6:00 PM with the final arrival time.  Friday 2/10/23    Please report to Registration at front of the hospital --it is best to [facility]    Do not eat or drink anything after midnight the night before your surgery - THIS INCLUDES  WATER, GUM, MINTS AND CANDY.    YOU MAY BRUSH YOUR TEETH     TAKE APPROVED  MEDICATIONS WITH A SMALL SIP OF WATER THE MORNING OF YOUR PROCEDURE: See Medication list    PLEASE NOTE:  The surgery schedule has many variables which may affect the time of your surgery case.  Family members should be available if your surgery time changes.  Plan to be here the day of your procedure between 4-6 hours.    DO NOT TAKE THESE MEDICATIONS 5-7 DAYS PRIOR to your procedure or per your surgeon's request: ASPIRIN, ALEVE, ADVIL, IBUPROFEN,  JENARO SELTZER, BC , FISH OIL , VITAMIN E, HERBALS  (May take Tylenol)        IMPORTANT INSTRUCTIONS    Do not smoke, vape or drink alcoholic beverages 24 hours prior to your procedure.  Shower the night before AND the morning of your procedure with a Chlorhexidine wash such as Hibiclens or Dial antibacterial soap from the neck down.    Do not get it on your face or in your eyes.  You may use your own shampoo and face wash. This helps your skin to be as bacteria free as possible.   Do not apply any deodorant, lotion or powder after you shower.   DO NOT remove hair from the surgery site.  Do not shave the incision site unless you are given specific instructions to do so.    Sleep in a bed with clean sheets.  Do not sleep with a pet in the bed.   If you wear contact lenses, dentures, hearing aids or glasses, bring a container to put them in during surgery and give to a family member for safe keeping.    Please leave all jewelry, piercing's and valuables at home.   Wear comfortable clothing that is  easy to remove and place back on after surgery.     Make arrangements in advance for transportation home by a responsible adult.    You must make arrangements for transportation, TAXI'S, UBER'S OR LYFTS ARE NOT ALLOWED.      If you have any questions about these instructions, call Pre-Op Admit  Nursing at [phone] , Pre-Op Day Surgery Unit at [phone]

## 2023-02-13 ENCOUNTER — HOSPITAL ENCOUNTER (OUTPATIENT)
Facility: HOSPITAL | Age: 83
Discharge: HOME | End: 2023-02-13
Attending: SURGERY | Admitting: SURGERY
Payer: MEDICARE

## 2023-02-13 ENCOUNTER — ANESTHESIA EVENT (OUTPATIENT)
Dept: SURGERY | Facility: HOSPITAL | Age: 83
End: 2023-02-13
Payer: MEDICARE

## 2023-02-13 ENCOUNTER — ANESTHESIA (OUTPATIENT)
Dept: SURGERY | Facility: HOSPITAL | Age: 83
End: 2023-02-13
Payer: MEDICARE

## 2023-02-13 VITALS
HEART RATE: 73 BPM | RESPIRATION RATE: 18 BRPM | DIASTOLIC BLOOD PRESSURE: 70 MMHG | TEMPERATURE: 98 F | SYSTOLIC BLOOD PRESSURE: 149 MMHG | OXYGEN SATURATION: 98 % | HEIGHT: 57 IN | WEIGHT: 156 LBS | BODY MASS INDEX: 33.66 KG/M2

## 2023-02-13 DIAGNOSIS — Z01.818 PRE-OP TESTING: ICD-10-CM

## 2023-02-13 DIAGNOSIS — L72.9 SUBCUTANEOUS CYST: Primary | ICD-10-CM

## 2023-02-13 LAB
BASOPHILS # BLD AUTO: 0.02 K/UL (ref 0–0.2)
BASOPHILS NFR BLD: 0.3 % (ref 0–1.9)
DIFFERENTIAL METHOD: ABNORMAL
EOSINOPHIL # BLD AUTO: 0.1 K/UL (ref 0–0.5)
EOSINOPHIL NFR BLD: 1.7 % (ref 0–8)
ERYTHROCYTE [DISTWIDTH] IN BLOOD BY AUTOMATED COUNT: 13 % (ref 11.5–14.5)
GLUCOSE SERPL-MCNC: 101 MG/DL (ref 70–110)
HCT VFR BLD AUTO: 45.5 % (ref 37–48.5)
HGB BLD-MCNC: 14.9 G/DL (ref 12–16)
IMM GRANULOCYTES # BLD AUTO: 0.03 K/UL (ref 0–0.04)
IMM GRANULOCYTES NFR BLD AUTO: 0.5 % (ref 0–0.5)
LYMPHOCYTES # BLD AUTO: 1 K/UL (ref 1–4.8)
LYMPHOCYTES NFR BLD: 15.9 % (ref 18–48)
MCH RBC QN AUTO: 30 PG (ref 27–31)
MCHC RBC AUTO-ENTMCNC: 32.7 G/DL (ref 32–36)
MCV RBC AUTO: 92 FL (ref 82–98)
MONOCYTES # BLD AUTO: 0.6 K/UL (ref 0.3–1)
MONOCYTES NFR BLD: 9.1 % (ref 4–15)
NEUTROPHILS # BLD AUTO: 4.6 K/UL (ref 1.8–7.7)
NEUTROPHILS NFR BLD: 72.5 % (ref 38–73)
NRBC BLD-RTO: 0 /100 WBC
PLATELET # BLD AUTO: 110 K/UL (ref 150–450)
PMV BLD AUTO: 9.7 FL (ref 9.2–12.9)
RBC # BLD AUTO: 4.96 M/UL (ref 4–5.4)
WBC # BLD AUTO: 6.36 K/UL (ref 3.9–12.7)

## 2023-02-13 PROCEDURE — 71000039 HC RECOVERY, EACH ADD'L HOUR: Performed by: SURGERY

## 2023-02-13 PROCEDURE — 27201423 OPTIME MED/SURG SUP & DEVICES STERILE SUPPLY: Performed by: SURGERY

## 2023-02-13 PROCEDURE — 37000009 HC ANESTHESIA EA ADD 15 MINS: Performed by: SURGERY

## 2023-02-13 PROCEDURE — 36000707: Performed by: SURGERY

## 2023-02-13 PROCEDURE — 37000008 HC ANESTHESIA 1ST 15 MINUTES: Performed by: SURGERY

## 2023-02-13 PROCEDURE — 71000033 HC RECOVERY, INTIAL HOUR: Performed by: SURGERY

## 2023-02-13 PROCEDURE — 63600175 PHARM REV CODE 636 W HCPCS: Performed by: SURGERY

## 2023-02-13 PROCEDURE — 63600175 PHARM REV CODE 636 W HCPCS: Performed by: NURSE ANESTHETIST, CERTIFIED REGISTERED

## 2023-02-13 PROCEDURE — C9290 INJ, BUPIVACAINE LIPOSOME: HCPCS | Performed by: SURGERY

## 2023-02-13 PROCEDURE — 25000003 PHARM REV CODE 250: Performed by: NURSE ANESTHETIST, CERTIFIED REGISTERED

## 2023-02-13 PROCEDURE — 71000016 HC POSTOP RECOV ADDL HR: Performed by: SURGERY

## 2023-02-13 PROCEDURE — 82962 GLUCOSE BLOOD TEST: CPT | Performed by: SURGERY

## 2023-02-13 PROCEDURE — 85025 COMPLETE CBC W/AUTO DIFF WBC: CPT | Performed by: SURGERY

## 2023-02-13 PROCEDURE — 71000015 HC POSTOP RECOV 1ST HR: Performed by: SURGERY

## 2023-02-13 PROCEDURE — 88304 TISSUE EXAM BY PATHOLOGIST: CPT | Mod: TC

## 2023-02-13 PROCEDURE — 20520 RMVL FB MUSC/TDN SIMPLE: CPT | Mod: ,,, | Performed by: SURGERY

## 2023-02-13 PROCEDURE — 25000003 PHARM REV CODE 250: Performed by: SURGERY

## 2023-02-13 PROCEDURE — 36000706: Performed by: SURGERY

## 2023-02-13 PROCEDURE — 20520 PR REMOVAL OF FOREIGN BODY: ICD-10-PCS | Mod: ,,, | Performed by: SURGERY

## 2023-02-13 PROCEDURE — 63600175 PHARM REV CODE 636 W HCPCS: Performed by: ANESTHESIOLOGY

## 2023-02-13 RX ORDER — LIDOCAINE HYDROCHLORIDE 20 MG/ML
JELLY TOPICAL
Status: DISCONTINUED | OUTPATIENT
Start: 2023-02-13 | End: 2023-02-13

## 2023-02-13 RX ORDER — BUPIVACAINE HYDROCHLORIDE 2.5 MG/ML
INJECTION, SOLUTION EPIDURAL; INFILTRATION; INTRACAUDAL
Status: DISCONTINUED | OUTPATIENT
Start: 2023-02-13 | End: 2023-02-13 | Stop reason: HOSPADM

## 2023-02-13 RX ORDER — ACETAMINOPHEN 10 MG/ML
INJECTION, SOLUTION INTRAVENOUS
Status: DISCONTINUED | OUTPATIENT
Start: 2023-02-13 | End: 2023-02-13

## 2023-02-13 RX ORDER — OXYCODONE HYDROCHLORIDE 5 MG/1
5 TABLET ORAL
Status: DISCONTINUED | OUTPATIENT
Start: 2023-02-13 | End: 2023-02-13 | Stop reason: HOSPADM

## 2023-02-13 RX ORDER — FAMOTIDINE 10 MG/ML
INJECTION INTRAVENOUS
Status: DISCONTINUED | OUTPATIENT
Start: 2023-02-13 | End: 2023-02-13

## 2023-02-13 RX ORDER — ONDANSETRON 2 MG/ML
INJECTION INTRAMUSCULAR; INTRAVENOUS
Status: DISCONTINUED
Start: 2023-02-13 | End: 2023-02-13 | Stop reason: HOSPADM

## 2023-02-13 RX ORDER — FENTANYL CITRATE 50 UG/ML
25 INJECTION, SOLUTION INTRAMUSCULAR; INTRAVENOUS EVERY 5 MIN PRN
Status: DISCONTINUED | OUTPATIENT
Start: 2023-02-13 | End: 2023-02-13 | Stop reason: HOSPADM

## 2023-02-13 RX ORDER — CEFAZOLIN SODIUM 2 G/50ML
2 SOLUTION INTRAVENOUS
Status: COMPLETED | OUTPATIENT
Start: 2023-02-13 | End: 2023-02-13

## 2023-02-13 RX ORDER — DIPHENHYDRAMINE HYDROCHLORIDE 50 MG/ML
12.5 INJECTION INTRAMUSCULAR; INTRAVENOUS
Status: DISCONTINUED | OUTPATIENT
Start: 2023-02-13 | End: 2023-02-13 | Stop reason: HOSPADM

## 2023-02-13 RX ORDER — PROPOFOL 10 MG/ML
INJECTION, EMULSION INTRAVENOUS
Status: DISCONTINUED | OUTPATIENT
Start: 2023-02-13 | End: 2023-02-13

## 2023-02-13 RX ORDER — ONDANSETRON 2 MG/ML
INJECTION INTRAMUSCULAR; INTRAVENOUS
Status: DISCONTINUED | OUTPATIENT
Start: 2023-02-13 | End: 2023-02-13

## 2023-02-13 RX ORDER — ONDANSETRON 2 MG/ML
4 INJECTION INTRAMUSCULAR; INTRAVENOUS DAILY PRN
Status: DISCONTINUED | OUTPATIENT
Start: 2023-02-13 | End: 2023-02-13 | Stop reason: HOSPADM

## 2023-02-13 RX ORDER — LIDOCAINE HCL/PF 100 MG/5ML
SYRINGE (ML) INTRAVENOUS
Status: DISCONTINUED | OUTPATIENT
Start: 2023-02-13 | End: 2023-02-13

## 2023-02-13 RX ORDER — PHENYLEPHRINE HYDROCHLORIDE 10 MG/ML
INJECTION INTRAVENOUS
Status: DISCONTINUED | OUTPATIENT
Start: 2023-02-13 | End: 2023-02-13

## 2023-02-13 RX ORDER — HYDROCODONE BITARTRATE AND ACETAMINOPHEN 5; 325 MG/1; MG/1
1 TABLET ORAL EVERY 6 HOURS PRN
Qty: 10 TABLET | Refills: 0 | Status: ON HOLD | OUTPATIENT
Start: 2023-02-13 | End: 2023-11-30 | Stop reason: ALTCHOICE

## 2023-02-13 RX ADMIN — CEFAZOLIN SODIUM 2 G: 2 SOLUTION INTRAVENOUS at 09:02

## 2023-02-13 RX ADMIN — LIDOCAINE HYDROCHLORIDE 50 MG: 20 INJECTION, SOLUTION INTRAVENOUS at 09:02

## 2023-02-13 RX ADMIN — PHENYLEPHRINE HYDROCHLORIDE 100 MCG: 10 INJECTION INTRAVENOUS at 09:02

## 2023-02-13 RX ADMIN — ACETAMINOPHEN 1000 MG: 10 INJECTION, SOLUTION INTRAVENOUS at 09:02

## 2023-02-13 RX ADMIN — SODIUM CHLORIDE, SODIUM LACTATE, POTASSIUM CHLORIDE, AND CALCIUM CHLORIDE: .6; .31; .03; .02 INJECTION, SOLUTION INTRAVENOUS at 09:02

## 2023-02-13 RX ADMIN — PROPOFOL 100 MG: 10 INJECTION, EMULSION INTRAVENOUS at 09:02

## 2023-02-13 RX ADMIN — FAMOTIDINE 20 MG: 10 INJECTION, SOLUTION INTRAVENOUS at 09:02

## 2023-02-13 RX ADMIN — ONDANSETRON 4 MG: 2 INJECTION INTRAMUSCULAR; INTRAVENOUS at 11:02

## 2023-02-13 RX ADMIN — LIDOCAINE HYDROCHLORIDE 1 ML: 20 JELLY TOPICAL at 09:02

## 2023-02-13 RX ADMIN — ONDANSETRON 4 MG: 2 INJECTION INTRAMUSCULAR; INTRAVENOUS at 09:02

## 2023-02-13 NOTE — ANESTHESIA PREPROCEDURE EVALUATION
02/13/2023  Roxanne Hernandez is a 82 y.o., female.      Patient Active Problem List   Diagnosis    Nonrheumatic aortic valve stenosis    Essential hypertension    Diabetes mellitus without complication    Pneumobilia    Nonrheumatic mitral valve stenosis    Nonrheumatic aortic valve insufficiency    Nodular calcific aortic valve stenosis    Acute on chronic diastolic heart failure    S/P TAVR (transcatheter aortic valve replacement)    Left bundle branch block    Presence of permanent cardiac pacemaker    Leakage of periprosthetic valve, subsequent encounter    Chronic diastolic heart failure    Benign neoplasm of transverse colon    Coronary artery disease of native artery of native heart with stable angina pectoris    Bilateral carotid artery occlusion    Syncope    Hypertensive emergency without congestive heart failure    Vestibular vertigo, bilateral    Dizziness and giddiness    Uncontrolled hypertension    Carotid artery syndrome    Bilateral carotid artery stenosis       Past Surgical History:   Procedure Laterality Date    A-V CARDIAC PACEMAKER INSERTION N/A 12/18/2019    Procedure: INSERTION, CARDIAC PACEMAKER, DUAL CHAMBER;  Surgeon: Nnamdi Macdonald MD;  Location: [facility] EP LAB;  Service: Cardiology;  Laterality: N/A;  lbbb, dppm, SJM, anes, pr, 6077    APPENDECTOMY      pt thinks no appendix    BREAST BIOPSY Left     CARDIAC CATHETERIZATION      CAROTID ENDARTERECTOMY Left 07/31/2020    Procedure: ENDARTERECTOMY-CAROTID;  Surgeon: Radu Kent MD;  Location: [facility] OR;  Service: Cardiothoracic;  Laterality: Left;    CAROTID ENDARTERECTOMY Right 10/16/2020    Procedure: ENDARTERECTOMY, CAROTID;  Surgeon: Radu Kent MD;  Location: [facility] OR;  Service: Cardiothoracic;  Laterality: Right;    CATARACT EXTRACTION BILATERAL W/ ANTERIOR VITRECTOMY      CHOLECYSTECTOMY       CORONARY ANGIOGRAPHY Left 09/04/2019    Procedure: ANGIOGRAM, CORONARY ARTERY;  Surgeon: Carmen Yepez MD;  Location: [facility] CATH/EP LAB;  Service: Cardiology;  Laterality: Left;    CYSTOSCOPY N/A 11/13/2019    Procedure: CYSTOSCOPY;  Surgeon: Paddy Dunham MD;  [facility];  Service: Urology;  Laterality: N/A;    HYSTERECTOMY      JOINT REPLACEMENT Right     KNEE ARTHROPLASTY Right     LASER LITHOTRIPSY Left 11/13/2019    Procedure: LITHOTRIPSY, USING LASER;  Surgeon: Paddy Dunham MD;  [facility];  Service: Urology;  Laterality: Left;    TRANSCATHETER AORTIC VALVE REPLACEMENT (TAVR) N/A 12/17/2019    Procedure: REPLACEMENT, AORTIC VALVE, TRANSCATHETER (TAVR);  Surgeon: Herbert Ashley MD;  Location: [facility] CATH LAB;  Service: Cardiology;  Laterality: N/A;    URETEROSCOPIC REMOVAL OF URETERIC CALCULUS Left 11/13/2019    Procedure: REMOVAL, CALCULUS, URETER, URETEROSCOPIC;  Surgeon: Paddy Dunham MD;  Location: [facility] OR [address] [place] [address];  Service: Urology;  Laterality: Left;    URETEROSCOPY Left 11/13/2019    Procedure: URETEROSCOPY;  Surgeon: Paddy Dunham MD;  Location: [facility] OR [address] [place] [address];  Service: Urology;  Laterality: Left;        Tobacco Use:  The patient  reports that she quit smoking about 43 years ago. Her smoking use included cigarettes. She has a 25.00 pack-year smoking history. She has never used smokeless tobacco.     Results for orders placed or performed during the hospital encounter of 02/08/23   EKG 12-lead    Collection Time: 02/08/23  7:17 AM    Narrative    Test Reason : L72.9,    Vent. Rate : 091 BPM     Atrial Rate : 091 BPM     P-R Int : 170 ms          QRS Dur : 138 ms      QT Int : 418 ms       P-R-T Axes : 056 016 092 degrees     QTc Int : 514 ms    Normal sinus rhythm with sinus arrhythmia  Possible Left atrial enlargement  Left bundle branch block  Abnormal ECG  When compared with ECG of 06-JAN-2023 08:19,  No significant change was found    Referred  By:             Confirmed By:              Lab Results   Component Value Date    WBC 6.49 12/29/2022    HGB 15.1 12/29/2022    HCT 45.8 12/29/2022    MCV 92 12/29/2022     (L) 12/29/2022     BMP  Lab Results   Component Value Date     02/08/2023    K 4.3 02/08/2023     02/08/2023    CO2 23 02/08/2023    BUN 31 (H) 02/08/2023    CREATININE 1.1 02/08/2023    CALCIUM 9.6 02/08/2023    ANIONGAP 8 02/08/2023     (H) 02/08/2023     (H) 12/29/2022     (H) 07/16/2022       Results for orders placed in visit on 11/19/20    Echo Color Flow Doppler? Yes    Interpretation Summary  · There is mild left ventricular concentric hypertrophy.  · The left ventricle is normal in size with normal systolic function. The estimated ejection fraction is 60%.  · Grade II diastolic dysfunction.  · Normal right ventricular size with normal right ventricular systolic function.  · Mild left atrial enlargement.  · There is a transcutaneously-placed aortic bioprosthesis present. There is mild paravalvular aortic insufficiency present.  · Mild aortic regurgitation.  · Moderate aortic valve stenosis.  · Aortic valve area is 1.26 cm2; peak velocity is 2.76 m/s; mean gradient is 18 mmHg.  · The mitral valve is mildly sclerotic.  · Mild mitral regurgitation.  · There is mild mitral stenosis.  · Mild to moderate tricuspid regurgitation.  · Normal central venous pressure (3 mmHg).  · The estimated PA systolic pressure is 55 mmHg.              Pre-op Assessment    I have reviewed the Patient Summary Reports.     I have reviewed the Nursing Notes. I have reviewed the NPO Status.   I have reviewed the Medications.     Review of Systems  Anesthesia Hx:  No problems with previous Anesthesia  Denies Family Hx of Anesthesia complications.   Denies Personal Hx of Anesthesia complications.   Social:  Former Smoker, No Alcohol Use    Hematology/Oncology:         -- Cancer in past history (hx cervical CA): Oncology Comments:  cervical     Cardiovascular:   Pacemaker ([employer]) Hypertension, well controlled Valvular problems/Murmurs (mild aortic stenosis/regurge, mild MR, s/p TAVR) CAD  Dysrhythmias (LBBB)  CHF (hx diastolic HF, EF 60% on ECHO 11/2020)    Renal/:   renal calculi    Endocrine:   Diabetes, well controlled, type 2        Physical Exam  General: Well nourished, Cooperative, Alert and Oriented    Airway:  Mallampati: III / II  Mouth Opening: Normal  TM Distance: Normal  Tongue: Normal  Neck ROM: Normal ROM    Dental:  Dentures    Chest/Lungs:  Clear to auscultation    Heart:  Rate: Normal  Rhythm: Regular Rhythm  Sounds: Normal    Abdomen:  Normal, Soft, Nontender        Anesthesia Plan  Type of Anesthesia, risks & benefits discussed:    Anesthesia Type: Gen Supraglottic Airway  Intra-op Monitoring Plan: Standard ASA Monitors  Post Op Pain Control Plan: multimodal analgesia and IV/PO Opioids PRN  Induction:  IV  Airway Plan: Video, Post-Induction  Informed Consent: Informed consent signed with the Patient and all parties understand the risks and agree with anesthesia plan.  All questions answered.   ASA Score: 3  Anesthesia Plan Notes:   General LMA OK  IV tylenol  Zofran, pepcid    Ready For Surgery From Anesthesia Perspective.     .

## 2023-02-13 NOTE — ANESTHESIA POSTPROCEDURE EVALUATION
Anesthesia Post Evaluation    Patient: Roxanne Hernandez    Procedure(s) Performed: Procedure(s) (LRB):  EXCISION, CYST (N/A)    Final Anesthesia Type: general      Patient location during evaluation: PACU  Patient participation: Yes- Able to Participate  Level of consciousness: awake and alert and oriented  Post-procedure vital signs: reviewed and stable  Pain management: adequate  Airway patency: patent    PONV status at discharge: No PONV  Anesthetic complications: no      Cardiovascular status: blood pressure returned to baseline and hemodynamically stable  Respiratory status: unassisted, spontaneous ventilation and room air  Hydration status: euvolemic  Follow-up not needed.      Pacemaker checked.    Vitals Value Taken Time   /73 02/13/23 1146   Temp 36.2 °C (97.1 °F) 02/13/23 1040   Pulse 75 02/13/23 1148   Resp 43 02/13/23 1148   SpO2 99 % 02/13/23 1148   Vitals shown include unvalidated device data.      No case tracking events are documented in the log.      Pain/Paula Score: Paula Score: 10 (2/13/2023 11:30 AM)

## 2023-02-13 NOTE — PROGRESS NOTES
Pacemaker interrogated. [place] with [facility] called.  No problems noted. Functioning appropriately.

## 2023-02-13 NOTE — TRANSFER OF CARE
"Anesthesia Transfer of Care Note    Patient: Roxanne Hernandez    Procedure(s) Performed: Procedure(s) (LRB):  EXCISION, CYST (N/A)    Patient location: PACU    Anesthesia Type: general    Transport from OR: Transported from OR on room air with adequate spontaneous ventilation    Post pain: adequate analgesia    Post assessment: no apparent anesthetic complications    Post vital signs: stable    Level of consciousness: awake and alert    Nausea/Vomiting: no nausea/vomiting    Complications: none    Transfer of care protocol was followed      Last vitals:   Visit Vitals  BP (!) 145/63   Pulse 73   Temp 36.2 °C (97.1 °F) (Temporal)   Resp (!) 22   Ht 4' 9" (1.448 m)   Wt 70.8 kg (156 lb)   SpO2 95%   Breastfeeding No   BMI 33.76 kg/m²     "

## 2023-02-14 NOTE — OP NOTE
Surgery Date: 2/13/2023     Surgeon(s) and Role:     * Bret Avina III, MD - Primary    Assisting Surgeon: None    Pre-op Diagnosis:  Subcutaneous cyst [L72.9]    Post-op Diagnosis:  Post-Op Diagnosis Codes:     * Subcutaneous stitch granuloma     Procedure(s) (LRB):  EXCISION of stitch granuloma.     Anesthesia: General    Operative Findings: Patient brought to the OR and transferred to the OR table in the supine position.  She was given general anesthesia and intubated. The abdomen was prepped and draped. Elliptical incision was made around sinus in the mid aspect her previous incision. Dissection was carried down into the subq and to the fascia. Several old prolene sutures were encountered at the base of the draining process. The inflamed area with the sutures was excised. Cavity irrigated. 10 F RENETTA left in place and brought out lateral to the wound. Incision closed with 4-0 Monocryl. She was extubated and brought to the RR in stable condition.   Complications none   Instrument counts correct   Estimated Blood Loss 5 mL    Estimated Blood Loss has been documented.         Specimens:   Specimen (24h ago, onward)       Start     Ordered    02/13/23 1239  Specimen to Pathology - Surgery  Once        Comments: Pre-op Diagnosis: Subcutaneous cyst [L72.9]Procedure(s):EXCISION, CYST Number of specimens: 1Name of specimens: stitch granuloma (fresh)     Question:  Release to patient  Answer:  Immediate    02/13/23 8228                    [id number]

## 2023-02-14 NOTE — BRIEF OP NOTE
[facility]  Brief Operative Note    SUMMARY     Surgery Date: 2/13/2023     Surgeon(s) and Role:     * Bret Avina III, MD - Primary    Assisting Surgeon: None    Pre-op Diagnosis:  Subcutaneous cyst [L72.9]    Post-op Diagnosis:  Post-Op Diagnosis Codes:     * Subcutaneous stitch granuloma     Procedure(s) (LRB):  EXCISION of stitch granuloma.     Anesthesia: General    Operative Findings: Patient brought to the OR and transferred to the OR table in the supine position.  She was given general anesthesia and intubated. The abdomen was prepped and draped. Elliptical incision was made around sinus in the mid aspect her previous incision. Dissection was carried down into the subq and to the fascia. Several old prolene sutures were encountered at the base of the draining process. The inflamed area with the sutures was excised. Cavity irrigated. 10 F RENETTA left in place and brought out lateral to the wound. Incision closed with 4-0 Monocryl. She was extubated and brought to the RR in stable condition.   Complications none   Instrument counts correct   Estimated Blood Loss 5 mL    Estimated Blood Loss has been documented.         Specimens:   Specimen (24h ago, onward)       Start     Ordered    02/13/23 1239  Specimen to Pathology - Surgery  Once        Comments: Pre-op Diagnosis: Subcutaneous cyst [L72.9]Procedure(s):EXCISION, CYST Number of specimens: 1Name of specimens: stitch granuloma (fresh)     Question:  Release to patient  Answer:  Immediate    02/13/23 0249                    [id number]

## 2023-02-14 NOTE — DISCHARGE SUMMARY
Discharge Summary  General Surgery      Admit Date: 2/13/2023    Discharge Date :2/13/2023    Attending Physician: Bret Avina III     Discharge Physician: Bret Avina III    Discharged Condition: good    Discharge Diagnosis: Subcutaneous cyst [L72.9]    Treatments/Procedures: Procedure(s) (LRB):  EXCISION, CYST (N/A)    Hospital Course: Uneventful; Discharged home from Recovery    Significant Diagnostic Studies: none    Disposition: Home or Self Care    Diet: Regular    Follow up: Office 10-14 days    Activity: No heavy lifting till seen in office.    Patient Instructions:   Discharge Medication List as of 2/13/2023  1:04 PM        START taking these medications    Details   HYDROcodone-acetaminophen (NORCO) 5-325 mg per tablet Take 1 tablet by mouth every 6 (six) hours as needed for Pain., Starting Mon 2/13/2023, Normal           CONTINUE these medications which have NOT CHANGED    Details   aspirin (ECOTRIN) 81 MG EC tablet Take 81 mg by mouth once daily., Historical Med      atorvastatin (LIPITOR) 10 MG tablet Take 1 tablet (10 mg total) by mouth every evening., Starting Sat 10/17/2020, No Print      wvj-F6-csk40-zinc--demetri-bor (CALTRATE 600-D PLUS MINERALS) 600 mg calcium- 800 unit-50 mg Tab Take 1 each by mouth every morning., Starting Tue 8/4/2020, OTC      cholecalciferol, vitamin D3, 50 mcg (2,000 unit) Chew Take 2,000 Units by mouth every morning., Starting Mon 6/6/2011, Historical Med      furosemide (LASIX) 20 MG tablet Take 1 tablet (20 mg total) by mouth every morning., Starting Tue 8/4/2020, Until Mon 2/13/2023, No Print      metoprolol succinate (TOPROL-XL) 25 MG 24 hr tablet Take 1 tablet (25 mg total) by mouth every morning., Starting Thu 2/13/2020, Until Mon 2/13/2023, No Print      potassium 99 mg Tab Take 1 tablet by mouth once daily., Historical Med             Discharge Procedure Orders   Diet Adult Regular     Lifting restrictions   Order Comments: No lifting over twenty pounds  for six weeks     Remove dressing in 48 hours

## 2023-02-23 ENCOUNTER — OFFICE VISIT (OUTPATIENT)
Dept: SURGERY | Facility: CLINIC | Age: 83
End: 2023-02-23
Payer: MEDICARE

## 2023-02-23 VITALS
HEART RATE: 82 BPM | BODY MASS INDEX: 33.66 KG/M2 | HEIGHT: 57 IN | TEMPERATURE: 98 F | DIASTOLIC BLOOD PRESSURE: 78 MMHG | SYSTOLIC BLOOD PRESSURE: 154 MMHG | WEIGHT: 156 LBS

## 2023-02-23 DIAGNOSIS — T81.41XA INFECTION INVOLVING STITCH WITH ABSCESS: Primary | ICD-10-CM

## 2023-02-23 PROCEDURE — 99213 OFFICE O/P EST LOW 20 MIN: CPT | Mod: PBBFAC,PN | Performed by: SURGERY

## 2023-02-23 PROCEDURE — 99999 PR PBB SHADOW E&M-EST. PATIENT-LVL III: CPT | Mod: PBBFAC,,, | Performed by: SURGERY

## 2023-02-23 PROCEDURE — 99024 POSTOP FOLLOW-UP VISIT: CPT | Mod: POP,,, | Performed by: SURGERY

## 2023-03-03 ENCOUNTER — CLINICAL SUPPORT (OUTPATIENT)
Dept: CARDIOLOGY | Facility: HOSPITAL | Age: 83
End: 2023-03-03
Payer: MEDICARE

## 2023-03-03 DIAGNOSIS — I44.1 ATRIOVENTRICULAR BLOCK, SECOND DEGREE: ICD-10-CM

## 2023-03-03 DIAGNOSIS — Z95.0 PRESENCE OF CARDIAC PACEMAKER: ICD-10-CM

## 2023-04-21 ENCOUNTER — TELEPHONE (OUTPATIENT)
Dept: ELECTROPHYSIOLOGY | Facility: CLINIC | Age: 83
End: 2023-04-21
Payer: MEDICARE

## 2023-04-28 ENCOUNTER — TELEPHONE (OUTPATIENT)
Dept: ELECTROPHYSIOLOGY | Facility: CLINIC | Age: 83
End: 2023-04-28
Payer: MEDICARE

## 2023-05-19 ENCOUNTER — LAB VISIT (OUTPATIENT)
Dept: LAB | Facility: HOSPITAL | Age: 83
End: 2023-05-19
Attending: INTERNAL MEDICINE
Payer: MEDICARE

## 2023-05-19 DIAGNOSIS — E78.2 MIXED HYPERLIPIDEMIA: ICD-10-CM

## 2023-05-19 DIAGNOSIS — I10 ESSENTIAL HYPERTENSION, MALIGNANT: Primary | ICD-10-CM

## 2023-05-19 DIAGNOSIS — E11.9 DIABETES MELLITUS WITHOUT COMPLICATION: ICD-10-CM

## 2023-05-19 DIAGNOSIS — Z79.899 ENCOUNTER FOR LONG-TERM (CURRENT) USE OF OTHER MEDICATIONS: ICD-10-CM

## 2023-05-19 LAB
ALBUMIN SERPL BCP-MCNC: 4.2 G/DL (ref 3.5–5.2)
ALBUMIN/CREAT UR: 24.2 UG/MG (ref 0–30)
ALP SERPL-CCNC: 89 U/L (ref 55–135)
ALT SERPL W/O P-5'-P-CCNC: 20 U/L (ref 10–44)
ANION GAP SERPL CALC-SCNC: 9 MMOL/L (ref 8–16)
AST SERPL-CCNC: 22 U/L (ref 10–40)
BILIRUB DIRECT SERPL-MCNC: 0.2 MG/DL (ref 0.1–0.3)
BILIRUB SERPL-MCNC: 1.2 MG/DL (ref 0.1–1)
BUN SERPL-MCNC: 31 MG/DL (ref 8–23)
CALCIUM SERPL-MCNC: 9.7 MG/DL (ref 8.7–10.5)
CHLORIDE SERPL-SCNC: 107 MMOL/L (ref 95–110)
CHOLEST SERPL-MCNC: 149 MG/DL (ref 120–199)
CHOLEST/HDLC SERPL: 2.5 {RATIO} (ref 2–5)
CO2 SERPL-SCNC: 26 MMOL/L (ref 23–29)
CREAT SERPL-MCNC: 1.1 MG/DL (ref 0.5–1.4)
CREAT UR-MCNC: 33 MG/DL (ref 15–325)
EST. GFR  (NO RACE VARIABLE): 50.2 ML/MIN/1.73 M^2
ESTIMATED AVG GLUCOSE: 126 MG/DL (ref 68–131)
GLUCOSE SERPL-MCNC: 123 MG/DL (ref 70–110)
HBA1C MFR BLD: 6 % (ref 4.5–6.2)
HDLC SERPL-MCNC: 59 MG/DL (ref 40–75)
HDLC SERPL: 39.6 % (ref 20–50)
LDLC SERPL CALC-MCNC: 71.4 MG/DL (ref 63–159)
MICROALBUMIN UR DL<=1MG/L-MCNC: 8 UG/ML
NONHDLC SERPL-MCNC: 90 MG/DL
POTASSIUM SERPL-SCNC: 4.5 MMOL/L (ref 3.5–5.1)
PROT SERPL-MCNC: 7.1 G/DL (ref 6–8.4)
SODIUM SERPL-SCNC: 142 MMOL/L (ref 136–145)
TRIGL SERPL-MCNC: 93 MG/DL (ref 30–150)

## 2023-05-19 PROCEDURE — 80076 HEPATIC FUNCTION PANEL: CPT | Performed by: INTERNAL MEDICINE

## 2023-05-19 PROCEDURE — 83036 HEMOGLOBIN GLYCOSYLATED A1C: CPT | Performed by: INTERNAL MEDICINE

## 2023-05-19 PROCEDURE — 80061 LIPID PANEL: CPT | Performed by: INTERNAL MEDICINE

## 2023-05-19 PROCEDURE — 82570 ASSAY OF URINE CREATININE: CPT | Performed by: INTERNAL MEDICINE

## 2023-05-19 PROCEDURE — 36415 COLL VENOUS BLD VENIPUNCTURE: CPT | Performed by: INTERNAL MEDICINE

## 2023-05-19 PROCEDURE — 80048 BASIC METABOLIC PNL TOTAL CA: CPT | Performed by: INTERNAL MEDICINE

## 2023-06-01 ENCOUNTER — CLINICAL SUPPORT (OUTPATIENT)
Dept: CARDIOLOGY | Facility: HOSPITAL | Age: 83
End: 2023-06-01
Payer: MEDICARE

## 2023-06-01 DIAGNOSIS — I44.1 ATRIOVENTRICULAR BLOCK, SECOND DEGREE: ICD-10-CM

## 2023-06-01 DIAGNOSIS — Z95.0 PRESENCE OF CARDIAC PACEMAKER: ICD-10-CM

## 2023-06-01 PROCEDURE — 93294 CARDIAC DEVICE CHECK - REMOTE: ICD-10-PCS | Mod: ,,, | Performed by: INTERNAL MEDICINE

## 2023-06-01 PROCEDURE — 93294 REM INTERROG EVL PM/LDLS PM: CPT | Mod: ,,, | Performed by: INTERNAL MEDICINE

## 2023-08-30 ENCOUNTER — CLINICAL SUPPORT (OUTPATIENT)
Dept: CARDIOLOGY | Facility: HOSPITAL | Age: 83
End: 2023-08-30
Payer: MEDICARE

## 2023-08-30 DIAGNOSIS — Z95.0 PRESENCE OF CARDIAC PACEMAKER: ICD-10-CM

## 2023-11-24 ENCOUNTER — LAB VISIT (OUTPATIENT)
Dept: LAB | Facility: HOSPITAL | Age: 83
End: 2023-11-24
Attending: INTERNAL MEDICINE
Payer: MEDICARE

## 2023-11-24 DIAGNOSIS — E03.4 IDIOPATHIC ATROPHIC HYPOTHYROIDISM: ICD-10-CM

## 2023-11-24 DIAGNOSIS — I10 ESSENTIAL HYPERTENSION, MALIGNANT: Primary | ICD-10-CM

## 2023-11-24 DIAGNOSIS — D50.0 IRON DEFICIENCY ANEMIA SECONDARY TO BLOOD LOSS (CHRONIC): ICD-10-CM

## 2023-11-24 DIAGNOSIS — E11.9 DIABETES MELLITUS WITHOUT COMPLICATION: ICD-10-CM

## 2023-11-24 DIAGNOSIS — Z79.899 ENCOUNTER FOR LONG-TERM (CURRENT) USE OF OTHER MEDICATIONS: ICD-10-CM

## 2023-11-24 DIAGNOSIS — E78.2 MIXED HYPERLIPIDEMIA: ICD-10-CM

## 2023-11-24 LAB
ALBUMIN SERPL BCP-MCNC: 4.2 G/DL (ref 3.5–5.2)
ALBUMIN/CREAT UR: 25.6 UG/MG (ref 0–30)
ALP SERPL-CCNC: 78 U/L (ref 55–135)
ALT SERPL W/O P-5'-P-CCNC: 15 U/L (ref 10–44)
ANION GAP SERPL CALC-SCNC: 7 MMOL/L (ref 8–16)
AST SERPL-CCNC: 20 U/L (ref 10–40)
BASOPHILS # BLD AUTO: 0.02 K/UL (ref 0–0.2)
BASOPHILS NFR BLD: 0.3 % (ref 0–1.9)
BILIRUB DIRECT SERPL-MCNC: 0.1 MG/DL (ref 0.1–0.3)
BILIRUB SERPL-MCNC: 0.8 MG/DL (ref 0.1–1)
BUN SERPL-MCNC: 28 MG/DL (ref 8–23)
CALCIUM SERPL-MCNC: 9.4 MG/DL (ref 8.7–10.5)
CHLORIDE SERPL-SCNC: 108 MMOL/L (ref 95–110)
CHOLEST SERPL-MCNC: 145 MG/DL (ref 120–199)
CHOLEST/HDLC SERPL: 2.3 {RATIO} (ref 2–5)
CO2 SERPL-SCNC: 26 MMOL/L (ref 23–29)
CREAT SERPL-MCNC: 1.3 MG/DL (ref 0.5–1.4)
CREAT UR-MCNC: 62.8 MG/DL (ref 15–325)
DIFFERENTIAL METHOD: ABNORMAL
EOSINOPHIL # BLD AUTO: 0.2 K/UL (ref 0–0.5)
EOSINOPHIL NFR BLD: 2.9 % (ref 0–8)
ERYTHROCYTE [DISTWIDTH] IN BLOOD BY AUTOMATED COUNT: 12.2 % (ref 11.5–14.5)
EST. GFR  (NO RACE VARIABLE): 40.8 ML/MIN/1.73 M^2
ESTIMATED AVG GLUCOSE: 123 MG/DL (ref 68–131)
FERRITIN SERPL-MCNC: 89.7 NG/ML (ref 20–300)
GLUCOSE SERPL-MCNC: 120 MG/DL (ref 70–110)
HBA1C MFR BLD: 5.9 % (ref 4.5–6.2)
HCT VFR BLD AUTO: 45.2 % (ref 37–48.5)
HDLC SERPL-MCNC: 62 MG/DL (ref 40–75)
HDLC SERPL: 42.8 % (ref 20–50)
HGB BLD-MCNC: 15.1 G/DL (ref 12–16)
IMM GRANULOCYTES # BLD AUTO: 0.01 K/UL (ref 0–0.04)
IMM GRANULOCYTES NFR BLD AUTO: 0.2 % (ref 0–0.5)
IRON SERPL-MCNC: 92 UG/DL (ref 30–160)
LDLC SERPL CALC-MCNC: 65.8 MG/DL (ref 63–159)
LYMPHOCYTES # BLD AUTO: 1.4 K/UL (ref 1–4.8)
LYMPHOCYTES NFR BLD: 24.3 % (ref 18–48)
MCH RBC QN AUTO: 30.4 PG (ref 27–31)
MCHC RBC AUTO-ENTMCNC: 33.4 G/DL (ref 32–36)
MCV RBC AUTO: 91 FL (ref 82–98)
MICROALBUMIN UR DL<=1MG/L-MCNC: 16.1 UG/ML
MONOCYTES # BLD AUTO: 0.5 K/UL (ref 0.3–1)
MONOCYTES NFR BLD: 8.4 % (ref 4–15)
NEUTROPHILS # BLD AUTO: 3.8 K/UL (ref 1.8–7.7)
NEUTROPHILS NFR BLD: 63.9 % (ref 38–73)
NONHDLC SERPL-MCNC: 83 MG/DL
NRBC BLD-RTO: 0 /100 WBC
PLATELET # BLD AUTO: 121 K/UL (ref 150–450)
PMV BLD AUTO: 9.8 FL (ref 9.2–12.9)
POTASSIUM SERPL-SCNC: 4.3 MMOL/L (ref 3.5–5.1)
PROT SERPL-MCNC: 6.7 G/DL (ref 6–8.4)
RBC # BLD AUTO: 4.96 M/UL (ref 4–5.4)
SATURATED IRON: 28 % (ref 20–50)
SODIUM SERPL-SCNC: 141 MMOL/L (ref 136–145)
T4 FREE SERPL-MCNC: 0.98 NG/DL (ref 0.71–1.51)
TOTAL IRON BINDING CAPACITY: 328 UG/DL (ref 250–450)
TRANSFERRIN SERPL-MCNC: 234 MG/DL (ref 200–375)
TRIGL SERPL-MCNC: 86 MG/DL (ref 30–150)
TSH SERPL DL<=0.005 MIU/L-ACNC: 2.32 UIU/ML (ref 0.34–5.6)
WBC # BLD AUTO: 5.92 K/UL (ref 3.9–12.7)

## 2023-11-24 PROCEDURE — 80061 LIPID PANEL: CPT | Performed by: INTERNAL MEDICINE

## 2023-11-24 PROCEDURE — 84466 ASSAY OF TRANSFERRIN: CPT | Performed by: INTERNAL MEDICINE

## 2023-11-24 PROCEDURE — 83036 HEMOGLOBIN GLYCOSYLATED A1C: CPT | Performed by: INTERNAL MEDICINE

## 2023-11-24 PROCEDURE — 84439 ASSAY OF FREE THYROXINE: CPT | Performed by: INTERNAL MEDICINE

## 2023-11-24 PROCEDURE — 80076 HEPATIC FUNCTION PANEL: CPT | Performed by: INTERNAL MEDICINE

## 2023-11-24 PROCEDURE — 80048 BASIC METABOLIC PNL TOTAL CA: CPT | Performed by: INTERNAL MEDICINE

## 2023-11-24 PROCEDURE — 82728 ASSAY OF FERRITIN: CPT | Performed by: INTERNAL MEDICINE

## 2023-11-24 PROCEDURE — 82043 UR ALBUMIN QUANTITATIVE: CPT | Performed by: INTERNAL MEDICINE

## 2023-11-24 PROCEDURE — 36415 COLL VENOUS BLD VENIPUNCTURE: CPT | Performed by: INTERNAL MEDICINE

## 2023-11-24 PROCEDURE — 85025 COMPLETE CBC W/AUTO DIFF WBC: CPT | Performed by: INTERNAL MEDICINE

## 2023-11-24 PROCEDURE — 84443 ASSAY THYROID STIM HORMONE: CPT | Performed by: INTERNAL MEDICINE

## 2023-11-24 PROCEDURE — 83540 ASSAY OF IRON: CPT | Performed by: INTERNAL MEDICINE

## 2023-11-30 ENCOUNTER — CLINICAL SUPPORT (OUTPATIENT)
Dept: CARDIOLOGY | Facility: HOSPITAL | Age: 83
End: 2023-11-30
Attending: INTERNAL MEDICINE
Payer: MEDICARE

## 2023-11-30 ENCOUNTER — HOSPITAL ENCOUNTER (OUTPATIENT)
Facility: HOSPITAL | Age: 83
Discharge: HOME | End: 2023-12-01
Attending: INTERNAL MEDICINE | Admitting: INTERNAL MEDICINE
Payer: MEDICARE

## 2023-11-30 VITALS — WEIGHT: 145 LBS | BODY MASS INDEX: 31.28 KG/M2 | HEIGHT: 57 IN

## 2023-11-30 DIAGNOSIS — I10 ESSENTIAL HYPERTENSION: Chronic | ICD-10-CM

## 2023-11-30 DIAGNOSIS — I50.9 HEART FAILURE, UNSPECIFIED: ICD-10-CM

## 2023-11-30 DIAGNOSIS — I20.0 INTERMEDIATE CORONARY SYNDROME: ICD-10-CM

## 2023-11-30 DIAGNOSIS — Z95.2 S/P AVR (AORTIC VALVE REPLACEMENT): ICD-10-CM

## 2023-11-30 DIAGNOSIS — R07.2 PRECORDIAL PAIN: ICD-10-CM

## 2023-11-30 DIAGNOSIS — I20.0 CRESCENDO ANGINA: ICD-10-CM

## 2023-11-30 DIAGNOSIS — I34.2 MITRAL VALVE STENOSIS, NON-RHEUMATIC: Primary | Chronic | ICD-10-CM

## 2023-11-30 DIAGNOSIS — R07.9 CHEST PAIN: ICD-10-CM

## 2023-11-30 DIAGNOSIS — I25.118 CORONARY ARTERY DISEASE OF NATIVE ARTERY OF NATIVE HEART WITH STABLE ANGINA PECTORIS: Chronic | ICD-10-CM

## 2023-11-30 PROBLEM — R42 DIZZINESS AND GIDDINESS: Status: RESOLVED | Noted: 2020-10-14 | Resolved: 2023-11-30

## 2023-11-30 PROBLEM — H81.393 VESTIBULAR VERTIGO, BILATERAL: Status: RESOLVED | Noted: 2020-10-14 | Resolved: 2023-11-30

## 2023-11-30 PROBLEM — I65.23 BILATERAL CAROTID ARTERY STENOSIS: Status: RESOLVED | Noted: 2020-11-09 | Resolved: 2023-11-30

## 2023-11-30 PROBLEM — G45.1 CAROTID ARTERY SYNDROME: Status: RESOLVED | Noted: 2020-10-14 | Resolved: 2023-11-30

## 2023-11-30 PROBLEM — D69.6 THROMBOCYTOPENIA, UNSPECIFIED: Chronic | Status: ACTIVE | Noted: 2020-07-28

## 2023-11-30 PROBLEM — L72.9 SUBCUTANEOUS CYST: Status: RESOLVED | Noted: 2023-02-13 | Resolved: 2023-11-30

## 2023-11-30 PROBLEM — I35.0 NONRHEUMATIC AORTIC VALVE STENOSIS: Status: RESOLVED | Noted: 2019-09-04 | Resolved: 2023-11-30

## 2023-11-30 PROBLEM — I65.23 BILATERAL CAROTID ARTERY OCCLUSION: Chronic | Status: RESOLVED | Noted: 2020-07-28 | Resolved: 2023-11-30

## 2023-11-30 LAB
ALBUMIN SERPL BCP-MCNC: 4.1 G/DL (ref 3.5–5.2)
ALP SERPL-CCNC: 90 U/L (ref 55–135)
ALT SERPL W/O P-5'-P-CCNC: 18 U/L (ref 10–44)
ANION GAP SERPL CALC-SCNC: 8 MMOL/L (ref 8–16)
AORTIC ROOT ANNULUS: 2.3 CM
AORTIC VALVE CUSP SEPERATION: 0.6 CM
AST SERPL-CCNC: 23 U/L (ref 10–40)
AV INDEX (PROSTH): 0.47
AV MEAN GRADIENT: 18 MMHG
AV PEAK GRADIENT: 34 MMHG
AV VALVE AREA BY VELOCITY RATIO: 0.96 CM²
AV VALVE AREA: 0.95 CM²
AV VELOCITY RATIO: 0.48
BASOPHILS # BLD AUTO: 0.03 K/UL (ref 0–0.2)
BASOPHILS NFR BLD: 0.5 % (ref 0–1.9)
BILIRUB SERPL-MCNC: 0.9 MG/DL (ref 0.1–1)
BNP SERPL-MCNC: 257 PG/ML (ref 0–99)
BSA FOR ECHO PROCEDURE: 1.63 M2
BUN SERPL-MCNC: 25 MG/DL (ref 8–23)
CALCIUM SERPL-MCNC: 9.7 MG/DL (ref 8.7–10.5)
CHLORIDE SERPL-SCNC: 105 MMOL/L (ref 95–110)
CO2 SERPL-SCNC: 24 MMOL/L (ref 23–29)
CREAT SERPL-MCNC: 1 MG/DL (ref 0.5–1.4)
CV ECHO LV RWT: 0.47 CM
DIFFERENTIAL METHOD: ABNORMAL
DOP CALC AO PEAK VEL: 2.93 M/S
DOP CALC AO VTI: 54.7 CM
DOP CALC LVOT AREA: 2 CM2
DOP CALC LVOT DIAMETER: 1.6 CM
DOP CALC LVOT PEAK VEL: 1.4 M/S
DOP CALC LVOT STROKE VOLUME: 52.05 CM3
DOP CALC MV VTI: 72.3 CM
DOP CALCLVOT PEAK VEL VTI: 25.9 CM
E WAVE DECELERATION TIME: 363 MSEC
E/A RATIO: 0.68
E/E' RATIO: 21.85 M/S
ECHO LV POSTERIOR WALL: 0.92 CM (ref 0.6–1.1)
EOSINOPHIL # BLD AUTO: 0.1 K/UL (ref 0–0.5)
EOSINOPHIL NFR BLD: 2.1 % (ref 0–8)
ERYTHROCYTE [DISTWIDTH] IN BLOOD BY AUTOMATED COUNT: 12.3 % (ref 11.5–14.5)
ERYTHROCYTE [SEDIMENTATION RATE] IN BLOOD BY WESTERGREN METHOD: 7 MM/HR (ref 0–20)
EST. GFR  (NO RACE VARIABLE): 55.9 ML/MIN/1.73 M^2
FRACTIONAL SHORTENING: 42 % (ref 28–44)
GLUCOSE SERPL-MCNC: 102 MG/DL (ref 70–110)
HCT VFR BLD AUTO: 46.5 % (ref 37–48.5)
HGB BLD-MCNC: 15.4 G/DL (ref 12–16)
IMM GRANULOCYTES # BLD AUTO: 0.02 K/UL (ref 0–0.04)
IMM GRANULOCYTES NFR BLD AUTO: 0.3 % (ref 0–0.5)
INTERVENTRICULAR SEPTUM: 1.56 CM (ref 0.6–1.1)
IVC DIAMETER: 0.78 CM
LEFT INTERNAL DIMENSION IN SYSTOLE: 2.25 CM (ref 2.1–4)
LEFT VENTRICLE DIASTOLIC VOLUME INDEX: 41.97 ML/M2
LEFT VENTRICLE DIASTOLIC VOLUME: 65.9 ML
LEFT VENTRICLE MASS INDEX: 107 G/M2
LEFT VENTRICLE SYSTOLIC VOLUME INDEX: 10.9 ML/M2
LEFT VENTRICLE SYSTOLIC VOLUME: 17.1 ML
LEFT VENTRICULAR INTERNAL DIMENSION IN DIASTOLE: 3.9 CM (ref 3.5–6)
LEFT VENTRICULAR MASS: 167.31 G
LV LATERAL E/E' RATIO: 15.78 M/S
LV SEPTAL E/E' RATIO: 35.5 M/S
LVOT MG: 4 MMHG
LVOT MV: 0.96 CM/S
LYMPHOCYTES # BLD AUTO: 1.1 K/UL (ref 1–4.8)
LYMPHOCYTES NFR BLD: 18.1 % (ref 18–48)
MCH RBC QN AUTO: 30.1 PG (ref 27–31)
MCHC RBC AUTO-ENTMCNC: 33.1 G/DL (ref 32–36)
MCV RBC AUTO: 91 FL (ref 82–98)
MONOCYTES # BLD AUTO: 0.5 K/UL (ref 0.3–1)
MONOCYTES NFR BLD: 8.2 % (ref 4–15)
MV MEAN GRADIENT: 12 MMHG
MV PEAK A VEL: 2.08 M/S
MV PEAK E VEL: 1.42 M/S
MV PEAK GRADIENT: 29 MMHG
MV VALVE AREA BY CONTINUITY EQUATION: 0.72 CM2
NEUTROPHILS # BLD AUTO: 4.3 K/UL (ref 1.8–7.7)
NEUTROPHILS NFR BLD: 70.8 % (ref 38–73)
NRBC BLD-RTO: 0 /100 WBC
PISA MRMAX VEL: 5.52 M/S
PISA TR MAX VEL: 3.22 M/S
PLATELET # BLD AUTO: 116 K/UL (ref 150–450)
PMV BLD AUTO: 10.1 FL (ref 9.2–12.9)
POTASSIUM SERPL-SCNC: 4.4 MMOL/L (ref 3.5–5.1)
PROT SERPL-MCNC: 6.5 G/DL (ref 6–8.4)
PV MV: 0.64 M/S
PV PEAK GRADIENT: 4 MMHG
PV PEAK VELOCITY: 0.96 M/S
RBC # BLD AUTO: 5.12 M/UL (ref 4–5.4)
SODIUM SERPL-SCNC: 137 MMOL/L (ref 136–145)
T4 FREE SERPL-MCNC: 0.93 NG/DL (ref 0.71–1.51)
TDI LATERAL: 0.09 M/S
TDI SEPTAL: 0.04 M/S
TDI: 0.07 M/S
TR MAX PG: 41 MMHG
TRICUSPID ANNULAR PLANE SYSTOLIC EXCURSION: 1.39 CM
TROPONIN I SERPL HS-MCNC: 16.8 PG/ML (ref 0–14.9)
TROPONIN I SERPL HS-MCNC: 8.5 PG/ML (ref 0–14.9)
TSH SERPL DL<=0.005 MIU/L-ACNC: 1.63 UIU/ML (ref 0.34–5.6)
WBC # BLD AUTO: 6.12 K/UL (ref 3.9–12.7)
Z-SCORE OF LEFT VENTRICULAR DIMENSION IN END DIASTOLE: -1.46
Z-SCORE OF LEFT VENTRICULAR DIMENSION IN END SYSTOLE: -1.73

## 2023-11-30 PROCEDURE — 99213 PR OFFICE/OUTPT VISIT, EST, LEVL III, 20-29 MIN: ICD-10-PCS | Mod: ,,, | Performed by: INTERNAL MEDICINE

## 2023-11-30 PROCEDURE — 84439 ASSAY OF FREE THYROXINE: CPT | Performed by: INTERNAL MEDICINE

## 2023-11-30 PROCEDURE — 93306 TTE W/DOPPLER COMPLETE: CPT | Mod: 26,,, | Performed by: INTERNAL MEDICINE

## 2023-11-30 PROCEDURE — 63600175 PHARM REV CODE 636 W HCPCS: Performed by: INTERNAL MEDICINE

## 2023-11-30 PROCEDURE — 84443 ASSAY THYROID STIM HORMONE: CPT | Performed by: INTERNAL MEDICINE

## 2023-11-30 PROCEDURE — 25000003 PHARM REV CODE 250: Performed by: INTERNAL MEDICINE

## 2023-11-30 PROCEDURE — 85025 COMPLETE CBC W/AUTO DIFF WBC: CPT | Performed by: INTERNAL MEDICINE

## 2023-11-30 PROCEDURE — G0378 HOSPITAL OBSERVATION PER HR: HCPCS

## 2023-11-30 PROCEDURE — 25000003 PHARM REV CODE 250

## 2023-11-30 PROCEDURE — 99213 OFFICE O/P EST LOW 20 MIN: CPT | Mod: ,,, | Performed by: INTERNAL MEDICINE

## 2023-11-30 PROCEDURE — G0379 DIRECT REFER HOSPITAL OBSERV: HCPCS

## 2023-11-30 PROCEDURE — 93306 TTE W/DOPPLER COMPLETE: CPT

## 2023-11-30 PROCEDURE — 93010 EKG 12-LEAD: ICD-10-PCS | Mod: ,,, | Performed by: GENERAL PRACTICE

## 2023-11-30 PROCEDURE — 80053 COMPREHEN METABOLIC PANEL: CPT | Performed by: INTERNAL MEDICINE

## 2023-11-30 PROCEDURE — 93306 ECHO (CUPID ONLY): ICD-10-PCS | Mod: 26,,, | Performed by: INTERNAL MEDICINE

## 2023-11-30 PROCEDURE — 85651 RBC SED RATE NONAUTOMATED: CPT | Performed by: INTERNAL MEDICINE

## 2023-11-30 PROCEDURE — 36415 COLL VENOUS BLD VENIPUNCTURE: CPT | Performed by: INTERNAL MEDICINE

## 2023-11-30 PROCEDURE — 83880 ASSAY OF NATRIURETIC PEPTIDE: CPT | Performed by: INTERNAL MEDICINE

## 2023-11-30 PROCEDURE — 84484 ASSAY OF TROPONIN QUANT: CPT | Performed by: INTERNAL MEDICINE

## 2023-11-30 PROCEDURE — 93010 ELECTROCARDIOGRAM REPORT: CPT | Mod: ,,, | Performed by: GENERAL PRACTICE

## 2023-11-30 PROCEDURE — 96372 THER/PROPH/DIAG INJ SC/IM: CPT | Performed by: INTERNAL MEDICINE

## 2023-11-30 PROCEDURE — 93005 ELECTROCARDIOGRAM TRACING: CPT | Performed by: GENERAL PRACTICE

## 2023-11-30 RX ORDER — ATORVASTATIN CALCIUM 20 MG/1
20 TABLET, FILM COATED ORAL NIGHTLY
Status: DISCONTINUED | OUTPATIENT
Start: 2023-11-30 | End: 2023-12-01 | Stop reason: HOSPADM

## 2023-11-30 RX ORDER — ENOXAPARIN SODIUM 100 MG/ML
30 INJECTION SUBCUTANEOUS EVERY 24 HOURS
Status: DISCONTINUED | OUTPATIENT
Start: 2023-11-30 | End: 2023-12-01 | Stop reason: HOSPADM

## 2023-11-30 RX ORDER — FUROSEMIDE 20 MG/1
20 TABLET ORAL EVERY MORNING
Status: DISCONTINUED | OUTPATIENT
Start: 2023-11-30 | End: 2023-12-01 | Stop reason: HOSPADM

## 2023-11-30 RX ORDER — ASPIRIN 81 MG/1
81 TABLET ORAL EVERY MORNING
Status: DISCONTINUED | OUTPATIENT
Start: 2023-11-30 | End: 2023-12-01 | Stop reason: HOSPADM

## 2023-11-30 RX ORDER — NALOXONE HCL 0.4 MG/ML
0.02 VIAL (ML) INJECTION
Status: DISCONTINUED | OUTPATIENT
Start: 2023-11-30 | End: 2023-11-30

## 2023-11-30 RX ORDER — SODIUM CHLORIDE 0.9 % (FLUSH) 0.9 %
10 SYRINGE (ML) INJECTION EVERY 12 HOURS PRN
Status: DISCONTINUED | OUTPATIENT
Start: 2023-11-30 | End: 2023-12-01 | Stop reason: HOSPADM

## 2023-11-30 RX ORDER — METOPROLOL SUCCINATE 25 MG/1
25 TABLET, EXTENDED RELEASE ORAL NIGHTLY
Status: DISCONTINUED | OUTPATIENT
Start: 2023-11-30 | End: 2023-12-01 | Stop reason: HOSPADM

## 2023-11-30 RX ORDER — GLUCAGON 1 MG
1 KIT INJECTION
Status: DISCONTINUED | OUTPATIENT
Start: 2023-11-30 | End: 2023-11-30

## 2023-11-30 RX ORDER — IBUPROFEN 200 MG
24 TABLET ORAL
Status: DISCONTINUED | OUTPATIENT
Start: 2023-11-30 | End: 2023-11-30

## 2023-11-30 RX ORDER — IBUPROFEN 200 MG
16 TABLET ORAL
Status: DISCONTINUED | OUTPATIENT
Start: 2023-11-30 | End: 2023-11-30

## 2023-11-30 RX ADMIN — FUROSEMIDE 20 MG: 20 TABLET ORAL at 12:11

## 2023-11-30 RX ADMIN — ATORVASTATIN CALCIUM 20 MG: 20 TABLET, FILM COATED ORAL at 08:11

## 2023-11-30 RX ADMIN — ASPIRIN 81 MG: 81 TABLET, COATED ORAL at 12:11

## 2023-11-30 RX ADMIN — METOPROLOL SUCCINATE 25 MG: 25 TABLET, EXTENDED RELEASE ORAL at 08:11

## 2023-11-30 RX ADMIN — ENOXAPARIN SODIUM 30 MG: 30 INJECTION SUBCUTANEOUS at 05:11

## 2023-11-30 NOTE — PLAN OF CARE
11/30/23 1517   FRASER Message   Medicare Outpatient and Observation Notification regarding financial responsibility Given to patient/caregiver;Explained to patient/caregiver;Signed/date by patient/caregiver   Date FRASER was signed 11/30/23   Time FRASER was signed 0254

## 2023-11-30 NOTE — PLAN OF CARE
[facility]  Initial Discharge Assessment       Primary Care Provider: Yoni Hernandez MD    Admission Diagnosis: Intermediate coronary syndrome [I20.0]    Admission Date: 11/30/2023  Expected Discharge Date:     Pt is an 83-year-old female/male who arrived from doctor's office with Crescendo angina. Information verified as correct on facesheet. The assessment was completed at the patient's bedside.  Pt lives with daughter, Benita Hernandez [phone]. Pt does have a Living Will. The Pt is oriented to person, places, and times. PCP last seen today.  Pt denies Coumadin, dialysis, DME, HH, and has not been readmitted into the hospital in the last thirty days.  Pt is capable of performing ADLs without assistance. Pt's daughter drivers her to and from medical appointments. Pt reports she takes medication as prescribed; pharmacy used [facility].  Pt verbalized plan to discharge home via family transport. Pt has no other needs to be addressed at this time. CM reviewed the chart and will continue to monitor.       Transition of Care Barriers: None    Payor: MEDICARE / Plan: MEDICARE PART A & B / Product Type: Government /     Extended Emergency Contact Information  Primary Emergency Contact: Benita Hernandez  Home Phone: [phone]  Relation: Daughter  Preferred language: English  Secondary Emergency Contact: Donna Hernandez  Mobile Phone: [phone]  Relation: Daughter  Preferred language: English   needed? No    Discharge Plan A: Home with family  Discharge Plan B: Home      [facility] [place] - [facility] [place] [place] [facility]  [facility]  [place] [address]  Phone: [phone] Fax: [phone]      Initial Assessment (most recent)       Adult Discharge Assessment - 11/30/23 1517          Discharge Assessment    Assessment Type Discharge Planning Assessment     Confirmed/corrected address, phone number and insurance Yes      Confirmed Demographics Correct on Facesheet     Source of Information patient     When was your last doctors appointment? 11/30/23     Does patient/caregiver understand observation status Yes     Communicated JESSIKA with patient/caregiver Date not available/Unable to determine     Reason For Admission Jennyndo angina     People in Home child(homero), adult     Facility Arrived From: Doctor's office     Do you expect to return to your current living situation? Yes     Do you have help at home or someone to help you manage your care at home? Yes     Who are your caregiver(s) and their phone number(s)? Benita Hernandez/daughter [phone]     Prior to hospitilization cognitive status: Alert/Oriented     Current cognitive status: Alert/Oriented     Equipment Currently Used at Home none     Readmission within 30 days? No     Patient currently being followed by outpatient case management? No     Do you currently have service(s) that help you manage your care at home? No     Do you take prescription medications? Yes     Do you have prescription coverage? Yes     Coverage Payor: MEDICARE - MEDICARE PART A & B -     Do you have any problems affording any of your prescribed medications? No     Is the patient taking medications as prescribed? yes     Who is going to help you get home at discharge? Benita Hernandez/daughter [phone]     Are you on dialysis? No     Do you take coumadin? No     DME Needed Upon Discharge  none     Discharge Plan discussed with: Patient;Adult children     Transition of Care Barriers None     Discharge Plan A Home with family     Discharge Plan B Home

## 2023-11-30 NOTE — CONSULTS
[facility]  Department of Cardiology  Consult Note      PATIENT NAME: Roxanne Hernandez  MRN: [id number]  TODAY'S DATE: 11/30/2023  ADMIT DATE: 11/30/2023                          CONSULT REQUESTED BY: Yoni Hernandez MD    SUBJECTIVE     PRINCIPAL PROBLEM: Crescendo angina      REASON FOR CONSULT:    Please see your established TAVR/RCA 50% patient with RENDON and exertional chest pain      HPI:    Patient is an 83 y.o. female with PMH of CAD, hypertension, s/p TAVR, HFpEF, diabetes mellitus, PPM who was sent from Dr. Hernandez's office for c/o shortness of breath with exertion and associated lightheadedness and dizziness over the past few weeks.  Patient also complains for shortness of breath with rest at times. Patient denies recent CP during examination.   WBC 6, H&H 15/46, K 4.4, Cr 1.0, BUN 25, eGFR 55.9, , Troponin HS 8.5, CXR stable, no acute process.  Venous US BLE pending. EKG shows atrial sensed and ventricular paced rhythm.    ECHO 11/19/20  There is mild left ventricular concentric hypertrophy.  The left ventricle is normal in size with normal systolic function. The estimated ejection fraction is 60%.  Grade II diastolic dysfunction.  Normal right ventricular size with normal right ventricular systolic function.  Mild left atrial enlargement.  There is a transcutaneously-placed aortic bioprosthesis present. There is mild paravalvular aortic insufficiency present.  Mild aortic regurgitation.  Moderate aortic valve stenosis.  Aortic valve area is 1.26 cm2; peak velocity is 2.76 m/s; mean gradient is 18 mmHg.  The mitral valve is mildly sclerotic.  Mild mitral regurgitation.  There is mild mitral stenosis.  Mild to moderate tricuspid regurgitation.  Normal central venous pressure (3 mmHg).  The estimated PA systolic pressure is 55 mmHg.      CARDIAC CATH 9/4/19    Mid RCA lesion , 50% stenosed. Nonsignificant by iFR assessment.  Known severe aortic stenosis from echo       FROM H&P  This 83 year old  "female presented to my office in routine 6 month followup. She reported that she has been having right infraclavicular odd pains occurring intermittently over the past 2 weeks, though she is not certain whether they occur with exertion.   She has also had new dyspnea with minimal exertion and occasionally even at rest, but without paroxysmal nocturnal dyspnea, also all beginning about 2 weeks prior to presentation.  She has not had fever, cough or hemoptysis.  She has noticed mild left calf swelling, but she says "I've had this all of my life".  She can't explain why she reports it as if it is new, while simultaneously reporting that she has always had it.  She otherwise is as well as ever.          Review of patient's allergies indicates:   Allergen Reactions    Azithromycin Swelling     All mycins    Statins-hmg-coa reductase inhibitors Other (See Comments)     Liver function  "It attacks my liver and makes me very sick"    Sulfa (sulfonamide antibiotics) Hives     Pt states she can use sulfa. 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 surgical therapy options discussio    Bilateral carotid artery stenosis 11/09/2020    S/p B/L CEA. Dr Kent.     Carotid artery syndrome 10/14/2020    Cervical cancer     Coronary artery disease     COVID-19     Diabetes mellitus     diet control.    Dizziness and giddiness 10/14/2020    Hypertension     Kidney stone     Nonrheumatic aortic valve stenosis 09/04/2019    Obese     Subcutaneous cyst 02/13/2023    Uncontrolled hypertension " 10/14/2020    Vestibular vertigo, bilateral 10/14/2020     Past Surgical History:   Procedure Laterality Date    A-V CARDIAC PACEMAKER INSERTION N/A 12/18/2019    Procedure: INSERTION, CARDIAC PACEMAKER, DUAL CHAMBER;  Surgeon: Nnamdi Macdonald MD;  Location: [facility] EP LAB;  Service: Cardiology;  Laterality: N/A;  lbbb, dppm, SJM, anes, pr, [id number]    APPENDECTOMY      pt thinks no appendix    BREAST BIOPSY Left     CARDIAC CATHETERIZATION      CAROTID ENDARTERECTOMY Left 07/31/2020    Procedure: ENDARTERECTOMY-CAROTID;  Surgeon: Radu Kent MD;  Location: [facility];  Service: Cardiothoracic;  Laterality: Left;    CAROTID ENDARTERECTOMY Right 10/16/2020    Procedure: ENDARTERECTOMY, CAROTID;  Surgeon: Radu Kent MD;  Location: [facility];  Service: Cardiothoracic;  Laterality: Right;    CATARACT EXTRACTION BILATERAL W/ ANTERIOR VITRECTOMY      CHOLECYSTECTOMY      CORONARY ANGIOGRAPHY Left 09/04/2019    Procedure: ANGIOGRAM, CORONARY ARTERY;  Surgeon: Carmen Yepez MD;  Location: [facility] CATH/EP LAB;  Service: Cardiology;  Laterality: Left;    CYST REMOVAL N/A 2/13/2023    Procedure: EXCISION, CYST;  Surgeon: Bret Avina III, MD;  Location: [facility] OR;  Service: General;  Laterality: N/A;    CYSTOSCOPY N/A 11/13/2019    Procedure: CYSTOSCOPY;  Surgeon: Paddy Dunham MD;  [address];  Service: Urology;  Laterality: N/A;    HYSTERECTOMY      JOINT REPLACEMENT Right     KNEE ARTHROPLASTY Right     LASER LITHOTRIPSY Left 11/13/2019    Procedure: LITHOTRIPSY, USING LASER;  Surgeon: Paddy Dunham MD;  Location: [facility] 1ST FLR;  Service: Urology;  Laterality: Left;    TRANSCATHETER AORTIC VALVE REPLACEMENT (TAVR) N/A 12/17/2019    Procedure: REPLACEMENT, AORTIC VALVE, TRANSCATHETER (TAVR);  Surgeon: Herbert Ashley MD;  Location: [facility] CATH LAB;  Service: Cardiology;  Laterality: N/A;    URETEROSCOPIC REMOVAL OF URETERIC CALCULUS Left 11/13/2019    Procedure: REMOVAL, CALCULUS, URETER,  URETEROSCOPIC;  Surgeon: Paddy Dunham MD;  Location: [facility] OR [address] [place] [address];  Service: Urology;  Laterality: Left;    URETEROSCOPY Left 2019    Procedure: URETEROSCOPY;  Surgeon: Paddy Dunham MD;  Location: [facility] OR [address] [place] [address];  Service: Urology;  Laterality: Left;     Social History     Tobacco Use    Smoking status: Former     Current packs/day: 0.00     Average packs/day: 1 pack/day for 25.0 years (25.0 ttl pk-yrs)     Types: Cigarettes     Start date: 1955     Quit date: 1980     Years since quittin.9    Smokeless tobacco: Never   Substance Use Topics    Alcohol use: Not Currently    Drug use: Never        REVIEW OF SYSTEMS    As mentioned in HPI    OBJECTIVE     VITAL SIGNS (Most Recent)  Temp: 98.1 °F (36.7 °C) (23 1213)  Pulse: 72 (23 1213)  Resp: 20 (23 1213)  BP: (!) 175/74 (23 1213)  SpO2: 98 % (23 1213)    VENTILATION STATUS  Resp: 20 (23 1213)  SpO2: 98 % (23 1213)           I & O (Last 24H):No intake or output data in the 24 hours ending 23 1324    WEIGHTS  Wt Readings from Last 3 Encounters:   23 1213 66.2 kg (145 lb 14.4 oz)   23 0946 70.8 kg (156 lb)   23 0721 70.8 kg (156 lb)       PHYSICAL EXAM    CONSTITUTIONAL: NAD  HEENT: Normocephalic. No pallor  NECK: no JVD  LUNGS: CTA b/l  HEART: regular rate and rhythm, S1, S2 normal  ABDOMEN: soft, non-tender, bowel sounds normal  EXTREMITIES: No edema  SKIN: No rash  NEURO: AAO X 3  PSYCH: normal affect      HOME MEDICATIONS:  Current Facility-Administered Medications on File Prior to Encounter   Medication Dose Route Frequency Provider Last Rate Last Admin    0.9%  NaCl infusion   Intravenous Continuous Carmen Yepez MD   Stopped at 10/16/20 1151    aspirin EC tablet 325 mg  325 mg Oral Once Carmen Yepez MD        diazePAM tablet 5 mg  5 mg Oral On Call Procedure Carmen Yepez MD        diphenhydrAMINE capsule 25 mg  25 mg Oral Once Marleni,  "Carmen FARFAN MD         Current Outpatient Medications on File Prior to Encounter   Medication Sig Dispense Refill    aspirin (ECOTRIN) 81 MG EC tablet Take 81 mg by mouth every morning.      atorvastatin (LIPITOR) 10 MG tablet Take 1 tablet (10 mg total) by mouth every evening. (Patient taking differently: Take 20 mg by mouth every evening.)      eoz-D5-qmj18-zinc--demetri-bor (CALTRATE 600-D PLUS MINERALS) 600 mg calcium- 800 unit-50 mg Tab Take 1 each by mouth every morning.      cholecalciferol, vitamin D3, 50 mcg (2,000 unit) Chew Take 2,000 Units by mouth every morning.      potassium 99 mg Tab Take 1 tablet by mouth once daily.      furosemide (LASIX) 20 MG tablet Take 1 tablet (20 mg total) by mouth every morning. 30 tablet 11    metoprolol succinate (TOPROL-XL) 25 MG 24 hr tablet Take 1 tablet (25 mg total) by mouth every morning. (Patient taking differently: Take 12.5 mg by mouth every evening.) 30 tablet 11    [DISCONTINUED] HYDROcodone-acetaminophen (NORCO) 5-325 mg per tablet Take 1 tablet by mouth every 6 (six) hours as needed for Pain. 10 tablet 0       SCHEDULED MEDS:   aspirin  81 mg Oral QAM    atorvastatin  20 mg Oral QHS    enoxparin  30 mg Subcutaneous Daily    furosemide  20 mg Oral QAM    metoprolol succinate  12.5 mg Oral QHS       CONTINUOUS INFUSIONS:    PRN MEDS:sodium chloride 0.9%    LABS AND DIAGNOSTICS     CBC LAST 3 DAYS  Recent Labs   Lab 11/24/23  0707   WBC 5.92   RBC 4.96   HGB 15.1   HCT 45.2   MCV 91   MCH 30.4   MCHC 33.4   RDW 12.2   *   MPV 9.8   GRAN 63.9  3.8   LYMPH 24.3  1.4   MONO 8.4  0.5   BASO 0.02   NRBC 0       COAGULATION LAST 3 DAYS  No results for input(s): "LABPT", "INR", "APTT" in the last 168 hours.    CHEMISTRY LAST 3 DAYS  Recent Labs   Lab 11/24/23  0707      K 4.3      CO2 26   ANIONGAP 7*   BUN 28*   CREATININE 1.3   *   CALCIUM 9.4   ALBUMIN 4.2   PROT 6.7   ALKPHOS 78   ALT 15   AST 20   BILITOT 0.8       CARDIAC PROFILE " "LAST 3 DAYS  No results for input(s): "BNP", "CPK", "CPKMB", "LDH", "TROPONINI", "TROPONINIHS" in the last 168 hours.    ENDOCRINE LAST 3 DAYS  Recent Labs   Lab 11/24/23  0707   TSH 2.321       LAST ARTERIAL BLOOD GAS  ABG  No results for input(s): "PH", "PO2", "PCO2", "HCO3", "BE" in the last 168 hours.    LAST 7 DAYS MICROBIOLOGY   Microbiology Results (last 7 days)       ** No results found for the last 168 hours. **            MOST RECENT IMAGING  Cardiac device check - Remote  Additional Comments  REMOTE Interrogation Report  PPM       Presenting egram demonstrates  AS/AP/VS PACs  Device fxn WNL   Variable R waves, impedance and Threshold wnl, minimal RV pacing   Autocapture algorithms are On   RA pacing     1%, RV pacing     0%      Atrial arrhythmias:  none   Ventricular arrhythmias:  none   Battery Status/Longevity:  7-8 yrs  Recall placed  Continue to monitor  F/U via remote interrogation in 3 mos   DG      ECHOCARDIOGRAM RESULTS (last 5)  Results for orders placed in visit on 11/19/20    Echo Color Flow Doppler? Yes    Interpretation Summary  · There is mild left ventricular concentric hypertrophy.  · The left ventricle is normal in size with normal systolic function. The estimated ejection fraction is 60%.  · Grade II diastolic dysfunction.  · Normal right ventricular size with normal right ventricular systolic function.  · Mild left atrial enlargement.  · There is a transcutaneously-placed aortic bioprosthesis present. There is mild paravalvular aortic insufficiency present.  · Mild aortic regurgitation.  · Moderate aortic valve stenosis.  · Aortic valve area is 1.26 cm2; peak velocity is 2.76 m/s; mean gradient is 18 mmHg.  · The mitral valve is mildly sclerotic.  · Mild mitral regurgitation.  · There is mild mitral stenosis.  · Mild to moderate tricuspid regurgitation.  · Normal central venous pressure (3 mmHg).  · The estimated PA systolic pressure is 55 mmHg.      Results for orders placed during " the hospital encounter of 01/22/20    Echo Color Flow Doppler? Yes    Interpretation Summary  · Normal left ventricular systolic function. The estimated ejection fraction is 65%.  · Concentric left ventricular remodeling.  · No wall motion abnormalities.  · Grade I (mild) left ventricular diastolic dysfunction consistent with impaired relaxation.  · Normal right ventricular systolic function.  · Severe left atrial enlargement.  · There is a transcutaneously-placed aortic bioprosthesis present. There is paravalvular aortic insufficiency present.  · Mild mitral regurgitation.  · Normal central venous pressure (3 mmHg).  · The estimated PA systolic pressure is 29 mmHg.  · Trivial posterior pericardial effusion.      Results for orders placed during the hospital encounter of 12/26/19    Echo Color Flow Doppler? Yes    Interpretation Summary  · Mild concentric left ventricular hypertrophy.  · Normal left ventricular systolic function. The estimated ejection fraction is 60%.  · No wall motion abnormalities.  · Grade I (mild) left ventricular diastolic dysfunction consistent with impaired relaxation.  · There is a transcutaneously-placed aortic bioprosthesis present.  · Mild aortic regurgitation.  · Mild-to-moderate aortic valve stenosis.  · Aortic valve area is 1.23 cm2; peak velocity is 2.84 m/s; mean gradient is 18 mmHg.  · Mild left atrial enlargement.  · Mild tricuspid regurgitation.  · Normal right ventricular systolic function.  · Moderate mitral sclerosis.  · There is mild leaflet calcification of the Mitral Valve.  · Mild mitral stenosis. MVA by PHT is 2.21 sq cm.  · Normal central venous pressure (3 mmHg).  · The estimated PA systolic pressure is 32 mmHg.      Results for orders placed during the hospital encounter of 11/11/19    Echo    Interpretation Summary  · Concentric left ventricular remodeling.  · Hyperdynamic left ventricular systolic function. The estimated ejection fraction is 73%  · Normal right  ventricular systolic function.  · Severe left atrial enlargement.  · Moderate mitral stenosis from annular calcification. The mean diastolic gradient across the mitral valve is 9 mmHg at a heart rate of 92 bpm.  · Severe aortic valve stenosis. Aortic valve area is 0.82 cm2; peak velocity is 4.6 m/s; mean gradient is 53 mmHg.  · Moderate aortic regurgitation.  · The estimated PA systolic pressure is 39 mm Hg  · Normal central venous pressure (3 mm Hg).      Results for orders placed during the hospital encounter of 09/04/19    Transesophageal echo (VICKY)    Interpretation Summary  Indication:  Mitral stenosis, aortic stenosis and regurgitation    Counseling:  Patient as well as her family member were informed of the risks, benefits as well as alternatives to the procedure and informed consent was obtained.    Procedure:  After obtaining informed consent and sedated with Anesthesia help a VICKY probe was advanced without difficulty and images were obtained. Patient tolerated the procedure well and no immediate complications were noted.    Complications:  None immediate    Findings:  1. Aortic valve: Heavily calcified with restricted leaflet motion. Moderate aortic regurgitation.  2. Mitral valve: Thick and calcified with restricted leaflet motion. Mild mitral stenosis and regurgitation.  3. Tricuspid valve: Is structurally normal with good leaflet excursion. Mild regurgitation.  4. Pulmonary valve is structurally normal with good leaflet excursion. No significant regurgitation.  5. Overall LVEF appears normal.  6. No obvious shunt across the interatrial septum by color flow Doppler.    Conclusions:  1. Mild mitral stenosis.  2. Moderate aortic regurgitation.  3. Known severe aortic stenosis on previous TTE.      CURRENT/PREVIOUS VISIT EKG  Results for orders placed or performed during the hospital encounter of 02/08/23   EKG 12-lead    Collection Time: 02/08/23  7:17 AM    Narrative    Test Reason : L72.9,    Vent. Rate  : 091 BPM     Atrial Rate : 091 BPM     P-R Int : 170 ms          QRS Dur : 138 ms      QT Int : 418 ms       P-R-T Axes : 056 016 092 degrees     QTc Int : 514 ms    Normal sinus rhythm with sinus arrhythmia  Possible Left atrial enlargement  Left bundle branch block  Abnormal ECG  When compared with ECG of 06-JAN-2023 08:19,  No significant change was found  Confirmed by Owen Vance MD ([id number]) on 2/14/2023 12:25:07 PM    Referred By:             Confirmed By:Owen Vance MD           ASSESSMENT/PLAN:     Active Hospital Problems    Diagnosis    *Crescendo angina    Hypertensive emergency without congestive heart failure    Leakage of periprosthetic valve, subsequent encounter     Aortic stenosis saga:   Echocardiogram (VICKY) 09/04/2019:   Aortic valve: heavily calcified with restricted leaflet motion. Moderate aortic regurgitation.   Mitral valve: thick and calcified with restricted leaflet motion. Mild mitral stenosis and regurgitation.   Tricuspid valve: is structurally normal with good leaflet excursion. Mild regurgitation.  Pulmonary valve: is structurally normal with good leaflet excursion. No significant regurgitation.  Overall LVEF appears normal.   No obvious shunt across the interatrial septum by color flow Doppler.     Cardiac catheterization 09/14/2019 (Timothy Salgado MD):   Mid RCA lesion, 50% stenosed, nonsignificant by iFR assessment.  Known severe aortic stenosis from echo    CTA Cardiac TAVR 09/19/2019:   Large left UVJ stone measuring at 2.6 cm with severe left-sided hydroureteronephrosis.  Significant pneumobilia in the left hepatic lobe with air within the common bile duct.  Unclear if this is due to biliary enteric fistula, infectious process such as cholangitis, or incompetent sphincter of Oddi.  Unfortunately this is not well evaluated on this dedicated TAVR protocol examination.  Clinical correlation is advised.  Further evaluation could be performed with ERCP and dedicated abdominal  CT with intravenous contrast.  TAVR measurements, as above.  Significant aortic valve and abdominal aorta atherosclerosis.  Nonspecific mesenteric haziness, possibly mesenteric panniculitis or adenitis.  Heterogeneity of the pulmonary parenchyma possibly related to elevated pulmonary vascular resistance, small airways disease, or both.  Small bilateral pleural effusions.    Cardiac catheterization 12/17/2019 (Herbert Ashley):   Successful left transfemoral 23 mm Evolute TAVR.  No paravalvular leak, mean gradient 3, peak velocity 1.5 post TAVR.    Echocardiogram (TTE) 12/26/2019   Mild concentric left ventricular hypertrophy   Normal left ventricular systolic function. The estimated ejection fraction is 60%.  No wall motion abnormalities.  Grade I (mild) left ventricular diastolic dysfunction consistent with impaired relaxation.  There is a transcutaneously-placed aortic bioprosthesis present.  Mild aortic regurgitation.  Mild-to-moderate aortic valve stenosis.  Aortic valve area is 1.23 cm2; peak velocity is 2.84 m/s; mean gradient is 18 mmHg.  Mild left atrial enlargement.  Mild tricuspid regurgitation.  Normal right ventricular systolic function.  Moderate mitral sclerosis.  There is mild leaflet calcification of the Mitral Valve.  Mild mitral stenosis. MVA by PHT is 2.21 sq cm.  Normal central venous pressure (3 mmHg).   The estimated PA systolic pressure is 32 mmHg.:     Echocardiogram (TTE) 01/22/2020:   Normal left ventricular systolic function. The estimated ejection fraction is 65%.  Concentric left ventricular remodeling.  No wall motion abnormalities.  Grade I (mild) left ventricular diastolic dysfunction consistent with impaired relaxation.  Normal right ventricular systolic function.  Severe left atrial enlargement.  There is a transcutaneously-placed aortic bioprosthesis present.   There is paravalvular aortic insufficiency present   Mild mitral regurgitation.  Normal central venous pressure (3  mmHg).  The estimated PA systolic pressure is 29 mmHg.   Trivial posterior pericardial effusion.       History of transcatheter aortic valve replacement (TAVR)     Cardiac catheterization 12/17/2019 (Herbert Ashley):   Successful left transfemoral 23 mm Evolut R TAVR; no paravalvular leak; mean gradient 3; peak velocity 1.5 post TAVR.      Coronary artery disease of native artery of native heart with stable angina pectoris     Cardiac catheterization 09/14/2019 (Timothy Salgado MD):    Mid RCA lesion, 50% stenosed, nonsignificant by iFR assessment.   Known severe aortic stenosis from echo  02/13/2020: it is unlikely that this trivial coronary artery disease is the cause of her episodic dyspnea that has been present since prior to 09/04/2019.      Chronic diastolic heart failure     Echocardiogram 12/01/2000 = hyperdynamic LV, with mild to moderate amount of pericardial fat, and left ventricular diastolic dysfunction   Echocardiogram 05/20/2011 = dilated left atrial cavity, normal LV thickness and function with LVEF @ 70%, mild aortic stenosis with valve area @ 1.09cm2, peak gradient = 34 mmHg, minimal prolapse of anterior mitral leaflet, mild TI, very small pericardial effusion         Diabetes mellitus without complication    Benign neoplasm of transverse colon     Barium enema 01/12/1998 = several sessile polyps, diverticulosis coli   Colonoscopy #1 08/07/1998 (Jamie) = multiple polyps; histopathology = adenomatous polyp with mild dysplasia, hyperplastic polyps   Colonoscopy #2 08/02/2006 (Jamie) = small cecal polyp (ablated), ascending polyp removed, 8mm transverse polyp, rectosigmoid polyp (ablated); histopathology = ascending and transverse colon polyps: tubular adenomas (2-3-year repeat advised)   Colonoscopy #3: due 08/02/2009       Pneumobilia     S/P cholecystectomy state since 12/03/1992  Pneumobilia, chronic, 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   Ultrasound 01/31/1992 = cholelithiasis with tender gallbladder    Ultrasound abdomen 06/22/1995 = normal except for hypoechoic acoustic texture of the left hepatic lobe   ERCP #1 02/01/1992 (Pellegrin) = choledocholithiasis with cholangitis   ERCP #2 06/28/1995 (Leonor) = multiple filling defects due to stones   CT abdomen (with & without) 06/26/1995 = normal except for air in the biliary tree, fatty infiltration of the left lobe, calcified granuloma at right lung base   CT abdomen & pelvis (with & without) 12/02/1997 = normal/normal except for air in the biliary tree due to prior sphincterotomy   CTA Cardiac TAVR 09/19/2019: significant pneumobilia in the left hepatic lobe with air within the common bile duct          Essential hypertension       ASSESSMENT & PLAN:     Dyspnea on Exertion  HFpEF- EF 60% with Grade II diastolic dysfunctoin 11/2020  CAD- Mid RCA lesion , 50% stenosed, 9/2019  S/p TAVR- 12/2019  Hypertension  Hyperlipidemia on statin therapy      RECOMMENDATIONS:    Dyspnea on exertion and occasionally at rest.  Troponin HS negative.  Elevated BNP, appears chronically elevated.  Euvolemic on examination.  CXR negative for acute process.  Continue PO lasix.    History of TAVR, 12/2019 by Dr. Ashley. Will repeat echocardiogram.    Continue to trend troponin.  Initial troponin HS within normal range.  Interrogate pacemaker.   Hypertensive on arrival, 175/74.  Closely monitor. Recommend increasing metoprolol succinate to 25 mg daily.   Continue aspirin and statin therapy.  Thank you for the consult. We will continue to follow.       Laverne Anderson NP  Department of Cardiology  Date of Service: 11/30/2023

## 2023-11-30 NOTE — H&P
"11/30/2023 @ 11:41 AM     [facility]  History & Physical      TODAY'S DATE: 11/30/2023    PATIENT NAME: Roxanne Hernandez  MRN: [id number]    ADMIT DATE: 11/30/2023 10:51 AM  PATIENT CLASS: OP- Observation   HOSPITAL LENGTH OF STAY: 0 Days    CODE STATUS: Full Code  ATTENDING PHYSICIAN: Yoni Hernandez MD  PRINCIPLE PROBLEM: Crescendo angina    CHIEF COMPLAINT AND HISTORY OF PRESENT ILLNESS  This 83 year old female presented to my office in routine 6 month followup. She reported that she has been having right infraclavicular odd pains occurring intermittently over the past 2 weeks, though she is not certain whether they occur with exertion.   She has also had new dyspnea with minimal exertion and occasionally even at rest, but without paroxysmal nocturnal dyspnea, also all beginning about 2 weeks prior to presentation.  She has not had fever, cough or hemoptysis.  She has noticed mild left calf swelling, but she says "I've had this all of my life".  She can't explain why she reports it as if it is new, while simultaneously reporting that she has always had it.  She otherwise is as well as ever.     REVIEW OF SYSTEMS  Obtained comprehensively including HEENT, neck, breast, GI, , GYN, MSK, dermatologic, neurologic is all negative or stable.     Review of patient's allergies indicates:   Allergen Reactions    Azithromycin Swelling     All mycins    Statins-hmg-coa reductase inhibitors Other (See Comments)     Liver function  "It attacks my liver and makes me very sick"    Sulfa (sulfonamide antibiotics) Hives     Pt states she can use sulfa. Has had since 1st reaction     Current Outpatient Medications   Medication Instructions    aspirin (ECOTRIN) 81 mg, Oral, Every morning    atorvastatin (LIPITOR) 10 mg, Oral, Nightly    nnq-Z5-[id number]-zinc--demetri-bor (CALTRATE 600-D PLUS MINERALS) 600 mg calcium- 800 unit-50 mg Tab 1 each, Oral, Every morning    cholecalciferol (vitamin D3) " 2,000 Units, Oral, Every morning    furosemide (LASIX) 20 mg, Oral, Every morning    metoprolol succinate (TOPROL-XL) 25 mg, Oral, Every morning    potassium 99 mg Tab 1 tablet, Oral, Daily     Past Medical History, Hospitalization History, Vaccine History, Surgical History, Social History, Family History are extensively recorded on my office PMH document, a copy of which is copied and pasted to the end of this document.    PHYSICAL EXAM     VITAL SIGNS (Most Recent)  Temp: 98.1 °F (36.7 °C) (11/30/23 1213)  Pulse: 72 (11/30/23 1213)  Resp: 20 (11/30/23 1213)  BP: (!) 175/74 (11/30/23 1213)  SpO2: 98 % (11/30/23 1213)    VENTILATION STATUS  Resp: 20 (11/30/23 1213)  SpO2: 98 % (11/30/23 1213)       WEIGHTS  Wt Readings from Last 10 Encounters:   11/30/23 66.2 kg (145 lb 14.4 oz)   02/23/23 70.8 kg (156 lb)   02/13/23 70.8 kg (156 lb)   02/08/23 70.9 kg (156 lb 3.2 oz)   02/07/23 80 kg (176 lb 5.9 oz)   04/18/22 80 kg (176 lb 5.9 oz)   08/02/21 80 kg (176 lb 5.9 oz)   04/27/21 74.8 kg (165 lb)   04/23/21 74.8 kg (165 lb)   04/21/21 77.1 kg (170 lb)     INTAKE & OUTPUT (Last 24H)No intake or output data in the 24 hours ending 11/30/23 1259    Awake, alert, oriented in all spheres, ambulatory independently in no distress  HEENT: PERRL, EOMi, normal   NECK: normal   LUNGS: remarkably clear with excellent airflow   HEART: RRR S1 & S2 with no murmur or gallop  BREASTS: normal   ABDOMEN: obese benign normal   : not examined   RECTAL: not examined   EXT: trace edema of left calf   PULSES: 2+ carotids, brachials, radials, 1+ femorals, 1+ dorsal pedals, absent posterior tibials (no bruits)   SKIN: normal   NEUROLOGIC: normal   NO Bedsores  NO DVT  IV Clean (newly placed 11/30/2023)     OBJECTIVE     SERIAL EKG  EKG 12/01/2000 = NSR, leftward axis, normal   EKG 05/01/2006 = NSR, leftward axis, normal   EKG 06/01/2011 = NSR, PAC's, mild left axis, normal     EKG 08/30/2019 = NSR, LAD, LVH with secondary ST-T changes (inverted  T's in I, aVL)   EKG 02/11/2020 = NSR, LAD, LVH, inverted T's in I, aVL   EKG 07/28/2020 = NSR, LAD, normal T waves diffusely (her previously inverted T's in I and aVL are now upright)   EKG 09/01/2020 = NSR, LAD, T inverted T's in I, aVL   EKG 10/14/2020 = NSR, LAD, IVCD, normal T waves diffusely   EKG 04/27/2021 = NSR, LAD, IVCD, normal T waves diffusely   EKG 01/06/2023 = NSR, LAD, IVCD in RBBB pattern, inverted T's in I, aVL, poor R-wave progression, similar to 04/27/2021     ECHOCARDIOGRAM RESULTS (last 10)    Results for orders placed in visit on 11/19/20    Echo Color Flow Doppler? Yes    Interpretation Summary  · There is mild left ventricular concentric hypertrophy.  · The left ventricle is normal in size with normal systolic function. The estimated ejection fraction is 60%.  · Grade II diastolic dysfunction.  · Normal right ventricular size with normal right ventricular systolic function.  · Mild left atrial enlargement.  · There is a transcutaneously-placed aortic bioprosthesis present. There is mild paravalvular aortic insufficiency present.  · Mild aortic regurgitation.  · Moderate aortic valve stenosis.  · Aortic valve area is 1.26 cm2; peak velocity is 2.76 m/s; mean gradient is 18 mmHg.  · The mitral valve is mildly sclerotic.  · Mild mitral regurgitation.  · There is mild mitral stenosis.  · Mild to moderate tricuspid regurgitation.  · Normal central venous pressure (3 mmHg).  · The estimated PA systolic pressure is 55 mmHg.      Results for orders placed during the hospital encounter of 01/22/20    Echo Color Flow Doppler? Yes    Interpretation Summary  · Normal left ventricular systolic function. The estimated ejection fraction is 65%.  · Concentric left ventricular remodeling.  · No wall motion abnormalities.  · Grade I (mild) left ventricular diastolic dysfunction consistent with impaired relaxation.  · Normal right ventricular systolic function.  · Severe left atrial enlargement.  · There is a  transcutaneously-placed aortic bioprosthesis present. There is paravalvular aortic insufficiency present.  · Mild mitral regurgitation.  · Normal central venous pressure (3 mmHg).  · The estimated PA systolic pressure is 29 mmHg.  · Trivial posterior pericardial effusion.      Results for orders placed during the hospital encounter of 12/26/19    Echo Color Flow Doppler? Yes    Interpretation Summary  · Mild concentric left ventricular hypertrophy.  · Normal left ventricular systolic function. The estimated ejection fraction is 60%.  · No wall motion abnormalities.  · Grade I (mild) left ventricular diastolic dysfunction consistent with impaired relaxation.  · There is a transcutaneously-placed aortic bioprosthesis present.  · Mild aortic regurgitation.  · Mild-to-moderate aortic valve stenosis.  · Aortic valve area is 1.23 cm2; peak velocity is 2.84 m/s; mean gradient is 18 mmHg.  · Mild left atrial enlargement.  · Mild tricuspid regurgitation.  · Normal right ventricular systolic function.  · Moderate mitral sclerosis.  · There is mild leaflet calcification of the Mitral Valve.  · Mild mitral stenosis. MVA by PHT is 2.21 sq cm.  · Normal central venous pressure (3 mmHg).  · The estimated PA systolic pressure is 32 mmHg.      Results for orders placed during the hospital encounter of 11/11/19    Echo    Interpretation Summary  · Concentric left ventricular remodeling.  · Hyperdynamic left ventricular systolic function. The estimated ejection fraction is 73%  · Normal right ventricular systolic function.  · Severe left atrial enlargement.  · Moderate mitral stenosis from annular calcification. The mean diastolic gradient across the mitral valve is 9 mmHg at a heart rate of 92 bpm.  · Severe aortic valve stenosis. Aortic valve area is 0.82 cm2; peak velocity is 4.6 m/s; mean gradient is 53 mmHg.  · Moderate aortic regurgitation.  · The estimated PA systolic pressure is 39 mm Hg  · Normal central venous pressure (3 mm  Hg).      Results for orders placed during the hospital encounter of 09/04/19    Transesophageal echo (VICKY)    Interpretation Summary  Indication:  Mitral stenosis, aortic stenosis and regurgitation    Counseling:  Patient as well as her family member were informed of the risks, benefits as well as alternatives to the procedure and informed consent was obtained.    Procedure:  After obtaining informed consent and sedated with Anesthesia help a VICKY probe was advanced without difficulty and images were obtained. Patient tolerated the procedure well and no immediate complications were noted.    Complications:  None immediate    Findings:  1. Aortic valve: Heavily calcified with restricted leaflet motion. Moderate aortic regurgitation.  2. Mitral valve: Thick and calcified with restricted leaflet motion. Mild mitral stenosis and regurgitation.  3. Tricuspid valve: Is structurally normal with good leaflet excursion. Mild regurgitation.  4. Pulmonary valve is structurally normal with good leaflet excursion. No significant regurgitation.  5. Overall LVEF appears normal.  6. No obvious shunt across the interatrial septum by color flow Doppler.    Conclusions:  1. Mild mitral stenosis.  2. Moderate aortic regurgitation.  3. Known severe aortic stenosis on previous TTE.    MOST RECENT IMAGING  Cardiac device check - Remote  Additional Comments  REMOTE Interrogation Report  PPM       Presenting egram demonstrates  AS/AP/VS PACs  Device fxn WNL   Variable R waves, impedance and Threshold wnl, minimal RV pacing   Autocapture algorithms are On   RA pacing     1%, RV pacing     0%      Atrial arrhythmias:  none   Ventricular arrhythmias:  none   Battery Status/Longevity:  7-8 yrs  Recall placed  Continue to monitor  F/U via remote interrogation in 3 mos   DG    CBC LAST 3 DAYS  Recent Labs   Lab 11/24/23  0707   WBC 5.92   RBC 4.96   HGB 15.1   HCT 45.2   MCV 91   MCH 30.4   MCHC 33.4   RDW 12.2   *   MPV 9.8   GRAN 63.9   "3.8   LYMPH 24.3  1.4   MONO 8.4  0.5   BASO 0.02   NRBC 0     COAGULATION LAST 3 DAYS  No results for input(s): "LABPT", "INR", "APTT" in the last 168 hours.    CHEMISTRY LAST 3 DAYS  Recent Labs   Lab 11/24/23  0707      K 4.3      CO2 26   ANIONGAP 7*   BUN 28*   CREATININE 1.3   *   CALCIUM 9.4   ALBUMIN 4.2   PROT 6.7   ALKPHOS 78   ALT 15   AST 20   BILITOT 0.8     CARDIAC PROFILE LAST 10 DAYS  No results for input(s): "BNP", "CPK", "CPKMB", "LDH", "TROPONINI", "TROPONINIHS" in the last 168 hours.    THYROID PROFILE  Lab Results   Component Value Date    TSH 2.321 11/24/2023    FREET4 0.98 11/24/2023     IMPRESSION (MD Kera note: I am entering this portion of the record late on 12/02/2023, because on admission day a hospital wide computer crash resulted in Epic not being available for many hours)  Atypical chest discomfort syndrome (right infraclavicular area) in this patient with a mid RCA 50% stenosis known since 09/14/2019; the possibility of unstable angina must be carefully considered  Dyspnea, of uncertain cause, cause to be determined  Thrombocytopenia, known since 07/28/2020, intermittent, mild, cause uncertain  Other problems as recorded in the Active Hospital Problems list below and my PMH document at the end of this Hx & Px.    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   Known severe aortic stenosis from echo  02/13/2020: it is unlikely that this trivial coronary artery disease is the cause of her episodic dyspnea that has been present since prior to 09/04/2019.      Hypertensive emergency without congestive heart failure [I16.1]  Yes     Priority: 3     History " of transcatheter aortic valve replacement (TAVR) [Z95.2]  Not Applicable     Priority: 4      Cardiac catheterization 12/17/2019 (Herbert Ashley):   Successful left transfemoral 23 mm Evolut R TAVR; no paravalvular leak; mean gradient 3; peak velocity 1.5 post TAVR.      Leakage of periprosthetic valve, subsequent encounter [T82.03XD]  Not Applicable     Priority: 5      Aortic stenosis saga:   Echocardiogram (VICKY) 09/04/2019:   Aortic valve: heavily calcified with restricted leaflet motion. Moderate aortic regurgitation.   Mitral valve: thick and calcified with restricted leaflet motion. Mild mitral stenosis and regurgitation.   Tricuspid valve: is structurally normal with good leaflet excursion. Mild regurgitation.  Pulmonary valve: is structurally normal with good leaflet excursion. No significant regurgitation.  Overall LVEF appears normal.   No obvious shunt across the interatrial septum by color flow Doppler.     Cardiac catheterization 09/14/2019 (Timothy Salgado MD):   Mid RCA lesion, 50% stenosed, nonsignificant by iFR assessment.  Known severe aortic stenosis from echo    CTA Cardiac TAVR 09/19/2019:   Large left UVJ stone measuring at 2.6 cm with severe left-sided hydroureteronephrosis.  Significant pneumobilia in the left hepatic lobe with air within the common bile duct.  Unclear if this is due to biliary enteric fistula, infectious process such as cholangitis, or incompetent sphincter of Oddi.  Unfortunately this is not well evaluated on this dedicated TAVR protocol examination.  Clinical correlation is advised.  Further evaluation could be performed with ERCP and dedicated abdominal CT with intravenous contrast.  TAVR measurements, as above.  Significant aortic valve and abdominal aorta atherosclerosis.  Nonspecific mesenteric haziness, possibly mesenteric panniculitis or adenitis.  Heterogeneity of the pulmonary parenchyma possibly related to elevated pulmonary vascular resistance, small airways  disease, or both.  Small bilateral pleural effusions.    Cardiac catheterization 12/17/2019 (Herbert Ashley):   Successful left transfemoral 23 mm Evolute TAVR.  No paravalvular leak, mean gradient 3, peak velocity 1.5 post TAVR.    Echocardiogram (TTE) 12/26/2019   Mild concentric left ventricular hypertrophy   Normal left ventricular systolic function. The estimated ejection fraction is 60%.  No wall motion abnormalities.  Grade I (mild) left ventricular diastolic dysfunction consistent with impaired relaxation.  There is a transcutaneously-placed aortic bioprosthesis present.  Mild aortic regurgitation.  Mild-to-moderate aortic valve stenosis.  Aortic valve area is 1.23 cm2; peak velocity is 2.84 m/s; mean gradient is 18 mmHg.  Mild left atrial enlargement.  Mild tricuspid regurgitation.  Normal right ventricular systolic function.  Moderate mitral sclerosis.  There is mild leaflet calcification of the Mitral Valve.  Mild mitral stenosis. MVA by PHT is 2.21 sq cm.  Normal central venous pressure (3 mmHg).   The estimated PA systolic pressure is 32 mmHg.:     Echocardiogram (TTE) 01/22/2020:   Normal left ventricular systolic function. The estimated ejection fraction is 65%.  Concentric left ventricular remodeling.  No wall motion abnormalities.  Grade I (mild) left ventricular diastolic dysfunction consistent with impaired relaxation.  Normal right ventricular systolic function.  Severe left atrial enlargement.  There is a transcutaneously-placed aortic bioprosthesis present.   There is paravalvular aortic insufficiency present   Mild mitral regurgitation.  Normal central venous pressure (3 mmHg).  The estimated PA systolic pressure is 29 mmHg.   Trivial posterior pericardial effusion.       Chronic diastolic heart failure [I50.32]  Yes     Priority: 6      Chronic     Echocardiogram 12/01/2000 = hyperdynamic LV, with mild to moderate amount of pericardial fat, and left ventricular diastolic dysfunction    Echocardiogram 05/20/2011 = dilated left atrial cavity, normal LV thickness and function with LVEF @ 70%, mild aortic stenosis with valve area @ 1.09cm2, peak gradient = 34 mmHg, minimal prolapse of anterior mitral leaflet, mild TI, very small pericardial effusion         Thrombocytopenia, unspecified [D69.6]  Yes     Priority: 7      Chronic     Hematology:  è episodic mild thrombocytopenia NOS since 07/28/2020   Date WBC Hgb Hct MCV Plt Gran  (42.2-75.2) Bands   Lymph  (21.0-51.0) Mono  (1.7-9.3) Eos  (0.5-11.0) Baso  (0.0-2.0) Fe  ( ug/dl) TIBC  (177-435 ug/dl) Sat  % Ferritin  ( ng/dl) Comment   08/01/1994 6.3 14.0 42.0 86 212 47  43 5 5 0 71       05/01/2006 8.0 14.6 43.0 87 270 64.4  25.2 7.5 2.5 0.4 91 298 30 235    05/20/2011 8.1 15.3 45.0 88 223 92.5  25.2 8.7 3.0 0.6 79 396 20 117    Away                   02/11/2020 9.8 14.5 45.0 89 168 70.7  19.2 6.6 2.8 0.5        07/28/2020 7.9 14.8 45.0 89 127 68.7  20.1 7.3 3.1 0.5        10/14/2020 6.5 14.4 43.8 88 177 64.8  22.8 8.2 3.4 0.5        01/21/2021 6.3 13.9 43.2 90 143 58.9  27.3 9.0 4.1 0.5        04/27/2021 6.0 13.0 39.4 88 250 75.5  ?11.6 9.1 2.5 0.3    310    12/29/2022 6.5 15.1 45.8 92 145 63.5  24.2 8.3 3.2 0.5 76 360 21 165    11/24/2023 5.9 15.1 45.2 91 121 63.9  24.3 8.4 2.9 0.3 92 328 28 90    11/30/2023 6.1 15.4 46.5 91 116 70.8  18.1 8.2 2.1 0.5                           Iron status: normal 08/01/1994, normal 05/01/2006, normal 05/20/2011       Essential hypertension [I10]  Yes     Priority: 22      Chronic    Diabetes mellitus without complication [E11.9]  Yes     Priority: 23      Chronic    Pneumobilia [K83.8]  Yes     Priority: 24      Chronic     S/P cholecystectomy state since 12/03/1992  Pneumobilia, chronic, 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   Ultrasound 01/31/1992 = cholelithiasis with tender gallbladder   Ultrasound abdomen 06/22/1995 = normal except for hypoechoic acoustic texture of the left hepatic lobe   ERCP #1 02/01/1992  (Pellegrin) = choledocholithiasis with cholangitis   ERCP #2 1995 (Leonor) = multiple filling defects due to stones   CT abdomen (with & without) 1995 = normal except for air in the biliary tree, fatty infiltration of the left lobe, calcified granuloma at right lung base   CT abdomen & pelvis (with & without) 1997 = normal/normal except for air in the biliary tree due to prior sphincterotomy   CTA Cardiac TAVR 2019: significant pneumobilia in the left hepatic lobe with air within the common bile duct          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     PLAN  Observation status  Rapid cardiac and pulmonary workup to define precisely the cause of her 2-week history of right subclavian chest pains and dyspnea on minimal exertion.  Cardiology consultation on admission day with her established cardiology group.     Yoni Hernandez MD  Date of Service: 2023  11:42 AM     ==============================    Roxanne Hernandez  : 1935   Patient since: 1991 @ age 51     PAST MEDICAL HISTORY: review date: 2006, 2006, 2006, 2007, 2007, 2009, 2009, 2011, 2011, 2011, 11/10/2011, 2012, 2012, 2013, 2013 è away è 2020, 2020, 2020, 08/10/2020, 2020, 2020, 2020, 2020,  10/14/2020, 12/17/2020, 01/26/2021, 07/12/2021, 08/01/2021, 01/11/2022, 07/25/2022, 01/03/2023, 01/09/2023, 01/23/2023, 05/23/2023, 11/30/2023,   ALLERGIES: see current computerized database listing   MEDS: see current computerized database listing     Breast status: fibrocystic changes   Family history of breast cancer? no as of 03/29/2006   BSE?   no as of 03/29/2006     Last CBE: 12/01/2000 = wnl, 03/29/2006 = wnl, 02/08/2007 = wnl, 02/09/2009 = wnl, 05/17/2011 = wnl, 07/24/2012 = wnl, 09/12/2013 = wnl è away è 02/11/2020 = wnl, 01/26/2021 = wnl, 01/11/2022 = wnl, 01/03/2023 = wnl, 11/30/2023 = wnl,     Last mammogram:  01/05/1998 = wnl, 04/17/2006 = ABNORMAL LEFT è 04/27/2006 dx mammo/us = ABNORMAL LEFT è 06/05/2006 = ABNORMAL LEFT è see biopsy reports below, 02/27/2009 = wnl, 05/17/2011 =  è right dx mammo/us 06/02/2011 = normal, 09/17/2012 = wnl, 03/04/2020 = wnl, 04/13/2021 = wnl, 04/18/2022 = wnl,     Surgery consultation:   Left breast biopsy #1 05/08/2006 (Shafor): left breast tissue: fatty breast tissue with focal mild to moderate intraductal hyperplasia and focal medial calcific sclerosis of blood vessels; benign   Left breast biopsy #2 06/28/2006 (Shafor): left breast, lumpectomy: rare foci of intraductal epithelial hyperplasia with microcalcification embedded in fat necrosis surrounding a cystic lesion with hemorrhage and foreign body giant cell granuloma.  No evidence of malignancy.     Uterus:   S/P hysterectomy for uterine (or cervical) cancer ~01/14/1998 ([facility])   Chronic exposed umbilical stitch that will not come out, present since that surgery of 01/14/1998, still present as of 12/17/2020 07/25/2022: still present with new 1cm area of drainage about 6 inches below original chronic drainage site, for which I advised that there is no medical solution, and that she should see a surgeon for this problem but she doesn't want to at this time; I asked her to discuss with her two daughters and  her son the fact that I advise surgical evaluation and management but she declines understanding that this could turn into a bad infection one day   01/03/2023: worsening now with a painless second tract   CT abdomen and pelvis (with) 01/06/2023:   Atelectasis lung bases bilaterally; minimal LLL pulmonary scar   Concentric mild thickening of the esophagus at the level of the diaphragmatic hiatus   Diffuse pneumobilia eduardo Cote MD note: known 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   Bilateral hydroureter tracking into the lower pelvis, worse towards the left without obstructive changes ç chronic since 11/13/2019   Diverticulosis coli (sigmoid)   01/09/2023: Bactrim DS po bid x 7-14 days along with General Surgery referral and 2-week followup.   01/23/2023: it is markedly better, though she did NOT see the surgeon as I requested due to scheduling difficulties with his office; I asked her to try again because the surgeon I talked to on 01/09/2023 agreed to see her   02/07/2023 Surgeon Fabrice evaluation: operative exploration scheduled for 02/13/2023 è done 02/13/2023 with excision of old Prolene sutures and stitch granulomas     Family history of ovarian cancer? no as of 03/29/2006   PAP smear: ~1999   HRT usage history: positive usage of OCP's from 1964 to 1965, never took HRT as of 03/29/2006   HRT risks discussed in detail on: 03/29/2006   Bone density status: osteoporosis   P/C, R/B, limits, alternatives to all osteoporosis treatment options including: calcium + D, HRT, calcitonin-salmon, oral bisphosphonates, IV bisphosphonates, (including new mid femur shaft rare fractures) Evista, Forteo, Prolia discussed in detail on: pending due to severe carious teeth bisphosphonates are contraindicated, and due to cerebrovascular disease Evista is relatively contraindicated, leaving only Miacalcin or Forteo è her choice is: Caltrate 600+D and Miacalcin as of 05/23/2011 è Miacalcin discontinued 09/12/2013 (but she hasn't used it in a long  time anyway) due to new FDA rule of on-or-about 03/05/2013 that the slight increased risk of cancer outweighs its questionable benefits in reducing osteoporosis [email or web address] (accessed 07/17/2013) è her choice is to take nothing because all of the alternatives are unacceptable to her primarily due to exorbitant expense or they are contraindicated      DEXA Date Density measurement site Bone density (g/cm2) T-score Z-score WHO classification Comments   04/17/2006 Lumbar spine 0.869 -1.6 0.2 Osteopenia     Left femoral neck 0.483 -3.3 -1.7 Osteoporosis     Left total hip 0.792 -1.2 0.0 Osteopenia    05/17/2011 Lumbar spine 0.922 -1.1 1.0 Osteopenia     Total left hip 0.622 -2.6 -1.1 Osteoporosis             09/06/2013 Lumbar spine 0.944 -0.9 1.4 Normal     Total left hip 0.579 -2.4 -0.5 Osteopenia                      Vitamin D status: vitamin D deficiency known since 06/01/2011   Date 25-OH D    Comments   06/01/2011 28 ng/mL  Baseline on no vitamin D supplements   09/17/2012 20 ng/mL  Vitamin D3 cholecalciferol 2000U/d ordered since 06/06/2011, but she wasn't taking it           Bladder status: clinically normal     Colorectal status: diverticulosis coli, multiple hyperplastic and adenomatous colon polyps known since 08/07/1998    Family history CRC or colon polyps? no as of 03/29/2006   CRC screening/surveillance discussed on: prior to first colonoscopy, 03/29/2006, 05/17/2011 with referral package, reminded 09/24/2012, reminded 09/16/2013 with referral package  re-discussed 03/19/2020 @ age 79 in the recent TAVR state in the beginning of the Coronavirus pandemic whereby we will have to defer a surveillance colonoscopy for now for all those reasons   Family counseling done 03/29/2006   Last rectal:   Barium enema 01/12/1998 = several sessile polyps, diverticulosis coli   Colonoscopy #1 08/07/1998 (Jamie) = multiple polyps; histopathology = adenomatous polyp with mild dysplasia,  hyperplastic polyps   Colonoscopy #2 08/02/2006 (Jamie) = small cecal polyp (ablated), ascending polyp removed, 8mm transverse polyp, rectosigmoid polyp (ablated); histopathology = ascending and transverse colon polyps: tubular adenomas (2-3-year repeat advised)   Colonoscopy #3: due 08/02/2009     Upper GI status: clinically normal   UGI 01/05/1998 = normal   Modified barium swallow study 12/15/2020:   Radiologist Report: minor penetration with multiple swallows of thin liquid otherwise negative video fluoroscopic swallowing study.  See speech therapy report.  Speech Pathologist Report: WFL oral and pharyngeal phases of the swallow; both swallow safety and swallow efficiency are preserved; patient appears to be at low risk for aspiration related PNA 2/2 to good mobility; no further ST indicated at this time for swallow rehab    Hepatobiliary system status: normal in the S/P cholecystectomy state since 12/03/1992, fatty infiltration of the left liver lobe as of 06/22/1995   Pneumobilia, chronic, 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   Ultrasound 01/31/1992 = cholelithiasis with tender gallbladder   Ultrasound abdomen 06/22/1995 = normal except for hypoechoic acoustic texture of the left hepatic lobe   ERCP #1 02/01/1992 (Pellegrin) = choledocholithiasis with cholangitis   ERCP #2 06/28/1995 (Leonor) = multiple filling defects due to stones   CT abdomen (with & without) 06/26/1995 = normal except for air in the biliary tree, fatty infiltration of the left lobe, calcified granuloma at right lung base   CT abdomen & pelvis (with & without) 12/02/1997 = normal/normal except for air in the biliary tree due to prior sphincterotomy   CTA Cardiac TAVR 09/19/2019: significant pneumobilia in the left hepatic lobe with air within the common bile duct    Date ALT  (0-40) AST  (0-40) Alk Phos T. bili Alb T.P. GGT LDH  (110-260 U/L) Ca++ Mg++ iPTH  (12-65 pg/mL) PO4   04/24/1992 54 40 258 0.6 4.2 7.2   9.5      08/01/1994 39 31 139 0.7 4.3  7.7   9.5      01/25/2001 22 21 154 0.7 4.1 6.8   9.4      05/01/2006 29 27 123 0.8 3.6 6.9   8.8      03/13/2009 37 27 57 0.7 3.9 6.9   9.2      05/20/2011 36 29 82 0.6 4.3 7.4   9.6      09/17/2012 20 19 81 0.9 4.2 7.0   9.5      09/06/2013 25 26 78 0.8 4.2 7.2   9.5      Away               02/11/2020 20 25 90 1.1 4.3 7.4   9.5      03/16/2020 18 23 99 0.8 3.9 7.0   9.2 1.9     07/28/2020 25 32 94 1.0 4.3 7.7   9.9      10/14/2020 23 5 96 0.8 4.0 7.1   10.2      01/21/2021 17 22 79 0.9 3.9 7.0   9.3      04/27/2021 19 24 110 1.2 3.4 7.2  263 8.9      01/08/2022 21 18 106 1.1 4.1 7.1   9.2      07/16/2022 25 27 96 1.1 4.3 7.4   9.1      12/29/2022 18 20 107 1.1 4.2 7.4   9.7      05/19/2023 20 22 89 1.2 4.2 7.1   9.7      11/24/2023 15 20 78 0.8 4.2 6.7   9.4                       Renal status:  è CKD IIIa since 03/16/2020   Left ureteral stone with hydronephrosis requiring  manipulation 11/13/2019 @ [facility] (Paddy Dunham MD)   CTA Cardiac TAVR 09/19/2019: large left UVJ stone measuring at 2.6 cm with severe left-sided hydroureteronephrosis   Intervention 11/13/2019: 1. Left ureteroscopy 2. Cystoscopy 3. Stone basket extraction 4. Laser lithotripsy 5. Left ureteral stent placement 6. Fluoro < 1h 11/13/2019 (Paddy Dunham @ [facility])   Ultrasound kidneys 02/11/2020 = moderate left-sided hydronephrosis with no shadowing stone identified; nonobstructing right-sided renal stones; likely debris within the bladder, consider correlation with urinalysis.  02/13/2020 referral back to  to evaluate her left hydronephrosis: see  [facility] eval 02/18/2020    HUNTER Mobley repeat evaluation 02/18/2020: her left hydronephrosis is chronic and will likely never resolve and intervention is not required in the face of no symptoms or infection   CT abdomen and pelvis (with) 01/06/2023: bilateral hydroureter tracking into the lower pelvis, worse towards the left without obstructive changes  ç chronic since 11/13/2019     Date BUN  Creat eGFR CKD  stage CC Protein mg/24h Microalbumin (0-30 mcg/mg creat) Comments Na+ K+   04/24/1992 10 0.8           01/25/2001 15 1.1           05/01/2006 18 0.8     18 mcg/mg creat      09/09/2006 17 0.9     3 mcg/mL Zestril since 05/04/2006 03/13/2009 17 0.8     13 mcg/mg creat      05/20/2011 28 0.8     12 mcg/mg creat      11/04/2011 24 0.8     9 mcg/mg creat      09/17/2012 20 0.9     10 mcg/mg creat      09/06/2013 20 1.0     10 mcg/mg creat      Away        ?             ?     02/11/2020 45 1.7 28 IV    ?     02/12/2020 36 1.2 43 IIIb    ?     03/16/2020 19 1.1 48 IIIa   31 mcg/mg creat Zestril 10 since 02/13/2020 07/28/2020 30 1.1 48          09/032020 27 1.6 30 IV         10/14/2020 28 1.2 43 IIIb         01/21/2021 29 1.2 43    7 mcg/mg creat      04/27/2021 16 1.1 48 IIIa         07/06/2021 34 1.2 43 IIIb     137 4.5   01/08/2022 29 1.1 47 IIIa   11 mcg/mg creat  138 4.1   07/16/2022 30 1.2 42 IIIb   27 mcg/mg creat  135 4.3   12/29/2022 26 1.1 50 IIIa   19 mcg/mg creat  141 4.6   05/19/2023 31 1.1 50    24 mcg/mg creat  142 4.5   11/24/2023 28 1.3 41 IIIb   26 mcg/mg creat  141 4.3                  BP status: hypertension since prior to 01/31/1992 @ 200/110     Lipid status: mixed hyperlipidemia (intolerant of Zocor, Lopid, Zetia)   Date Ch   LDL HDL TG VLDL Comments   04/24/1992 261 195 40 126 25 Diet   08/01/1994 233   171     06/06/1995 314 228 25 307     07/18/1995 291 222 38 156     04/26/1996 258 170 43 225     11/26/1997 289 219 43 134 27    01/25/2001 196 112 44 197 39    05/01/2006 223 137 51 174 35    09/09/2006 175 45 45 90 18 On Zocor 20 since 06/07/2006 02/05/2007 202 121 57 122 24 Diet   03/13/2009 172 121 37 70 14 Tricor 145 ordered since 02/09/2009 05/20/2011 279 202 49 139 28  but NOT taking any ordered meds for a long time PLUS Atkins diet for 2 months   11/04/2011 236 159 54 116 23  è changed to Pravachol 10 06/06/682910/17/2012 200 116 41  216 43 Pravachol 20 since 11/10/2011   09/06/2013 199 115 47 183 37    Away         03/16/2020 254 177 57 98  On no statin agents for a while   01/21/2021 163 86 56 106  Atorvastatin 10 since 03/19/2020 01/08/2022 240 158 55 134   (off meds for a while)   07/16/2022 155 83 55 87     12/29/2022 189 115 57 85     05/19/2023 149 71 59 93     11/24/2023 145 66 62 86              Glycemic status: diabetes mellitus II since prior to 12/01/2000 when FBS = 136 mg/dL   Date: 05/01/06 09/09/06 02/05/07 03/13/09 05/20/11 11/04/11 09/17/12 09/06/13 Away 03/16/20 01/21/21 01/08/22   %A1C 8.6 6.5 6.0 5.9 5.6 5.7 6.4 6.6  5.8 6.7 7.4   Date: 07/16/22 12/29/22 05/19/23 11/24/23           %A1C 6.5 5.6 6.0 5.9           Weight status: overweight since age 25   Formal diet, exercise, and weight loss advice given: 07/25/2022 01/03/2023: she has been on Weight Watchers and has lost 16# deliberately   Date Height ()   Weight (#) BMI Weight status Ideal BMI min Ideal BMI max Ideal body weight minimum (#) Ideal weight maximum (#)   01/31/1992  224         10/01/1996  235         12/11/2000  243         03/29/2006 59 225 32 Obesity I 20 25 138 173   09/14/2006  218         02/08/2007  220         02/09/2009  216         03/18/2009  212         05/17/2011  207         07/24/2012  213         09/24/2012 58 206         11/05/2012 58 209         09/12/2013  206         Away           02/11/2020  173         03/19/2020  178         07/28/2020  182         08/10/2020  178         08/18/2020  177         09/01/2020  178         09/28/2020  179         11/23/2020  180         12/17/2020  176         01/26/2021  176         07/12/2021  173         08/03/2021  177         01/11/2022  175         07/19/2022  175         01/03/2023  159         05/23/2023  150         11/30/2023  147                    Thyroid status: euthyroid   Date T4   Free T4 TSH   Comments   12/01/2000 10.7  1.85   On no thyroid meds   05/01/2006  0.87 2.88       03/13/2009  0.98 2.85      05/20/2011  0.94 3.13      Away         02/11/2020  0.88 1.84      09/03/2020  0.93 2.17      12/29/2022  0.89 4.36      11/24/2023  0.98 2.32               Heart status:   LV diastolic dysfunction by echo 12/01/2000   Murmur of aortic stenosis (asymptomatic) discovered on routine exam 05/17/2011, but very mild by echocardiogram 05/21/2011    S/P TAVR (Jessenia Pizano) 12/17/2019   S/P pacemaker 12/18/2019 (Jessenia Pizano)   ASCVD known since 09/14/2019     EKG 12/01/2000 = NSR, leftward axis, normal   EKG 05/01/2006 = NSR, leftward axis, normal   EKG 06/01/2011 = NSR, PAC's, mild left axis, normal     EKG 08/30/2019 = NSR, LAD, LVH with secondary ST-T changes (inverted T's in I, aVL)   EKG 02/11/2020 = NSR, LAD, LVH, inverted T's in I, aVL   EKG 07/28/2020 = NSR, LAD, normal T waves diffusely (her previously inverted T's in I and aVL are now upright)   EKG 09/01/2020 = NSR, LAD, T inverted T's in I, aVL   EKG 10/14/2020 = NSR, LAD, IVCD, normal T waves diffusely   EKG 04/27/2021 = NSR, LAD, IVCD, normal T waves diffusely   EKG 01/06/2023 = NSR, LAD, IVCD in RBBB pattern, inverted T's in I, aVL, poor R-wave progression, similar to 04/27/2021     Holter, 24 hour, 10/15-16/2020: NSR @ 63 to 121 bpm with a mean of 77 BPM; only 101 PACs and 7 PVCs during this 13-hour recording. one 3-beat run of SVT; no pauses; no symptoms reported.  Stress test (persantine/myoview) 12/02/2000 = normal/normal   Echocardiogram 12/01/2000 = hyperdynamic LV, with mild to moderate amount of pericardial fat, and left ventricular diastolic dysfunction   Echocardiogram 05/20/2011 = dilated left atrial cavity, normal LV thickness and function with LVEF @ 70%, mild aortic stenosis with valve area @ 1.09cm2, peak gradient = 34 mmHg, minimal prolapse of anterior mitral leaflet, mild TI, very small pericardial effusion   Echocardiogram (VICKY) 09/04/2019:   Aortic valve: heavily calcified with restricted leaflet motion.  Moderate aortic regurgitation.   Mitral valve: thick and calcified with restricted leaflet motion. Mild mitral stenosis and regurgitation.   Tricuspid valve: is structurally normal with good leaflet excursion. Mild regurgitation.  Pulmonary valve: is structurally normal with good leaflet excursion. No significant regurgitation.  Overall LVEF appears normal.   No obvious shunt across the interatrial septum by color flow Doppler.     Echocardiogram (TTE) 12/26/2019  Mild concentric left ventricular hypertrophy.  Normal left ventricular systolic function. The estimated ejection fraction is 60%.  No wall motion abnormalities.  Grade I (mild) left ventricular diastolic dysfunction consistent with impaired relaxation.  There is a transcutaneously-placed aortic bioprosthesis present.  Mild aortic regurgitation.  Mild-to-moderate aortic valve stenosis.  Aortic valve area is 1.23 cm2; peak velocity is 2.84 m/s; mean gradient is 18 mmHg.  Mild left atrial enlargement.  Mild tricuspid regurgitation.  Normal right ventricular systolic function.  Moderate mitral sclerosis.  There is mild leaflet calcification of the Mitral Valve.  Mild mitral stenosis. MVA by PHT is 2.21 sq cm.  Normal central venous pressure (3 mmHg).   The estimated PA systolic pressure is 32 mmHg.:     Echocardiogram (TTE) 01/22/2020:   Normal left ventricular systolic function. The estimated ejection fraction is 65%.  Concentric left ventricular remodeling.  No wall motion abnormalities.  Grade I (mild) left ventricular diastolic dysfunction consistent with impaired relaxation.  Normal right ventricular systolic function.  Severe left atrial enlargement.  There is a transcutaneously-placed aortic bioprosthesis present.   There is paravalvular aortic insufficiency present. (ICD 10: T82.03XA)    Mild mitral regurgitation.  Normal central venous pressure (3 mmHg).  The estimated PA systolic pressure (PAP) = 29 mmHg.   Trivial posterior pericardial effusion.      Echocardiogram (TTE) 11/19/2020:   There is mild left ventricular concentric hypertrophy.  The left ventricle is normal in size with normal systolic function. The estimated ejection fraction is 60%.  Grade II diastolic dysfunction.  Normal right ventricular size with normal right ventricular systolic function.  Mild left atrial enlargement.  There is a transcutaneously-placed aortic bioprosthesis present. There is mild paravalvular aortic insufficiency present.  Mild aortic regurgitation.  Moderate aortic valve stenosis.  Aortic valve area is 1.26 cm2; peak velocity is 2.76 m/s; mean gradient is 18 mmHg.  The mitral valve is mildly sclerotic.  Mild mitral regurgitation.  There is mild mitral stenosis.  Mild to moderate tricuspid regurgitation.  Normal central venous pressure (3 mmHg).  The estimated PA systolic pressure (PAP) = 55 mmHg.    Cardiac catheterization 09/14/2019 (Timothy Salgado MD):   Mid RCA lesion, 50% stenosed, nonsignificant by iFR assessment.  Known severe aortic stenosis from echo    CTA Cardiac pre-TAVR evaluation 09/19/2019:   Large left UVJ stone measuring at 2.6 cm with severe left-sided hydroureteronephrosis.  Significant pneumobilia in the left hepatic lobe with air within the common bile duct.  Unclear if this is due to biliary enteric fistula, infectious process such as cholangitis, or incompetent sphincter of Oddi.  Unfortunately, this is not well evaluated on this dedicated TAVR protocol examination.  Clinical correlation is advised.  Further evaluation could be performed with ERCP and dedicated abdominal CT with intravenous contrast.  TAVR measurements, as above.  Significant aortic valve and abdominal aorta atherosclerosis.  Nonspecific mesenteric haziness, possibly mesenteric panniculitis or adenitis.  Heterogeneity of the pulmonary parenchyma possibly related to elevated pulmonary vascular resistance, small airways disease, or both.  Small bilateral pleural effusions.    Cardiac  catheterization 12/17/2019 (Herbert Ashley):   Successful left transfemoral 23 mm Evolut R TAVR; no paravalvular leak; mean gradient 3; peak velocity 1.5 post TAVR.    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 surgical therapy options discussion: patient advised that (in the asymptomatic state) medical treatment reduces her 5 year risk of stroke to 11%, whereas carotid endarterectomy reduces her 5 year risk of stroke to 5% with a small risk of karyn-operative stroke (see copy in her chart of the handout I gave her)she advised me 06/06/2011 that she wishes to proceed with medical treatment for now   07/28/2020 CTA neck and brain: severe progression of bilateral cerebrovascular disease with bilateral 90% ICA stenoses    Moderate atheromatous changes are noted in the aortic arch. There are moderate degrees of narrowing of the proximal segments of the left common carotid artery and subclavian artery at their origin.  There is mild tortuosity of the common carotid arteries.  There is prominent plaque formation observed bilaterally at the level of the carotid bifurcations.   There is bilateral high-grade stenosis of the origins of the internal carotid arteries (approximately 90%). The remainder of the internal carotid arteries are tortuous in  their extracranial segment but patent. There is stenosis of the origin of the right external carotid artery.  There is significant focal stenosis of the intracranial segment of the left internal carotid artery near the level of the anterior clinoid process.  There is mild tortuosity of the vertebral arteries.   There are no findings of high-grade stenosis or occlusion of the major intracranial arteries otherwise. No aneurysm or vascular malformation is noted.  The cervical segments of the vertebral arteries are patent. There are mild-moderate stenoses of the origins of the vertebral arteries bilaterally.  Scattered groundglass type opacities are observed within the visualized lung apices bilaterally. There is prominent multilevel cervical spondylosis.  07/29/2020 Cardiologist Marleni: advises sequential carotid endarterectomy   07/29/2020 CT/Vascular Surgeon Donte referral: given severity of disease bilateral carotid artery repair reasonable even if this is considered asymptomatic; can proceed this hospitalization if all agree; since there is tandem stenosis on left side, would consider addressing that side first    S/P left carotid endarterectomy 07/31/2020 (CT Surgeon Donte @ [facility])    CTA neck and brain (with) 10/14/2020:   Interval carotid endarterectomy at the left carotid bulb, with no measurable stenosis on the left.  Short segment high-grade stenosis at the origin of the right internal carotid artery (90%).  Patent bilateral vertebral arteries.  No large vessel occlusion or aneurysm is identified.  Partially calcified plaque in the intracranial internal carotid arteries and additional/incidental findings as above.  S/P right carotid endarterectomy 10/16/2020 (Donte @ [facility])   Abdominal aorta: normal by CT 12/02/1997   Peripheral arterial status: clinically normal   03/29/2006: 3+ carotids, brachials, radials, 1+ femorals, 2+ dorsal pedals, absent posterior tibials (no bruits)   02/09/2009: 3+ carotids,  brachials, radials, 1+ femorals, 3+ dorsal pedals and posterior tibials (no bruits)   05/17/2011: 2+ carotids (left more than right bruit), brachials, radials, 1+ femorals, 3+ dorsal pedals and posterior tibials (no bruits)   07/24/2012: 2+ carotids, brachials, radials, 1+ femorals, 3+ dorsal pedals, absent posterior tibials (no bruits)   09/12/2013: 2+ carotids, brachials, radials, 1+ femorals, 3+ dorsal pedals, absent posterior tibials (no bruits)   02/11/2020: 2+ carotids, brachials, radials, 1+ femorals, 3+ dorsal pedals, absent posterior tibials (no bruits)   07/28/2020: 2+ carotids, brachials, radials, 1+ femorals, 3+ dorsal pedals, absent posterior tibials (no bruits)   01/26/2021: 2+ carotids, brachials, radials, 1+ femorals, 3+ dorsal pedals, absent posterior tibials (no bruits)   01/11/2022: 2+ carotids, brachials, radials, 1+ femorals, 3+ dorsal pedals, absent posterior tibials (no bruits)   01/03/2023: 2+ carotids, brachials, radials, 1+ femorals, 2+ dorsal pedals, absent posterior tibials (no bruits)   11/30/2023: 2+ carotids, brachials, radials, 1+ femorals, 1+ dorsal pedals, absent posterior tibials (no bruits)     Pulmonary status: calcified granuloma of the right lower lobe by CT abdomen 06/26/1995   Dyspnea, episodic, since prior to 09/04/2019   See 02/11-13/2020 hospitalization whereby her dyspnea is attributed to first aortic stenosis then post-TAVR with aortic valve leakage syndrome, hoping that it will subside spontaneously over a few weeks   PPD status: undefined   COVID-19 status:   07/28/2020 = negative nasal swab PCR   10/14/2020 = negative nasal swab PCR   04/21/2021 = ER visit for previous positive on 04/17/2021 04/22/2021 ER Sparks ordered a monoclonal antibody infusion to be given this day   04/27/2021 ESR =   04/27/2021 CRP = ?4.06 (<0.76/mg/dL)   04/27/2021 Lactate = 1.1 (0.5-1.9 mmol/L)   PFT's 11/11/2019: normal spirometry, DLCO is normal, MVV is moderately decreased   Oxygenation  status: normal   CXR 12/01/2000 = normal   CXR 05/01/2006 = normal   CXR 02/11/2020 = normal   CXR 07/28/2020 = aortic prosthetic valve, pacemaker, minimal basilar platelike atelectasis or scarring   CXR 10/14/2020 = stable benign   CXR 04/27/2021 = redemonstration of bilateral linear and hazy lung opacities, worse in the left lung and mildly increased when compared with prior (04/21/2021)   CXR 01/06/2023 = prior TAVR, pacemaker, otherwise normal     CTA chest 02/12/2020: no evidence of pulmonary embolism to the segmental arterial level  mild dependent atelectasis   CTA chest 04/27/2021:   No evidence of pulmonary embolism to the segmental central main arterial level.  Groundglass attenuation opacities in the left greater than right lung base with smooth interlobular septal thickening suggesting a combination of mild to moderate pulmonary edema and/or multifocal pneumonia in the appropriate clinical setting. Consider PA and lateral radiographic follow-up to document resolution after treatment.  Numerous, top normal size mediastinal lymph nodes, likely reactive/inflammatory in nature, consider correlation with interval follow-up is a lymphoproliferative disorder is not entirely excluded.  Numerous additional and incidental findings as noted above.    ENT status: allergic rhinitis since prior to 2000   Carious teeth, multiple, severe on exam 05/17/2011 è referred to dentist this day   Dizziness, non-specific (non-vertigo, not true orthostasis, manifested by episodic mildly ataxic gait) reported 10/14/2020 as having started ~10/01/2020   Hoarseness ever since her right carotid endarterectomy on 10/16/2020:   01/26/2021: she is no better and I advised her to see an ENT specialist to establish a precise diagnosis and exclude vocal cord cancer (though I advised her that by Occam's razor it is most likely NOT throat cancer and more likely to be a partially paralyzed vocal cord due to the carotid endarterectomy), and she  is reluctantly willing to go: answered 02/09/2021 by ENT Barrera Kwok with diagnosis of right true vocal cord paresis, 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; she is improving and I expect that she will get better; her larynx was otherwise normal   01/11/2022: no better and subjectively worse though she sounds the same to me   07/25/2022: not better or worse, and she doesn't choke     Neuropsychiatric:   Meralgia paresthetica, left sided, since prior to 12/01/2000   CTS of right hand only, mild, transient in ~January 2005, resolved as of 03/29/2006   CVA, small, remote, of left corpus callosum, of unknown start date, found incidentally on 07/28/2020 (not on aspirin or Plavix or any anticoagulant)   Dizziness, described as an unsteady gait and a sensation that the ground she is walking on is moving below her but without true vertigo reported 07/28/2020 as having started ~2 weeks prior, with variable left sided visual disturbances described as moving spots before her eyes without blindness, with no weakness of her arms or legs, but with episodic sensations of impending loss of consciousness, concomitant reduced memory capacity, with no fever or headache, with episodic nausea, and new emotional changes with the desire to cry all of the time, and episodic palpitations described as her heart is just not beating right and the sensation of the need to breathe deeply and faster episodically    CT head (no contrast) 07/28/2020 = small focal area of probable remote infarction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

## 2023-12-01 ENCOUNTER — HOSPITAL ENCOUNTER (OUTPATIENT)
Dept: RADIOLOGY | Facility: HOSPITAL | Age: 83
Discharge: HOME | End: 2023-12-01
Attending: INTERNAL MEDICINE | Admitting: INTERNAL MEDICINE
Payer: MEDICARE

## 2023-12-01 VITALS
WEIGHT: 145.88 LBS | HEIGHT: 57 IN | RESPIRATION RATE: 16 BRPM | SYSTOLIC BLOOD PRESSURE: 123 MMHG | TEMPERATURE: 98 F | HEART RATE: 70 BPM | OXYGEN SATURATION: 98 % | BODY MASS INDEX: 31.47 KG/M2 | DIASTOLIC BLOOD PRESSURE: 68 MMHG

## 2023-12-01 DIAGNOSIS — I05.0 MITRAL VALVE STENOSIS, SEVERE: Primary | ICD-10-CM

## 2023-12-01 PROBLEM — I34.2 MITRAL VALVE STENOSIS, NON-RHEUMATIC: Chronic | Status: ACTIVE | Noted: 2019-09-04

## 2023-12-01 PROBLEM — R79.89 ELEVATED TROPONIN LEVEL NOT DUE MYOCARDIAL INFARCTION: Status: ACTIVE | Noted: 2023-11-30

## 2023-12-01 PROBLEM — I34.0 MITRAL REGURGITATION: Chronic | Status: ACTIVE | Noted: 2019-09-14

## 2023-12-01 PROBLEM — Z95.0 PRESENCE OF PERMANENT CARDIAC PACEMAKER: Chronic | Status: ACTIVE | Noted: 2019-12-18

## 2023-12-01 PROBLEM — I34.81 MITRAL VALVE ANNULAR CALCIFICATION: Chronic | Status: ACTIVE | Noted: 2023-11-30

## 2023-12-01 PROBLEM — I34.2 MITRAL VALVE STENOSIS, NON-RHEUMATIC: Status: ACTIVE | Noted: 2019-09-04

## 2023-12-01 LAB
LACTATE SERPL-SCNC: 1.6 MMOL/L (ref 0.5–1.9)
TROPONIN I SERPL HS-MCNC: 13.7 PG/ML (ref 0–14.9)

## 2023-12-01 PROCEDURE — 36415 COLL VENOUS BLD VENIPUNCTURE: CPT | Performed by: INTERNAL MEDICINE

## 2023-12-01 PROCEDURE — 36415 COLL VENOUS BLD VENIPUNCTURE: CPT

## 2023-12-01 PROCEDURE — 83605 ASSAY OF LACTIC ACID: CPT

## 2023-12-01 PROCEDURE — 25000003 PHARM REV CODE 250: Performed by: INTERNAL MEDICINE

## 2023-12-01 PROCEDURE — 78451 HT MUSCLE IMAGE SPECT SING: CPT | Mod: 52,TC

## 2023-12-01 PROCEDURE — G0378 HOSPITAL OBSERVATION PER HR: HCPCS

## 2023-12-01 PROCEDURE — 99213 OFFICE O/P EST LOW 20 MIN: CPT | Mod: ,,, | Performed by: INTERNAL MEDICINE

## 2023-12-01 PROCEDURE — 84484 ASSAY OF TROPONIN QUANT: CPT | Performed by: INTERNAL MEDICINE

## 2023-12-01 PROCEDURE — 99213 PR OFFICE/OUTPT VISIT, EST, LEVL III, 20-29 MIN: ICD-10-PCS | Mod: ,,, | Performed by: INTERNAL MEDICINE

## 2023-12-01 RX ORDER — METOPROLOL SUCCINATE 25 MG/1
25 TABLET, EXTENDED RELEASE ORAL NIGHTLY
Qty: 90 TABLET | Refills: 11
Start: 2023-12-01 | End: 2024-11-30

## 2023-12-01 RX ORDER — ATORVASTATIN CALCIUM 20 MG/1
20 TABLET, FILM COATED ORAL NIGHTLY
Start: 2023-01-03 | End: 2024-02-06

## 2023-12-01 RX ADMIN — FUROSEMIDE 20 MG: 20 TABLET ORAL at 09:12

## 2023-12-01 RX ADMIN — ASPIRIN 81 MG: 81 TABLET, COATED ORAL at 09:12

## 2023-12-01 NOTE — DISCHARGE SUMMARY
12/01/2023 @ 3:44 PM     NOTICE to health insurer: my time spent discharging her exceeded 90 minutes.     [facility] Hospital Medicine  Discharge Summary        TODAY'S DATE: 12/01/2023    PATIENT NAME: Roxanne Hernandez  MRN: [id number]    ADMIT DATE: 11/30/2023 10:51 AM  HOSPITAL LENGTH OF STAY: 0 Days  DISCHARGE DATE:     CODE STATUS: Full Code  ATTENDING PHYSICIAN: Yoni Hernandez MD  PRINCIPLE PROBLEM: Mitral valve stenosis, non-rheumatic    FINAL DISCHARGE DIAGNOSES  Atypical chest discomfort (right subclavian area) associated with dyspnea occurring at low levels of exertion over the previous 2 weeks in this patient with known coronary artery disease, S/P TAVR state and mitral stenosis; the possibility of unstable angina or progressive valvular heart disease causing her symptom complex was considered on admission, with the final decision by Cardiology Consultants being that this is probably due to progressive mitral stenosis. Cardiologist Edelmira decided that ongoing management as an outpatient was reasonable, safe and proper.   11/30/2023: present on admission  12/01/2023: resolved   Troponin elevation, slight, transient, NOT due to acute myocardial infarction   11/30/2023: present on admission  12/01/2023: resolved   12/01/2023 note: I ordered a Lexiscan/Myoview stress test for this morning, but the Cardiology consultant cancelled the order due to the presence of mitral stenosis.  Accelerated hypertension   11/30/2023: present on admission  12/01/2023: resolved   Thrombocytopenia, mild, intermittent since first noticed by me 07/28/2020, possibly a mild form of immune thrombocytopenia, or possibly as a side effect of her post-TAVR state.  11/30/2023: present on admission  12/01/2023: to be followed and managed in the outpatient interval   Other problems described in my office PMH document and the Active Hospital Problems list below  11/30/2023: present on admission  12/01/2023: to be followed  and managed in the outpatient interval     Active Hospital Problems    Diagnosis  POA    *Mitral valve stenosis, non-rheumatic [I34.2]  Yes     Priority: 1 - High     Chronic     Heart status:   LV diastolic dysfunction by echo 12/01/2000   Murmur of aortic stenosis (asymptomatic) discovered on routine exam 05/17/2011, but very mild by echocardiogram 05/21/2011    S/P TAVR (Jessenia Pizano) 12/17/2019   S/P pacemaker 12/18/2019 (Jessenia Pizano)   ASCVD known since 09/14/2019   Mitral stenosis (I34.2) and mitral regurgitation (I34.0) known since 09/24/2019 11/30/2023: thought by Cardiologist Edelmira to have become symptomatic in the two-week period prior to this day, with plans for an outpatient evaluation by subspecialist Cardiologist Shailesh Garcia MD and/or Timothy Stephen MD   Moderate mitral annular calcification (I34.81) known since 11/30/2023     Echocardiogram 12/01/2000 = hyperdynamic LV, with mild to moderate amount of pericardial fat, and left ventricular diastolic dysfunction   Echocardiogram 05/20/2011 = dilated left atrial cavity, normal LV thickness and function with LVEF @ 70%, mild aortic stenosis with valve area @ 1.09cm2, peak gradient = 34 mmHg, minimal prolapse of anterior mitral leaflet, mild TI, very small pericardial effusion   Echocardiogram (VICKY) 09/04/2019:   Aortic valve: heavily calcified with restricted leaflet motion. Moderate aortic regurgitation.   Mitral valve: thick and calcified with restricted leaflet motion. Mild mitral stenosis and regurgitation.   Tricuspid valve: is structurally normal with good leaflet excursion. Mild regurgitation.  Pulmonary valve: is structurally normal with good leaflet excursion. No significant regurgitation.  Overall LVEF appears normal.   No obvious shunt across the interatrial septum by color flow Doppler.     Echocardiogram (TTE) 12/26/2019  Mild concentric left ventricular hypertrophy.  Normal left ventricular systolic function. The estimated ejection  fraction is 60%.  No wall motion abnormalities.  Grade I (mild) left ventricular diastolic dysfunction consistent with impaired relaxation.  There is a transcutaneously-placed aortic bioprosthesis present.  Mild aortic regurgitation.  Mild-to-moderate aortic valve stenosis.  Aortic valve area is 1.23 cm2; peak velocity is 2.84 m/s; mean gradient is 18 mmHg.  Mild left atrial enlargement.  Mild tricuspid regurgitation.  Normal right ventricular systolic function.  Moderate mitral sclerosis.  There is mild leaflet calcification of the Mitral Valve.  Mild mitral stenosis. MVA by PHT is 2.21 sq cm.  Normal central venous pressure (3 mmHg).   The estimated PA systolic pressure is 32 mmHg.:     Echocardiogram (TTE) 01/22/2020:   Normal left ventricular systolic function. The estimated ejection fraction is 65%.  Concentric left ventricular remodeling.  No wall motion abnormalities.  Grade I (mild) left ventricular diastolic dysfunction consistent with impaired relaxation.  Normal right ventricular systolic function.  Severe left atrial enlargement.  There is a transcutaneously-placed aortic bioprosthesis present.   There is paravalvular aortic insufficiency present. (ICD 10: T82.03XA)    Mild mitral regurgitation.  Normal central venous pressure (3 mmHg).  The estimated PA systolic pressure (PAP) = 29 mmHg.   Trivial posterior pericardial effusion.     Echocardiogram (TTE) 11/19/2020:   There is mild left ventricular concentric hypertrophy.  The left ventricle is normal in size with normal systolic function. The estimated ejection fraction is 60%.  Grade II diastolic dysfunction.  Normal right ventricular size with normal right ventricular systolic function.  Mild left atrial enlargement.  There is a transcutaneously-placed aortic bioprosthesis present. There is mild paravalvular aortic insufficiency present.  Mild aortic regurgitation.  Moderate aortic valve stenosis.  Aortic valve area is 1.26 cm2; peak velocity is 2.76  m/s; mean gradient is 18 mmHg.  The mitral valve is mildly sclerotic.  Mild mitral regurgitation   There is mild mitral stenosis.  Mild to moderate tricuspid regurgitation.  Normal central venous pressure (3 mmHg).  The estimated PA systolic pressure (PAP) = 55 mmHg.    Echocardiogram (TTE) 11/30/2023:   LV: normal size; moderately increased wall thickness; normal wall motion, LVEF = 60-65%, normal diastolic function   RV: normal size, normal thickness, normal wall motion, normal systolic function   LA: moderately dilated  RA: normal   AV: TAVR in the aortic position; normal leaflet mobility; aortic valve area = 0.95 cm2, mean gradient = 18 mmHg, dimensionless index is 0.47, no regurgitation  MV: moderate bileaflet sclerosis; moderately thickened anterior leaflet; moderate mitral annular calcification (I34.81); normal leaflet mobility; there is severe mitral stenosis (I34.2) with a mean pressure gradient across the mitral valve of 12 mmHg; mild-to-moderate regurgitation (I34.0)  TV: normal   PV: normal  Ascending aorta: normal   Pericardium: normal       Mitral valve annular calcification [I34.81]  Yes     Priority: 3      Chronic    Mitral regurgitation [I34.0]  Yes     Priority: 3      Chronic    Hypertensive emergency without congestive heart failure [I16.1]  Yes     Priority: 4     History of transcatheter aortic valve replacement (TAVR) [Z95.2]  Not Applicable     Priority: 5      Cardiac catheterization 12/17/2019 (Herbert Ashley):   Successful left transfemoral 23 mm Evolut R TAVR; no paravalvular leak; mean gradient 3; peak velocity 1.5 post TAVR.      Leakage of periprosthetic valve, subsequent encounter [T82.03XD]  Not Applicable     Priority: 6      Aortic stenosis saga:   Echocardiogram (VICKY) 09/04/2019:   Aortic valve: heavily calcified with restricted leaflet motion. Moderate aortic regurgitation.   Mitral valve: thick and calcified with restricted leaflet motion. Mild mitral stenosis and  regurgitation.   Tricuspid valve: is structurally normal with good leaflet excursion. Mild regurgitation.  Pulmonary valve: is structurally normal with good leaflet excursion. No significant regurgitation.  Overall LVEF appears normal.   No obvious shunt across the interatrial septum by color flow Doppler.     Cardiac catheterization 09/14/2019 (Timothy Salgado MD):   Mid RCA lesion, 50% stenosed, nonsignificant by iFR assessment.  Known severe aortic stenosis from echo    CTA Cardiac TAVR 09/19/2019:   Large left UVJ stone measuring at 2.6 cm with severe left-sided hydroureteronephrosis.  Significant pneumobilia in the left hepatic lobe with air within the common bile duct.  Unclear if this is due to biliary enteric fistula, infectious process such as cholangitis, or incompetent sphincter of Oddi.  Unfortunately this is not well evaluated on this dedicated TAVR protocol examination.  Clinical correlation is advised.  Further evaluation could be performed with ERCP and dedicated abdominal CT with intravenous contrast.  TAVR measurements, as above.  Significant aortic valve and abdominal aorta atherosclerosis.  Nonspecific mesenteric haziness, possibly mesenteric panniculitis or adenitis.  Heterogeneity of the pulmonary parenchyma possibly related to elevated pulmonary vascular resistance, small airways disease, or both.  Small bilateral pleural effusions.    Cardiac catheterization 12/17/2019 (Herbert Ashley):   Successful left transfemoral 23 mm Evolute TAVR.  No paravalvular leak, mean gradient 3, peak velocity 1.5 post TAVR.    Echocardiogram (TTE) 12/26/2019   Mild concentric left ventricular hypertrophy   Normal left ventricular systolic function. The estimated ejection fraction is 60%.  No wall motion abnormalities.  Grade I (mild) left ventricular diastolic dysfunction consistent with impaired relaxation.  There is a transcutaneously-placed aortic bioprosthesis present.  Mild aortic  regurgitation.  Mild-to-moderate aortic valve stenosis.  Aortic valve area is 1.23 cm2; peak velocity is 2.84 m/s; mean gradient is 18 mmHg.  Mild left atrial enlargement.  Mild tricuspid regurgitation.  Normal right ventricular systolic function.  Moderate mitral sclerosis.  There is mild leaflet calcification of the Mitral Valve.  Mild mitral stenosis. MVA by PHT is 2.21 sq cm.  Normal central venous pressure (3 mmHg).   The estimated PA systolic pressure is 32 mmHg.:     Echocardiogram (TTE) 01/22/2020:   Normal left ventricular systolic function. The estimated ejection fraction is 65%.  Concentric left ventricular remodeling.  No wall motion abnormalities.  Grade I (mild) left ventricular diastolic dysfunction consistent with impaired relaxation.  Normal right ventricular systolic function.  Severe left atrial enlargement.  There is a transcutaneously-placed aortic bioprosthesis present.   There is paravalvular aortic insufficiency present   Mild mitral regurgitation.  Normal central venous pressure (3 mmHg).  The estimated PA systolic pressure is 29 mmHg.   Trivial posterior pericardial effusion.       Presence of permanent cardiac pacemaker [Z95.0]  Yes     Priority: 7      Chronic    Coronary artery disease of native artery of native heart with stable angina pectoris [I25.118]  Yes     Priority: 8      Chronic     Cardiac catheterization 09/14/2019 (Timothy Salgado MD):    Mid RCA lesion, 50% stenosed, nonsignificant by iFR assessment.   Known severe aortic stenosis from echo  02/13/2020: it is unlikely that this trivial coronary artery disease is the cause of her episodic dyspnea that has been present since prior to 09/04/2019.      Chronic diastolic heart failure [I50.32]  Yes     Priority: 9      Chronic     Echocardiogram 12/01/2000 = hyperdynamic LV, with mild to moderate amount of pericardial fat, and left ventricular diastolic dysfunction   Echocardiogram 05/20/2011 = dilated left atrial cavity, normal  LV thickness and function with LVEF @ 70%, mild aortic stenosis with valve area @ 1.09cm2, peak gradient = 34 mmHg, minimal prolapse of anterior mitral leaflet, mild TI, very small pericardial effusion         Thrombocytopenia, unspecified [D69.6]  Yes     Priority: 10      Chronic     Hematology:  è episodic mild thrombocytopenia NOS since 07/28/2020   Date WBC Hgb Hct MCV Plt Gran  (42.2-75.2) Bands   Lymph  (21.0-51.0) Mono  (1.7-9.3) Eos  (0.5-11.0) Baso  (0.0-2.0) Fe  ( ug/dl) TIBC  (177-435 ug/dl) Sat  % Ferritin  ( ng/dl) Comment   08/01/1994 6.3 14.0 42.0 86 212 47  43 5 5 0 71       05/01/2006 8.0 14.6 43.0 87 270 64.4  25.2 7.5 2.5 0.4 91 298 30 235    05/20/2011 8.1 15.3 45.0 88 223 92.5  25.2 8.7 3.0 0.6 79 396 20 117    Away                   02/11/2020 9.8 14.5 45.0 89 168 70.7  19.2 6.6 2.8 0.5        07/28/2020 7.9 14.8 45.0 89 127 68.7  20.1 7.3 3.1 0.5        10/14/2020 6.5 14.4 43.8 88 177 64.8  22.8 8.2 3.4 0.5        01/21/2021 6.3 13.9 43.2 90 143 58.9  27.3 9.0 4.1 0.5        04/27/2021 6.0 13.0 39.4 88 250 75.5  ?11.6 9.1 2.5 0.3    310    12/29/2022 6.5 15.1 45.8 92 145 63.5  24.2 8.3 3.2 0.5 76 360 21 165    11/24/2023 5.9 15.1 45.2 91 121 63.9  24.3 8.4 2.9 0.3 92 328 28 90    11/30/2023 6.1 15.4 46.5 91 116 70.8  18.1 8.2 2.1 0.5                           Iron status: normal 08/01/1994, normal 05/01/2006, normal 05/20/2011       Elevated troponin level not due myocardial infarction [R79.89]  Yes     Priority: 11     Essential hypertension [I10]  Yes     Priority: 21      Chronic    Diabetes mellitus without complication [E11.9]  Yes     Priority: 22      Chronic    Mixed hyperlipidemia [E78.2]  Yes     Priority: 23      Chronic    Pneumobilia [K83.8]  Yes     Priority: 24      Chronic     S/P cholecystectomy state since 12/03/1992  Pneumobilia, chronic, 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   Ultrasound 01/31/1992 = cholelithiasis with tender gallbladder   Ultrasound abdomen 06/22/1995 = normal  except for hypoechoic acoustic texture of the left hepatic lobe   ERCP #1 02/01/1992 ([place]) = choledocholithiasis with cholangitis   ERCP #2 06/28/1995 ([place]) = multiple filling defects due to stones   CT abdomen (with & without) 06/26/1995 = normal except for air in the biliary tree, fatty infiltration of the left lobe, calcified granuloma at right lung base   CT abdomen & pelvis (with & without) 12/02/1997 = normal/normal except for air in the biliary tree due to prior sphincterotomy   CTA Cardiac TAVR 09/19/2019: significant pneumobilia in the left hepatic lobe with air within the common bile duct          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     HOSPITAL TREATMENT SUMMARY  11/30/2023: placement in hospital on observation status for a rapid multidisciplinary workup to arrive at an accurate diagnosis in order to prevent avoidable premature death or disability.  12/01/2023: multiple diagnostic modalities performed, then reviewed, then discussed between myself and Cardiologist Edelmira, followed by family discussion.   No interventions were performed other than a dose increase in her metoprolol dose FROM 12.5mg po qhs TO 25mg po qhs beginning the evening of 11/30/2023     HOSPITAL COURSE  11/30/2023:   This 83 year old female presented to my office in routine 6 month followup. She reported that she has been having  "right infraclavicular odd pains occurring intermittently over the past 2 weeks, though she is not certain whether they occur with exertion.   She has also had new dyspnea with minimal exertion and occasionally even at rest, but without paroxysmal nocturnal dyspnea, also all beginning about 2 weeks prior to presentation.  She has not had fever, cough or hemoptysis.  She has noticed mild left calf swelling, but she says "I've had this all of my life".  She can't explain why she reports it as if it is new, while simultaneously reporting that she has always had it.  She otherwise is as well as ever.     12/01/2023:  She feels perfectly well this day.  She has had no dyspnea or chest discomfort since admission.  She has been in NSR, paced rhythm since admission.  She ambulated at a leisurely pace twice around the [facility] nurses station on room air maintaining O2 saturations of 97-98% without dyspnea, chest pain, dizziness or a sensation of impending loss of consciousness.  I discussed all of the findings and the recommendations of her Cardiologist Edelmira with the patient and her daughter. Edelmira believes her symptoms are due to progressive mitral stenosis (first diagnosed   We are all comfortable that she is "safe for discharge to home" this day.     DISCHARGE DAY PHYSICAL EXAM     VITAL SIGNS (Most Recent)  Temp: 97.9 °F (36.6 °C) (12/01/23 1700)  Pulse: 70 (12/01/23 1700)  Resp: 16 (12/01/23 1230)  BP: 123/68 (12/01/23 1700)  SpO2: 98 % (12/01/23 1700)    VENTILATION STATUS  Resp: 16 (12/01/23 1230)  SpO2: 98 % (12/01/23 1700)     WEIGHTS  Wt Readings from Last 10 Encounters:   11/30/23 66.2 kg (145 lb 14.4 oz)   11/30/23 65.8 kg (145 lb)   02/23/23 70.8 kg (156 lb)   02/13/23 70.8 kg (156 lb)   02/08/23 70.9 kg (156 lb 3.2 oz)   02/07/23 80 kg (176 lb 5.9 oz)   04/18/22 80 kg (176 lb 5.9 oz)   08/02/21 80 kg (176 lb 5.9 oz)   04/27/21 74.8 kg (165 lb)   04/23/21 74.8 kg (165 lb)     INTAKE & OUTPUT (Last " 24H)  Intake/Output Summary (Last 24 hours) at 12/1/2023 1858  Last data filed at 12/1/2023 1535  Gross per 24 hour   Intake 540 ml   Output 1850 ml   Net -1310 ml     Awake, alert, oriented in all spheres, ambulatory independently in no distress  HEENT: PERRL, EOMi, normal   NECK: normal   LUNGS: remarkably clear with excellent airflow   HEART: RRR S1 & S2 with no murmur or gallop  ABDOMEN: obese benign normal   EXT: trace edema of left calf   SKIN: normal   NEUROLOGIC: normal   NO Bedsores  NO DVT  IV Clean (newly placed 11/30/2023)     OBJECTIVE     SERIAL EKG  EKG 12/01/2000 = NSR, leftward axis, normal   EKG 05/01/2006 = NSR, leftward axis, normal   EKG 06/01/2011 = NSR, PAC's, mild left axis, normal     EKG 08/30/2019 = NSR, LAD, LVH with secondary ST-T changes (inverted T's in I, aVL)   EKG 02/11/2020 = NSR, LAD, LVH, inverted T's in I, aVL   EKG 07/28/2020 = NSR, LAD, normal T waves diffusely (her previously inverted T's in I and aVL are now upright)   EKG 09/01/2020 = NSR, LAD, T inverted T's in I, aVL   EKG 10/14/2020 = NSR, LAD, IVCD, normal T waves diffusely   EKG 04/27/2021 = NSR, LAD, IVCD, normal T waves diffusely   EKG 01/06/2023 = NSR, LAD, IVCD in RBBB pattern, inverted T's in I, aVL, poor R-wave progression, similar to 04/27/2021   EKG 11/30/2023 = atrial sensed, ventricular paced rhythm     ECHOCARDIOGRAM RESULTS    Echocardiogram (TTE) 11/30/2023:   LV: normal size; moderately increased wall thickness; normal wall motion, LVEF = 60-65%, normal diastolic function   RV: normal size, normal thickness, normal wall motion, normal systolic function   LA: moderately dilated  RA: normal   AV: TAVR in the aortic position; normal leaflet mobility; aortic valve area = 0.95 cm2, mean gradient = 18 mmHg, dimensionless index is 0.47, no regurgitation  MV: moderate bileaflet sclerosis; moderately thickened anterior leaflet; moderate mitral annular calcification (I34.81); normal leaflet mobility; there is  severe mitral stenosis (I34.2) with a mean pressure gradient across the mitral valve of 12 mmHg; mild-to-moderate regurgitation (I34.0)  TV: normal   PV: normal  Ascending aorta: normal   Pericardium: normal     MOST RECENT IMAGING    Echo Saline Bubble? No    Result Date: 11/30/2023    Left Ventricle: The left ventricle is normal in size. Moderately increased wall thickness. Normal wall motion. There is normal systolic function with a visually estimated ejection fraction of 60 - 65%. There is normal diastolic function.   Right Ventricle: Normal right ventricular cavity size. Wall thickness is normal. Right ventricle wall motion  is normal. Systolic function is normal.   Left Atrium: Left atrium is moderately dilated.   Aortic Valve: There is a transcatheter valve replacement in the aortic position. Aortic valve area by VTI is 0.95 cm². Aortic valve peak velocity is 2.93 m/s. Mean gradient is 18 mmHg. The dimensionless index is 0.47.   Mitral Valve: There is moderate bileaflet sclerosis. Moderately thickened anterior leaflet. There is moderate mitral annular calcification present. There is severe stenosis. The mean pressure gradient across the mitral valve is 12 mmHg at a heart rate of  bpm. There is mild to moderate regurgitation.     US Lower Extremity Veins Bilateral    Result Date: 11/30/2023  US LOWER EXTREMITY VEINS BILATERAL CLINICAL HISTORY: 83 years Female dyspnea, left calf swelling FINDINGS: Grayscale, color and spectral Doppler analysis of the bilateral lower extremity deep venous system was performed. There is normal compressibility, with normal flow by color and spectral Doppler analysis in the bilateral lower extremity deep venous system, with normal augmentation and no evidence of deep venous thrombosis. IMPRESSION: Negative for DVT. Electronically signed by:  Liborio Vilchis MD  11/30/2023 03:27 PM [facility] Workstation: [id number]    X-Ray Chest PA And Lateral    Result Date: 11/30/2023  XR CHEST 2 VIEWS  "CLINICAL HISTORY: 83 years Female unstable angina and dyspnea on minimal exertion COMPARISON: Chest radiograph January 6, 2023 FINDINGS: Cardiac silhouette size is borderline enlarged and stable compared to prior. Status post aortic valvuloplasty. Mitral annular calcification noted. Atherosclerotic calcification of the aortic arch. Right subclavian pacing leads are in stable position. No airspace consolidation or pulmonary edema. No pleural effusion or pneumothorax. Degenerative changes of the thoracic spine. IMPRESSION: Stable chest radiograph. No acute pulmonary pathology. Electronically signed by:  Liborio Vilchis MD  11/30/2023 01:34 PM [facility] Workstation: [id number]      CBC LAST 3 DAYS  Recent Labs   Lab 11/30/23  1246   WBC 6.12   RBC 5.12   HGB 15.4   HCT 46.5   MCV 91   MCH 30.1   MCHC 33.1   RDW 12.3   *   MPV 10.1   GRAN 70.8  4.3   LYMPH 18.1  1.1   MONO 8.2  0.5   BASO 0.03   NRBC 0       COAGULATION LAST 3 DAYS  No results for input(s): "LABPT", "INR", "APTT" in the last 168 hours.    CHEMISTRY LAST 3 DAYS  Recent Labs   Lab 11/30/23  1246      K 4.4      CO2 24   ANIONGAP 8   BUN 25*   CREATININE 1.0      CALCIUM 9.7   ALBUMIN 4.1   PROT 6.5   ALKPHOS 90   ALT 18   AST 23   BILITOT 0.9     CARDIAC PROFILE LAST 10 DAYS  Recent Labs   Lab 11/30/23  1246 11/30/23  1815 12/01/23  0023   *  --   --    TROPONINIHS 8.5 16.8* 13.7     THYROID PROFILE  Lab Results   Component Value Date    TSH 1.628 11/30/2023    FREET4 0.93 11/30/2023     LAST ARTERIAL BLOOD GAS  ABG  No results for input(s): "PH", "PO2", "PCO2", "HCO3", "BE" in the last 168 hours.    LAST 7 DAYS MICROBIOLOGY   Microbiology Results (Last 14 Days)       ** No results found for the last 336 hours. **          PLAN   Discharge to home.  Diet, meds, activity are all "the same" with the following exceptions:  INCREASED is metoprolol succinate ER FROM 12.5mg po qhs TO 25mg po qhs  She is to "take it easy" and do " "nothing strenuous until directed otherwise  She is to see me next week, sooner if worse.  She is to see the [facility] subspecialty Cardiologists (Shailesh Garcia MD and/or Timothy Stephen MD) next week, as arranged by Cardiologist Edelmira.   Her condition on discharge is good.  Her prognosis is "fair"    DISCHARGE DAY HOME MEDICATION RECONCILIATION     Medication List        CHANGE how you take these medications      atorvastatin 20 MG tablet  Commonly known as: LIPITOR  Take 1 tablet (20 mg total) by mouth every evening.  What changed:   medication strength  how much to take     metoprolol succinate 25 MG 24 hr tablet  Commonly known as: TOPROL-XL  Take 1 tablet (25 mg total) by mouth every evening.  What changed: when to take this            CONTINUE taking these medications      aspirin 81 MG EC tablet  Commonly known as: ECOTRIN     CALTRATE 600-D PLUS MINERALS 600 mg calcium- 800 unit-50 mg Tab  Generic drug: [id number]-zinc--demetri-bor  Take 1 each by mouth every morning.     cholecalciferol (vitamin D3) 50 mcg (2,000 unit) Chew     furosemide 20 MG tablet  Commonly known as: LASIX  Take 1 tablet (20 mg total) by mouth every morning.     potassium 99 mg Tab               Where to Get Your Medications        Information about where to get these medications is not yet available    Ask your nurse or doctor about these medications  atorvastatin 20 MG tablet  metoprolol succinate 25 MG 24 hr tablet       Yoni Hernandez MD  Date of Service: 12/01/2023  3:44 PM    "

## 2023-12-01 NOTE — PROGRESS NOTES
[facility]  Department of Cardiology  Consult Note      PATIENT NAME: Roxanne Hernandez  MRN: [id number]  TODAY'S DATE: 12/01/2023  ADMIT DATE: 11/30/2023                          CONSULT REQUESTED BY: Yoni Hernandez MD    SUBJECTIVE     PRINCIPAL PROBLEM: Crescendo angina      REASON FOR CONSULT:    Please see your established TAVR/RCA 50% patient with RENDON and exertional chest pain    Interval history    12/01/2023  Patient was resting in bed during examination.  No acute distress.  Appears euvolemic.  No significant cardiac events on telemetry.  Vital signs stable.  Labs reviewed stable.  Denies acute complaints.  Discussed with patient that her echocardiogram now shows severe mitral stenosis.  Discussed options for evaluation by CTS while here in the hospital with Dr. Tapia versus follow up outpatient with Dr. Garcia at [facility] for evaluation of mitral valve replacement/repair.  Patient was agreeable to following up with Dr. Garcia outpatient.    HPI:    Patient is an 83 y.o. female with PMH of CAD, hypertension, s/p TAVR, HFpEF, diabetes mellitus, PPM who was sent from Dr. Hernandez's office for c/o shortness of breath with exertion and associated lightheadedness and dizziness over the past few weeks.  Patient also complains for shortness of breath with rest at times. Patient denies recent CP during examination.   WBC 6, H&H 15/46, K 4.4, Cr 1.0, BUN 25, eGFR 55.9, , Troponin HS 8.5, CXR stable, no acute process.  Venous US BLE pending. EKG shows atrial sensed and ventricular paced rhythm.    ECHO 11/19/20  There is mild left ventricular concentric hypertrophy.  The left ventricle is normal in size with normal systolic function. The estimated ejection fraction is 60%.  Grade II diastolic dysfunction.  Normal right ventricular size with normal right ventricular systolic function.  Mild left atrial enlargement.  There is a transcutaneously-placed aortic bioprosthesis present. There is mild  "paravalvular aortic insufficiency present.  Mild aortic regurgitation.  Moderate aortic valve stenosis.  Aortic valve area is 1.26 cm2; peak velocity is 2.76 m/s; mean gradient is 18 mmHg.  The mitral valve is mildly sclerotic.  Mild mitral regurgitation.  There is mild mitral stenosis.  Mild to moderate tricuspid regurgitation.  Normal central venous pressure (3 mmHg).  The estimated PA systolic pressure is 55 mmHg.      CARDIAC CATH 9/4/19    Mid RCA lesion , 50% stenosed. Nonsignificant by iFR assessment.  Known severe aortic stenosis from echo       FROM H&P  This 83 year old female presented to my office in routine 6 month followup. She reported that she has been having right infraclavicular odd pains occurring intermittently over the past 2 weeks, though she is not certain whether they occur with exertion.   She has also had new dyspnea with minimal exertion and occasionally even at rest, but without paroxysmal nocturnal dyspnea, also all beginning about 2 weeks prior to presentation.  She has not had fever, cough or hemoptysis.  She has noticed mild left calf swelling, but she says "I've had this all of my life".  She can't explain why she reports it as if it is new, while simultaneously reporting that she has always had it.  She otherwise is as well as ever.          Review of patient's allergies indicates:   Allergen Reactions    Azithromycin Swelling     All mycins    Statins-hmg-coa reductase inhibitors Other (See Comments)     Liver function  "It attacks my liver and makes me very sick"    Sulfa (sulfonamide antibiotics) Hives     Pt states she can use sulfa. 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 surgical therapy options discussio    Bilateral carotid artery stenosis 11/09/2020    S/p B/L CEA. Dr Kent.     Carotid artery syndrome 10/14/2020    Cervical cancer     Coronary artery disease     COVID-19     Diabetes mellitus     diet control.    Dizziness and giddiness 10/14/2020    Hypertension     Kidney stone     Nonrheumatic aortic valve stenosis 09/04/2019    Obese     Subcutaneous cyst 02/13/2023    Uncontrolled hypertension 10/14/2020    Vestibular vertigo, bilateral 10/14/2020     Past Surgical History:   Procedure Laterality Date    A-V CARDIAC PACEMAKER INSERTION N/A 12/18/2019    Procedure: INSERTION, CARDIAC PACEMAKER, DUAL CHAMBER;  Surgeon: Nanmdi Macdonald MD;  Location: [facility] EP LAB;  Service: Cardiology;  Laterality: N/A;  lbbb, dppm, SJM, anes, pr, 6077    APPENDECTOMY      pt thinks no appendix    BREAST BIOPSY Left     CARDIAC CATHETERIZATION      CAROTID ENDARTERECTOMY Left 07/31/2020    Procedure: ENDARTERECTOMY-CAROTID;  Surgeon: Radu Kent MD;  Location: [facility] OR;  Service: Cardiothoracic;  Laterality: Left;    CAROTID ENDARTERECTOMY Right 10/16/2020    Procedure: ENDARTERECTOMY, CAROTID;  Surgeon: Radu Kent MD;  Location: [facility];  Service: Cardiothoracic;  Laterality: Right;    CATARACT EXTRACTION BILATERAL W/ ANTERIOR VITRECTOMY      CHOLECYSTECTOMY      CORONARY ANGIOGRAPHY Left 09/04/2019    Procedure: ANGIOGRAM, CORONARY ARTERY;  Surgeon: Carmen Yepez MD;  Location: [facility] CATH/EP LAB;  Service: Cardiology;  Laterality: Left;    CYST REMOVAL N/A 2/13/2023    Procedure: EXCISION, CYST;  Surgeon: Bret Avina III, MD;  Location: [facility] OR;  Service: General;  Laterality: N/A;    CYSTOSCOPY N/A 11/13/2019    Procedure: CYSTOSCOPY;  Surgeon: Paddy Dunham MD;  Location: [address];  Service: Urology;  Laterality: N/A;     HYSTERECTOMY      JOINT REPLACEMENT Right     KNEE ARTHROPLASTY Right     LASER LITHOTRIPSY Left 2019    Procedure: LITHOTRIPSY, USING LASER;  Surgeon: aPddy Dunham MD;  Location: [facility] OR [address] [place] [address];  Service: Urology;  Laterality: Left;    TRANSCATHETER AORTIC VALVE REPLACEMENT (TAVR) N/A 2019    Procedure: REPLACEMENT, AORTIC VALVE, TRANSCATHETER (TAVR);  Surgeon: Herbert Ashley MD;  Location: [facility] CATH LAB;  Service: Cardiology;  Laterality: N/A;    URETEROSCOPIC REMOVAL OF URETERIC CALCULUS Left 2019    Procedure: REMOVAL, CALCULUS, URETER, URETEROSCOPIC;  Surgeon: Paddy Dunham MD;  [facility];  Service: Urology;  Laterality: Left;    URETEROSCOPY Left 2019    Procedure: URETEROSCOPY;  Surgeon: Paddy Dunham MD;  Location: [facility] OR [address] [place] [address];  Service: Urology;  Laterality: Left;     Social History     Tobacco Use    Smoking status: Former     Current packs/day: 0.00     Average packs/day: 1 pack/day for 25.0 years (25.0 ttl pk-yrs)     Types: Cigarettes     Start date: 1955     Quit date: 1980     Years since quittin.9    Smokeless tobacco: Never   Substance Use Topics    Alcohol use: Not Currently    Drug use: Never        REVIEW OF SYSTEMS    As mentioned in HPI    OBJECTIVE     VITAL SIGNS (Most Recent)  Temp: 97.6 °F (36.4 °C) (23 0700)  Pulse: 70 (23 0700)  Resp: 15 (23)  BP: (!) 152/68 (23 0700)  SpO2: 96 % (23)    VENTILATION STATUS  Resp: 15 (23)  SpO2: 96 % (2300)           I & O (Last 24H):  Intake/Output Summary (Last 24 hours) at 2023 1156  Last data filed at 2023 0947  Gross per 24 hour   Intake 540 ml   Output 1450 ml   Net -910 ml       WEIGHTS  Wt Readings from Last 3 Encounters:   23 1213 66.2 kg (145 lb 14.4 oz)   23 1445 65.8 kg (145 lb)   23 0946 70.8 kg (156 lb)       PHYSICAL EXAM    CONSTITUTIONAL: NAD  HEENT: Normocephalic. No pallor  NECK: no  JVD  LUNGS: CTA b/l  HEART: regular rate and rhythm, S1, S2 normal  ABDOMEN: soft, non-tender, bowel sounds normal  EXTREMITIES: No edema  SKIN: No rash  NEURO: AAO X 3  PSYCH: normal affect      HOME MEDICATIONS:  Current Facility-Administered Medications on File Prior to Encounter   Medication Dose Route Frequency Provider Last Rate Last Admin    0.9%  NaCl infusion   Intravenous Continuous Carmen Yepez MD   Stopped at 10/16/20 1151    aspirin EC tablet 325 mg  325 mg Oral Once Carmen Yepez MD        diazePAM tablet 5 mg  5 mg Oral On Call Procedure Carmen Yepez MD        diphenhydrAMINE capsule 25 mg  25 mg Oral Once Carmen Yepez MD         Current Outpatient Medications on File Prior to Encounter   Medication Sig Dispense Refill    aspirin (ECOTRIN) 81 MG EC tablet Take 81 mg by mouth every morning.      atorvastatin (LIPITOR) 10 MG tablet Take 1 tablet (10 mg total) by mouth every evening. (Patient taking differently: Take 20 mg by mouth every evening.)      ryg-[id number]-zinc--demetri-bor (CALTRATE 600-D PLUS MINERALS) 600 mg calcium- 800 unit-50 mg Tab Take 1 each by mouth every morning.      cholecalciferol, vitamin D3, 50 mcg (2,000 unit) Chew Take 2,000 Units by mouth every morning.      potassium 99 mg Tab Take 1 tablet by mouth once daily.      furosemide (LASIX) 20 MG tablet Take 1 tablet (20 mg total) by mouth every morning. 30 tablet 11    metoprolol succinate (TOPROL-XL) 25 MG 24 hr tablet Take 1 tablet (25 mg total) by mouth every morning. (Patient taking differently: Take 12.5 mg by mouth every evening.) 30 tablet 11       SCHEDULED MEDS:   aspirin  81 mg Oral QAM    atorvastatin  20 mg Oral QHS    enoxparin  30 mg Subcutaneous Daily    furosemide  20 mg Oral QAM    metoprolol succinate  25 mg Oral QHS       CONTINUOUS INFUSIONS:    PRN MEDS:sodium chloride 0.9%    LABS AND DIAGNOSTICS     CBC LAST 3 DAYS  Recent Labs   Lab 11/30/23  1246   WBC 6.12  "  RBC 5.12   HGB 15.4   HCT 46.5   MCV 91   MCH 30.1   MCHC 33.1   RDW 12.3   *   MPV 10.1   GRAN 70.8  4.3   LYMPH 18.1  1.1   MONO 8.2  0.5   BASO 0.03   NRBC 0       COAGULATION LAST 3 DAYS  No results for input(s): "LABPT", "INR", "APTT" in the last 168 hours.    CHEMISTRY LAST 3 DAYS  Recent Labs   Lab 11/30/23  1246      K 4.4      CO2 24   ANIONGAP 8   BUN 25*   CREATININE 1.0      CALCIUM 9.7   ALBUMIN 4.1   PROT 6.5   ALKPHOS 90   ALT 18   AST 23   BILITOT 0.9       CARDIAC PROFILE LAST 3 DAYS  Recent Labs   Lab 11/30/23  1246 11/30/23  1815 12/01/23  0023   *  --   --    TROPONINIHS 8.5 16.8* 13.7       ENDOCRINE LAST 3 DAYS  Recent Labs   Lab 11/30/23  1246   TSH 1.628       LAST ARTERIAL BLOOD GAS  ABG  No results for input(s): "PH", "PO2", "PCO2", "HCO3", "BE" in the last 168 hours.    LAST 7 DAYS MICROBIOLOGY   Microbiology Results (last 7 days)       ** No results found for the last 168 hours. **            MOST RECENT IMAGING  Echo Saline Bubble? No    Left Ventricle: The left ventricle is normal in size. Moderately   increased wall thickness. Normal wall motion. There is normal systolic   function with a visually estimated ejection fraction of 60 - 65%. There is   normal diastolic function.    Right Ventricle: Normal right ventricular cavity size. Wall thickness   is normal. Right ventricle wall motion  is normal. Systolic function is   normal.    Left Atrium: Left atrium is moderately dilated.    Aortic Valve: There is a transcatheter valve replacement in the aortic   position. Aortic valve area by VTI is 0.95 cm². Aortic valve peak velocity   is 2.93 m/s. Mean gradient is 18 mmHg. The dimensionless index is 0.47.    Mitral Valve: There is moderate bileaflet sclerosis. Moderately   thickened anterior leaflet. There is moderate mitral annular calcification   present. There is severe stenosis. The mean pressure gradient across the   mitral valve is 12 mmHg at a " heart rate of  bpm. There is mild to moderate   regurgitation.  US Lower Extremity Veins Bilateral  US LOWER EXTREMITY VEINS BILATERAL    CLINICAL HISTORY:  83 years Female dyspnea, left calf swelling    FINDINGS: Grayscale, color and spectral Doppler analysis of the bilateral lower extremity deep venous system was performed.    There is normal compressibility, with normal flow by color and spectral Doppler analysis in the bilateral lower extremity deep venous system, with normal augmentation and no evidence of deep venous thrombosis.    IMPRESSION: Negative for DVT.    Electronically signed by:  Liborio Vilchis MD  11/30/2023 03:27 PM CST Workstation: [id number]  X-Ray Chest PA And Lateral  XR CHEST 2 VIEWS    CLINICAL HISTORY:  83 years Female unstable angina and dyspnea on minimal exertion    COMPARISON: Chest radiograph January 6, 2023    FINDINGS: Cardiac silhouette size is borderline enlarged and stable compared to prior. Status post aortic valvuloplasty. Mitral annular calcification noted. Atherosclerotic calcification of the aortic arch. Right subclavian pacing leads are in stable position.    No airspace consolidation or pulmonary edema. No pleural effusion or pneumothorax. Degenerative changes of the thoracic spine.    IMPRESSION:    Stable chest radiograph. No acute pulmonary pathology.    Electronically signed by:  Liborio Vilchis MD  11/30/2023 01:34 PM CST Workstation: [id number]      ECHOCARDIOGRAM RESULTS (last 5)  Results for orders placed in visit on 11/19/20    Echo Color Flow Doppler? Yes    Interpretation Summary  · There is mild left ventricular concentric hypertrophy.  · The left ventricle is normal in size with normal systolic function. The estimated ejection fraction is 60%.  · Grade II diastolic dysfunction.  · Normal right ventricular size with normal right ventricular systolic function.  · Mild left atrial enlargement.  · There is a transcutaneously-placed aortic bioprosthesis present.  There is mild paravalvular aortic insufficiency present.  · Mild aortic regurgitation.  · Moderate aortic valve stenosis.  · Aortic valve area is 1.26 cm2; peak velocity is 2.76 m/s; mean gradient is 18 mmHg.  · The mitral valve is mildly sclerotic.  · Mild mitral regurgitation.  · There is mild mitral stenosis.  · Mild to moderate tricuspid regurgitation.  · Normal central venous pressure (3 mmHg).  · The estimated PA systolic pressure is 55 mmHg.      Results for orders placed during the hospital encounter of 01/22/20    Echo Color Flow Doppler? Yes    Interpretation Summary  · Normal left ventricular systolic function. The estimated ejection fraction is 65%.  · Concentric left ventricular remodeling.  · No wall motion abnormalities.  · Grade I (mild) left ventricular diastolic dysfunction consistent with impaired relaxation.  · Normal right ventricular systolic function.  · Severe left atrial enlargement.  · There is a transcutaneously-placed aortic bioprosthesis present. There is paravalvular aortic insufficiency present.  · Mild mitral regurgitation.  · Normal central venous pressure (3 mmHg).  · The estimated PA systolic pressure is 29 mmHg.  · Trivial posterior pericardial effusion.      Results for orders placed during the hospital encounter of 12/26/19    Echo Color Flow Doppler? Yes    Interpretation Summary  · Mild concentric left ventricular hypertrophy.  · Normal left ventricular systolic function. The estimated ejection fraction is 60%.  · No wall motion abnormalities.  · Grade I (mild) left ventricular diastolic dysfunction consistent with impaired relaxation.  · There is a transcutaneously-placed aortic bioprosthesis present.  · Mild aortic regurgitation.  · Mild-to-moderate aortic valve stenosis.  · Aortic valve area is 1.23 cm2; peak velocity is 2.84 m/s; mean gradient is 18 mmHg.  · Mild left atrial enlargement.  · Mild tricuspid regurgitation.  · Normal right ventricular systolic function.  ·  Moderate mitral sclerosis.  · There is mild leaflet calcification of the Mitral Valve.  · Mild mitral stenosis. MVA by PHT is 2.21 sq cm.  · Normal central venous pressure (3 mmHg).  · The estimated PA systolic pressure is 32 mmHg.      Results for orders placed during the hospital encounter of 11/11/19    Echo    Interpretation Summary  · Concentric left ventricular remodeling.  · Hyperdynamic left ventricular systolic function. The estimated ejection fraction is 73%  · Normal right ventricular systolic function.  · Severe left atrial enlargement.  · Moderate mitral stenosis from annular calcification. The mean diastolic gradient across the mitral valve is 9 mmHg at a heart rate of 92 bpm.  · Severe aortic valve stenosis. Aortic valve area is 0.82 cm2; peak velocity is 4.6 m/s; mean gradient is 53 mmHg.  · Moderate aortic regurgitation.  · The estimated PA systolic pressure is 39 mm Hg  · Normal central venous pressure (3 mm Hg).      Results for orders placed during the hospital encounter of 09/04/19    Transesophageal echo (VICKY)    Interpretation Summary  Indication:  Mitral stenosis, aortic stenosis and regurgitation    Counseling:  Patient as well as her family member were informed of the risks, benefits as well as alternatives to the procedure and informed consent was obtained.    Procedure:  After obtaining informed consent and sedated with Anesthesia help a VICKY probe was advanced without difficulty and images were obtained. Patient tolerated the procedure well and no immediate complications were noted.    Complications:  None immediate    Findings:  1. Aortic valve: Heavily calcified with restricted leaflet motion. Moderate aortic regurgitation.  2. Mitral valve: Thick and calcified with restricted leaflet motion. Mild mitral stenosis and regurgitation.  3. Tricuspid valve: Is structurally normal with good leaflet excursion. Mild regurgitation.  4. Pulmonary valve is structurally normal with good leaflet  excursion. No significant regurgitation.  5. Overall LVEF appears normal.  6. No obvious shunt across the interatrial septum by color flow Doppler.    Conclusions:  1. Mild mitral stenosis.  2. Moderate aortic regurgitation.  3. Known severe aortic stenosis on previous TTE.      CURRENT/PREVIOUS VISIT EKG  Results for orders placed or performed during the hospital encounter of 11/30/23   EKG 12-lead    Collection Time: 11/30/23 12:55 PM    Narrative    Test Reason : I20.0,    Vent. Rate : 075 BPM     Atrial Rate : 075 BPM     P-R Int : 180 ms          QRS Dur : 152 ms      QT Int : 458 ms       P-R-T Axes : 023 -64 080 degrees     QTc Int : 511 ms    Atrial-sensed ventricular-paced rhythm  Abnormal ECG  When compared with ECG of 08-FEB-2023 07:17,  Electronic ventricular pacemaker has replaced Sinus rhythm    Referred By: JANINE HAAS           Confirmed By:            ASSESSMENT/PLAN:         ASSESSMENT & PLAN:     Dyspnea on Exertion  HFpEF- EF 60%  CAD- Mid RCA lesion , 50% stenosed, 9/2019  S/p TAVR- 12/2019  Hypertension  Hyperlipidemia on statin therapy  Severe mitral stenosis    RECOMMENDATIONS:    Patient remains euvolemic this a.m..  No acute distress.  Continue PO lasix.    History of TAVR, 12/2019 by Dr. Ashley.  Echocardiogram this admission shows severe mitral stenosis.  Discussed with patient option for evaluation by CT surgery inpatient versus outpatient evaluation by Dr. Garcia at [facility] for transcatheter mitral valve replacement/repair.  Patient would like to follow up with Dr. Garcia outpatient for further evaluation and management.  We will place consult to Dr. Garcia.    Recommend continuing metoprolol succinate 25 mg daily.  Hold for systolic BP less than 100.  Continue aspirin and statin therapy.  Patient is stable from a cardiac standpoint for discharge at this time.        Laverne Anderson NP  Department of Cardiology  Date of Service: 12/01/2023

## 2023-12-04 ENCOUNTER — TELEPHONE (OUTPATIENT)
Dept: CARDIOTHORACIC SURGERY | Facility: CLINIC | Age: 83
End: 2023-12-04
Payer: MEDICARE

## 2023-12-10 ENCOUNTER — CLINICAL SUPPORT (OUTPATIENT)
Dept: CARDIOLOGY | Facility: HOSPITAL | Age: 83
End: 2023-12-10
Attending: INTERNAL MEDICINE
Payer: MEDICARE

## 2023-12-10 DIAGNOSIS — I50.9 HEART FAILURE, UNSPECIFIED: ICD-10-CM

## 2023-12-10 PROCEDURE — 93294 CARDIAC DEVICE CHECK CHECK - REMOTE ALERT: ICD-10-PCS | Mod: ,,, | Performed by: INTERNAL MEDICINE

## 2023-12-10 PROCEDURE — 93294 REM INTERROG EVL PM/LDLS PM: CPT | Mod: ,,, | Performed by: INTERNAL MEDICINE

## 2023-12-12 ENCOUNTER — TELEPHONE (OUTPATIENT)
Dept: CARDIOTHORACIC SURGERY | Facility: CLINIC | Age: 83
End: 2023-12-12
Payer: MEDICARE

## 2023-12-12 NOTE — TELEPHONE ENCOUNTER
Spoke with patient and reminded her of upcoming appointment tomorrow.     Julie Haase RN  CTS Nurse Navigator [phone]

## 2023-12-13 ENCOUNTER — OFFICE VISIT (OUTPATIENT)
Dept: CARDIOTHORACIC SURGERY | Facility: CLINIC | Age: 83
End: 2023-12-13
Payer: MEDICARE

## 2023-12-13 VITALS
HEIGHT: 57 IN | WEIGHT: 147.69 LBS | BODY MASS INDEX: 31.86 KG/M2 | DIASTOLIC BLOOD PRESSURE: 88 MMHG | SYSTOLIC BLOOD PRESSURE: 178 MMHG | HEART RATE: 66 BPM | OXYGEN SATURATION: 100 %

## 2023-12-13 DIAGNOSIS — I05.0 MITRAL VALVE STENOSIS, SEVERE: ICD-10-CM

## 2023-12-13 PROCEDURE — 99999 PR PBB SHADOW E&M-EST. PATIENT-LVL III: CPT | Mod: PBBFAC,,, | Performed by: THORACIC SURGERY (CARDIOTHORACIC VASCULAR SURGERY)

## 2023-12-13 PROCEDURE — 99213 OFFICE O/P EST LOW 20 MIN: CPT | Mod: PBBFAC | Performed by: THORACIC SURGERY (CARDIOTHORACIC VASCULAR SURGERY)

## 2023-12-13 PROCEDURE — 99999 PR PBB SHADOW E&M-EST. PATIENT-LVL III: ICD-10-PCS | Mod: PBBFAC,,, | Performed by: THORACIC SURGERY (CARDIOTHORACIC VASCULAR SURGERY)

## 2023-12-13 PROCEDURE — 99204 OFFICE O/P NEW MOD 45 MIN: CPT | Mod: S$PBB,,, | Performed by: THORACIC SURGERY (CARDIOTHORACIC VASCULAR SURGERY)

## 2023-12-13 PROCEDURE — 99204 PR OFFICE/OUTPT VISIT, NEW, LEVL IV, 45-59 MIN: ICD-10-PCS | Mod: S$PBB,,, | Performed by: THORACIC SURGERY (CARDIOTHORACIC VASCULAR SURGERY)

## 2023-12-13 NOTE — PROGRESS NOTES
"Subjective:      Patient ID: Roxanne Hernandez is a 83 y.o. female.    Chief Complaint: No chief complaint on file.      HPI:  Roxanne Hernandez is a 83 y.o. female who presents as an initial consult for mitral stenosis.  She is referred from Dr. Garcia for possible mitral clip vs Mvr. She has a medical history significant for carotis stenosis, cervical cancer, CAD, diabetes mellitus, hypertension, and aortic valve stenosis.  She was seen at her primary cardiologist office for a 2 week history of chest pain and dyspnea on exertion.  During this evaluation, she was found to have severe mitral stenosis. She reports continued shortness of breath.     Family and social history reviewed    Review of patient's allergies indicates:   Allergen Reactions    Azithromycin Swelling     All mycins    Statins-hmg-coa reductase inhibitors Other (See Comments)     Liver function  "It attacks my liver and makes me very sick"    Sulfa (sulfonamide antibiotics) Hives     Pt states she can use sulfa. 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 surgical therapy options discussio    Bilateral carotid artery stenosis 11/09/2020    S/p B/L CEA. Dr Kent.     Carotid artery syndrome 10/14/2020    Cervical cancer     Coronary artery disease     COVID-19     Diabetes mellitus     diet control.    Dizziness and giddiness 10/14/2020    Hypertension     Kidney stone     Nonrheumatic aortic valve stenosis 09/04/2019    Obese     Subcutaneous cyst 02/13/2023    Uncontrolled hypertension 10/14/2020    Vestibular " vertigo, bilateral 10/14/2020     Past Surgical History:   Procedure Laterality Date    A-V CARDIAC PACEMAKER INSERTION N/A 12/18/2019    Procedure: INSERTION, CARDIAC PACEMAKER, DUAL CHAMBER;  Surgeon: Nnamdi Macdonald MD;  Location: [facility] EP LAB;  Service: Cardiology;  Laterality: N/A;  lbbb, dppm, SJM, anes, pr, [id number]    APPENDECTOMY      pt thinks no appendix    BREAST BIOPSY Left     CARDIAC CATHETERIZATION      CAROTID ENDARTERECTOMY Left 07/31/2020    Procedure: ENDARTERECTOMY-CAROTID;  Surgeon: Radu Kent MD;  Location: [facility];  Service: Cardiothoracic;  Laterality: Left;    CAROTID ENDARTERECTOMY Right 10/16/2020    Procedure: ENDARTERECTOMY, CAROTID;  Surgeon: Radu Kent MD;  Location: [facility];  Service: Cardiothoracic;  Laterality: Right;    CATARACT EXTRACTION BILATERAL W/ ANTERIOR VITRECTOMY      CHOLECYSTECTOMY      CORONARY ANGIOGRAPHY Left 09/04/2019    Procedure: ANGIOGRAM, CORONARY ARTERY;  Surgeon: Carmen Yepez MD;  Location: [facility] CATH/EP LAB;  Service: Cardiology;  Laterality: Left;    CYST REMOVAL N/A 2/13/2023    Procedure: EXCISION, CYST;  Surgeon: Bret Avina III, MD;  Location: [facility];  Service: General;  Laterality: N/A;    CYSTOSCOPY N/A 11/13/2019    Procedure: CYSTOSCOPY;  Surgeon: Paddy Dunham MD;  [address];  Service: Urology;  Laterality: N/A;    HYSTERECTOMY      JOINT REPLACEMENT Right     KNEE ARTHROPLASTY Right     LASER LITHOTRIPSY Left 11/13/2019    Procedure: LITHOTRIPSY, USING LASER;  Surgeon: Paddy Dunham MD;  [address];  Service: Urology;  Laterality: Left;    TRANSCATHETER AORTIC VALVE REPLACEMENT (TAVR) N/A 12/17/2019    Procedure: REPLACEMENT, AORTIC VALVE, TRANSCATHETER (TAVR);  Surgeon: Herbert Ashley MD;  Location: [facility] CATH LAB;  Service: Cardiology;  Laterality: N/A;    URETEROSCOPIC REMOVAL OF URETERIC CALCULUS Left 11/13/2019    Procedure: REMOVAL, CALCULUS, URETER, URETEROSCOPIC;  Surgeon: Paddy WASHINGTON  MD Roly;  Location: [facility] OR [address] [place] [address];  Service: Urology;  Laterality: Left;    URETEROSCOPY Left 2019    Procedure: URETEROSCOPY;  Surgeon: Paddy Dunham MD;  Location: [facility] OR [address] [place] [address];  Service: Urology;  Laterality: Left;     Family History       Problem Relation (Age of Onset)    Cancer Maternal Grandfather, Paternal Grandfather    Hypertension Father          Social History     Socioeconomic History    Marital status:    Tobacco Use    Smoking status: Former     Current packs/day: 0.00     Average packs/day: 1 pack/day for 25.0 years (25.0 ttl pk-yrs)     Types: Cigarettes     Start date: 1955     Quit date: 1980     Years since quittin.9    Smokeless tobacco: Never   Substance and Sexual Activity    Alcohol use: Not Currently    Drug use: Never       Current Outpatient Medications:     aspirin (ECOTRIN) 81 MG EC tablet, Take 81 mg by mouth every morning., Disp: , Rfl:     atorvastatin (LIPITOR) 20 MG tablet, Take 1 tablet (20 mg total) by mouth every evening., Disp: , Rfl:     fvd-[id number]-[id number]-zinc--demetri-bor (CALTRATE 600-D PLUS MINERALS) 600 mg calcium- 800 unit-50 mg Tab, Take 1 each by mouth every morning., Disp: , Rfl:     cholecalciferol, vitamin D3, 50 mcg (2,000 unit) Chew, Take 2,000 Units by mouth every morning., Disp: , Rfl:     furosemide (LASIX) 20 MG tablet, Take 1 tablet (20 mg total) by mouth every morning., Disp: 30 tablet, Rfl: 11    metoprolol succinate (TOPROL-XL) 25 MG 24 hr tablet, Take 1 tablet (25 mg total) by mouth every evening., Disp: 90 tablet, Rfl: 11    potassium 99 mg Tab, Take 1 tablet by mouth once daily., Disp: , Rfl:   No current facility-administered medications for this visit.    Facility-Administered Medications Ordered in Other Visits:     0.9%  NaCl infusion, , Intravenous, Continuous, Carmen Yepez MD, Stopped at 10/16/20 1151    aspirin EC tablet 325 mg, 325 mg, Oral, Once, Carmen Yepez MD    diazePAM tablet 5 mg, 5 mg,  Oral, On Call Procedure, Carmen Yepez MD    diphenhydrAMINE capsule 25 mg, 25 mg, Oral, Once, Carmen Yepez MD  Current medications Reviewed    Review of Systems   Constitutional:  Negative for activity change, appetite change, fatigue and fever.   HENT:  Negative for nosebleeds.    Respiratory:  Negative for cough and shortness of breath.    Cardiovascular:  Negative for chest pain, palpitations and leg swelling.   Gastrointestinal:  Negative for abdominal distention, abdominal pain and nausea.   Genitourinary:  Negative for frequency.   Musculoskeletal:  Negative for arthralgias and myalgias.   Skin:  Negative for rash.   Neurological:  Negative for dizziness and numbness.   Hematological:  Does not bruise/bleed easily.     Objective:   Physical Exam  HENT:      Head: Normocephalic and atraumatic.   Eyes:      Extraocular Movements: Extraocular movements intact.   Cardiovascular:      Rate and Rhythm: Normal rate and regular rhythm.   Pulmonary:      Effort: Pulmonary effort is normal.   Abdominal:      General: Abdomen is flat.      Palpations: Abdomen is soft.   Musculoskeletal:         General: Normal range of motion.      Cervical back: Normal range of motion.   Skin:     General: Skin is warm and dry.      Capillary Refill: Capillary refill takes less than 2 seconds.   Neurological:      General: No focal deficit present.         Diagnostic Results: Reviewed     ECHO:    Left Ventricle: The left ventricle is normal in size. Moderately increased wall thickness. Normal wall motion. There is normal systolic function with a visually estimated ejection fraction of 60 - 65%. There is normal diastolic function.    Right Ventricle: Normal right ventricular cavity size. Wall thickness is normal. Right ventricle wall motion  is normal. Systolic function is normal.    Left Atrium: Left atrium is moderately dilated.    Aortic Valve: There is a transcatheter valve replacement in the aortic position.  Aortic valve area by VTI is 0.95 cm². Aortic valve peak velocity is 2.93 m/s. Mean gradient is 18 mmHg. The dimensionless index is 0.47.    Mitral Valve: There is moderate bileaflet sclerosis. Moderately thickened anterior leaflet. There is moderate mitral annular calcification present. There is severe stenosis. The mean pressure gradient across the mitral valve is 12 mmHg at a heart rate of  bpm. There is mild to moderate regurgitation.  Assessment:   Mitral Stenosis   Plan:     I have seen the patient and reviewed the nurse practitioner's note above. I have personally interviewed and examined the patient at bedside and agree with the findings.     Ms. Hernandez is a pleasant 83 y.o. female with mitral stenosis and regurgitation.  Given her severe mitral annular calcifications, prior TAVR, age, and subjective frailty, she is not a candidate for surgery.  I recommend medical management, mitraclip evaluation (but unlikely to be a candidate given the stenosis).      Herbert Stephen MD  Cardiothoracic Surgery  [facility]

## 2023-12-18 ENCOUNTER — TELEPHONE (OUTPATIENT)
Dept: CARDIOLOGY | Facility: CLINIC | Age: 83
End: 2023-12-18
Payer: MEDICARE

## 2023-12-18 NOTE — TELEPHONE ENCOUNTER
----- Message from Betina Baig MA sent at 12/18/2023  9:27 AM CST -----  Regarding: FW: Appointment for patient    ----- Message -----  From: Haase, Julie R, RN  Sent: 12/14/2023   2:46 PM CST  To: #  Subject: Appointment for patient                          Good afternoon,    This patient was seeing Dr Yepez and would like to establish care with another cardiologist. She mentioned in her appointment yesterday that the doctor had retired and she had not set anything up with another provider. Can you call her and schedule an appointment to establish care with another provider? Please let me know if I need to put in a referral for cardiology.    Thank you,  Julie Haase RN  [facility] Nurse Navigator [phone]

## 2023-12-19 DIAGNOSIS — Z95.0 PRESENCE OF PERMANENT CARDIAC PACEMAKER: Primary | Chronic | ICD-10-CM

## 2023-12-19 DIAGNOSIS — I50.32 CHRONIC DIASTOLIC HEART FAILURE: Chronic | ICD-10-CM

## 2023-12-27 LAB
OHS CV AF BURDEN PERCENT: < 1
OHS CV DC REMOTE DEVICE TYPE: NORMAL
OHS CV ICD SHOCK: NO
OHS CV RV PACING PERCENT: 8.2 %

## 2024-01-05 ENCOUNTER — TELEPHONE (OUTPATIENT)
Dept: ELECTROPHYSIOLOGY | Facility: CLINIC | Age: 84
End: 2024-01-05
Payer: MEDICARE

## 2024-01-10 NOTE — PROGRESS NOTES
Ms. Hernandez is a patient of Dr. Macdonald and was last seen in clinic 8/2/2021.      Subjective:   Patient ID:  Roxanne Hernanedz is an 83 y.o. female who presents for follow up of Pacemaker Check  .     HPI:    Ms. Hernandez is an 83 y.o. female with HFpEF, Htn, Aortic stenosis s/p TAVR with subsequent LBBB and 2nd degree AV block, PPM (12/2020), DM, severe MS (not a surgical candidate) here for follow up.    Background:    Primary cardiologist is Dr. Yepez.  TAVR 12/17/20. Developed new LBBB and 2nd degree AV block.  Dual chamber PPM (12/18/20) right sided.  Continued to note dyspnea on exertion.  Echo 1/22/20 normal biventricular structure and function, trivial effusion.  Notes stable dyspnea on exertion. Denies syncope, palpitations.  Device interrogation reveal stable function of the leads, RA pacing 12% RV pacing < 1% no significant arrhythmias.    8/2/2021: Ms. Hernandez is doing well from a device perspective with stable lead and device function. No sustained arrhythmia noted. No RV pacing. No recent CHF symptoms. Echo 11/2020 shows EF 60%.She is feeling well. Her BPs are controlled at home per pt and she is following up with PCP tomorrow. Will make no changes today.      Update (01/12/2024):    12/1/2023: Pt presented with atypical chest discomfort (right subclavian area) associated with dyspnea occurring at low levels of exertion over the previous 2 weeks in this patient with known coronary artery disease, S/P TAVR state and mitral stenosis; the possibility of unstable angina or progressive valvular heart disease causing her symptom complex was considered on admission, with the final decision by [facility] being that this is probably due to progressive mitral stenosis.     12/13/2023: Dr. Stephen evaluation: Ms. Hernandez is a pleasant 83 y.o. female with mitral stenosis and regurgitation.  Given her severe mitral annular calcifications, prior TAVR, age, and subjective frailty, she is not a candidate for  surgery.  I recommend medical management, mitraclip evaluation (but unlikely to be a candidate given the stenosis).     Today she says she has significant RENDON but at rest she feels ok. No exertional CP, palps, syncope reported. She is waiting for interventional cardiology appt to discuss possible mitraclip.    Device Interrogation (1/12/2024) reveals an intrinsic SR with CHB no escape with stable lead and device function. No arrhythmias or treated episodes were noted.  She paces 11% in the RA and 35% in the RV. Estimated battery longevity 7-7.5 years.     I have personally reviewed the patient's EKG today, which shows ASVP at 70bpm. UT interval is 186. QRS is 150. QTc is 494.    Relevant Cardiac Test Results:    2D Echo (11/30/2023):    Left Ventricle: The left ventricle is normal in size. Moderately increased wall thickness. Normal wall motion. There is normal systolic function with a visually estimated ejection fraction of 60 - 65%. There is normal diastolic function.    Right Ventricle: Normal right ventricular cavity size. Wall thickness is normal. Right ventricle wall motion  is normal. Systolic function is normal.    Left Atrium: Left atrium is moderately dilated.    Aortic Valve: There is a transcatheter valve replacement in the aortic position. Aortic valve area by VTI is 0.95 cm². Aortic valve peak velocity is 2.93 m/s. Mean gradient is 18 mmHg. The dimensionless index is 0.47.    Mitral Valve: There is moderate bileaflet sclerosis. Moderately thickened anterior leaflet. There is moderate mitral annular calcification present. There is severe stenosis. The mean pressure gradient across the mitral valve is 12 mmHg at a heart rate of  bpm. There is mild to moderate regurgitation.    Current Outpatient Medications   Medication Sig    aspirin (ECOTRIN) 81 MG EC tablet Take 81 mg by mouth every morning.    atorvastatin (LIPITOR) 20 MG tablet Take 1 tablet (20 mg total) by mouth every evening.     "nox-[id number]-zinc--demetri-bor (CALTRATE 600-D PLUS MINERALS) 600 mg calcium- 800 unit-50 mg Tab Take 1 each by mouth every morning.    cholecalciferol, vitamin D3, 50 mcg (2,000 unit) Chew Take 2,000 Units by mouth every morning.    metoprolol succinate (TOPROL-XL) 25 MG 24 hr tablet Take 1 tablet (25 mg total) by mouth every evening.    potassium 99 mg Tab Take 1 tablet by mouth once daily.    furosemide (LASIX) 20 MG tablet Take 1 tablet (20 mg total) by mouth every morning.     No current facility-administered medications for this visit.     Facility-Administered Medications Ordered in Other Visits   Medication    0.9%  NaCl infusion    aspirin EC tablet 325 mg    diazePAM tablet 5 mg    diphenhydrAMINE capsule 25 mg       Review of Systems   Constitutional: Negative for malaise/fatigue.   Cardiovascular:  Positive for dyspnea on exertion. Negative for chest pain, irregular heartbeat, leg swelling and palpitations.   Respiratory:  Positive for shortness of breath.    Hematologic/Lymphatic: Negative for bleeding problem.   Skin:  Negative for rash.   Musculoskeletal:  Negative for myalgias.   Gastrointestinal:  Negative for hematemesis, hematochezia and nausea.   Genitourinary:  Negative for hematuria.   Neurological:  Negative for light-headedness.   Psychiatric/Behavioral:  Negative for altered mental status.    Allergic/Immunologic: Negative for persistent infections.       Objective:          /60   Pulse 70   Ht 4' 9" (1.448 m)   Wt 67.6 kg (149 lb 0.5 oz)   BMI 32.25 kg/m²     Physical Exam  Vitals and nursing note reviewed.   Constitutional:       Appearance: Normal appearance. She is well-developed.   HENT:      Head: Normocephalic.      Nose: Nose normal.   Eyes:      Pupils: Pupils are equal, round, and reactive to light.   Cardiovascular:      Rate and Rhythm: Normal rate and regular rhythm.   Pulmonary:      Effort: No respiratory distress.      Breath sounds: Normal breath sounds.      " Comments: Device to [facility]. Incision and pocket in good repair.    Musculoskeletal:         General: Normal range of motion.   Skin:     General: Skin is warm and dry.      Findings: No erythema.   Neurological:      Mental Status: She is alert and oriented to person, place, and time.   Psychiatric:         Speech: Speech normal.         Behavior: Behavior normal.           Lab Results   Component Value Date     11/30/2023    K 4.4 11/30/2023    MG 2.1 11/02/2020    BUN 25 (H) 11/30/2023    CREATININE 1.0 11/30/2023    ALT 18 11/30/2023    AST 23 11/30/2023    HGB 15.4 11/30/2023    HCT 46.5 11/30/2023    HCT 42 07/31/2020    TSH 1.628 11/30/2023    LDLCALC 65.8 11/24/2023             Assessment:     1. Presence of permanent cardiac pacemaker    2. Chronic diastolic heart failure    3. Coronary artery disease of native artery of native heart with stable angina pectoris    4. Essential hypertension    5. Left bundle branch block      Plan:     In summary, Ms. Hernandez is an 83 y.o. female with HFpEF, Htn, Aortic stenosis s/p TAVR with subsequent LBBB and 2nd degree AV block, PPM, DM, severe MS here for follow up.  Ms. Hernandez is doing well from a device perspective with stable lead and device function. No arrhythmia noted. RV pacing 35% (but ~100% in recent weeks - intrinsic is SR with CHB no escape). Echo 11/2023 shows EF 60-65%. RENDON with severe mitral stenosis - not a surgical candidate but is awaiting evaluation by interventional cards to consider mitraclip.    Continue current medication regimen and device settings.   Follow up in device clinic as scheduled.   Follow up in EP clinic in 1 year, sooner as needed.     *A copy of this note has been sent to Dr. Macdonald*    Follow up in about 1 year (around 1/12/2025).    ------------------------------------------------------------------    BUSHRA Rhoades, NP-C  Cardiac Electrophysiology

## 2024-01-12 ENCOUNTER — CLINICAL SUPPORT (OUTPATIENT)
Dept: CARDIOLOGY | Facility: HOSPITAL | Age: 84
End: 2024-01-12
Attending: INTERNAL MEDICINE
Payer: MEDICARE

## 2024-01-12 ENCOUNTER — TELEPHONE (OUTPATIENT)
Dept: ELECTROPHYSIOLOGY | Facility: CLINIC | Age: 84
End: 2024-01-12
Payer: MEDICARE

## 2024-01-12 ENCOUNTER — OFFICE VISIT (OUTPATIENT)
Dept: ELECTROPHYSIOLOGY | Facility: CLINIC | Age: 84
End: 2024-01-12
Payer: MEDICARE

## 2024-01-12 VITALS
BODY MASS INDEX: 32.16 KG/M2 | HEIGHT: 57 IN | WEIGHT: 149.06 LBS | HEART RATE: 70 BPM | SYSTOLIC BLOOD PRESSURE: 126 MMHG | DIASTOLIC BLOOD PRESSURE: 60 MMHG

## 2024-01-12 DIAGNOSIS — Z95.0 PRESENCE OF PERMANENT CARDIAC PACEMAKER: Chronic | ICD-10-CM

## 2024-01-12 DIAGNOSIS — I50.32 CHRONIC DIASTOLIC HEART FAILURE: ICD-10-CM

## 2024-01-12 DIAGNOSIS — I05.0 MITRAL VALVE STENOSIS, UNSPECIFIED ETIOLOGY: ICD-10-CM

## 2024-01-12 DIAGNOSIS — I44.7 LEFT BUNDLE BRANCH BLOCK: ICD-10-CM

## 2024-01-12 DIAGNOSIS — I10 ESSENTIAL HYPERTENSION: Chronic | ICD-10-CM

## 2024-01-12 DIAGNOSIS — I25.118 CORONARY ARTERY DISEASE OF NATIVE ARTERY OF NATIVE HEART WITH STABLE ANGINA PECTORIS: ICD-10-CM

## 2024-01-12 DIAGNOSIS — Z95.0 PRESENCE OF PERMANENT CARDIAC PACEMAKER: Primary | Chronic | ICD-10-CM

## 2024-01-12 PROCEDURE — 93005 ELECTROCARDIOGRAM TRACING: CPT | Mod: 59,PBBFAC | Performed by: INTERNAL MEDICINE

## 2024-01-12 PROCEDURE — 99213 OFFICE O/P EST LOW 20 MIN: CPT | Mod: PBBFAC,25 | Performed by: NURSE PRACTITIONER

## 2024-01-12 PROCEDURE — 93010 ELECTROCARDIOGRAM REPORT: CPT | Mod: S$PBB,,, | Performed by: INTERNAL MEDICINE

## 2024-01-12 PROCEDURE — 93280 PM DEVICE PROGR EVAL DUAL: CPT | Mod: 26,,, | Performed by: INTERNAL MEDICINE

## 2024-01-12 PROCEDURE — 99999 PR PBB SHADOW E&M-EST. PATIENT-LVL III: CPT | Mod: PBBFAC,,, | Performed by: NURSE PRACTITIONER

## 2024-01-12 PROCEDURE — 99214 OFFICE O/P EST MOD 30 MIN: CPT | Mod: S$PBB,,, | Performed by: NURSE PRACTITIONER

## 2024-01-12 PROCEDURE — 93280 PM DEVICE PROGR EVAL DUAL: CPT

## 2024-01-16 DIAGNOSIS — Z95.0 PRESENCE OF PERMANENT CARDIAC PACEMAKER: Primary | Chronic | ICD-10-CM

## 2024-02-06 RX ORDER — ATORVASTATIN CALCIUM 20 MG/1
20 TABLET, FILM COATED ORAL NIGHTLY
Qty: 90 TABLET | Refills: 12 | Status: SHIPPED | OUTPATIENT
Start: 2024-01-17

## 2024-02-06 RX ORDER — FUROSEMIDE 20 MG/1
20 TABLET ORAL 2 TIMES DAILY PRN
Qty: 90 TABLET | Refills: 12 | Status: SHIPPED | OUTPATIENT
Start: 2024-01-17

## 2024-02-16 ENCOUNTER — LAB VISIT (OUTPATIENT)
Dept: LAB | Facility: HOSPITAL | Age: 84
End: 2024-02-16
Attending: INTERNAL MEDICINE
Payer: MEDICARE

## 2024-02-16 DIAGNOSIS — I50.32 CHRONIC DIASTOLIC HEART FAILURE: Primary | ICD-10-CM

## 2024-02-16 DIAGNOSIS — E83.42 HYPOMAGNESEMIA: ICD-10-CM

## 2024-02-16 LAB
ANION GAP SERPL CALC-SCNC: 7 MMOL/L (ref 8–16)
BNP SERPL-MCNC: 313 PG/ML (ref 0–99)
BUN SERPL-MCNC: 30 MG/DL (ref 8–23)
CALCIUM SERPL-MCNC: 10 MG/DL (ref 8.7–10.5)
CHLORIDE SERPL-SCNC: 105 MMOL/L (ref 95–110)
CO2 SERPL-SCNC: 26 MMOL/L (ref 23–29)
CREAT SERPL-MCNC: 1.2 MG/DL (ref 0.5–1.4)
EST. GFR  (NO RACE VARIABLE): 44.9 ML/MIN/1.73 M^2
GLUCOSE SERPL-MCNC: 111 MG/DL (ref 70–110)
MAGNESIUM SERPL-MCNC: 2.2 MG/DL (ref 1.6–2.6)
POTASSIUM SERPL-SCNC: 5.2 MMOL/L (ref 3.5–5.1)
SODIUM SERPL-SCNC: 138 MMOL/L (ref 136–145)

## 2024-02-16 PROCEDURE — 83880 ASSAY OF NATRIURETIC PEPTIDE: CPT | Performed by: INTERNAL MEDICINE

## 2024-02-16 PROCEDURE — 83735 ASSAY OF MAGNESIUM: CPT | Performed by: INTERNAL MEDICINE

## 2024-02-16 PROCEDURE — 36415 COLL VENOUS BLD VENIPUNCTURE: CPT | Performed by: INTERNAL MEDICINE

## 2024-02-16 PROCEDURE — 80048 BASIC METABOLIC PNL TOTAL CA: CPT | Performed by: INTERNAL MEDICINE

## 2024-02-23 LAB
OHS CV AF BURDEN PERCENT: < 1
OHS CV DC REMOTE DEVICE TYPE: NORMAL
OHS CV RV PACING PERCENT: 35 %

## 2024-03-05 ENCOUNTER — OFFICE VISIT (OUTPATIENT)
Dept: CARDIOLOGY | Facility: CLINIC | Age: 84
End: 2024-03-05
Payer: MEDICARE

## 2024-03-05 VITALS
OXYGEN SATURATION: 97 % | HEIGHT: 57 IN | SYSTOLIC BLOOD PRESSURE: 149 MMHG | DIASTOLIC BLOOD PRESSURE: 67 MMHG | HEART RATE: 65 BPM | BODY MASS INDEX: 31.54 KG/M2 | WEIGHT: 146.19 LBS

## 2024-03-05 DIAGNOSIS — Z95.2 HISTORY OF TRANSCATHETER AORTIC VALVE REPLACEMENT (TAVR): ICD-10-CM

## 2024-03-05 DIAGNOSIS — E11.9 DIABETES MELLITUS WITHOUT COMPLICATION: Chronic | ICD-10-CM

## 2024-03-05 DIAGNOSIS — I34.0 MITRAL VALVE INSUFFICIENCY, UNSPECIFIED ETIOLOGY: Chronic | ICD-10-CM

## 2024-03-05 DIAGNOSIS — E78.2 MIXED HYPERLIPIDEMIA: Chronic | ICD-10-CM

## 2024-03-05 DIAGNOSIS — I10 ESSENTIAL HYPERTENSION: Primary | Chronic | ICD-10-CM

## 2024-03-05 DIAGNOSIS — I34.2 MITRAL VALVE STENOSIS, NON-RHEUMATIC: Chronic | ICD-10-CM

## 2024-03-05 DIAGNOSIS — I50.32 CHRONIC DIASTOLIC HEART FAILURE: Chronic | ICD-10-CM

## 2024-03-05 DIAGNOSIS — E66.09 CLASS 1 OBESITY DUE TO EXCESS CALORIES WITH SERIOUS COMORBIDITY AND BODY MASS INDEX (BMI) OF 31.0 TO 31.9 IN ADULT: ICD-10-CM

## 2024-03-05 DIAGNOSIS — I34.81 MITRAL VALVE ANNULAR CALCIFICATION: Chronic | ICD-10-CM

## 2024-03-05 DIAGNOSIS — Z95.0 PRESENCE OF PERMANENT CARDIAC PACEMAKER: Chronic | ICD-10-CM

## 2024-03-05 PROBLEM — I50.33 ACUTE ON CHRONIC DIASTOLIC HEART FAILURE: Status: RESOLVED | Noted: 2019-12-18 | Resolved: 2024-03-05

## 2024-03-05 PROBLEM — E66.9 OBESE: Status: ACTIVE | Noted: 2024-03-05

## 2024-03-05 PROCEDURE — 99999 PR PBB SHADOW E&M-EST. PATIENT-LVL IV: CPT | Mod: PBBFAC,,, | Performed by: INTERNAL MEDICINE

## 2024-03-05 PROCEDURE — 99214 OFFICE O/P EST MOD 30 MIN: CPT | Mod: S$PBB,,, | Performed by: INTERNAL MEDICINE

## 2024-03-05 PROCEDURE — 99214 OFFICE O/P EST MOD 30 MIN: CPT | Mod: PBBFAC | Performed by: INTERNAL MEDICINE

## 2024-03-05 NOTE — ASSESSMENT & PLAN NOTE
-severe MAC with severe stenosis and NYHA II symptoms  -not a candidate for valve in mac   -continue maximal medical therapy with beta blockers

## 2024-03-05 NOTE — PROGRESS NOTES
"Referring provider: No ref. provider found     Patient ID:  Roxanne Hernandez is a 83 y.o. y.o. female who presents for evaluation Mitral Stenosis and Shortness of Breath      Roxanne Hernandez is a 82 yo F with HTN, HLD, DM, AS s/p 23mm Evolut 2019, HFpEF, MAC and severe MS who presents as a referral from Dr. Hickey for evaluation of mitral stenosis. She was recently admitted at [facility] for CHF at which time TTE revealed MG 12 across MV with severe MAC. She was then referred to this clinic for evaluation of BMV or valve in MAC.     She states since discharge and addition of isorsorbide, she has less RENDON. She has been hesitant to due tasks due to fear of worsening her condition, but states she has RENDON with moderate exertion such as mopping floors. She is otherwise doing well and denies orthopnea, pnd, le edema or any other acute complaints.     Review of Systems   Respiratory:  Positive for shortness of breath.    All other systems reviewed and are negative.     Objective:     BP (!) 149/67 (BP Location: Right arm, Patient Position: Sitting, BP Method: Large (Automatic))   Pulse 65   Ht 4' 9" (1.448 m)   Wt 66.3 kg (146 lb 3.2 oz)   SpO2 97%   BMI 31.64 kg/m²     Physical Exam  Vitals and nursing note reviewed.   Constitutional:       Appearance: Normal appearance.   HENT:      Head: Normocephalic and atraumatic.      Right Ear: External ear normal.      Left Ear: External ear normal.      Nose: Nose normal.      Mouth/Throat:      Mouth: Mucous membranes are moist.   Eyes:      Extraocular Movements: Extraocular movements intact.      Pupils: Pupils are equal, round, and reactive to light.   Cardiovascular:      Rate and Rhythm: Normal rate and regular rhythm.      Pulses: Normal pulses.      Heart sounds: Murmur heard.   Pulmonary:      Effort: Pulmonary effort is normal.   Abdominal:      General: Abdomen is flat.   Musculoskeletal:         General: Normal range of motion.      Cervical back: Normal range of motion.      " Right lower leg: No edema.      Left lower leg: No edema.   Skin:     General: Skin is warm and dry.      Capillary Refill: Capillary refill takes less than 2 seconds.   Neurological:      General: No focal deficit present.      Mental Status: She is alert and oriented to person, place, and time. Mental status is at baseline.     Labs:     Lab Results   Component Value Date     02/16/2024    K 5.2 (H) 02/16/2024     02/16/2024    CO2 26 02/16/2024    BUN 30 (H) 02/16/2024    CREATININE 1.2 02/16/2024    ANIONGAP 7 (L) 02/16/2024     Lab Results   Component Value Date    HGBA1C 5.9 11/24/2023     Lab Results   Component Value Date     (H) 02/16/2024     (H) 11/30/2023     (H) 04/27/2021       Lab Results   Component Value Date    WBC 6.12 11/30/2023    HGB 15.4 11/30/2023    HCT 46.5 11/30/2023    HCT 42 07/31/2020     (L) 11/30/2023    GRAN 4.3 11/30/2023    GRAN 70.8 11/30/2023     Lab Results   Component Value Date    CHOL 145 11/24/2023    HDL 62 11/24/2023    LDLCALC 65.8 11/24/2023    TRIG 86 11/24/2023       Meds:     Current Outpatient Medications:     aspirin (ECOTRIN) 81 MG EC tablet, Take 81 mg by mouth every morning., Disp: , Rfl:     atorvastatin (LIPITOR) 20 MG tablet, Take 1 tablet (20 mg total) by mouth every evening., Disp: 90 tablet, Rfl: 12    nhi-J1-sib22-zinc--demetri-bor (CALTRATE 600-D PLUS MINERALS) 600 mg calcium- 800 unit-50 mg Tab, Take 1 each by mouth every morning., Disp: , Rfl:     furosemide (LASIX) 20 MG tablet, Take 1 tablet (20 mg total) by mouth 2 (two) times daily as needed (1 by mouth each morning OR twice daily when needed; to control swelling and blood pressure.)., Disp: 90 tablet, Rfl: 12    metoprolol succinate (TOPROL-XL) 25 MG 24 hr tablet, Take 1 tablet (25 mg total) by mouth every evening., Disp: 90 tablet, Rfl: 11    cholecalciferol, vitamin D3, 50 mcg (2,000 unit) Chew, Take 2,000 Units by mouth every morning., Disp: , Rfl:      potassium 99 mg Tab, Take 1 tablet by mouth once daily., Disp: , Rfl:   No current facility-administered medications for this visit.    Facility-Administered Medications Ordered in Other Visits:     0.9%  NaCl infusion, , Intravenous, Continuous, Carmen Yepez MD, Stopped at 10/16/20 1151    aspirin EC tablet 325 mg, 325 mg, Oral, Once, Carmen Yepez MD    diazePAM tablet 5 mg, 5 mg, Oral, On Call Procedure, Carmen Yepez MD    diphenhydrAMINE capsule 25 mg, 25 mg, Oral, Once, Carmen Yepez MD      Assessment & Plan:     Essential hypertension  -continue current regimen    Mitral valve stenosis, non-rheumatic  -severe MAC with severe stenosis and NYHA II symptoms  -not a candidate for valve in mac   -continue maximal medical therapy with beta blockers     Mitral regurgitation  -mild     Presence of permanent cardiac pacemaker  -continue EP fu    Chronic diastolic heart failure  -continue lasix     Mitral valve annular calcification  -severe MAC with severe MS     Mixed hyperlipidemia  -atorvastatin     History of transcatheter aortic valve replacement (TAVR)  -s/p 23mm Evolut in 12/2019 with stable valve function     Diabetes mellitus without complication  -blood sugar control per primary     Obese  -diet and exercise       Monty De Los Santos MD   Interventional Cardiology     I have seen the patient, reviewed the Fellow's history and physical, assessment and plan. I have personally interviewed and examined the patient and agree with the findings.  Severe mitral stenosis with some regurgitation not amendable to balloon mitral valvuloplasty.  Toledo score greater than 10 which would produce severe mitral regurgitation if balloon valvuloplasty were attempted.  Optimize medical therapy.    MAY Garcia MD

## 2024-03-16 ENCOUNTER — CLINICAL SUPPORT (OUTPATIENT)
Dept: CARDIOLOGY | Facility: HOSPITAL | Age: 84
End: 2024-03-16
Attending: INTERNAL MEDICINE
Payer: MEDICARE

## 2024-03-16 ENCOUNTER — CLINICAL SUPPORT (OUTPATIENT)
Dept: CARDIOLOGY | Facility: HOSPITAL | Age: 84
End: 2024-03-16
Payer: MEDICARE

## 2024-03-16 DIAGNOSIS — I50.9 HEART FAILURE, UNSPECIFIED: ICD-10-CM

## 2024-03-16 PROCEDURE — 93294 REM INTERROG EVL PM/LDLS PM: CPT | Mod: ,,, | Performed by: INTERNAL MEDICINE

## 2024-04-03 LAB
OHS CV AF BURDEN PERCENT: < 1
OHS CV DC REMOTE DEVICE TYPE: NORMAL
OHS CV RV PACING PERCENT: 49 %

## 2024-06-14 ENCOUNTER — LAB VISIT (OUTPATIENT)
Dept: LAB | Facility: HOSPITAL | Age: 84
End: 2024-06-14
Attending: INTERNAL MEDICINE
Payer: MEDICARE

## 2024-06-14 DIAGNOSIS — E11.9 DIABETES MELLITUS WITHOUT COMPLICATION: ICD-10-CM

## 2024-06-14 DIAGNOSIS — E78.2 MIXED HYPERLIPIDEMIA: ICD-10-CM

## 2024-06-14 DIAGNOSIS — Z79.899 ENCOUNTER FOR LONG-TERM (CURRENT) USE OF OTHER MEDICATIONS: ICD-10-CM

## 2024-06-14 DIAGNOSIS — D50.0 IRON DEFICIENCY ANEMIA SECONDARY TO BLOOD LOSS (CHRONIC): ICD-10-CM

## 2024-06-14 DIAGNOSIS — I10 ESSENTIAL HYPERTENSION, MALIGNANT: Primary | ICD-10-CM

## 2024-06-14 LAB
ALBUMIN SERPL BCP-MCNC: 4 G/DL (ref 3.5–5.2)
ALBUMIN/CREAT UR: 18.7 UG/MG (ref 0–30)
ALP SERPL-CCNC: 85 U/L (ref 55–135)
ALT SERPL W/O P-5'-P-CCNC: 13 U/L (ref 10–44)
ANION GAP SERPL CALC-SCNC: 6 MMOL/L (ref 8–16)
AST SERPL-CCNC: 18 U/L (ref 10–40)
BASOPHILS # BLD AUTO: 0.02 K/UL (ref 0–0.2)
BASOPHILS NFR BLD: 0.4 % (ref 0–1.9)
BILIRUB DIRECT SERPL-MCNC: 0.1 MG/DL (ref 0.1–0.3)
BILIRUB SERPL-MCNC: 0.9 MG/DL (ref 0.1–1)
BUN SERPL-MCNC: 32 MG/DL (ref 8–23)
CALCIUM SERPL-MCNC: 9.3 MG/DL (ref 8.7–10.5)
CHLORIDE SERPL-SCNC: 107 MMOL/L (ref 95–110)
CHOLEST SERPL-MCNC: 145 MG/DL (ref 120–199)
CHOLEST/HDLC SERPL: 2.3 {RATIO} (ref 2–5)
CO2 SERPL-SCNC: 27 MMOL/L (ref 23–29)
CREAT SERPL-MCNC: 1.2 MG/DL (ref 0.5–1.4)
CREAT UR-MCNC: 39 MG/DL (ref 15–325)
DIFFERENTIAL METHOD BLD: ABNORMAL
EOSINOPHIL # BLD AUTO: 0.2 K/UL (ref 0–0.5)
EOSINOPHIL NFR BLD: 3.8 % (ref 0–8)
ERYTHROCYTE [DISTWIDTH] IN BLOOD BY AUTOMATED COUNT: 12.8 % (ref 11.5–14.5)
EST. GFR  (NO RACE VARIABLE): 44.6 ML/MIN/1.73 M^2
ESTIMATED AVG GLUCOSE: 128 MG/DL (ref 68–131)
GLUCOSE SERPL-MCNC: 107 MG/DL (ref 70–110)
HBA1C MFR BLD: 6.1 % (ref 4.5–6.2)
HCT VFR BLD AUTO: 45.5 % (ref 37–48.5)
HDLC SERPL-MCNC: 63 MG/DL (ref 40–75)
HDLC SERPL: 43.4 % (ref 20–50)
HGB BLD-MCNC: 14.9 G/DL (ref 12–16)
IMM GRANULOCYTES # BLD AUTO: 0.01 K/UL (ref 0–0.04)
IMM GRANULOCYTES NFR BLD AUTO: 0.2 % (ref 0–0.5)
LDLC SERPL CALC-MCNC: 64.4 MG/DL (ref 63–159)
LYMPHOCYTES # BLD AUTO: 1.6 K/UL (ref 1–4.8)
LYMPHOCYTES NFR BLD: 29.7 % (ref 18–48)
MCH RBC QN AUTO: 30.3 PG (ref 27–31)
MCHC RBC AUTO-ENTMCNC: 32.7 G/DL (ref 32–36)
MCV RBC AUTO: 93 FL (ref 82–98)
MICROALBUMIN UR DL<=1MG/L-MCNC: 7.3 UG/ML
MONOCYTES # BLD AUTO: 0.6 K/UL (ref 0.3–1)
MONOCYTES NFR BLD: 10 % (ref 4–15)
NEUTROPHILS # BLD AUTO: 3.1 K/UL (ref 1.8–7.7)
NEUTROPHILS NFR BLD: 55.9 % (ref 38–73)
NONHDLC SERPL-MCNC: 82 MG/DL
NRBC BLD-RTO: 0 /100 WBC
PLATELET # BLD AUTO: 126 K/UL (ref 150–450)
PMV BLD AUTO: 9.5 FL (ref 9.2–12.9)
POTASSIUM SERPL-SCNC: 4.6 MMOL/L (ref 3.5–5.1)
PROT SERPL-MCNC: 6.4 G/DL (ref 6–8.4)
RBC # BLD AUTO: 4.92 M/UL (ref 4–5.4)
SODIUM SERPL-SCNC: 140 MMOL/L (ref 136–145)
TRIGL SERPL-MCNC: 88 MG/DL (ref 30–150)
WBC # BLD AUTO: 5.48 K/UL (ref 3.9–12.7)

## 2024-06-14 PROCEDURE — 80076 HEPATIC FUNCTION PANEL: CPT | Performed by: INTERNAL MEDICINE

## 2024-06-14 PROCEDURE — 80048 BASIC METABOLIC PNL TOTAL CA: CPT | Performed by: INTERNAL MEDICINE

## 2024-06-14 PROCEDURE — 80061 LIPID PANEL: CPT | Performed by: INTERNAL MEDICINE

## 2024-06-14 PROCEDURE — 36415 COLL VENOUS BLD VENIPUNCTURE: CPT | Performed by: INTERNAL MEDICINE

## 2024-06-14 PROCEDURE — 82570 ASSAY OF URINE CREATININE: CPT | Performed by: INTERNAL MEDICINE

## 2024-06-14 PROCEDURE — 85025 COMPLETE CBC W/AUTO DIFF WBC: CPT | Performed by: INTERNAL MEDICINE

## 2024-06-14 PROCEDURE — 83036 HEMOGLOBIN GLYCOSYLATED A1C: CPT | Performed by: INTERNAL MEDICINE

## 2024-06-15 ENCOUNTER — CLINICAL SUPPORT (OUTPATIENT)
Dept: CARDIOLOGY | Facility: HOSPITAL | Age: 84
End: 2024-06-15
Attending: INTERNAL MEDICINE
Payer: MEDICARE

## 2024-06-15 ENCOUNTER — CLINICAL SUPPORT (OUTPATIENT)
Dept: CARDIOLOGY | Facility: HOSPITAL | Age: 84
End: 2024-06-15
Payer: MEDICARE

## 2024-06-15 DIAGNOSIS — I50.9 HEART FAILURE, UNSPECIFIED: ICD-10-CM

## 2024-06-15 PROCEDURE — 93294 REM INTERROG EVL PM/LDLS PM: CPT | Mod: ,,, | Performed by: INTERNAL MEDICINE

## 2024-06-26 LAB
OHS CV AF BURDEN PERCENT: < 1
OHS CV DC REMOTE DEVICE TYPE: NORMAL
OHS CV RV PACING PERCENT: 66 %

## 2024-07-31 ENCOUNTER — HOSPITAL ENCOUNTER (INPATIENT)
Facility: HOSPITAL | Age: 84
LOS: 1 days | Discharge: HOME | DRG: 291 | End: 2024-08-02
Attending: EMERGENCY MEDICINE | Admitting: INTERNAL MEDICINE
Payer: MEDICARE

## 2024-07-31 DIAGNOSIS — I50.33 ACUTE ON CHRONIC DIASTOLIC HEART FAILURE: Primary | ICD-10-CM

## 2024-07-31 DIAGNOSIS — R06.02 SHORTNESS OF BREATH: ICD-10-CM

## 2024-07-31 DIAGNOSIS — R00.2 PALPITATIONS: ICD-10-CM

## 2024-07-31 DIAGNOSIS — I10 ESSENTIAL HYPERTENSION: Chronic | ICD-10-CM

## 2024-07-31 DIAGNOSIS — R07.9 CHEST PAIN: ICD-10-CM

## 2024-07-31 DIAGNOSIS — I50.32 CHRONIC DIASTOLIC HEART FAILURE: Chronic | ICD-10-CM

## 2024-07-31 DIAGNOSIS — R79.89 ELEVATED TROPONIN: ICD-10-CM

## 2024-07-31 DIAGNOSIS — I34.2 MITRAL VALVE STENOSIS, NON-RHEUMATIC: ICD-10-CM

## 2024-07-31 PROBLEM — I16.0 HYPERTENSIVE URGENCY: Status: ACTIVE | Noted: 2020-10-14

## 2024-07-31 LAB
ALBUMIN SERPL BCP-MCNC: 4.9 G/DL (ref 3.5–5.2)
ALP SERPL-CCNC: 105 U/L (ref 55–135)
ALT SERPL W/O P-5'-P-CCNC: 16 U/L (ref 10–44)
ANION GAP SERPL CALC-SCNC: 10 MMOL/L (ref 8–16)
AST SERPL-CCNC: 22 U/L (ref 10–40)
BASOPHILS # BLD AUTO: 0.03 K/UL (ref 0–0.2)
BASOPHILS NFR BLD: 0.4 % (ref 0–1.9)
BILIRUB SERPL-MCNC: 0.9 MG/DL (ref 0.1–1)
BNP SERPL-MCNC: 417 PG/ML (ref 0–99)
BUN SERPL-MCNC: 26 MG/DL (ref 8–23)
CALCIUM SERPL-MCNC: 10.1 MG/DL (ref 8.7–10.5)
CHLORIDE SERPL-SCNC: 104 MMOL/L (ref 95–110)
CO2 SERPL-SCNC: 26 MMOL/L (ref 23–29)
CREAT SERPL-MCNC: 1.1 MG/DL (ref 0.5–1.4)
D DIMER PPP IA.FEU-MCNC: 1.2 MG/L FEU (ref 0–0.49)
DIFFERENTIAL METHOD BLD: ABNORMAL
EOSINOPHIL # BLD AUTO: 0.2 K/UL (ref 0–0.5)
EOSINOPHIL NFR BLD: 2.4 % (ref 0–8)
ERYTHROCYTE [DISTWIDTH] IN BLOOD BY AUTOMATED COUNT: 12.5 % (ref 11.5–14.5)
EST. GFR  (NO RACE VARIABLE): 49.5 ML/MIN/1.73 M^2
GLUCOSE SERPL-MCNC: 105 MG/DL (ref 70–110)
HCT VFR BLD AUTO: 48.9 % (ref 37–48.5)
HGB BLD-MCNC: 16.3 G/DL (ref 12–16)
IMM GRANULOCYTES # BLD AUTO: 0.02 K/UL (ref 0–0.04)
IMM GRANULOCYTES NFR BLD AUTO: 0.3 % (ref 0–0.5)
LYMPHOCYTES # BLD AUTO: 1.3 K/UL (ref 1–4.8)
LYMPHOCYTES NFR BLD: 18.3 % (ref 18–48)
MAGNESIUM SERPL-MCNC: 2 MG/DL (ref 1.6–2.6)
MCH RBC QN AUTO: 30.8 PG (ref 27–31)
MCHC RBC AUTO-ENTMCNC: 33.3 G/DL (ref 32–36)
MCV RBC AUTO: 92 FL (ref 82–98)
MONOCYTES # BLD AUTO: 0.6 K/UL (ref 0.3–1)
MONOCYTES NFR BLD: 7.8 % (ref 4–15)
NEUTROPHILS # BLD AUTO: 5.1 K/UL (ref 1.8–7.7)
NEUTROPHILS NFR BLD: 70.8 % (ref 38–73)
NRBC BLD-RTO: 0 /100 WBC
OHS QRS DURATION: 130 MS
OHS QTC CALCULATION: 467 MS
PLATELET # BLD AUTO: 143 K/UL (ref 150–450)
PMV BLD AUTO: 10.2 FL (ref 9.2–12.9)
POTASSIUM SERPL-SCNC: 4 MMOL/L (ref 3.5–5.1)
PROT SERPL-MCNC: 8 G/DL (ref 6–8.4)
RBC # BLD AUTO: 5.3 M/UL (ref 4–5.4)
SODIUM SERPL-SCNC: 140 MMOL/L (ref 136–145)
TROPONIN I SERPL HS-MCNC: 10.8 PG/ML (ref 0–14.9)
TROPONIN I SERPL HS-MCNC: 18.1 PG/ML (ref 0–14.9)
WBC # BLD AUTO: 7.22 K/UL (ref 3.9–12.7)

## 2024-07-31 PROCEDURE — 25500020 PHARM REV CODE 255: Performed by: INTERNAL MEDICINE

## 2024-07-31 PROCEDURE — 63600175 PHARM REV CODE 636 W HCPCS: Performed by: INTERNAL MEDICINE

## 2024-07-31 PROCEDURE — 80053 COMPREHEN METABOLIC PANEL: CPT | Performed by: EMERGENCY MEDICINE

## 2024-07-31 PROCEDURE — 85379 FIBRIN DEGRADATION QUANT: CPT | Performed by: EMERGENCY MEDICINE

## 2024-07-31 PROCEDURE — 83880 ASSAY OF NATRIURETIC PEPTIDE: CPT | Performed by: EMERGENCY MEDICINE

## 2024-07-31 PROCEDURE — 93010 ELECTROCARDIOGRAM REPORT: CPT | Mod: ,,, | Performed by: INTERNAL MEDICINE

## 2024-07-31 PROCEDURE — 25000003 PHARM REV CODE 250: Performed by: INTERNAL MEDICINE

## 2024-07-31 PROCEDURE — 85025 COMPLETE CBC W/AUTO DIFF WBC: CPT | Performed by: EMERGENCY MEDICINE

## 2024-07-31 PROCEDURE — G0378 HOSPITAL OBSERVATION PER HR: HCPCS

## 2024-07-31 PROCEDURE — 83735 ASSAY OF MAGNESIUM: CPT | Performed by: EMERGENCY MEDICINE

## 2024-07-31 PROCEDURE — 96374 THER/PROPH/DIAG INJ IV PUSH: CPT

## 2024-07-31 PROCEDURE — 25000003 PHARM REV CODE 250: Performed by: EMERGENCY MEDICINE

## 2024-07-31 PROCEDURE — 84484 ASSAY OF TROPONIN QUANT: CPT | Performed by: EMERGENCY MEDICINE

## 2024-07-31 PROCEDURE — 93005 ELECTROCARDIOGRAM TRACING: CPT | Performed by: INTERNAL MEDICINE

## 2024-07-31 PROCEDURE — 96361 HYDRATE IV INFUSION ADD-ON: CPT

## 2024-07-31 PROCEDURE — 99285 EMERGENCY DEPT VISIT HI MDM: CPT | Mod: 25

## 2024-07-31 RX ORDER — IBUPROFEN 200 MG
24 TABLET ORAL
Status: DISCONTINUED | OUTPATIENT
Start: 2024-07-31 | End: 2024-07-31

## 2024-07-31 RX ORDER — ATORVASTATIN CALCIUM 20 MG/1
20 TABLET, FILM COATED ORAL NIGHTLY
Status: DISCONTINUED | OUTPATIENT
Start: 2024-07-31 | End: 2024-08-02 | Stop reason: HOSPADM

## 2024-07-31 RX ORDER — FUROSEMIDE 10 MG/ML
20 INJECTION INTRAMUSCULAR; INTRAVENOUS ONCE
Status: COMPLETED | OUTPATIENT
Start: 2024-07-31 | End: 2024-07-31

## 2024-07-31 RX ORDER — SODIUM CHLORIDE 0.9 % (FLUSH) 0.9 %
10 SYRINGE (ML) INJECTION EVERY 12 HOURS PRN
Status: DISCONTINUED | OUTPATIENT
Start: 2024-07-31 | End: 2024-08-02 | Stop reason: HOSPADM

## 2024-07-31 RX ORDER — NALOXONE HCL 0.4 MG/ML
0.02 VIAL (ML) INJECTION
Status: DISCONTINUED | OUTPATIENT
Start: 2024-07-31 | End: 2024-07-31

## 2024-07-31 RX ORDER — ISOSORBIDE DINITRATE 5 MG/1
5 TABLET ORAL 3 TIMES DAILY
Status: DISCONTINUED | OUTPATIENT
Start: 2024-07-31 | End: 2024-07-31

## 2024-07-31 RX ORDER — ASPIRIN 81 MG/1
81 TABLET ORAL EVERY MORNING
Status: DISCONTINUED | OUTPATIENT
Start: 2024-08-01 | End: 2024-08-02 | Stop reason: HOSPADM

## 2024-07-31 RX ORDER — FUROSEMIDE 10 MG/ML
20 INJECTION INTRAMUSCULAR; INTRAVENOUS EVERY MORNING
Status: DISCONTINUED | OUTPATIENT
Start: 2024-08-01 | End: 2024-08-02 | Stop reason: HOSPADM

## 2024-07-31 RX ORDER — FUROSEMIDE 10 MG/ML
20 INJECTION INTRAMUSCULAR; INTRAVENOUS ONCE
Status: DISCONTINUED | OUTPATIENT
Start: 2024-07-31 | End: 2024-07-31

## 2024-07-31 RX ORDER — ISOSORBIDE DINITRATE 5 MG/1
5 TABLET ORAL 3 TIMES DAILY
Status: ON HOLD | COMMUNITY
Start: 2024-01-03 | End: 2024-08-02 | Stop reason: HOSPADM

## 2024-07-31 RX ORDER — GLUCAGON 1 MG
1 KIT INJECTION
Status: DISCONTINUED | OUTPATIENT
Start: 2024-07-31 | End: 2024-07-31

## 2024-07-31 RX ORDER — ISOSORBIDE DINITRATE 10 MG/1
10 TABLET ORAL EVERY 12 HOURS
Status: DISCONTINUED | OUTPATIENT
Start: 2024-07-31 | End: 2024-08-02 | Stop reason: HOSPADM

## 2024-07-31 RX ORDER — FUROSEMIDE 20 MG/1
20 TABLET ORAL 2 TIMES DAILY
Status: DISCONTINUED | OUTPATIENT
Start: 2024-07-31 | End: 2024-07-31

## 2024-07-31 RX ORDER — METOPROLOL SUCCINATE 25 MG/1
25 TABLET, EXTENDED RELEASE ORAL NIGHTLY
Status: DISCONTINUED | OUTPATIENT
Start: 2024-07-31 | End: 2024-08-02 | Stop reason: HOSPADM

## 2024-07-31 RX ORDER — NITROGLYCERIN 0.4 MG/1
0.4 TABLET SUBLINGUAL EVERY 5 MIN PRN
Status: DISCONTINUED | OUTPATIENT
Start: 2024-07-31 | End: 2024-08-02 | Stop reason: HOSPADM

## 2024-07-31 RX ORDER — IBUPROFEN 200 MG
16 TABLET ORAL
Status: DISCONTINUED | OUTPATIENT
Start: 2024-07-31 | End: 2024-07-31

## 2024-07-31 RX ADMIN — ISOSORBIDE DINITRATE 10 MG: 10 TABLET ORAL at 08:07

## 2024-07-31 RX ADMIN — ATORVASTATIN CALCIUM 20 MG: 20 TABLET, FILM COATED ORAL at 08:07

## 2024-07-31 RX ADMIN — FUROSEMIDE 20 MG: 10 INJECTION, SOLUTION INTRAMUSCULAR; INTRAVENOUS at 05:07

## 2024-07-31 RX ADMIN — SODIUM CHLORIDE 500 ML: 9 INJECTION, SOLUTION INTRAVENOUS at 01:07

## 2024-07-31 RX ADMIN — ISOSORBIDE DINITRATE 5 MG: 5 TABLET ORAL at 05:07

## 2024-07-31 RX ADMIN — IOHEXOL 100 ML: 350 INJECTION, SOLUTION INTRAVENOUS at 05:07

## 2024-07-31 RX ADMIN — METOPROLOL SUCCINATE 25 MG: 25 TABLET, EXTENDED RELEASE ORAL at 08:07

## 2024-08-01 ENCOUNTER — CLINICAL SUPPORT (OUTPATIENT)
Dept: CARDIOLOGY | Facility: HOSPITAL | Age: 84
End: 2024-08-01
Attending: INTERNAL MEDICINE
Payer: MEDICARE

## 2024-08-01 VITALS — WEIGHT: 148.13 LBS | HEIGHT: 57 IN | BODY MASS INDEX: 31.96 KG/M2

## 2024-08-01 LAB
AORTIC ROOT ANNULUS: 1.7 CM
APICAL FOUR CHAMBER EJECTION FRACTION: 56 %
APICAL TWO CHAMBER EJECTION FRACTION: 59 %
AV INDEX (PROSTH): 0.56
AV MEAN GRADIENT: 13 MMHG
AV PEAK GRADIENT: 25 MMHG
AV VALVE AREA BY VELOCITY RATIO: 1.17 CM²
AV VALVE AREA: 1.12 CM²
AV VELOCITY RATIO: 0.58
BSA FOR ECHO PROCEDURE: 1.64 M2
CV ECHO LV RWT: 0.65 CM
DOP CALC AO PEAK VEL: 2.49 M/S
DOP CALC AO VTI: 47.5 CM
DOP CALC LVOT AREA: 2 CM2
DOP CALC LVOT DIAMETER: 1.6 CM
DOP CALC LVOT PEAK VEL: 1.45 M/S
DOP CALC LVOT STROKE VOLUME: 53.25 CM3
DOP CALC MV VTI: 63.1 CM
DOP CALCLVOT PEAK VEL VTI: 26.5 CM
E WAVE DECELERATION TIME: 271 MSEC
E/A RATIO: 0.54
E/E' RATIO: 19.23 M/S
ECHO LV POSTERIOR WALL: 1.2 CM (ref 0.6–1.1)
FRACTIONAL SHORTENING: 32 % (ref 28–44)
INTERVENTRICULAR SEPTUM: 1.3 CM (ref 0.6–1.1)
IVC DIAMETER: 1 CM
LEFT INTERNAL DIMENSION IN SYSTOLE: 2.5 CM (ref 2.1–4)
LEFT VENTRICLE DIASTOLIC VOLUME INDEX: 36.77 ML/M2
LEFT VENTRICLE DIASTOLIC VOLUME: 58.1 ML
LEFT VENTRICLE END DIASTOLIC VOLUME APICAL 2 CHAMBER: 46.3 ML
LEFT VENTRICLE END DIASTOLIC VOLUME APICAL 4 CHAMBER: 50.4 ML
LEFT VENTRICLE MASS INDEX: 99 G/M2
LEFT VENTRICLE SYSTOLIC VOLUME INDEX: 14.1 ML/M2
LEFT VENTRICLE SYSTOLIC VOLUME: 22.3 ML
LEFT VENTRICULAR INTERNAL DIMENSION IN DIASTOLE: 3.7 CM (ref 3.5–6)
LEFT VENTRICULAR MASS: 156.75 G
LV LATERAL E/E' RATIO: 15.63 M/S
LV SEPTAL E/E' RATIO: 25 M/S
LVED V (TEICH): 58.1 ML
LVES V (TEICH): 22.3 ML
LVOT MG: 5 MMHG
LVOT MV: 0.93 CM/S
MV MEAN GRADIENT: 8 MMHG
MV PEAK A VEL: 2.32 M/S
MV PEAK E VEL: 1.25 M/S
MV PEAK GRADIENT: 23 MMHG
MV STENOSIS PRESSURE HALF TIME: 126 MS
MV VALVE AREA BY CONTINUITY EQUATION: 0.84 CM2
MV VALVE AREA P 1/2 METHOD: 1.75 CM2
OHS LV EJECTION FRACTION SIMPSONS BIPLANE MOD: 56 %
PISA MRMAX VEL: 3.84 M/S
PISA TR MAX VEL: 1.65 M/S
PV MV: 0.57 M/S
PV PEAK GRADIENT: 3 MMHG
PV PEAK VELOCITY: 0.84 M/S
RA PRESSURE ESTIMATED: 3 MMHG
RV TB RVSP: 5 MMHG
RV TISSUE DOPPLER FREE WALL SYSTOLIC VELOCITY 1 (APICAL 4 CHAMBER VIEW): 11.5 CM/S
TDI LATERAL: 0.08 M/S
TDI SEPTAL: 0.05 M/S
TDI: 0.07 M/S
TR MAX PG: 11 MMHG
TRICUSPID ANNULAR PLANE SYSTOLIC EXCURSION: 2.06 CM
TV REST PULMONARY ARTERY PRESSURE: 14 MMHG
Z-SCORE OF LEFT VENTRICULAR DIMENSION IN END DIASTOLE: -2.01
Z-SCORE OF LEFT VENTRICULAR DIMENSION IN END SYSTOLE: -0.92

## 2024-08-01 PROCEDURE — 63600175 PHARM REV CODE 636 W HCPCS: Performed by: INTERNAL MEDICINE

## 2024-08-01 PROCEDURE — 93306 TTE W/DOPPLER COMPLETE: CPT

## 2024-08-01 PROCEDURE — 96372 THER/PROPH/DIAG INJ SC/IM: CPT | Performed by: INTERNAL MEDICINE

## 2024-08-01 PROCEDURE — 25000003 PHARM REV CODE 250: Performed by: INTERNAL MEDICINE

## 2024-08-01 PROCEDURE — 93306 TTE W/DOPPLER COMPLETE: CPT | Mod: 26,,, | Performed by: INTERNAL MEDICINE

## 2024-08-01 PROCEDURE — 21400001 HC TELEMETRY ROOM

## 2024-08-01 RX ORDER — ENOXAPARIN SODIUM 100 MG/ML
40 INJECTION SUBCUTANEOUS EVERY 24 HOURS
Status: DISCONTINUED | OUTPATIENT
Start: 2024-08-01 | End: 2024-08-02 | Stop reason: HOSPADM

## 2024-08-01 RX ADMIN — ISOSORBIDE DINITRATE 10 MG: 10 TABLET ORAL at 09:08

## 2024-08-01 RX ADMIN — ATORVASTATIN CALCIUM 20 MG: 20 TABLET, FILM COATED ORAL at 09:08

## 2024-08-01 RX ADMIN — FUROSEMIDE 20 MG: 10 INJECTION, SOLUTION INTRAMUSCULAR; INTRAVENOUS at 06:08

## 2024-08-01 RX ADMIN — ENOXAPARIN SODIUM 40 MG: 40 INJECTION SUBCUTANEOUS at 12:08

## 2024-08-01 RX ADMIN — METOPROLOL SUCCINATE 25 MG: 25 TABLET, EXTENDED RELEASE ORAL at 09:08

## 2024-08-01 RX ADMIN — ASPIRIN 81 MG: 81 TABLET, COATED ORAL at 06:08

## 2024-08-01 RX ADMIN — ENOXAPARIN SODIUM 40 MG: 40 INJECTION SUBCUTANEOUS at 05:08

## 2024-08-02 VITALS
HEIGHT: 57 IN | OXYGEN SATURATION: 96 % | TEMPERATURE: 98 F | SYSTOLIC BLOOD PRESSURE: 126 MMHG | WEIGHT: 149.69 LBS | RESPIRATION RATE: 17 BRPM | DIASTOLIC BLOOD PRESSURE: 68 MMHG | BODY MASS INDEX: 32.29 KG/M2 | HEART RATE: 75 BPM

## 2024-08-02 LAB
OHS QRS DURATION: 146 MS
OHS QTC CALCULATION: 508 MS

## 2024-08-02 PROCEDURE — 63600175 PHARM REV CODE 636 W HCPCS: Performed by: INTERNAL MEDICINE

## 2024-08-02 PROCEDURE — 25000003 PHARM REV CODE 250: Performed by: INTERNAL MEDICINE

## 2024-08-02 RX ORDER — ISOSORBIDE DINITRATE 10 MG/1
10 TABLET ORAL EVERY 12 HOURS
Qty: 60 TABLET | Refills: 11 | Status: SHIPPED | OUTPATIENT
Start: 2024-08-02 | End: 2025-08-02

## 2024-08-02 RX ADMIN — FUROSEMIDE 20 MG: 10 INJECTION, SOLUTION INTRAMUSCULAR; INTRAVENOUS at 06:08

## 2024-08-02 RX ADMIN — ASPIRIN 81 MG: 81 TABLET, COATED ORAL at 06:08

## 2024-08-02 RX ADMIN — ISOSORBIDE DINITRATE 10 MG: 10 TABLET ORAL at 08:08

## 2024-08-07 ENCOUNTER — LAB VISIT (OUTPATIENT)
Dept: LAB | Facility: HOSPITAL | Age: 84
End: 2024-08-07
Attending: INTERNAL MEDICINE
Payer: MEDICARE

## 2024-08-07 DIAGNOSIS — E83.42 HYPOMAGNESEMIA: ICD-10-CM

## 2024-08-07 DIAGNOSIS — R10.11 ABDOMINAL PAIN, RIGHT UPPER QUADRANT: Primary | ICD-10-CM

## 2024-08-07 LAB
ALBUMIN SERPL BCP-MCNC: 4 G/DL (ref 3.5–5.2)
ALP SERPL-CCNC: 89 U/L (ref 55–135)
ALT SERPL W/O P-5'-P-CCNC: 13 U/L (ref 10–44)
ANION GAP SERPL CALC-SCNC: 7 MMOL/L (ref 8–16)
AST SERPL-CCNC: 17 U/L (ref 10–40)
BILIRUB SERPL-MCNC: 0.7 MG/DL (ref 0.1–1)
BNP SERPL-MCNC: 267 PG/ML (ref 0–99)
BUN SERPL-MCNC: 35 MG/DL (ref 8–23)
CALCIUM SERPL-MCNC: 9.1 MG/DL (ref 8.7–10.5)
CHLORIDE SERPL-SCNC: 107 MMOL/L (ref 95–110)
CO2 SERPL-SCNC: 26 MMOL/L (ref 23–29)
CREAT SERPL-MCNC: 1.4 MG/DL (ref 0.5–1.4)
EST. GFR  (NO RACE VARIABLE): 37.1 ML/MIN/1.73 M^2
GLUCOSE SERPL-MCNC: 163 MG/DL (ref 70–110)
MAGNESIUM SERPL-MCNC: 2.1 MG/DL (ref 1.6–2.6)
POTASSIUM SERPL-SCNC: 4.6 MMOL/L (ref 3.5–5.1)
PROT SERPL-MCNC: 6.6 G/DL (ref 6–8.4)
SODIUM SERPL-SCNC: 140 MMOL/L (ref 136–145)

## 2024-08-07 PROCEDURE — 80053 COMPREHEN METABOLIC PANEL: CPT | Performed by: INTERNAL MEDICINE

## 2024-08-07 PROCEDURE — 83880 ASSAY OF NATRIURETIC PEPTIDE: CPT | Performed by: INTERNAL MEDICINE

## 2024-08-07 PROCEDURE — 36415 COLL VENOUS BLD VENIPUNCTURE: CPT | Performed by: INTERNAL MEDICINE

## 2024-08-07 PROCEDURE — 83735 ASSAY OF MAGNESIUM: CPT | Performed by: INTERNAL MEDICINE

## 2024-09-12 NOTE — Clinical Note
10 ml injected throughout the case. 190 mL total wasted during the case. 200 mL total used in the case. 
A dressing is applied to the incision. After glue dries 
All monitoring and medications by anesthesia
An incision was made at the right  upper chest.
Angiography of the  left femoral artery performed to evaluate for the placement of a closure device. 
Angiography of the  right femoral artery performed to evaluate for the placement of a closure device. 
Blln Zmed Ii 16 X 5 was inflated in the aortic valve. 
Blln Zmed Ii 16 X 5 was inserted to the aortic valve. 
Blln Zmed Ii 16 X 5 was removed from the aortic valve. 
Catheter is inserted into the LFA. 
Catheter is inserted into the LFA. Angiography performed of the LFA. AND REMOVED
Catheter is inserted into the aorta. 
Catheter is inserted into the left ventricle. 
Catheter is removed from the aorta. 
Catheter is removed from the left ventricle. 
Closure device deployed in the left femoral artery. 
Closure device deployed in the right femoral artery. 
Closure device inserted into the left femoral artery. 
Closure device inserted into the right femoral artery. 
Defib pads placed on patient's chest and back.  
Defib pads placed on patient's chest and back.  
Device removed from the right femoral artery. 
Elbows and knees padded, heels floated, warming blanket, and safety strap applied.  
Generator Pocket made at the right  upper chest  with blunt dissection, electrocautery and sharp dissection.
Generator inserted into pocket in the right upper chest.
Generator pocket irrigated with  Vancomycyin 1G/500mL NS.
Generator pocket sutured closed .
Generator sutured into the pocket the right upper chest.
Grounding pads placed on patient's left thigh
ID band present and verified. Family is in the lobby. 
ID band present and verified. Family is in the lobby. 
Lead connected to generator in the  right upper chest.
Lead connected to generator in the  right upper chest.
Lead inserted, under fluorscopic guidance, into the right atrium.
Lead inserted, under fluorscopic guidance, into the right ventricle.
Lead secured in the atrium.
Lead secured in the right ventricle.
Lead sutured in place in the  right upper chest.
Lead sutured in place in the  right upper chest.
Lead thresholds tested in the right atrium.
Lead thresholds tested in the right ventricle.
Pacer pacing at 180 BPM 10 mA 0.8 mV.
Pacer pacing at 180 BPM 10 mA 0.8 mV.
Patient's chest was re-scrubbed
Percutaneous stick to the left groin. 
Phone report given to ICU RN by anesthesia
Prepped: right chest. Prepped with: ChloraPrep and Ioban. The patient was draped. 
Pt's daughter Benita updated telephonically
Right arm sling applied. 
TVP removed
The dilator is inserted into the  left femoral artery and left femoral artery. 
The sheath is inserted into the left femoral artery. 
The sheath is inserted into the right axillary vein. 
The sheath is inserted into the right axillary vein. 
The sheath is inserted into the right femoral artery. 
The sheath is inserted into the right femoral artery. 
The sheath is inserted into the right internal jugular vein. 
The sheath is removed from the left femoral artery. 
The sheath is removed from the left femoral artery. 
The sheath is removed from the right axillary vein. 
The sheath is removed from the right axillary vein. 
The sheath is removed from the right femoral artery. 
The sheath is sutured in place. 
The skin at the right upper chest was closed with dermal adhesive.
The wire is inserted in the  LEFT FEMORAL ARTERY AND REMOVED.
The wire is inserted in the  LFA.
The wire is inserted in the  LV.
The wire is inserted in the  LV.
The wire is inserted in the  aorta.
The wire is removed from the  LFA.
The wire is removed from the  LV.
The wire is removed from the  aorta.
The wire is removed from the  aorta.
Transvenous pacing lead inserted into the RV.
Transvenous pacing lead secured in place in the RV.
Valve Corevalve Tav 23mm was deployed in the aortic valve. 
Valve Corevalve Tav 23mm was inserted to the aortic valve. 
Verified procedural consent signed  blood consent signed and complete H&P in chart. 
Verified procedural consent signed and complete H&P in chart. 
Wire inserted in the right axillary vein.
Wire inserted in the right axillary vein.
Wire removed from the  right axillary vein.
Wire removed from the  right axillary vein.
dry, intact, no bleeding and no hematoma. L CFA perclose, gauze, tegaderm   
dry, intact, no bleeding and no hematoma. R CFA perclose, gauze, tegaderm 
no

## 2024-09-14 ENCOUNTER — CLINICAL SUPPORT (OUTPATIENT)
Dept: CARDIOLOGY | Facility: HOSPITAL | Age: 84
End: 2024-09-14
Attending: INTERNAL MEDICINE
Payer: MEDICARE

## 2024-09-14 ENCOUNTER — CLINICAL SUPPORT (OUTPATIENT)
Dept: CARDIOLOGY | Facility: HOSPITAL | Age: 84
End: 2024-09-14

## 2024-09-14 DIAGNOSIS — Z95.0 PRESENCE OF CARDIAC PACEMAKER: ICD-10-CM

## 2024-09-14 DIAGNOSIS — I50.9 HEART FAILURE, UNSPECIFIED: ICD-10-CM

## 2024-09-14 PROCEDURE — 93294 REM INTERROG EVL PM/LDLS PM: CPT | Mod: ,,, | Performed by: INTERNAL MEDICINE

## 2024-10-15 LAB
OHS CV AF BURDEN PERCENT: < 1
OHS CV DC REMOTE DEVICE TYPE: NORMAL
OHS CV ICD SHOCK: NO
OHS CV RV PACING PERCENT: 99 %

## 2024-11-08 ENCOUNTER — LAB VISIT (OUTPATIENT)
Dept: LAB | Facility: HOSPITAL | Age: 84
End: 2024-11-08
Attending: INTERNAL MEDICINE
Payer: MEDICARE

## 2024-11-08 DIAGNOSIS — I10 ESSENTIAL HYPERTENSION, MALIGNANT: Primary | ICD-10-CM

## 2024-11-08 DIAGNOSIS — D50.0 IRON DEFICIENCY ANEMIA SECONDARY TO BLOOD LOSS (CHRONIC): ICD-10-CM

## 2024-11-08 DIAGNOSIS — E11.9 DIABETES MELLITUS WITHOUT COMPLICATION: ICD-10-CM

## 2024-11-08 DIAGNOSIS — E78.2 MIXED HYPERLIPIDEMIA: ICD-10-CM

## 2024-11-08 DIAGNOSIS — Z79.899 NEED FOR PROPHYLACTIC CHEMOTHERAPY: ICD-10-CM

## 2024-11-08 DIAGNOSIS — E03.4 IDIOPATHIC ATROPHIC HYPOTHYROIDISM: ICD-10-CM

## 2024-11-08 LAB
ALBUMIN SERPL BCP-MCNC: 4.2 G/DL (ref 3.5–5.2)
ALBUMIN/CREAT UR: NORMAL UG/MG (ref 0–30)
ALP SERPL-CCNC: 82 U/L (ref 55–135)
ALT SERPL W/O P-5'-P-CCNC: 9 U/L (ref 10–44)
ANION GAP SERPL CALC-SCNC: 6 MMOL/L (ref 8–16)
AST SERPL-CCNC: 17 U/L (ref 10–40)
BASOPHILS # BLD AUTO: 0.04 K/UL (ref 0–0.2)
BASOPHILS NFR BLD: 0.7 % (ref 0–1.9)
BILIRUB DIRECT SERPL-MCNC: 0.2 MG/DL (ref 0.1–0.3)
BILIRUB SERPL-MCNC: 0.9 MG/DL (ref 0.1–1)
BUN SERPL-MCNC: 26 MG/DL (ref 8–23)
CALCIUM SERPL-MCNC: 9.2 MG/DL (ref 8.7–10.5)
CHLORIDE SERPL-SCNC: 107 MMOL/L (ref 95–110)
CHOLEST SERPL-MCNC: 134 MG/DL (ref 120–199)
CHOLEST/HDLC SERPL: 2.2 {RATIO} (ref 2–5)
CO2 SERPL-SCNC: 26 MMOL/L (ref 23–29)
CREAT SERPL-MCNC: 1.4 MG/DL (ref 0.5–1.4)
CREAT UR-MCNC: 29.3 MG/DL (ref 15–325)
DIFFERENTIAL METHOD BLD: ABNORMAL
EOSINOPHIL # BLD AUTO: 0.2 K/UL (ref 0–0.5)
EOSINOPHIL NFR BLD: 4.2 % (ref 0–8)
ERYTHROCYTE [DISTWIDTH] IN BLOOD BY AUTOMATED COUNT: 12.7 % (ref 11.5–14.5)
EST. GFR  (NO RACE VARIABLE): 37.1 ML/MIN/1.73 M^2
ESTIMATED AVG GLUCOSE: 131 MG/DL (ref 68–131)
GLUCOSE SERPL-MCNC: 116 MG/DL (ref 70–110)
HBA1C MFR BLD: 6.2 % (ref 4.5–6.2)
HCT VFR BLD AUTO: 44.8 % (ref 37–48.5)
HDLC SERPL-MCNC: 61 MG/DL (ref 40–75)
HDLC SERPL: 45.5 % (ref 20–50)
HGB BLD-MCNC: 15.1 G/DL (ref 12–16)
IMM GRANULOCYTES # BLD AUTO: 0.01 K/UL (ref 0–0.04)
IMM GRANULOCYTES NFR BLD AUTO: 0.2 % (ref 0–0.5)
LDLC SERPL CALC-MCNC: 50 MG/DL (ref 63–159)
LYMPHOCYTES # BLD AUTO: 1.7 K/UL (ref 1–4.8)
LYMPHOCYTES NFR BLD: 29.9 % (ref 18–48)
MCH RBC QN AUTO: 30.7 PG (ref 27–31)
MCHC RBC AUTO-ENTMCNC: 33.7 G/DL (ref 32–36)
MCV RBC AUTO: 91 FL (ref 82–98)
MICROALBUMIN UR DL<=1MG/L-MCNC: <7 UG/ML
MONOCYTES # BLD AUTO: 0.6 K/UL (ref 0.3–1)
MONOCYTES NFR BLD: 9.8 % (ref 4–15)
NEUTROPHILS # BLD AUTO: 3.2 K/UL (ref 1.8–7.7)
NEUTROPHILS NFR BLD: 55.2 % (ref 38–73)
NONHDLC SERPL-MCNC: 73 MG/DL
NRBC BLD-RTO: 0 /100 WBC
PLATELET # BLD AUTO: 141 K/UL (ref 150–450)
PMV BLD AUTO: 9.9 FL (ref 9.2–12.9)
POTASSIUM SERPL-SCNC: 4.9 MMOL/L (ref 3.5–5.1)
PROT SERPL-MCNC: 6.7 G/DL (ref 6–8.4)
RBC # BLD AUTO: 4.92 M/UL (ref 4–5.4)
SODIUM SERPL-SCNC: 139 MMOL/L (ref 136–145)
T4 FREE SERPL-MCNC: 0.91 NG/DL (ref 0.71–1.51)
TRIGL SERPL-MCNC: 115 MG/DL (ref 30–150)
TSH SERPL DL<=0.005 MIU/L-ACNC: 3.58 UIU/ML (ref 0.34–5.6)
WBC # BLD AUTO: 5.72 K/UL (ref 3.9–12.7)

## 2024-11-08 PROCEDURE — 80061 LIPID PANEL: CPT | Performed by: INTERNAL MEDICINE

## 2024-11-08 PROCEDURE — 84439 ASSAY OF FREE THYROXINE: CPT | Performed by: INTERNAL MEDICINE

## 2024-11-08 PROCEDURE — 80048 BASIC METABOLIC PNL TOTAL CA: CPT | Performed by: INTERNAL MEDICINE

## 2024-11-08 PROCEDURE — 82043 UR ALBUMIN QUANTITATIVE: CPT | Performed by: INTERNAL MEDICINE

## 2024-11-08 PROCEDURE — 84443 ASSAY THYROID STIM HORMONE: CPT | Performed by: INTERNAL MEDICINE

## 2024-11-08 PROCEDURE — 80076 HEPATIC FUNCTION PANEL: CPT | Performed by: INTERNAL MEDICINE

## 2024-11-08 PROCEDURE — 36415 COLL VENOUS BLD VENIPUNCTURE: CPT | Performed by: INTERNAL MEDICINE

## 2024-11-08 PROCEDURE — 83036 HEMOGLOBIN GLYCOSYLATED A1C: CPT | Performed by: INTERNAL MEDICINE

## 2024-11-08 PROCEDURE — 85025 COMPLETE CBC W/AUTO DIFF WBC: CPT | Performed by: INTERNAL MEDICINE

## 2024-12-14 ENCOUNTER — CLINICAL SUPPORT (OUTPATIENT)
Dept: CARDIOLOGY | Facility: HOSPITAL | Age: 84
End: 2024-12-14
Attending: INTERNAL MEDICINE
Payer: MEDICARE

## 2024-12-14 ENCOUNTER — CLINICAL SUPPORT (OUTPATIENT)
Dept: CARDIOLOGY | Facility: HOSPITAL | Age: 84
End: 2024-12-14
Payer: MEDICARE

## 2024-12-14 DIAGNOSIS — Z95.0 PRESENCE OF CARDIAC PACEMAKER: ICD-10-CM

## 2024-12-14 DIAGNOSIS — I50.9 HEART FAILURE, UNSPECIFIED: ICD-10-CM

## 2024-12-14 PROCEDURE — 93294 REM INTERROG EVL PM/LDLS PM: CPT | Mod: ,,, | Performed by: INTERNAL MEDICINE

## 2024-12-23 RX ORDER — CEFUROXIME AXETIL 250 MG/1
250 TABLET ORAL EVERY 12 HOURS
Qty: 14 TABLET | Refills: 0 | Status: SHIPPED | OUTPATIENT
Start: 2024-12-23 | End: 2024-12-30

## 2024-12-23 RX ORDER — PREDNISONE 5 MG/1
5 TABLET ORAL 2 TIMES DAILY
Qty: 14 TABLET | Refills: 0 | Status: SHIPPED | OUTPATIENT
Start: 2024-12-23

## 2024-12-23 RX ORDER — DOXYCYCLINE 100 MG/1
100 CAPSULE ORAL EVERY 12 HOURS
Qty: 14 CAPSULE | Refills: 0 | Status: SHIPPED | OUTPATIENT
Start: 2024-12-23 | End: 2024-12-23 | Stop reason: CLARIF

## 2025-01-10 LAB
OHS CV AF BURDEN PERCENT: < 1
OHS CV DC REMOTE DEVICE TYPE: NORMAL
OHS CV RV PACING PERCENT: 99 %

## 2025-02-01 RX ORDER — ISOSORBIDE DINITRATE 10 MG/1
10 TABLET ORAL EVERY 12 HOURS
Qty: 180 TABLET | Refills: 12 | Status: SHIPPED | OUTPATIENT
Start: 2024-11-24

## 2025-03-15 ENCOUNTER — CLINICAL SUPPORT (OUTPATIENT)
Dept: CARDIOLOGY | Facility: HOSPITAL | Age: 85
End: 2025-03-15
Attending: INTERNAL MEDICINE
Payer: MEDICARE

## 2025-03-15 ENCOUNTER — CLINICAL SUPPORT (OUTPATIENT)
Dept: CARDIOLOGY | Facility: HOSPITAL | Age: 85
End: 2025-03-15
Payer: MEDICARE

## 2025-03-15 DIAGNOSIS — Z95.0 PRESENCE OF CARDIAC PACEMAKER: ICD-10-CM

## 2025-03-15 DIAGNOSIS — I50.9 HEART FAILURE, UNSPECIFIED: ICD-10-CM

## 2025-03-15 PROCEDURE — 93294 REM INTERROG EVL PM/LDLS PM: CPT | Mod: ,,, | Performed by: INTERNAL MEDICINE

## 2025-03-18 ENCOUNTER — HOSPITAL ENCOUNTER (OUTPATIENT)
Dept: RADIOLOGY | Facility: HOSPITAL | Age: 85
Discharge: HOME | End: 2025-03-18
Attending: INTERNAL MEDICINE
Payer: MEDICARE

## 2025-03-18 ENCOUNTER — HOSPITAL ENCOUNTER (OUTPATIENT)
Dept: PREADMISSION TESTING | Facility: HOSPITAL | Age: 85
Discharge: HOME | End: 2025-03-18
Attending: INTERNAL MEDICINE
Payer: MEDICARE

## 2025-03-18 DIAGNOSIS — R06.02 SOB (SHORTNESS OF BREATH): Primary | ICD-10-CM

## 2025-03-18 DIAGNOSIS — R06.02 SOB (SHORTNESS OF BREATH): ICD-10-CM

## 2025-03-18 DIAGNOSIS — I80.13 PHLEBITIS AND THROMBOPHLEBITIS OF FEMORAL VEIN, BILATERAL: Primary | ICD-10-CM

## 2025-03-18 DIAGNOSIS — R94.31 ABNORMAL ELECTROCARDIOGRAM: ICD-10-CM

## 2025-03-18 DIAGNOSIS — I80.13 PHLEBITIS AND THROMBOPHLEBITIS OF FEMORAL VEIN, BILATERAL: ICD-10-CM

## 2025-03-18 DIAGNOSIS — R94.31 ABNORMAL ELECTROCARDIOGRAM: Primary | ICD-10-CM

## 2025-03-18 LAB
OHS QRS DURATION: 152 MS
OHS QTC CALCULATION: 495 MS

## 2025-03-18 PROCEDURE — 93005 ELECTROCARDIOGRAM TRACING: CPT | Performed by: INTERNAL MEDICINE

## 2025-03-18 PROCEDURE — 71046 X-RAY EXAM CHEST 2 VIEWS: CPT | Mod: TC

## 2025-03-18 PROCEDURE — 71046 X-RAY EXAM CHEST 2 VIEWS: CPT | Mod: 26,,, | Performed by: RADIOLOGY

## 2025-03-18 PROCEDURE — 93010 ELECTROCARDIOGRAM REPORT: CPT | Mod: ,,, | Performed by: INTERNAL MEDICINE

## 2025-03-18 PROCEDURE — 93970 EXTREMITY STUDY: CPT | Mod: 26,,, | Performed by: RADIOLOGY

## 2025-03-18 PROCEDURE — 93970 EXTREMITY STUDY: CPT | Mod: TC

## 2025-03-21 LAB
OHS CV AF BURDEN PERCENT: < 1
OHS CV DC REMOTE DEVICE TYPE: NORMAL
OHS CV RV PACING PERCENT: 99 %

## 2025-04-02 NOTE — PROGRESS NOTES
Subjective:       Patient ID: Roxanne Hernandez is a 84 y.o. female.    Chief Complaint: Post-op Evaluation      HPI:  S/p excision of old prolene suture. Doing well. RENETTA minimal. No fever. No drainage.     Review of Systems    Objective:      Physical Exam    Assessment/Plan:   Infection involving stitch with abscess      S/p excision of old prolene suture with adjacent diseased soft tissue. Path returned stitch abscess. Doing well. RENETTA removed. Follow up PRN

## 2025-05-09 ENCOUNTER — LAB VISIT (OUTPATIENT)
Dept: LAB | Facility: HOSPITAL | Age: 85
End: 2025-05-09
Attending: INTERNAL MEDICINE
Payer: MEDICARE

## 2025-05-09 DIAGNOSIS — I10 ESSENTIAL HYPERTENSION, MALIGNANT: Primary | ICD-10-CM

## 2025-05-09 DIAGNOSIS — D50.0 IRON DEFICIENCY ANEMIA SECONDARY TO BLOOD LOSS (CHRONIC): ICD-10-CM

## 2025-05-09 DIAGNOSIS — E11.9 DIABETES MELLITUS WITHOUT COMPLICATION: ICD-10-CM

## 2025-05-09 DIAGNOSIS — E78.2 MIXED HYPERLIPIDEMIA: ICD-10-CM

## 2025-05-09 DIAGNOSIS — Z79.899 POLYPHARMACY: ICD-10-CM

## 2025-05-09 DIAGNOSIS — E03.4 IDIOPATHIC ATROPHIC HYPOTHYROIDISM: ICD-10-CM

## 2025-05-09 LAB
ABSOLUTE EOSINOPHIL (SMH): 0.24 K/UL
ABSOLUTE MONOCYTE (SMH): 0.61 K/UL (ref 0.3–1)
ABSOLUTE NEUTROPHIL COUNT (SMH): 3.4 K/UL (ref 1.8–7.7)
ALBUMIN SERPL-MCNC: 4 G/DL (ref 3.5–5.2)
ALBUMIN/CREAT UR: 16.3 UG/MG
ALP SERPL-CCNC: 91 UNIT/L (ref 55–135)
ALT SERPL-CCNC: 11 UNIT/L (ref 10–44)
ANION GAP (SMH): 6 MMOL/L (ref 8–16)
AST SERPL-CCNC: 17 UNIT/L (ref 10–40)
BASOPHILS # BLD AUTO: 0.04 K/UL
BASOPHILS NFR BLD AUTO: 0.6 %
BILIRUB DIRECT SERPL-MCNC: 0.1 MG/DL (ref 0.1–0.3)
BILIRUB SERPL-MCNC: 0.8 MG/DL (ref 0.1–1)
BUN SERPL-MCNC: 30 MG/DL (ref 8–23)
CALCIUM SERPL-MCNC: 9.5 MG/DL (ref 8.7–10.5)
CHLORIDE SERPL-SCNC: 107 MMOL/L (ref 95–110)
CHOLEST SERPL-MCNC: 144 MG/DL (ref 120–199)
CHOLEST/HDLC SERPL: 2.7 {RATIO} (ref 2–5)
CO2 SERPL-SCNC: 27 MMOL/L (ref 23–29)
CREAT SERPL-MCNC: 1.3 MG/DL (ref 0.5–1.4)
CREAT UR-MCNC: 55.9 MG/DL (ref 15–325)
EAG (SMH): 128 MG/DL (ref 68–131)
ERYTHROCYTE [DISTWIDTH] IN BLOOD BY AUTOMATED COUNT: 12 % (ref 11.5–14.5)
GFR SERPLBLD CREATININE-BSD FMLA CKD-EPI: 41 ML/MIN/1.73/M2
GLUCOSE SERPL-MCNC: 110 MG/DL (ref 70–110)
HBA1C MFR BLD: 6.1 % (ref 4.5–6.2)
HCT VFR BLD AUTO: 44.8 % (ref 37–48.5)
HDLC SERPL-MCNC: 54 MG/DL (ref 40–75)
HDLC SERPL: 37.5 % (ref 20–50)
HGB BLD-MCNC: 14.7 GM/DL (ref 12–16)
IMM GRANULOCYTES # BLD AUTO: 0.02 K/UL (ref 0–0.04)
IMM GRANULOCYTES NFR BLD AUTO: 0.3 % (ref 0–0.5)
LDLC SERPL CALC-MCNC: 69 MG/DL (ref 63–159)
LYMPHOCYTES # BLD AUTO: 1.93 K/UL (ref 1–4.8)
MCH RBC QN AUTO: 30.8 PG (ref 27–31)
MCHC RBC AUTO-ENTMCNC: 32.8 G/DL (ref 32–36)
MCV RBC AUTO: 94 FL (ref 82–98)
MICROALBUMIN UR-MCNC: 9.1 UG/ML
NONHDLC SERPL-MCNC: 90 MG/DL
NUCLEATED RBC (/100WBC) (SMH): 0 /100 WBC
PLATELET # BLD AUTO: 145 K/UL (ref 150–450)
PMV BLD AUTO: 9.6 FL (ref 9.2–12.9)
POTASSIUM SERPL-SCNC: 4.8 MMOL/L (ref 3.5–5.1)
PROT SERPL-MCNC: 6.8 GM/DL (ref 6–8.4)
RBC # BLD AUTO: 4.77 M/UL (ref 4–5.4)
RELATIVE EOSINOPHIL (SMH): 3.8 % (ref 0–8)
RELATIVE LYMPHOCYTE (SMH): 30.7 % (ref 18–48)
RELATIVE MONOCYTE (SMH): 9.7 % (ref 4–15)
RELATIVE NEUTROPHIL (SMH): 54.9 % (ref 38–73)
SODIUM SERPL-SCNC: 140 MMOL/L (ref 136–145)
T4 FREE SERPL-MCNC: 0.87 NG/DL (ref 0.71–1.51)
TRIGL SERPL-MCNC: 105 MG/DL (ref 30–150)
TSH SERPL-ACNC: 2.62 UIU/ML (ref 0.34–5.6)
WBC # BLD AUTO: 6.28 K/UL (ref 3.9–12.7)

## 2025-05-09 PROCEDURE — 36415 COLL VENOUS BLD VENIPUNCTURE: CPT

## 2025-05-09 PROCEDURE — 80053 COMPREHEN METABOLIC PANEL: CPT

## 2025-05-09 PROCEDURE — 80061 LIPID PANEL: CPT

## 2025-05-09 PROCEDURE — 85025 COMPLETE CBC W/AUTO DIFF WBC: CPT

## 2025-05-09 PROCEDURE — 84439 ASSAY OF FREE THYROXINE: CPT

## 2025-05-09 PROCEDURE — 83036 HEMOGLOBIN GLYCOSYLATED A1C: CPT

## 2025-05-09 PROCEDURE — 82043 UR ALBUMIN QUANTITATIVE: CPT

## 2025-05-09 PROCEDURE — 82248 BILIRUBIN DIRECT: CPT

## 2025-05-09 PROCEDURE — 84443 ASSAY THYROID STIM HORMONE: CPT

## 2025-06-14 ENCOUNTER — CLINICAL SUPPORT (OUTPATIENT)
Dept: CARDIOLOGY | Facility: HOSPITAL | Age: 85
End: 2025-06-14
Attending: INTERNAL MEDICINE
Payer: MEDICARE

## 2025-06-14 ENCOUNTER — CLINICAL SUPPORT (OUTPATIENT)
Dept: CARDIOLOGY | Facility: HOSPITAL | Age: 85
End: 2025-06-14

## 2025-06-14 DIAGNOSIS — Z95.0 PRESENCE OF CARDIAC PACEMAKER: ICD-10-CM

## 2025-06-14 DIAGNOSIS — I50.9 HEART FAILURE, UNSPECIFIED: ICD-10-CM

## 2025-06-14 PROCEDURE — 93294 REM INTERROG EVL PM/LDLS PM: CPT | Mod: ,,, | Performed by: INTERNAL MEDICINE

## 2025-06-27 DIAGNOSIS — I48.0 PAROXYSMAL ATRIAL FIBRILLATION: Primary | ICD-10-CM

## 2025-06-30 DIAGNOSIS — I48.0 PAROXYSMAL ATRIAL FIBRILLATION: Primary | ICD-10-CM

## 2025-07-16 LAB
OHS CV AF BURDEN PERCENT: < 1
OHS CV DC REMOTE DEVICE TYPE: NORMAL
OHS CV RV PACING PERCENT: 99 %

## 2025-07-28 NOTE — PROGRESS NOTES
"Mrs. Hernandez is a patient of Dr. Macdonald and was last seen in clinic 1/12/2024.      Subjective:   Patient ID:  Roxanne Hernandez is an 85 y.o. female who presents for follow up of Pacemaker Check  .     HPI:    Mrs. Hernandez is an 85 y.o. female with HFpEF, Htn, Aortic stenosis s/p TAVR with subsequent LBBB and 2nd degree AV block, PPM (12/2020), DM, severe MS (not a surgical candidate) here for follow up.    Background:    Primary cardiologist is Dr. Yepez.  TAVR 12/17/20. Developed new LBBB and 2nd degree AV block.  Dual chamber PPM (12/18/20) right sided.  Continued to note dyspnea on exertion.  Echo 1/22/20 normal biventricular structure and function, trivial effusion.  Notes stable dyspnea on exertion. Denies syncope, palpitations.  Device interrogation reveal stable function of the leads, RA pacing 12% RV pacing < 1% no significant arrhythmias.    8/2/2021: Ms. Hernandez is doing well from a device perspective with stable lead and device function. No sustained arrhythmia noted. No RV pacing. No recent CHF symptoms. Echo 11/2020 shows EF 60%.She is feeling well. Her BPs are controlled at home per pt and she is following up with PCP tomorrow. Will make no changes today.    12/13/2023: Dr. Stephen evaluation:  Given her severe mitral annular calcifications, prior TAVR, age, and subjective frailty, she is not a candidate for surgery.  I recommend medical management, mitraclip evaluation (but unlikely to be a candidate given the stenosis).     1/12/2024: Ms. Hernandez is doing well from a device perspective with stable lead and device function. No arrhythmia noted. RV pacing 35% (but ~100% in recent weeks - intrinsic is SR with CHB no escape). Echo 11/2023 shows EF 60-65%. RENDON with severe mitral stenosis - not a surgical candidate but is awaiting evaluation by interventional cards to consider mitraclip.    Update (08/05/2025):    Echo 8/2024 EF 55-60%.   8/22/2024: ADHF  8/2024: Dr Garcia appt: "Maximize medical " "management."    Today she says she is stable. Some RENDON (raymond when mopping) but this has not worsened. Family is taking care of her and notes her breathing is stable. No CP, palpitations, LH, syncope reported.    Device Interrogation (8/5/2025) reveals an intrinsic SR with CHB with stable lead and device function. NSATx1, 6 seconds. No ventricular arrhythmias.  She paces 40% in the RA and 98% in the RV. Estimated battery longevity 5.5 years.     I have personally reviewed the patient's EKG today, which shows ASVP at 81bpm. MN interval is 178. QRS is 156. QTc is 506.    Relevant Cardiac Test Results:    2D Echo (8/1/2024):    Left Ventricle: The left ventricle is normal in size. Normal wall thickness. Normal wall motion. Septal motion is consistent with bundle branch block. There is normal systolic function with a visually estimated ejection fraction of 55 - 60%. There is normal diastolic function.    Right Ventricle: Normal right ventricular cavity size. Wall thickness is normal. Systolic function is normal.    Left Atrium: Left atrium is mildly dilated.    Aortic Valve: There is a transcatheter valve replacement in the aortic position.    Mitral Valve: There is moderate bileaflet sclerosis. There is moderate mitral annular calcification present. Moderately restricted motion. There is moderate stenosis. The mean pressure gradient across the mitral valve is 8 mmHg at a heart rate of  bpm. There is mild regurgitation with a centrally directed jet.    IVC/SVC: Normal venous pressure at 3 mmHg.    Current Outpatient Medications   Medication Sig    aspirin (ECOTRIN) 81 MG EC tablet Take 81 mg by mouth every morning.    atorvastatin (LIPITOR) 20 MG tablet Take 1 tablet (20 mg total) by mouth every evening.    uzq-[id number]-zinc--demetri-bor (CALTRATE 600-D PLUS MINERALS) 600 mg calcium- 800 unit-50 mg Tab Take 1 each by mouth every morning.    cholecalciferol, vitamin D3, 50 mcg (2,000 unit) Chew Take 2,000 Units by mouth " "every morning.    furosemide (LASIX) 20 MG tablet Take 1 tablet (20 mg total) by mouth 2 (two) times daily as needed (1 by mouth each morning OR twice daily when needed; to control swelling and blood pressure.).    isosorbide dinitrate (ISORDIL) 10 MG tablet Take 1 tablet (10 mg total) by mouth every 12 (twelve) hours.    metoprolol succinate (TOPROL-XL) 25 MG 24 hr tablet Take 1 tablet (25 mg total) by mouth every evening.    predniSONE (DELTASONE) 5 MG tablet Take 1 tablet (5 mg total) by mouth 2 (two) times daily. For non-Covid cough illness     No current facility-administered medications for this visit.     Facility-Administered Medications Ordered in Other Visits   Medication    0.9%  NaCl infusion    aspirin EC tablet 325 mg    diazePAM tablet 5 mg    diphenhydrAMINE capsule 25 mg       Review of Systems   Constitutional: Negative for malaise/fatigue.   Cardiovascular:  Positive for dyspnea on exertion (stable). Negative for chest pain, irregular heartbeat, leg swelling and palpitations.   Respiratory:  Negative for shortness of breath.    Hematologic/Lymphatic: Negative for bleeding problem.   Skin:  Negative for rash.   Musculoskeletal:  Negative for myalgias.   Gastrointestinal:  Negative for hematemesis, hematochezia and nausea.   Genitourinary:  Negative for hematuria.   Neurological:  Negative for light-headedness.   Psychiatric/Behavioral:  Negative for altered mental status.    Allergic/Immunologic: Negative for persistent infections.       Objective:          /61 (Patient Position: Sitting)   Pulse 71   Ht 4' 9" (1.448 m)   Wt (!) 157.2 kg (346 lb 9 oz)   BMI 75.00 kg/m²     Physical Exam  Vitals and nursing note reviewed.   Constitutional:       Appearance: Normal appearance. She is well-developed.   HENT:      Head: Normocephalic.      Nose: Nose normal.   Eyes:      Pupils: Pupils are equal, round, and reactive to light.   Cardiovascular:      Rate and Rhythm: Normal rate and regular " rhythm.   Pulmonary:      Effort: No respiratory distress.   Chest:      Comments: Device to RUW. Incision and pocket in good repair.    Musculoskeletal:         General: Normal range of motion.   Skin:     General: Skin is warm and dry.      Findings: No erythema.   Neurological:      Mental Status: She is alert and oriented to person, place, and time.   Psychiatric:         Speech: Speech normal.         Behavior: Behavior normal.           Lab Results   Component Value Date     05/09/2025    K 4.8 05/09/2025    MG 2.1 08/07/2024    BUN 30 (H) 05/09/2025    CREATININE 1.3 05/09/2025    ALT 11 05/09/2025    AST 17 05/09/2025    HGB 14.7 05/09/2025    HCT 44.8 05/09/2025    HCT 42 07/31/2020    TSH 2.615 05/09/2025    LDLCALC 69.0 05/09/2025             Assessment:     1. Presence of permanent cardiac pacemaker    2. Chronic diastolic heart failure    3. Essential hypertension    4. Left bundle branch block      Plan:     In summary, Mrs. Hernandez is an 85 y.o. female with HFpEF, Htn, Aortic stenosis s/p TAVR with subsequent LBBB and 2nd degree AV block, PPM (12/2020), DM, severe MS (not a surgical candidate) here for follow up.  Ms. Hernandez is doing well from a device perspective with stable lead and device function. No sustained arrhythmia noted. CHB with 100% RV pacing which is unchanged from last clinic visit. Severe mitral stenosis and not a surgical or mitraclip candidate. However, her symptoms appear to be well managed on current regimen. No evidence of CHF exacerbation. Doing well. RTC 1 yr.    Continue current medication regimen and device settings.   Follow up in device clinic as scheduled.   Follow up in EP clinic in 1 year, sooner as needed.     *A copy of this note has been sent to Dr. Macdonald*    Follow up in about 1 year (around 8/5/2026).      ------------------------------------------------------------------    BUSHRA Rhoades, NP-C  Cardiac Electrophysiology

## 2025-08-05 ENCOUNTER — HOSPITAL ENCOUNTER (OUTPATIENT)
Dept: CARDIOLOGY | Facility: CLINIC | Age: 85
Discharge: HOME | End: 2025-08-05
Payer: MEDICARE

## 2025-08-05 ENCOUNTER — CLINICAL SUPPORT (OUTPATIENT)
Dept: CARDIOLOGY | Facility: HOSPITAL | Age: 85
End: 2025-08-05
Attending: INTERNAL MEDICINE
Payer: MEDICARE

## 2025-08-05 ENCOUNTER — OFFICE VISIT (OUTPATIENT)
Dept: ELECTROPHYSIOLOGY | Facility: CLINIC | Age: 85
End: 2025-08-05
Payer: MEDICARE

## 2025-08-05 VITALS
WEIGHT: 293 LBS | DIASTOLIC BLOOD PRESSURE: 61 MMHG | HEIGHT: 57 IN | BODY MASS INDEX: 63.21 KG/M2 | SYSTOLIC BLOOD PRESSURE: 105 MMHG | HEART RATE: 71 BPM

## 2025-08-05 DIAGNOSIS — I44.7 LEFT BUNDLE BRANCH BLOCK: ICD-10-CM

## 2025-08-05 DIAGNOSIS — I50.32 CHRONIC DIASTOLIC HEART FAILURE: ICD-10-CM

## 2025-08-05 DIAGNOSIS — Z95.0 PRESENCE OF PERMANENT CARDIAC PACEMAKER: Primary | Chronic | ICD-10-CM

## 2025-08-05 DIAGNOSIS — I10 ESSENTIAL HYPERTENSION: Chronic | ICD-10-CM

## 2025-08-05 DIAGNOSIS — I48.0 PAROXYSMAL ATRIAL FIBRILLATION: ICD-10-CM

## 2025-08-05 LAB
OHS QRS DURATION: 156 MS
OHS QTC CALCULATION: 506 MS

## 2025-08-05 PROCEDURE — 93005 ELECTROCARDIOGRAM TRACING: CPT | Mod: PBBFAC | Performed by: INTERNAL MEDICINE

## 2025-08-05 PROCEDURE — 93280 PM DEVICE PROGR EVAL DUAL: CPT | Mod: 26,,, | Performed by: INTERNAL MEDICINE

## 2025-08-05 PROCEDURE — 99213 OFFICE O/P EST LOW 20 MIN: CPT | Mod: PBBFAC,25 | Performed by: NURSE PRACTITIONER

## 2025-08-05 PROCEDURE — 99214 OFFICE O/P EST MOD 30 MIN: CPT | Mod: S$PBB,,, | Performed by: NURSE PRACTITIONER

## 2025-08-05 PROCEDURE — 93010 ELECTROCARDIOGRAM REPORT: CPT | Mod: S$PBB,,, | Performed by: INTERNAL MEDICINE

## 2025-08-05 PROCEDURE — 93280 PM DEVICE PROGR EVAL DUAL: CPT

## 2025-08-05 PROCEDURE — 99999 PR PBB SHADOW E&M-EST. PATIENT-LVL III: CPT | Mod: PBBFAC,,, | Performed by: NURSE PRACTITIONER

## 2025-08-06 LAB
OHS CV AF BURDEN PERCENT: < 1
OHS CV DC REMOTE DEVICE TYPE: NORMAL
OHS CV RV PACING PERCENT: 98

## (undated) DEVICE — BAG DECANTER 10-102

## (undated) DEVICE — SHEATH INTRODUCER 8FR 11CM

## (undated) DEVICE — FIBER MOSES 365 DFL

## (undated) DEVICE — ADHESIVE DERMABOND ADVANCED

## (undated) DEVICE — SOL IRR WATER STRL 3000 ML

## (undated) DEVICE — CATH ALII 4FR INFINITY

## (undated) DEVICE — SUTURE PROLENE 7-0 BV175-6 24 M8735

## (undated) DEVICE — CATH IMA INFINITI 4FRX100CM

## (undated) DEVICE — GUIDEWIRE EMERALD 150CM PTFE

## (undated) DEVICE — SOLUTION IRRI NS BOTTLE 1000ML R5200-01

## (undated) DEVICE — INFLATOR ENCORE 26 BLLN INFL

## (undated) DEVICE — SYRINGE HYPODERMC LL 22G 1.5 ECLIPSE 30

## (undated) DEVICE — CATHETER URETHRAL RED 18FR

## (undated) DEVICE — PAD BOVIE ADULT

## (undated) DEVICE — PACK CYSTO

## (undated) DEVICE — Device

## (undated) DEVICE — DRAPE OPTIMA MAJOR PEDIATRIC

## (undated) DEVICE — PREP CHLORA 26ML

## (undated) DEVICE — AGENT HEMOSTATIC 3GM OXIDIZED REGENERATED CELLULOSE

## (undated) DEVICE — SHEATH 6FR 35CM

## (undated) DEVICE — EXTRACTOR TIPLESS 2.4FRX1115CM

## (undated) DEVICE — SUTURE VICRYL 2-0 27 SH VCP417H

## (undated) DEVICE — SEE MEDLINE ITEM 107746

## (undated) DEVICE — SYS LOADING ENVEO PRO 14
Type: IMPLANTABLE DEVICE | Site: HEART | Status: NON-FUNCTIONAL
Removed: 2019-12-17

## (undated) DEVICE — CATH MPA2 INFINITI 4FR 100CM

## (undated) DEVICE — FLEXSHEATH DRYSEAL 14FR 33CM

## (undated) DEVICE — CLIP LIGA MED LT200

## (undated) DEVICE — SUTURE MONOCRYL 4-0 PS-2 27 MCP426H

## (undated) DEVICE — WAX BONE 2.5G (YELLOW)

## (undated) DEVICE — VALVE BLEEDBACK CONTROL 1003330

## (undated) DEVICE — STOPCOCK 3-WAY

## (undated) DEVICE — GLOVE BIOGEL 7.5

## (undated) DEVICE — SUTURE VICRYL 3-0 18 SH VCP864D

## (undated) DEVICE — GUIDEWIRE X SPORT .014IN 190CM

## (undated) DEVICE — CLIP LIGACLIP LIGATING SMALL LT100

## (undated) DEVICE — SUTURE PROLENE 6-0 BV-1 24 8805H

## (undated) DEVICE — COVER BAND BAG 40 X 40

## (undated) DEVICE — CATH DXTERITY AL20 100CM 6FR

## (undated) DEVICE — PROTECTION STATION PLUS

## (undated) DEVICE — DRESSING AQUACEL AG ADV 3.5X6

## (undated) DEVICE — LOOP MINI BLUE 30-713

## (undated) DEVICE — DRAPE LAPAROTOMY TRANSVERSE 89281

## (undated) DEVICE — TRAY CYSTO BASIN

## (undated) DEVICE — PREP CHLORA 10.5ML ORANGE

## (undated) DEVICE — SYS DELIVERY ENVEO PRO 18FR 14
Type: IMPLANTABLE DEVICE | Site: HEART | Status: NON-FUNCTIONAL
Removed: 2019-12-17

## (undated) DEVICE — CATH TEMP PACER 5.0FR

## (undated) DEVICE — DRESSING MEPORE 2.5 X 3   670800

## (undated) DEVICE — SEE MEDLINE ITEM 156894

## (undated) DEVICE — KIT STYLET 46CM

## (undated) DEVICE — KIT PERCUTANEOUS SHEATH

## (undated) DEVICE — DRESSING POST OP MEPILEX AG 4X6  498300

## (undated) DEVICE — SHEATH SAFESHEATH II ULTRA 6FR

## (undated) DEVICE — SPIKE CONTRAST CONTROLLER

## (undated) DEVICE — CATHETER URETHRAL RED 16FR

## (undated) DEVICE — VISE RADIFOCUS MULTI TORQUE

## (undated) DEVICE — DRAPE THYROID 89255

## (undated) DEVICE — CATHETER RADIAL TIG 4.0 OPTITORQUE 5X110

## (undated) DEVICE — SUT 3-0 VICRYL / SH (J416)

## (undated) DEVICE — GUIDEWIRE REG. ANGLED .035X260 LAUREATE

## (undated) DEVICE — GUIDEWIRE STF .035X260CM STR

## (undated) DEVICE — PACK PACER PERMANENT

## (undated) DEVICE — CATH POLLACK OPEN-END FLEXI-TI

## (undated) DEVICE — UNDERGLOVE BIOGEL PI MICRO BLUE SZ 8

## (undated) DEVICE — SET IRR URLGY 2LINE UNIV SPIKE

## (undated) DEVICE — TUBING PERFUSION DLP 14000

## (undated) DEVICE — TUBING SUCTION 12' ST2003

## (undated) DEVICE — SHEATH INTRODUCER SLENDER 6FX10CM

## (undated) DEVICE — GOWN SMART LRG 044673

## (undated) DEVICE — GUIDEWIRE AMPLATZ .035X260

## (undated) DEVICE — CANNULA VESSEL 30001

## (undated) DEVICE — KIT SHUNT CAROTID ARGYLE 577775

## (undated) DEVICE — CABLE PACER

## (undated) DEVICE — DERMABOND HVD MINI  DHVM12

## (undated) DEVICE — CATH DXTERITY PG145 110CM 6FR

## (undated) DEVICE — SUTURE MONOCRYL 3-0 27 PS-1 MCP936H

## (undated) DEVICE — ELECTRODE REM PLYHSV RETURN 9

## (undated) DEVICE — SHEATH INTRODUCER 6FR 11CM

## (undated) DEVICE — SLING SWATHE UNIVERSAL FOAM

## (undated) DEVICE — OMNIPAQUE 350 200ML

## (undated) DEVICE — SUCTION 10FR 10052

## (undated) DEVICE — KIT CUSTOM MANIFOLD

## (undated) DEVICE — GUIDEWIRE VERRATA J-TIP 3CMX3CMX36MM

## (undated) DEVICE — KIT MICROINTRO 4F .018X40X7CM

## (undated) DEVICE — TAPE UMBILICAL COTTON XR RADIOPAQU 1/8 X 36 STERILE

## (undated) DEVICE — GLOVE BIOGEL MICRO SURGEON PINK SZ 7.5

## (undated) DEVICE — LINE 60IN PRESSURE MON.

## (undated) DEVICE — TRAY VASCULAR SLIDELL MEMORIAL

## (undated) DEVICE — SUTURE PROLENE 7-0 BV175-6 24 8735H

## (undated) DEVICE — DEVICE PERCLOSE SUT CLSR 6FR

## (undated) DEVICE — GUIDEWIRE CONFIDA BECKER CURVE

## (undated) DEVICE — TUBING HPCIL ROT M/F ADPT 10IN

## (undated) DEVICE — SOL IRR NACL .9% 3000ML

## (undated) DEVICE — GOWN SMARTGOWN LVL4 X-LONG XL

## (undated) DEVICE — CATH ULTRAVERSE 035 7X40X75

## (undated) DEVICE — PAD DEFIB CADENCE ADULT R2

## (undated) DEVICE — HEMOSTAT VASC BAND REG 24CM

## (undated) DEVICE — GUIDEWIRE SUPRA CORE 035 190CM

## (undated) DEVICE — SYR 10CC LUER LOCK

## (undated) DEVICE — DILATOR COONS TAPER 14FR

## (undated) DEVICE — HEMOSTAT SURGICEL 4X8

## (undated) DEVICE — GUIDEWIRE STF .035X180CM ANG

## (undated) DEVICE — DRAPE INCISE IOBAN 2 23X17IN

## (undated) DEVICE — GUIDE WIRE MOTION .035 X 150CM